# Patient Record
Sex: FEMALE | Race: WHITE | NOT HISPANIC OR LATINO | URBAN - METROPOLITAN AREA
[De-identification: names, ages, dates, MRNs, and addresses within clinical notes are randomized per-mention and may not be internally consistent; named-entity substitution may affect disease eponyms.]

---

## 2021-02-03 ENCOUNTER — IMMUNIZATIONS (OUTPATIENT)
Dept: FAMILY MEDICINE CLINIC | Facility: HOSPITAL | Age: 76
End: 2021-02-03

## 2021-02-03 DIAGNOSIS — Z23 ENCOUNTER FOR IMMUNIZATION: Primary | ICD-10-CM

## 2021-02-03 PROCEDURE — 91300 SARS-COV-2 / COVID-19 MRNA VACCINE (PFIZER-BIONTECH) 30 MCG: CPT

## 2021-02-03 PROCEDURE — 0001A SARS-COV-2 / COVID-19 MRNA VACCINE (PFIZER-BIONTECH) 30 MCG: CPT

## 2021-02-26 ENCOUNTER — IMMUNIZATIONS (OUTPATIENT)
Dept: FAMILY MEDICINE CLINIC | Facility: HOSPITAL | Age: 76
End: 2021-02-26

## 2021-02-26 DIAGNOSIS — Z23 ENCOUNTER FOR IMMUNIZATION: Primary | ICD-10-CM

## 2021-02-26 PROCEDURE — 0002A SARS-COV-2 / COVID-19 MRNA VACCINE (PFIZER-BIONTECH) 30 MCG: CPT

## 2021-02-26 PROCEDURE — 91300 SARS-COV-2 / COVID-19 MRNA VACCINE (PFIZER-BIONTECH) 30 MCG: CPT

## 2022-05-26 ENCOUNTER — APPOINTMENT (OUTPATIENT)
Dept: LAB | Age: 77
End: 2022-05-26
Attending: INTERNAL MEDICINE
Payer: MEDICARE

## 2022-05-26 ENCOUNTER — HOSPITAL ENCOUNTER (OUTPATIENT)
Dept: RADIOLOGY | Age: 77
Discharge: HOME | End: 2022-05-26
Attending: INTERNAL MEDICINE
Payer: MEDICARE

## 2022-05-26 ENCOUNTER — TRANSCRIBE ORDERS (OUTPATIENT)
Dept: REGISTRATION | Age: 77
End: 2022-05-26

## 2022-05-26 DIAGNOSIS — I47.20 VENTRICULAR TACHYCARDIA (CMS/HCC): ICD-10-CM

## 2022-05-26 DIAGNOSIS — I49.3 VENTRICULAR PREMATURE DEPOLARIZATION: ICD-10-CM

## 2022-05-26 DIAGNOSIS — I47.20 VENTRICULAR TACHYCARDIA (CMS/HCC): Primary | ICD-10-CM

## 2022-05-26 DIAGNOSIS — I47.10 SUPRAVENTRICULAR TACHYCARDIA (CMS/HCC): ICD-10-CM

## 2022-05-26 DIAGNOSIS — I34.0 NONRHEUMATIC MITRAL (VALVE) INSUFFICIENCY: ICD-10-CM

## 2022-05-26 DIAGNOSIS — I35.1 NONRHEUMATIC AORTIC (VALVE) INSUFFICIENCY: ICD-10-CM

## 2022-05-26 DIAGNOSIS — Z95.818 PRESENCE OF OTHER CARDIAC IMPLANTS AND GRAFTS: ICD-10-CM

## 2022-05-26 DIAGNOSIS — R94.31 ABNORMAL ELECTROCARDIOGRAM (ECG) (EKG): ICD-10-CM

## 2022-05-26 DIAGNOSIS — I65.23 OCCLUSION AND STENOSIS OF BILATERAL CAROTID ARTERIES: ICD-10-CM

## 2022-05-26 DIAGNOSIS — N28.9 DISORDER OF KIDNEY AND URETER, UNSPECIFIED: ICD-10-CM

## 2022-05-26 DIAGNOSIS — R06.09 OTHER FORMS OF DYSPNEA: ICD-10-CM

## 2022-05-26 DIAGNOSIS — I10 ESSENTIAL (PRIMARY) HYPERTENSION: ICD-10-CM

## 2022-05-26 DIAGNOSIS — I65.29 OCCLUSION AND STENOSIS OF UNSPECIFIED CAROTID ARTERY: ICD-10-CM

## 2022-05-26 LAB
ANION GAP SERPL CALC-SCNC: 12 MEQ/L (ref 3–15)
APTT PPP: 30 SEC (ref 23–35)
BUN SERPL-MCNC: 33 MG/DL (ref 8–20)
CALCIUM SERPL-MCNC: 10.3 MG/DL (ref 8.9–10.3)
CHLORIDE SERPL-SCNC: 102 MEQ/L (ref 98–109)
CO2 SERPL-SCNC: 27 MEQ/L (ref 22–32)
CREAT SERPL-MCNC: 1.4 MG/DL (ref 0.6–1.1)
ERYTHROCYTE [DISTWIDTH] IN BLOOD BY AUTOMATED COUNT: 15.3 % (ref 11.7–14.4)
GFR SERPL CREATININE-BSD FRML MDRD: 36.4 ML/MIN/1.73M*2
GLUCOSE SERPL-MCNC: 100 MG/DL (ref 70–99)
HCT VFR BLDCO AUTO: 44.8 % (ref 35–45)
HGB BLD-MCNC: 13.7 G/DL (ref 11.8–15.7)
INR PPP: 1.1
MCH RBC QN AUTO: 28 PG (ref 28–33.2)
MCHC RBC AUTO-ENTMCNC: 30.6 G/DL (ref 32.2–35.5)
MCV RBC AUTO: 91.6 FL (ref 83–98)
PDW BLD AUTO: 11.7 FL (ref 9.4–12.3)
PLATELET # BLD AUTO: 262 K/UL (ref 150–369)
POTASSIUM SERPL-SCNC: 5.1 MEQ/L (ref 3.6–5.1)
PROTHROMBIN TIME: 13.7 SEC (ref 12.2–14.5)
RBC # BLD AUTO: 4.89 M/UL (ref 3.93–5.22)
SODIUM SERPL-SCNC: 141 MEQ/L (ref 136–144)
WBC # BLD AUTO: 6.79 K/UL (ref 3.8–10.5)

## 2022-05-26 PROCEDURE — 85610 PROTHROMBIN TIME: CPT

## 2022-05-26 PROCEDURE — 80048 BASIC METABOLIC PNL TOTAL CA: CPT

## 2022-05-26 PROCEDURE — 85730 THROMBOPLASTIN TIME PARTIAL: CPT

## 2022-05-26 PROCEDURE — 85027 COMPLETE CBC AUTOMATED: CPT

## 2022-05-26 PROCEDURE — 71046 X-RAY EXAM CHEST 2 VIEWS: CPT

## 2022-05-26 PROCEDURE — 36415 COLL VENOUS BLD VENIPUNCTURE: CPT

## 2022-05-27 ENCOUNTER — TRANSCRIBE ORDERS (OUTPATIENT)
Dept: SCHEDULING | Age: 77
End: 2022-05-27

## 2022-05-27 DIAGNOSIS — Z11.59 ENCOUNTER FOR SCREENING FOR OTHER VIRAL DISEASES: Primary | ICD-10-CM

## 2022-06-06 ENCOUNTER — APPOINTMENT (OUTPATIENT)
Dept: LAB | Age: 77
DRG: 247 | End: 2022-06-06
Attending: PHYSICIAN ASSISTANT
Payer: MEDICARE

## 2022-06-06 DIAGNOSIS — Z11.59 ENCOUNTER FOR SCREENING FOR OTHER VIRAL DISEASES: ICD-10-CM

## 2022-06-06 LAB — SARS-COV-2 RNA RESP QL NAA+PROBE: NEGATIVE

## 2022-06-06 PROCEDURE — U0003 INFECTIOUS AGENT DETECTION BY NUCLEIC ACID (DNA OR RNA); SEVERE ACUTE RESPIRATORY SYNDROME CORONAVIRUS 2 (SARS-COV-2) (CORONAVIRUS DISEASE [COVID-19]), AMPLIFIED PROBE TECHNIQUE, MAKING USE OF HIGH THROUGHPUT TECHNOLOGIES AS DESCRIBED BY CMS-2020-01-R: HCPCS

## 2022-06-06 PROCEDURE — C9803 HOPD COVID-19 SPEC COLLECT: HCPCS

## 2022-06-08 NOTE — H&P
There have been no  interim changes in his medical history since the office visit.   Treatment still indicated    Admitted today for elective left and RIGHT heart catheterization related to continued PASCUAL(anginal equivalent)     Echocardiogram in January which showed a preserved EF with mild-to-moderate valvular regurgitation and mild pulmonary hypertension.  She had a PET stress test in November 2021. There were some EKG changes during the stress test but myocardial perfusion imaging appear normal.        There have been no medication changes.     Has been NPO since midnight      Labs reviewed 05/26/22  Creatinine 1.4  HG 13.7  Plats 262  COVID negative 06/06/22    Aspirin given  No contraindications to DAPT               Date of service: 05/26/2022 11:10 AM    Background     Reason for Visit: Follow-up    History from: Patient    Chief Complaint   #1: See HPI    History of Present Illness:  77-year-old woman with past medical history of mild bilateral carotid artery disease, hypertension, hyperlipidemia, mild mitral and mild aortic regurgitation, frequent PVCs, and dyspnea on exertion presents for follow-up.    Patient still with PASCUAL.  She has no chest pain.  She denies palpitations.  ILR transmissions have shown brief episodes of SVT as well as NSVT.  The patient reports that medications are being taken as prescribed.  No leg swelling, orthopnea or PND.  No presyncope or syncope.     She had an echocardiogram in January that showed EF 65-70%, mild LVH, mild LAE, mild MR, mild AR, mild/mod TR, mod W[place], mild pHTN with PASP 35-40 mmHg.     She had an implantable loop recorder placed earlier this year.  He has shown brief episodes of SVT as well as nonsustained ventricular tachycardia.    Past Medical and Surgical Histories   Past Medical History (reviewed - no changes required):   Hypertension  Arthritis  Left retinal vein occlusion 6/2016  PVC's, frequent   PAC's  SVT, NSVT   Hyperlipidemia   Cerebral Vascular  Disease   Mitral Regurgitation, trace to mild  Aortic Regurgitation, mild  CKD III    Rt hip replacement- 2011    Echo (2022): EF 65-70%, mild LVH, mild LAE, mild MR, mild AR, mild/mod TR, mod GA, mild pHTN with PASP 35-40 mmHg.   Past Surgical History (reviewed - no changes required):   Total right hip replacement    Social History     Smoked Tobacco Usage    Smoking Status:  Never    Detailed Status:  Never smoker    Smokeless Tobacco Usage     Smokeless Status:   Never    Alcohol Usage     Alcohol Status:  Current    She drinks alcohol infrequently.    Average drink(s) / day: Infrequent    Additional Social History (reviewed - no changes required):    & retired  Exercises regularly    Family History   Structured Family History  [01]  Father:   Heart disease;  (3/26/2015).  [01]  Mother:   Heart disease;  (3/26/2015).  Family History (reviewed - no changes required):   father  @ 80- [place], pacemaker  mother  @ 92- old age    Cardiac Catheterization History   Past Cath History:   Carotid Ultrasound Exam 16- mild plaque, PHAN < 50%, LICA no hemodynamically significant stenosis   Echocardiogram 16- EF 70%, no LVH, Ao nl, LV/RV nl,  mild MR, mild AR, TR mild-mod, PA 30-35 mmHg, mild-mod PI.  Since 9/24/15, the MR is mild  Carotid Ultrasound Exam 10/9/18- bilat 50-69% stenosis (lower end spectrum),  since 18 there is bilat. interval progression.  Repeat 1 year.    Review of Systems   General: Denies weakness and fatigue.   Eyes: Denies change in vision.   Cardiovascular: Acknowledges dyspnea on exertion. Denies chest pain at rest, chest pain with exercise, palpitations and peripheral edema.   Respiratory: Denies dyspnea at rest, wheezing and shortness of breath.   Gastrointestinal: Denies nausea, vomiting and change in bowel habits.   Genitourinary: Denies hematuria and nocturia.   Musculoskeletal: Denies myalgias.   Neurologic: Denies pre-syncope, syncope and dizziness.    Psychiatric: Denies depression and anxiety.   Endocrine: Denies unusual weight change.   Heme/Lymphatic: Denies abnormal bruising and bleeding.   Allergic/Immunologic: Denies hay fever.     Physical Exam  I have examined the patient and concur with documented physical findings.  Vital Signs:     Height: 63 inches  (01/31/2022).    Weight: 175 pounds    BP cuff size:  Large    BP: 120/64 mmHg HR: 56 bpm.    Body Mass Index: 31.11    Body Surface Area: 1.83 m2.  General: Well developed and well nourished.   Eyes: JOSEY, conjunctiva and sclera clear and no xanthomas.   Neck: Supple, no adenopathy and no thyromegaly.   Lungs: Clear to auscultation.   Chest: No obvious deformity.   Heart: Regular rate and rhythm, S1 and S2 physiologic and PMI not displaced. Examination of neck veins reveals no neck vein distention.  Carotid artery examination reveals no carotid bruits.    Extremities: There is no peripheral edema cyanosis or clubbing.   Pulses: Pedal pulses are normal bilaterally.   Abdomen: Normoactive bowel sounds and soft and non-tender.   Musculoskeletal: Normal gait and normal strength and tone.   Skin: Warm and dry and no rashes.   Neurologic: Alert, oriented x 3 and appropriate affect.       Resting Electrocardiogram  ECG personally reviewed   ECG Date:  05/26/2022   Sinus bradycardia with frequent atrial premature complexes,    Heart Rate: 55 bpm. NC:  154 msec. QRS: 90 msec. QT: 458 msec.   QRS Axis:   8 deg.   ECG Comments: # PACs = 2., -Poor R-wave progression,  Low voltage, possible pulmonary disease. , may be secondary to pulmonary disease consider old anterior infarct   Summary: Abnormal tracing.      Medications:   coenzyme Q10 100 mg capsule (coenzyme q10) Take 1 capsule by mouth once a day   fluticasone propionate 50 mcg/actuation spray,suspension (fluticasone propionate) Spray 2 spray into both nostrils once a day   triamterene-hydrochlorothiazid 37.5-25 mg tablet (triamterene-hydrochlorothiazid) Take  1 tablet by mouth once a day   Aspirin Low Dose 81 mg tablet,delayed release (DR/EC) (aspirin) Take 1 tablet by mouth once a day   omeprazole 40 mg capsule,delayed release(DR/EC) (omeprazole) Take 1 capsule by mouth once a day   simvastatin 20 mg tablet (simvastatin) Take 1 tablet every night   tramadol-acetaminophen 37.5-325 mg tablet (tramadol-acetaminophen) Take 1 tablet by mouth twice a day   famotidine 40 mg tablet (famotidine) Take 1 tablet by mouth at bedtime   metoprolol succinate 50 mg tablet extended release 24 hr (metoprolol succinate) Take 1 tablet by mouth once a day   Medications reviewed today.    Medications list verified with patient by Brian Saravia M.D..    Allergies, Intolerances and/or Therapeutic Variances:    CELEBREX (CELECOXIB)  [DRUG]  (Onset: 06/12/2013) Critical: rash & Bruisisng  CIPRO  [DRUG] Critical: torso rash  VOLTAREN  [DRUG] Critical  * CLINDAMYCIN  [DRUG] Severe: intolerant  CRESTOR (ROSUVASTATIN CALCIUM)  [DRUG]  (Onset: 02/17/2017) Severe: severe aches  ATORVASTATIN CALCIUM (ATORVASTATIN CALCIUM)  [DRUG]  (Onset: 06/16/2017) Severe: itching, no rash  Allergies and adverse reactions reviewed today.      Problems:   NONSUSTAINED VENTRICULAR TACHYCARDIA (ICD-427.1) (EBP18-P76.2)  PAROXYSMAL SVT (ICD-427.0) (YXL81-B81.1)  NEAR SYNCOPE (ICD-780.2) (EQQ42-U97)  MEDTRON[employer] SUBCUTANEOUS IMPLANTABLE LOOP RECORDER IN SITU (ICD-V45.09) (NFV95-D79.818)  DYSPNEA ON EXERTION (ICD-786.09) (KWX04-F46.09)  STENOSIS OR OCCLUSION OF BILATERAL CAROTID ARTERIES (ICD-433.10) (BXO78-M36.23)  BODY MASS INDEX (BMI) 31 - 32 (ICD-V85.30) (TNA94-H17.31)  SHORTNESS OF BREATH (ICD-786.05) (KLX75-X25.02)  PALPITATIONS (ICD-785.1) (JCF01-N28.2)  PREMATURE VENTRICULAR CONTRACTIONS (ICD-427.69) (NDM54-A07.3)  ESSENTIAL BENIGN HYPERTENSION (ICD-401.1) (SML43-W40)  MITRAL REGURGITATION (ICD-424.0) (RHM41-X79.0)  AORTIC REGURGITATION, MILD (ICD-424.1) (WNI28-E30.1)  ABNORMAL ELECTROCARDIOGRAM (ICD-794.31)  (GGF43-B92.31)  CAROTID ARTERY DISEASE (ICD-433.10) (BWW06-M20.29)  DEGENERATIVE JOINT DISEASE (ICD-715.90) (XGK99-I53.90)  GAIT DISTURBANCE (ICD-781.2) (CAM79-K46.9)  HYPERLIPIDEMIA (ICD-272.4) (XCS75-N76.5)  CENTRAL RETINAL VEIN OCCLUSION (ICD-362.35) (IOU42-C48.819)  RENAL INSUFFICIENCY (ICD-593.9) (DPW95-V26.9)  Problems reviewed today.      Impression:  1)  Nonsustained Ventricular Tachycardia :  We will continue to monitor her loop recorder transmissions.  There was 1 episode of nonsustained ventricular tachycardia detected.  She is on metoprolol succinate 50 mg once daily.  Continue.  She follows with electrophysiology.    2)  Paroxysmal SVT :  Brief episodes of SVT have also been noted on her loop recorder transmissions.  Continue Toprol.    3)  [employer] Subcutaneous Implantable Loop Recorder In Situ :  Noted.  Continue to monitor.    4)  Dyspnea On Exertion :  The patient complains of dyspnea on exertion.  This is chronic.  It is most notable when she is walking up hills.  She denies exertional chest pain.  Prior evaluation includes echocardiogram in January which showed a preserved EF with mild-to-moderate valvular regurgitation and mild pulmonary hypertension.  She had a PET stress test in November 2021. There were some EKG changes during the stress test but myocardial perfusion imaging appear normal.  Despite this, she is still complaining of dyspnea on exertion.  She has multiple risk factors for vascular disease including age, obesity, hypertension, and hyperlipidemia.  I am sending for right heart catheterization and I will also check a coronary angiogram to definitively evaluate coronary arteries and exclude symptomatic coronary artery disease as potential culprit of her dyspnea on exertion.    5)  Stenosis Or Occlusion Of Bilateral Carotid Arteries :  The patient has mild bilateral carotid artery disease.  Continue medical management with aspirin and simvastatin.  Recent blood work reviewed.  LDL  61, HDL 50, triglycerides 94.     6)  Premature Ventricular Contractions :  Noted.  Continue Toprol.     7)  Essential Benign Hypertension :  Blood pressure is controlled.  Continue current medications.    8)  Mitral Regurgitation :  Mild in severity.  Stable.  Monitor.    9)  Aortic Regurgitation: Mild :  Mild in severity.  Stable.  Monitor.    10)  Abnormal Electrocardiogram :  EKG today shows sinus bradycardia with PACs and poor R-wave progression, cannot exclude old anterior MI.  Continue to monitor.    11)  Carotid Artery Disease :  Plan as above.    12)  Renal Insufficiency :  Creatinine stable on recent blood work at 1.29.    RTO 6 months  R/[facility]  Pre-procedural bloodwork and CXR ordered             Return visit requested: 6 month(s) as a preferred in office encounter.    Medical Decision Making: Order Unique Test                    Electronically Signed by Brian Saravia M.D. on 05/26/2022 at 11:48 AM    ________________________________________________________________________

## 2022-06-09 ENCOUNTER — HOSPITAL ENCOUNTER (INPATIENT)
Facility: HOSPITAL | Age: 77
LOS: 1 days | Discharge: HOME | DRG: 247 | End: 2022-06-10
Attending: INTERNAL MEDICINE | Admitting: INTERNAL MEDICINE
Payer: MEDICARE

## 2022-06-09 ENCOUNTER — PROCEDURE PASS (OUTPATIENT)
Age: 77
End: 2022-06-09
Payer: MEDICARE

## 2022-06-09 DIAGNOSIS — I25.10 CAD (CORONARY ARTERY DISEASE): Primary | ICD-10-CM

## 2022-06-09 DIAGNOSIS — Z01.812 ENCOUNTER FOR PRE-OPERATIVE LABORATORY TESTING: ICD-10-CM

## 2022-06-09 DIAGNOSIS — R06.02 SHORTNESS OF BREATH: ICD-10-CM

## 2022-06-09 LAB
POCT ACT-LR: 283 SEC (ref 113–149)
POCT ACT-LR: 287 SEC (ref 113–149)
POCT ACT-LR: 312 SEC (ref 113–149)
POCT ACT-LR: 357 SEC (ref 113–149)
POCT ACT-LR: 363 SEC (ref 113–149)
POCT ACT-LR: 379 SEC (ref 113–149)
POCT OXYHGB: 78.2 % (ref 93–98)
POCT OXYHGB: 96.7 % (ref 93–98)
POCT TEST: ABNORMAL
POCT TEST: NORMAL

## 2022-06-09 PROCEDURE — 71000011 HC PACU PHASE 1 EA ADDL MIN: Performed by: INTERNAL MEDICINE

## 2022-06-09 PROCEDURE — 71000001 HC PACU PHASE 1 INITIAL 30MIN: Performed by: INTERNAL MEDICINE

## 2022-06-09 PROCEDURE — 4A023N8 MEASUREMENT OF CARDIAC SAMPLING AND PRESSURE, BILATERAL, PERCUTANEOUS APPROACH: ICD-10-PCS | Performed by: INTERNAL MEDICINE

## 2022-06-09 PROCEDURE — 93460 R&L HRT ART/VENTRICLE ANGIO: CPT | Mod: XU | Performed by: INTERNAL MEDICINE

## 2022-06-09 PROCEDURE — B2101ZZ FLUOROSCOPY OF SINGLE CORONARY ARTERY USING LOW OSMOLAR CONTRAST: ICD-10-PCS | Performed by: INTERNAL MEDICINE

## 2022-06-09 PROCEDURE — 93005 ELECTROCARDIOGRAM TRACING: CPT | Mod: 59 | Performed by: NURSE PRACTITIONER

## 2022-06-09 PROCEDURE — C1887 CATHETER, GUIDING: HCPCS | Performed by: INTERNAL MEDICINE

## 2022-06-09 PROCEDURE — 93454 CORONARY ARTERY ANGIO S&I: CPT | Mod: XU,78 | Performed by: INTERNAL MEDICINE

## 2022-06-09 PROCEDURE — 63600105 HC IODINE BASED CONTRAST: Mod: JW | Performed by: INTERNAL MEDICINE

## 2022-06-09 PROCEDURE — C1769 GUIDE WIRE: HCPCS | Performed by: INTERNAL MEDICINE

## 2022-06-09 PROCEDURE — 12000000 HC ROOM AND CARE MED/SURG

## 2022-06-09 PROCEDURE — C1726 CATH, BAL DIL, NON-VASCULAR: HCPCS | Performed by: INTERNAL MEDICINE

## 2022-06-09 PROCEDURE — 25800000 HC PHARMACY IV SOLUTIONS: Performed by: NURSE PRACTITIONER

## 2022-06-09 PROCEDURE — 85347 COAGULATION TIME ACTIVATED: CPT | Performed by: INTERNAL MEDICINE

## 2022-06-09 PROCEDURE — 63600000 HC DRUGS/DETAIL CODE: Performed by: INTERNAL MEDICINE

## 2022-06-09 PROCEDURE — C9601 PERC DRUG-EL COR STENT BRAN: HCPCS | Performed by: INTERNAL MEDICINE

## 2022-06-09 PROCEDURE — C1725 CATH, TRANSLUMIN NON-LASER: HCPCS | Performed by: INTERNAL MEDICINE

## 2022-06-09 PROCEDURE — C1751 CATH, INF, PER/CENT/MIDLINE: HCPCS | Performed by: INTERNAL MEDICINE

## 2022-06-09 PROCEDURE — 63700000 HC SELF-ADMINISTRABLE DRUG: Performed by: NURSE PRACTITIONER

## 2022-06-09 PROCEDURE — C9606 PERC D-E COR REVASC W AMI S: HCPCS | Performed by: INTERNAL MEDICINE

## 2022-06-09 PROCEDURE — B2111ZZ FLUOROSCOPY OF MULTIPLE CORONARY ARTERIES USING LOW OSMOLAR CONTRAST: ICD-10-PCS | Performed by: INTERNAL MEDICINE

## 2022-06-09 PROCEDURE — 63700000 HC SELF-ADMINISTRABLE DRUG: Performed by: INTERNAL MEDICINE

## 2022-06-09 PROCEDURE — 02703ZZ DILATION OF CORONARY ARTERY, ONE ARTERY, PERCUTANEOUS APPROACH: ICD-10-PCS | Performed by: INTERNAL MEDICINE

## 2022-06-09 PROCEDURE — C9600 PERC DRUG-EL COR STENT SING: HCPCS | Mod: RC | Performed by: INTERNAL MEDICINE

## 2022-06-09 PROCEDURE — C1874 STENT, COATED/COV W/DEL SYS: HCPCS | Performed by: INTERNAL MEDICINE

## 2022-06-09 PROCEDURE — C9600 PERC DRUG-EL COR STENT SING: HCPCS | Mod: LC

## 2022-06-09 PROCEDURE — 027135Z DILATION OF CORONARY ARTERY, TWO ARTERIES WITH TWO DRUG-ELUTING INTRALUMINAL DEVICES, PERCUTANEOUS APPROACH: ICD-10-PCS | Performed by: INTERNAL MEDICINE

## 2022-06-09 PROCEDURE — 93005 ELECTROCARDIOGRAM TRACING: CPT | Performed by: NURSE PRACTITIONER

## 2022-06-09 PROCEDURE — C1894 INTRO/SHEATH, NON-LASER: HCPCS | Performed by: INTERNAL MEDICINE

## 2022-06-09 PROCEDURE — 25000000 HC PHARMACY GENERAL: Performed by: INTERNAL MEDICINE

## 2022-06-09 PROCEDURE — 21400000 HC ROOM AND CARE CCU/INTERMEDIATE

## 2022-06-09 PROCEDURE — 25000000 HC PHARMACY GENERAL: Performed by: NURSE PRACTITIONER

## 2022-06-09 PROCEDURE — 27200000 HC STERILE SUPPLY: Performed by: INTERNAL MEDICINE

## 2022-06-09 PROCEDURE — C1760 CLOSURE DEV, VASC: HCPCS | Performed by: INTERNAL MEDICINE

## 2022-06-09 DEVICE — EVEROLIMUS-ELUTING PLATINUM CHROMIUM CORONARY STENT SYSTEM
Type: IMPLANTABLE DEVICE | Site: CORONARY | Status: FUNCTIONAL
Brand: SYNERGY™ XD

## 2022-06-09 DEVICE — PERCLOSE PROGLIDE™ SUTURE-MEDIATED CLOSURE SYSTEM
Type: IMPLANTABLE DEVICE | Site: GROIN | Status: FUNCTIONAL
Brand: PERCLOSE PROGLIDE™

## 2022-06-09 RX ORDER — MIDAZOLAM HYDROCHLORIDE 2 MG/2ML
INJECTION, SOLUTION INTRAMUSCULAR; INTRAVENOUS
Status: DISCONTINUED | OUTPATIENT
Start: 2022-06-09 | End: 2022-06-09 | Stop reason: HOSPADM

## 2022-06-09 RX ORDER — FENTANYL CITRATE 50 UG/ML
INJECTION, SOLUTION INTRAMUSCULAR; INTRAVENOUS
Status: DISCONTINUED | OUTPATIENT
Start: 2022-06-09 | End: 2022-06-09 | Stop reason: HOSPADM

## 2022-06-09 RX ORDER — NICARDIPINE HCL-0.9% SOD CHLOR 1 MG/10 ML
SYRINGE (ML) INTRAVENOUS
Status: DISCONTINUED | OUTPATIENT
Start: 2022-06-09 | End: 2022-06-09 | Stop reason: HOSPADM

## 2022-06-09 RX ORDER — IODIXANOL 320 MG/ML
INJECTION, SOLUTION INTRAVASCULAR
Status: DISCONTINUED | OUTPATIENT
Start: 2022-06-09 | End: 2022-06-09 | Stop reason: HOSPADM

## 2022-06-09 RX ORDER — FAMOTIDINE 20 MG/1
40 TABLET, FILM COATED ORAL NIGHTLY
Status: DISCONTINUED | OUTPATIENT
Start: 2022-06-09 | End: 2022-06-09

## 2022-06-09 RX ORDER — FAMOTIDINE 20 MG/1
20 TABLET, FILM COATED ORAL NIGHTLY
Status: DISCONTINUED | OUTPATIENT
Start: 2022-06-09 | End: 2022-06-10 | Stop reason: HOSPADM

## 2022-06-09 RX ORDER — CLOPIDOGREL BISULFATE 75 MG/1
TABLET ORAL
Status: DISCONTINUED | OUTPATIENT
Start: 2022-06-09 | End: 2022-06-09 | Stop reason: HOSPADM

## 2022-06-09 RX ORDER — SIMVASTATIN 20 MG/1
20 TABLET, FILM COATED ORAL NIGHTLY
COMMUNITY
End: 2022-09-25 | Stop reason: HOSPADM

## 2022-06-09 RX ORDER — ATROPINE SULFATE 0.1 MG/ML
0.5 INJECTION INTRAVENOUS EVERY 5 MIN PRN
Status: DISCONTINUED | OUTPATIENT
Start: 2022-06-09 | End: 2022-06-10 | Stop reason: HOSPADM

## 2022-06-09 RX ORDER — IBUPROFEN 200 MG
16-32 TABLET ORAL AS NEEDED
Status: DISCONTINUED | OUTPATIENT
Start: 2022-06-09 | End: 2022-06-10 | Stop reason: HOSPADM

## 2022-06-09 RX ORDER — SODIUM CHLORIDE 450 MG/100ML
INJECTION, SOLUTION INTRAVENOUS CONTINUOUS
Status: ACTIVE | OUTPATIENT
Start: 2022-06-09 | End: 2022-06-09

## 2022-06-09 RX ORDER — METOPROLOL SUCCINATE 50 MG/1
50 TABLET, EXTENDED RELEASE ORAL DAILY
Status: DISCONTINUED | OUTPATIENT
Start: 2022-06-09 | End: 2022-06-10 | Stop reason: HOSPADM

## 2022-06-09 RX ORDER — UBIDECARENONE 100 MG
100 CAPSULE ORAL EVERY EVENING
COMMUNITY
End: 2022-09-25 | Stop reason: HOSPADM

## 2022-06-09 RX ORDER — SODIUM CHLORIDE 450 MG/100ML
INJECTION, SOLUTION INTRAVENOUS CONTINUOUS
Status: DISCONTINUED | OUTPATIENT
Start: 2022-06-09 | End: 2022-06-09

## 2022-06-09 RX ORDER — HYDRALAZINE HYDROCHLORIDE 20 MG/ML
INJECTION INTRAMUSCULAR; INTRAVENOUS
Status: DISCONTINUED | OUTPATIENT
Start: 2022-06-09 | End: 2022-06-09 | Stop reason: HOSPADM

## 2022-06-09 RX ORDER — ACETAMINOPHEN 325 MG/1
650 TABLET ORAL EVERY 4 HOURS PRN
Status: DISCONTINUED | OUTPATIENT
Start: 2022-06-09 | End: 2022-06-10 | Stop reason: HOSPADM

## 2022-06-09 RX ORDER — PANTOPRAZOLE SODIUM 20 MG/1
20 TABLET, DELAYED RELEASE ORAL DAILY
Status: DISCONTINUED | OUTPATIENT
Start: 2022-06-10 | End: 2022-06-10 | Stop reason: HOSPADM

## 2022-06-09 RX ORDER — DIPHENHYDRAMINE HCL 25 MG
25 CAPSULE ORAL EVERY 6 HOURS PRN
Status: DISCONTINUED | OUTPATIENT
Start: 2022-06-09 | End: 2022-06-10 | Stop reason: HOSPADM

## 2022-06-09 RX ORDER — TRAMADOL HYDROCHLORIDE AND ACETAMINOPHEN 37.5; 325 MG/1; MG/1
1 TABLET, FILM COATED ORAL
COMMUNITY

## 2022-06-09 RX ORDER — TRIAMTERENE/HYDROCHLOROTHIAZID 37.5-25 MG
1 TABLET ORAL EVERY MORNING
COMMUNITY
End: 2022-07-19

## 2022-06-09 RX ORDER — OMEPRAZOLE 20 MG/1
20 CAPSULE, DELAYED RELEASE ORAL
COMMUNITY
End: 2022-07-19

## 2022-06-09 RX ORDER — NITROGLYCERIN 0.4 MG/1
0.4 TABLET SUBLINGUAL EVERY 5 MIN PRN
Status: DISCONTINUED | OUTPATIENT
Start: 2022-06-09 | End: 2022-06-10 | Stop reason: HOSPADM

## 2022-06-09 RX ORDER — ONDANSETRON HYDROCHLORIDE 2 MG/ML
4 INJECTION, SOLUTION INTRAVENOUS EVERY 8 HOURS PRN
Status: DISCONTINUED | OUTPATIENT
Start: 2022-06-09 | End: 2022-06-10 | Stop reason: HOSPADM

## 2022-06-09 RX ORDER — NITROGLYCERIN 0.4 MG/1
0.4 TABLET SUBLINGUAL EVERY 5 MIN PRN
Status: DISCONTINUED | OUTPATIENT
Start: 2022-06-09 | End: 2022-06-09

## 2022-06-09 RX ORDER — ASPIRIN 81 MG/1
81 TABLET ORAL DAILY
COMMUNITY
End: 2022-07-25 | Stop reason: HOSPADM

## 2022-06-09 RX ORDER — TRIAMTERENE/HYDROCHLOROTHIAZID 37.5-25 MG
1 TABLET ORAL DAILY
Status: DISCONTINUED | OUTPATIENT
Start: 2022-06-10 | End: 2022-06-10 | Stop reason: HOSPADM

## 2022-06-09 RX ORDER — MORPHINE SULFATE 2 MG/ML
2 INJECTION, SOLUTION INTRAMUSCULAR; INTRAVENOUS
Status: DISCONTINUED | OUTPATIENT
Start: 2022-06-09 | End: 2022-06-10

## 2022-06-09 RX ORDER — ALUMINUM HYDROXIDE, MAGNESIUM HYDROXIDE, AND SIMETHICONE 1200; 120; 1200 MG/30ML; MG/30ML; MG/30ML
30 SUSPENSION ORAL EVERY 4 HOURS PRN
Status: DISCONTINUED | OUTPATIENT
Start: 2022-06-09 | End: 2022-06-10 | Stop reason: HOSPADM

## 2022-06-09 RX ORDER — HEPARIN SODIUM 1000 [USP'U]/ML
INJECTION, SOLUTION INTRAVENOUS; SUBCUTANEOUS
Status: DISCONTINUED | OUTPATIENT
Start: 2022-06-09 | End: 2022-06-09 | Stop reason: HOSPADM

## 2022-06-09 RX ORDER — ASPIRIN 81 MG/1
81 TABLET ORAL DAILY
Status: DISCONTINUED | OUTPATIENT
Start: 2022-06-10 | End: 2022-06-10 | Stop reason: HOSPADM

## 2022-06-09 RX ORDER — DEXTROSE 40 %
15-30 GEL (GRAM) ORAL AS NEEDED
Status: DISCONTINUED | OUTPATIENT
Start: 2022-06-09 | End: 2022-06-10 | Stop reason: HOSPADM

## 2022-06-09 RX ORDER — CLOPIDOGREL BISULFATE 75 MG/1
75 TABLET ORAL DAILY
Status: DISCONTINUED | OUTPATIENT
Start: 2022-06-10 | End: 2022-06-10 | Stop reason: HOSPADM

## 2022-06-09 RX ORDER — DEXTROSE 50 % IN WATER (D50W) INTRAVENOUS SYRINGE
25 AS NEEDED
Status: DISCONTINUED | OUTPATIENT
Start: 2022-06-09 | End: 2022-06-10 | Stop reason: HOSPADM

## 2022-06-09 RX ORDER — LIDOCAINE HYDROCHLORIDE 10 MG/ML
INJECTION, SOLUTION INFILTRATION; PERINEURAL
Status: DISCONTINUED | OUTPATIENT
Start: 2022-06-09 | End: 2022-06-09 | Stop reason: HOSPADM

## 2022-06-09 RX ORDER — METOPROLOL SUCCINATE 50 MG/1
50 TABLET, EXTENDED RELEASE ORAL DAILY
COMMUNITY
End: 2022-07-25 | Stop reason: HOSPADM

## 2022-06-09 RX ORDER — NAPROXEN SODIUM 220 MG/1
81 TABLET, FILM COATED ORAL ONCE
Status: COMPLETED | OUTPATIENT
Start: 2022-06-09 | End: 2022-06-09

## 2022-06-09 RX ORDER — CLOPIDOGREL BISULFATE 75 MG/1
75 TABLET ORAL DAILY
Qty: 30 TABLET | Refills: 11 | Status: SHIPPED | OUTPATIENT
Start: 2022-06-09 | End: 2022-06-10 | Stop reason: HOSPADM

## 2022-06-09 RX ORDER — FAMOTIDINE 20 MG/1
40 TABLET, FILM COATED ORAL NIGHTLY PRN
COMMUNITY
End: 2022-07-19

## 2022-06-09 RX ORDER — METOPROLOL TARTRATE 1 MG/ML
5 INJECTION, SOLUTION INTRAVENOUS ONCE
Status: COMPLETED | OUTPATIENT
Start: 2022-06-09 | End: 2022-06-09

## 2022-06-09 RX ADMIN — ASPIRIN 81 MG CHEWABLE TABLET 81 MG: 81 TABLET CHEWABLE at 08:14

## 2022-06-09 RX ADMIN — METOPROLOL SUCCINATE 50 MG: 50 TABLET, EXTENDED RELEASE ORAL at 16:15

## 2022-06-09 RX ADMIN — FAMOTIDINE 20 MG: 20 TABLET ORAL at 21:13

## 2022-06-09 RX ADMIN — SODIUM CHLORIDE: 4.5 INJECTION, SOLUTION INTRAVENOUS at 08:14

## 2022-06-09 RX ADMIN — SODIUM CHLORIDE: 4.5 INJECTION, SOLUTION INTRAVENOUS at 17:00

## 2022-06-09 RX ADMIN — METOPROLOL TARTRATE 5 MG: 1 INJECTION, SOLUTION INTRAVENOUS at 11:23

## 2022-06-09 RX ADMIN — NITROGLYCERIN 0.4 MG: 0.4 TABLET SUBLINGUAL at 11:23

## 2022-06-09 ASSESSMENT — PATIENT HEALTH QUESTIONNAIRE - PHQ9: SUM OF ALL RESPONSES TO PHQ9 QUESTIONS 1 & 2: 0

## 2022-06-09 NOTE — Clinical Note
Patient placed on procedure table in supine position with arms at side. Positioning devices: all pressure points padded, arm board under arms, heel pads in place, safety strap applied, wrist straps in place, donut foam under head and pillow under knees.

## 2022-06-09 NOTE — DISCHARGE INSTRUCTIONS
Please follow up with Dr. Saravia in 1 week    Never miss a dose of Aspirin and Plavix        SPECIAL CAUTION FOR GROIN PUNCTURE:    1. No driving, bending over at the waist, or bearing down for 48 hours.    2. No lifting over 5 lbs for 1 week.    3. No bath or swimming pools for 1 week, you may shower. No lotions or powders    4. If Bleeding occurs, lie down immediately and apply pressure to the site with your fingers. Have Someone call you physician immediately. Call 911 if bleeding does not stop.    5. If you have any questions or problems of medical nature your should call your physician.    6. Call for fever >100.4, abnormal bruising or drainage from the site.    7. You may remove the dressing in the morning, no need to apply a bandage.

## 2022-06-09 NOTE — POST-PROCEDURE NOTE
Pt's  called and updated regarding plan to stay overnight. Pt's  has already spoken with Dr. Gallo who gave in depth review of procedure, findings, and interventions.

## 2022-06-09 NOTE — POST-PROCEDURE NOTE
"Pt returned to holding area. Denies pain. Reports feeling \"much better.\" Dr. Gallo at bedside to review procedure findings and interventions with patient. Questions encouraged and answered.  "

## 2022-06-09 NOTE — Clinical Note
The left groin and left radial was clipped, marked and prepped with ChloraPrep. The patient was draped in a sterile fashion after allowing for the recommended dry time.

## 2022-06-09 NOTE — POST-PROCEDURE NOTE
Pt c/o moderate 5/10 chest pressure and throat discomfort at 1118. EKG obtained. Dr Gallo aware and at bedside. Nitroglycerin 0.4mg x1 tab given, 5mg IV Metoprolol also administered. Pt reports persistent chest and throat discomfort. Pt taken back into cath lab by Dr. Gallo. See flowsheet and MAR for correlating information.

## 2022-06-09 NOTE — NURSING NOTE
R groin site with saturated dressing. Manual pressure help for 15 minutes. Cath lab nurse to bedside to apply brien dressing. L radial dressing with slight oozing. Pressure dressing applied by cath lab RN.

## 2022-06-09 NOTE — Clinical Note
Closure device placed for the right femoral artery. Closure device used: Perclose. Closure pressure manually applied. Hemostasis achieved.

## 2022-06-09 NOTE — POST-PROCEDURE NOTE
L venous sheath removed and Z stitch placed by Kristin MARTIN. Pt now to resume 4 hr bedrest. R groin site CDI and soft to touch. Pt denies pain.

## 2022-06-09 NOTE — Clinical Note
The bilateral groins was clipped, marked and prepped with ChloraPrep. The patient was draped in a sterile fashion after allowing for the recommended dry time.

## 2022-06-09 NOTE — Clinical Note
Patient placed on procedure table in supine position. Positioning devices: arm board under arms and safety strap applied.

## 2022-06-09 NOTE — Clinical Note
Stent deployed in the mid right coronary artery. Pressure = 11 jefry. Inflation time =  15 seconds.

## 2022-06-09 NOTE — PRE-PROCEDURE NOTE
Cardiac Cath Lab Pre-procedure Note    - Patient was seen and examined at bedside.  - The patient's chart and all data was reviewed.  - The procedure, treatment alternatives, risks and benefits were explained with specific risks discussed.  - Patient was consented for cardiac cath procedure and possible PCI.  - Patient's case was found appropriate for dual antiplatelet therapy.    Patient's clinical presentation to the cardiac cath lab: stable ischemic symptoms.       Patient appears to be managing well.     Notable Non-Invasive Cardiac Testing  - Stress Test: negative nuclear stress test, date: 12/1/2021.         Total anti-anginal medications: 1.         Pre-sedation assessment  ASA 3   Mallampati class: II - soft palate, uvula, fauces visible.   for right heart catheterization and a coronary angiogram to definitively evaluate coronary arteries and exclude symptomatic coronary artery disease as potential culprit of her dyspnea on exertion.

## 2022-06-10 VITALS
BODY MASS INDEX: 30.05 KG/M2 | TEMPERATURE: 98.5 F | DIASTOLIC BLOOD PRESSURE: 83 MMHG | HEART RATE: 67 BPM | OXYGEN SATURATION: 97 % | RESPIRATION RATE: 18 BRPM | SYSTOLIC BLOOD PRESSURE: 156 MMHG | HEIGHT: 64 IN | WEIGHT: 176 LBS

## 2022-06-10 PROBLEM — I25.10 CAD (CORONARY ARTERY DISEASE): Status: ACTIVE | Noted: 2022-06-10

## 2022-06-10 LAB
ANION GAP SERPL CALC-SCNC: 9 MEQ/L (ref 3–15)
ATRIAL RATE: 50
ATRIAL RATE: 61
ATRIAL RATE: 63
ATRIAL RATE: 67
ATRIAL RATE: 67
ATRIAL RATE: 72
BUN SERPL-MCNC: 27 MG/DL (ref 8–20)
CALCIUM SERPL-MCNC: 9 MG/DL (ref 8.9–10.3)
CHLORIDE SERPL-SCNC: 104 MEQ/L (ref 98–109)
CO2 SERPL-SCNC: 24 MEQ/L (ref 22–32)
CREAT SERPL-MCNC: 1 MG/DL (ref 0.6–1.1)
ERYTHROCYTE [DISTWIDTH] IN BLOOD BY AUTOMATED COUNT: 15.3 % (ref 11.7–14.4)
GFR SERPL CREATININE-BSD FRML MDRD: 53.6 ML/MIN/1.73M*2
GLUCOSE SERPL-MCNC: 105 MG/DL (ref 70–99)
HCT VFR BLDCO AUTO: 36.2 % (ref 35–45)
HGB BLD-MCNC: 12.1 G/DL (ref 11.8–15.7)
MCH RBC QN AUTO: 28.7 PG (ref 28–33.2)
MCHC RBC AUTO-ENTMCNC: 33.4 G/DL (ref 32.2–35.5)
MCV RBC AUTO: 86 FL (ref 83–98)
P AXIS: 28
P AXIS: 38
P AXIS: 39
P AXIS: 60
P AXIS: 64
P AXIS: 91
PDW BLD AUTO: 11 FL (ref 9.4–12.3)
PLATELET # BLD AUTO: 222 K/UL (ref 150–369)
POTASSIUM SERPL-SCNC: 3.6 MEQ/L (ref 3.6–5.1)
PR INTERVAL: 146
PR INTERVAL: 148
PR INTERVAL: 164
PR INTERVAL: 164
PR INTERVAL: 170
PR INTERVAL: 178
QRS DURATION: 70
QRS DURATION: 76
QRS DURATION: 76
QRS DURATION: 84
QRS DURATION: 88
QRS DURATION: 88
QT INTERVAL: 444
QT INTERVAL: 454
QT INTERVAL: 462
QT INTERVAL: 486
QTC CALCULATION(BAZETT): 443
QTC CALCULATION(BAZETT): 446
QTC CALCULATION(BAZETT): 469
QTC CALCULATION(BAZETT): 472
QTC CALCULATION(BAZETT): 479
QTC CALCULATION(BAZETT): 486
R AXIS: -21
R AXIS: -23
R AXIS: -4
R AXIS: 11
R AXIS: 32
R AXIS: 42
RBC # BLD AUTO: 4.21 M/UL (ref 3.93–5.22)
SODIUM SERPL-SCNC: 137 MEQ/L (ref 136–144)
T WAVE AXIS: -11
T WAVE AXIS: -18
T WAVE AXIS: -8
T WAVE AXIS: 0
T WAVE AXIS: 27
T WAVE AXIS: 77
VENTRICULAR RATE: 50
VENTRICULAR RATE: 61
VENTRICULAR RATE: 63
VENTRICULAR RATE: 67
VENTRICULAR RATE: 67
VENTRICULAR RATE: 72
WBC # BLD AUTO: 7.38 K/UL (ref 3.8–10.5)

## 2022-06-10 PROCEDURE — 80048 BASIC METABOLIC PNL TOTAL CA: CPT | Performed by: NURSE PRACTITIONER

## 2022-06-10 PROCEDURE — 36415 COLL VENOUS BLD VENIPUNCTURE: CPT | Performed by: NURSE PRACTITIONER

## 2022-06-10 PROCEDURE — 93005 ELECTROCARDIOGRAM TRACING: CPT | Performed by: NURSE PRACTITIONER

## 2022-06-10 PROCEDURE — 85027 COMPLETE CBC AUTOMATED: CPT | Performed by: NURSE PRACTITIONER

## 2022-06-10 PROCEDURE — 63700000 HC SELF-ADMINISTRABLE DRUG: Performed by: NURSE PRACTITIONER

## 2022-06-10 RX ORDER — CLOPIDOGREL BISULFATE 75 MG/1
75 TABLET ORAL DAILY
Qty: 30 TABLET | Refills: 6 | Status: SHIPPED | OUTPATIENT
Start: 2022-06-11 | End: 2022-07-19 | Stop reason: ENTERED-IN-ERROR

## 2022-06-10 RX ORDER — TRAMADOL HYDROCHLORIDE 50 MG/1
50 TABLET ORAL EVERY 6 HOURS PRN
Status: DISCONTINUED | OUTPATIENT
Start: 2022-06-10 | End: 2022-06-10 | Stop reason: HOSPADM

## 2022-06-10 RX ADMIN — CLOPIDOGREL BISULFATE 75 MG: 75 TABLET, FILM COATED ORAL at 08:49

## 2022-06-10 RX ADMIN — METOPROLOL SUCCINATE 50 MG: 50 TABLET, EXTENDED RELEASE ORAL at 08:49

## 2022-06-10 RX ADMIN — PANTOPRAZOLE SODIUM 20 MG: 20 TABLET, DELAYED RELEASE ORAL at 08:49

## 2022-06-10 RX ADMIN — ACETAMINOPHEN 650 MG: 325 TABLET ORAL at 04:23

## 2022-06-10 RX ADMIN — ASPIRIN 81 MG: 81 TABLET, COATED ORAL at 08:49

## 2022-06-10 RX ADMIN — TRIAMTERENE AND HYDROCHLOROTHIAZIDE 1 TABLET: 37.5; 25 TABLET ORAL at 08:49

## 2022-06-10 NOTE — PROGRESS NOTES
Right groin hemostatic suture removed and new pressure dressing placed.  Ecchymosis noted, slight tenderness to palpation.  No overt bleeding, no hematoma, no bruit auscultated.  Site stable.  Wound care and activity restrictions reviewed with patient with verbalization of understanding.

## 2022-06-10 NOTE — DISCHARGE SUMMARY
Cardiology Discharge Summary    BRIEF OVERVIEW    Admitting Provider: Javier Gallo MD  AttendingProvider: Javier Gallo MD  Primary Care Physician at Discharge: Davidhuber Jonathan SINGH,  [phone]   H&P Notes 5/11/2022 to 6/10/2022         Date of Service   Author Author Type Status Note Type File Time    06/09/22 0812  Natalia Smith CRNP Nurse Practitioner Signed H&P 06/09/22 0813        Admission Date: 6/9/2022     Discharge Date: 6/10/2022    Primary Discharge Diagnosis  Coronary artery disease    DETAILS OF HOSPITAL STAY    Operative Procedures Performed  Procedure(s):  RIGHT & LEFT HEART CATH WITH CORONARY ANGIOGRAPHY  Stent NIKITA - coronary - additional vessel  Stent NIKITA coronary - initial vessel      Consult Orders During Admission:  None     Procedures:  Pertinent Test Results:     Imaging  X-RAY CHEST 2 VIEWS    Result Date: 5/26/2022  Narrative: CLINICAL HISTORY: Ventricular tachycardia.     Impression: IMPRESSION: Cardiomegaly COMMENT: 2 PA, one lateral view of the chest are compared to previous study dated 4/28/2016. Thoracic scoliosis is seen with concavity to the left. Cardiomegaly is unchanged. Moderate to large hiatal hernia seen. Bilateral shoulder prostheses are noted. There is no large focal consolidation, effusion or pneumothorax.    Cardiac catheterization    Result Date: 6/9/2022  Narrative:   Prox RCA lesion is 30% stenosed. Acute Thrombotic stenosis RECOMMENDATIONS:  1. Successful PTCA acute thrombosis RCA stent     Cardiac catheterization    Result Date: 6/9/2022  Narrative:   Prox RCA lesion is 90% stenosed.   Prox Cx to Mid Cx lesion is 90% stenosed. RECOMMENDATIONS:  1. Successful PTCA/stent to CFX and RCA     Presenting Problem/History of Present Illness  Shortness of breath [R06.02]  Encounter for pre-operative laboratory testing [Z01.812]  CAD (coronary artery disease) [I25.10]     Hospital Course  A/P: 77-year-old woman with past medical history of mild bilateral carotid  artery disease, hypertension, hyperlipidemia, mild mitral and mild aortic regurgitation, frequent PVCs, and dyspnea on exertion presented for outpatient L/RHC, found to have severe 2-vessel disease (LCx, RCA) now s/p NIKITA to LCx, RCA.      #CAD, s/p NIKITA to LCx, RCA  -s/p NIKITA TO LCx, RCA  -Patient developed ST elevations after cath and needed balloon angioplasty of thrombosis in RCA stent  -Continue ASA, plavix, toprol   -Start rosuvastatin 20mg qhs  -Importance of taking DAPT reviewed with patient who expressed understanding.  -RHC benign/normal       #HTN  -BP mildly elevated  -Continue toprol  -Will follow-up in office in 1 week for reassessment - may need additional meds     #HLD  -Start rosuvastatin 20mg qhs    Discharge Orders  Aspirin 81 mg p.o. daily  Plavix 75 mg p.o. daily  Toprol 50 mg p.o. daily  Simvastatin 20 mg p.o. hour sleep  Maxide 37.5-25 mg p.o. daily    Outpatient Follow-Ups  F/U 1 week with cardiology     Referrals:      Active Issues Requiring Follow-up  none      Test Results Pending at Discharge  none    Discharge Disposition  Home   Code Status at Discharge: Full Code

## 2022-06-10 NOTE — PLAN OF CARE
Plan of Care Review  Plan of Care Reviewed With: patient  Progress: improving  Outcome Summary: Pt denies chest pain or sob this shift. No bleeding noted to L wrist site or R groin site. Donald dressing C/D/I to R groin. Pt up to BSC to void. VSS. Fall precautions in place.

## 2022-06-10 NOTE — PROGRESS NOTES
"Daily Progress Note (LOS: 0)    Interval History:   No chest pain or SOB.  Feeling well this AM.      Review of Systems:  All organ systems normal except as per HPI.    Current Medications:   aspirin  81 mg oral Daily    clopidogreL  75 mg oral Daily    famotidine  20 mg oral Nightly    metoprolol succinate XL  50 mg oral Daily    NON FORMULARY MEDICATION REQUEST  20 mg oral Daily (6p)    pantoprazole  20 mg oral Daily    triamterene-hydrochlorothiazide  1 tablet oral Daily       Physical Exam:  Visit Vitals  BP (!) 155/70 (BP Location: Right forearm, Patient Position: Lying)   Pulse 67   Temp 36.4 °C (97.5 °F) (Temporal)   Resp 16   Ht 1.626 m (5' 4\")   Wt 79.8 kg (176 lb)   SpO2 96%   BMI 30.21 kg/m²       Gen: NAD  HEENT: no JVD, no thyromegaly  Heart: RRR, nl S1, S2, no m/r/g  Lungs: Clear bilaterally  Abd: soft, nt, nd, +bs  Ext: no edema    I&Os:    Intake/Output Summary (Last 24 hours) at 6/10/2022 0749  Last data filed at 6/10/2022 0600  Gross per 24 hour   Intake 500 ml   Output 1530 ml   Net -1030 ml       Weights:   Wt Readings from Last 3 Encounters:   06/09/22 79.8 kg (176 lb)       Labs:  Results from last 7 days   Lab Units 06/10/22  0327   SODIUM mEQ/L 137   POTASSIUM mEQ/L 3.6   CHLORIDE mEQ/L 104   CO2 mEQ/L 24   BUN mg/dL 27*   CREATININE mg/dL 1.0   GLUCOSE mg/dL 105*   CALCIUM mg/dL 9.0             Results from last 7 days   Lab Units 06/10/22  0327   WBC K/uL 7.38   HEMOGLOBIN g/dL 12.1   HEMATOCRIT % 36.2   PLATELETS K/uL 222     No results found for: BNP  Lab Results   Component Value Date    INR 1.1 05/26/2022       A/P: 77-year-old woman with past medical history of mild bilateral carotid artery disease, hypertension, hyperlipidemia, mild mitral and mild aortic regurgitation, frequent PVCs, and dyspnea on exertion presented for outpatient L/RHC, found to have severe 2-vessel disease (LCx, RCA) now s/p NIKITA to LCx, RCA.     #CAD, s/p NIKITA to LCx, RCA  -s/p NIKITA TO LCx, RCA  -Patient " developed ST elevations after cath and needed balloon angioplasty of thrombosis in RCA stent  -Continue ASA, plavix, toprol   -Start rosuvastatin 20mg qhs  -Importance of taking DAPT reviewed with patient who expressed understanding.  -RHC benign/normal      #HTN  -BP mildly elevated  -Continue toprol  -Will follow-up in office in 1 week for reassessment - may need additional meds    #HLD  -Start rosuvastatin 20mg qhs    OK for d/c home today.   Our staff will come by to facilitate discharge.

## 2022-07-19 ENCOUNTER — APPOINTMENT (EMERGENCY)
Dept: RADIOLOGY | Facility: HOSPITAL | Age: 77
DRG: 243 | End: 2022-07-19
Attending: EMERGENCY MEDICINE
Payer: MEDICARE

## 2022-07-19 ENCOUNTER — HOSPITAL ENCOUNTER (INPATIENT)
Facility: HOSPITAL | Age: 77
LOS: 6 days | Discharge: HOME | DRG: 243 | End: 2022-07-25
Attending: EMERGENCY MEDICINE | Admitting: STUDENT IN AN ORGANIZED HEALTH CARE EDUCATION/TRAINING PROGRAM
Payer: MEDICARE

## 2022-07-19 DIAGNOSIS — N18.9 ACUTE KIDNEY INJURY SUPERIMPOSED ON CKD (CMS/HCC)  (CMS/HCC): ICD-10-CM

## 2022-07-19 DIAGNOSIS — E87.6 HYPOKALEMIA: ICD-10-CM

## 2022-07-19 DIAGNOSIS — I48.91 ATRIAL FIBRILLATION, UNSPECIFIED TYPE (CMS/HCC): Primary | ICD-10-CM

## 2022-07-19 DIAGNOSIS — I49.5 TACHY-BRADY SYNDROME (CMS/HCC): ICD-10-CM

## 2022-07-19 DIAGNOSIS — N17.9 ACUTE KIDNEY INJURY SUPERIMPOSED ON CKD (CMS/HCC)  (CMS/HCC): ICD-10-CM

## 2022-07-19 DIAGNOSIS — R79.89 ELEVATED TROPONIN: ICD-10-CM

## 2022-07-19 PROBLEM — E78.5 HYPERLIPIDEMIA: Status: ACTIVE | Noted: 2022-07-19

## 2022-07-19 PROBLEM — K21.9 GERD (GASTROESOPHAGEAL REFLUX DISEASE): Status: ACTIVE | Noted: 2022-07-19

## 2022-07-19 PROBLEM — N28.9 ACUTE KIDNEY INSUFFICIENCY: Status: ACTIVE | Noted: 2022-07-19

## 2022-07-19 LAB
ALBUMIN SERPL-MCNC: 4.1 G/DL (ref 3.4–5)
ALP SERPL-CCNC: 63 IU/L (ref 35–126)
ALT SERPL-CCNC: 18 IU/L (ref 11–54)
ANION GAP SERPL CALC-SCNC: 5 MEQ/L (ref 3–15)
AST SERPL-CCNC: 25 IU/L (ref 15–41)
BASOPHILS # BLD: 0.03 K/UL (ref 0.01–0.1)
BASOPHILS NFR BLD: 0.5 %
BILIRUB SERPL-MCNC: 0.8 MG/DL (ref 0.3–1.2)
BUN SERPL-MCNC: 38 MG/DL (ref 8–20)
CALCIUM SERPL-MCNC: 9.4 MG/DL (ref 8.9–10.3)
CHLORIDE SERPL-SCNC: 105 MEQ/L (ref 98–109)
CO2 SERPL-SCNC: 29 MEQ/L (ref 22–32)
CREAT SERPL-MCNC: 1.7 MG/DL (ref 0.6–1.1)
DIFFERENTIAL METHOD BLD: ABNORMAL
EOSINOPHIL # BLD: 0.18 K/UL (ref 0.04–0.36)
EOSINOPHIL NFR BLD: 2.7 %
ERYTHROCYTE [DISTWIDTH] IN BLOOD BY AUTOMATED COUNT: 14.8 % (ref 11.7–14.4)
GFR SERPL CREATININE-BSD FRML MDRD: 29.1 ML/MIN/1.73M*2
GLUCOSE SERPL-MCNC: 124 MG/DL (ref 70–99)
HCT VFR BLDCO AUTO: 39.2 % (ref 35–45)
HGB BLD-MCNC: 12.6 G/DL (ref 11.8–15.7)
IMM GRANULOCYTES # BLD AUTO: 0.01 K/UL (ref 0–0.08)
IMM GRANULOCYTES NFR BLD AUTO: 0.2 %
LYMPHOCYTES # BLD: 1.61 K/UL (ref 1.2–3.5)
LYMPHOCYTES NFR BLD: 24.2 %
MCH RBC QN AUTO: 28.8 PG (ref 28–33.2)
MCHC RBC AUTO-ENTMCNC: 32.1 G/DL (ref 32.2–35.5)
MCV RBC AUTO: 89.5 FL (ref 83–98)
MONOCYTES # BLD: 0.53 K/UL (ref 0.28–0.8)
MONOCYTES NFR BLD: 8 %
NEUTROPHILS # BLD: 4.29 K/UL (ref 1.7–7)
NEUTS SEG NFR BLD: 64.4 %
NRBC BLD-RTO: 0 %
PDW BLD AUTO: 10.7 FL (ref 9.4–12.3)
PLATELET # BLD AUTO: 275 K/UL (ref 150–369)
POTASSIUM SERPL-SCNC: 3.4 MEQ/L (ref 3.6–5.1)
PROT SERPL-MCNC: 7.1 G/DL (ref 6–8.2)
RBC # BLD AUTO: 4.38 M/UL (ref 3.93–5.22)
SARS-COV-2 RNA RESP QL NAA+PROBE: NEGATIVE
SODIUM SERPL-SCNC: 139 MEQ/L (ref 136–144)
TROPONIN I SERPL HS-MCNC: 365.5 PG/ML
TROPONIN I SERPL HS-MCNC: 377.5 PG/ML
WBC # BLD AUTO: 6.65 K/UL (ref 3.8–10.5)

## 2022-07-19 PROCEDURE — 84484 ASSAY OF TROPONIN QUANT: CPT

## 2022-07-19 PROCEDURE — U0003 INFECTIOUS AGENT DETECTION BY NUCLEIC ACID (DNA OR RNA); SEVERE ACUTE RESPIRATORY SYNDROME CORONAVIRUS 2 (SARS-COV-2) (CORONAVIRUS DISEASE [COVID-19]), AMPLIFIED PROBE TECHNIQUE, MAKING USE OF HIGH THROUGHPUT TECHNOLOGIES AS DESCRIBED BY CMS-2020-01-R: HCPCS | Performed by: EMERGENCY MEDICINE

## 2022-07-19 PROCEDURE — 85025 COMPLETE CBC W/AUTO DIFF WBC: CPT

## 2022-07-19 PROCEDURE — 36415 COLL VENOUS BLD VENIPUNCTURE: CPT

## 2022-07-19 PROCEDURE — 93005 ELECTROCARDIOGRAM TRACING: CPT | Performed by: EMERGENCY MEDICINE

## 2022-07-19 PROCEDURE — 84484 ASSAY OF TROPONIN QUANT: CPT | Performed by: EMERGENCY MEDICINE

## 2022-07-19 PROCEDURE — 80053 COMPREHEN METABOLIC PANEL: CPT | Performed by: EMERGENCY MEDICINE

## 2022-07-19 PROCEDURE — 63700000 HC SELF-ADMINISTRABLE DRUG

## 2022-07-19 PROCEDURE — 93005 ELECTROCARDIOGRAM TRACING: CPT

## 2022-07-19 PROCEDURE — 71045 X-RAY EXAM CHEST 1 VIEW: CPT

## 2022-07-19 PROCEDURE — 85025 COMPLETE CBC W/AUTO DIFF WBC: CPT | Performed by: EMERGENCY MEDICINE

## 2022-07-19 PROCEDURE — 12000000 HC ROOM AND CARE MED/SURG

## 2022-07-19 PROCEDURE — 99222 1ST HOSP IP/OBS MODERATE 55: CPT | Performed by: STUDENT IN AN ORGANIZED HEALTH CARE EDUCATION/TRAINING PROGRAM

## 2022-07-19 PROCEDURE — 80053 COMPREHEN METABOLIC PANEL: CPT

## 2022-07-19 PROCEDURE — 99283 EMERGENCY DEPT VISIT LOW MDM: CPT | Mod: 25

## 2022-07-19 PROCEDURE — 84443 ASSAY THYROID STIM HORMONE: CPT | Performed by: NURSE PRACTITIONER

## 2022-07-19 RX ORDER — ALUMINUM HYDROXIDE, MAGNESIUM HYDROXIDE, AND SIMETHICONE 1200; 120; 1200 MG/30ML; MG/30ML; MG/30ML
30 SUSPENSION ORAL EVERY 4 HOURS PRN
Status: DISCONTINUED | OUTPATIENT
Start: 2022-07-19 | End: 2022-07-25 | Stop reason: HOSPADM

## 2022-07-19 RX ORDER — CLOPIDOGREL BISULFATE 75 MG/1
75 TABLET ORAL DAILY
Status: DISCONTINUED | OUTPATIENT
Start: 2022-07-20 | End: 2022-07-25 | Stop reason: HOSPADM

## 2022-07-19 RX ORDER — CLOPIDOGREL BISULFATE 75 MG/1
75 TABLET ORAL EVERY MORNING
COMMUNITY
End: 2024-04-17

## 2022-07-19 RX ORDER — ACETAMINOPHEN 325 MG/1
650 TABLET ORAL EVERY 4 HOURS PRN
Status: DISCONTINUED | OUTPATIENT
Start: 2022-07-19 | End: 2022-07-23

## 2022-07-19 RX ORDER — POTASSIUM CHLORIDE 750 MG/1
20 TABLET, EXTENDED RELEASE ORAL AS NEEDED
Status: DISCONTINUED | OUTPATIENT
Start: 2022-07-19 | End: 2022-07-25 | Stop reason: HOSPADM

## 2022-07-19 RX ORDER — METOPROLOL SUCCINATE 50 MG/1
50 TABLET, EXTENDED RELEASE ORAL DAILY
Status: DISCONTINUED | OUTPATIENT
Start: 2022-07-20 | End: 2022-07-22

## 2022-07-19 RX ORDER — HYDROCHLOROTHIAZIDE 25 MG/1
25 TABLET ORAL DAILY
Status: DISCONTINUED | OUTPATIENT
Start: 2022-07-20 | End: 2022-07-24

## 2022-07-19 RX ORDER — FAMOTIDINE 20 MG/1
40 TABLET, FILM COATED ORAL NIGHTLY
Status: DISCONTINUED | OUTPATIENT
Start: 2022-07-19 | End: 2022-07-25 | Stop reason: HOSPADM

## 2022-07-19 RX ORDER — LISINOPRIL 10 MG/1
10 TABLET ORAL DAILY
Status: DISCONTINUED | OUTPATIENT
Start: 2022-07-20 | End: 2022-07-25 | Stop reason: HOSPADM

## 2022-07-19 RX ORDER — PANTOPRAZOLE SODIUM 40 MG/1
40 TABLET, DELAYED RELEASE ORAL DAILY
Status: DISCONTINUED | OUTPATIENT
Start: 2022-07-20 | End: 2022-07-19

## 2022-07-19 RX ORDER — IBUPROFEN 200 MG
16-32 TABLET ORAL AS NEEDED
Status: DISCONTINUED | OUTPATIENT
Start: 2022-07-19 | End: 2022-07-25 | Stop reason: HOSPADM

## 2022-07-19 RX ORDER — PANTOPRAZOLE SODIUM 40 MG/1
40 TABLET, DELAYED RELEASE ORAL DAILY
Status: DISCONTINUED | OUTPATIENT
Start: 2022-07-20 | End: 2022-07-25 | Stop reason: HOSPADM

## 2022-07-19 RX ORDER — ATROPINE SULFATE 0.1 MG/ML
0.5 INJECTION INTRAVENOUS EVERY 5 MIN PRN
Status: DISCONTINUED | OUTPATIENT
Start: 2022-07-19 | End: 2022-07-25 | Stop reason: HOSPADM

## 2022-07-19 RX ORDER — LISINOPRIL 10 MG/1
10 TABLET ORAL DAILY
COMMUNITY
End: 2022-07-25 | Stop reason: HOSPADM

## 2022-07-19 RX ORDER — DEXTROSE 50 % IN WATER (D50W) INTRAVENOUS SYRINGE
25 AS NEEDED
Status: DISCONTINUED | OUTPATIENT
Start: 2022-07-19 | End: 2022-07-25 | Stop reason: HOSPADM

## 2022-07-19 RX ORDER — HYDROCHLOROTHIAZIDE 25 MG/1
25 TABLET ORAL DAILY
COMMUNITY
End: 2022-07-25 | Stop reason: HOSPADM

## 2022-07-19 RX ORDER — ASPIRIN 81 MG/1
81 TABLET ORAL DAILY
Status: DISCONTINUED | OUTPATIENT
Start: 2022-07-20 | End: 2022-07-21

## 2022-07-19 RX ORDER — POTASSIUM CHLORIDE 750 MG/1
40 TABLET, EXTENDED RELEASE ORAL AS NEEDED
Status: DISCONTINUED | OUTPATIENT
Start: 2022-07-19 | End: 2022-07-25 | Stop reason: HOSPADM

## 2022-07-19 RX ORDER — FAMOTIDINE 40 MG/1
40 TABLET, FILM COATED ORAL NIGHTLY
COMMUNITY
End: 2022-09-25 | Stop reason: HOSPADM

## 2022-07-19 RX ORDER — DOCUSATE SODIUM 100 MG/1
100 CAPSULE, LIQUID FILLED ORAL 2 TIMES DAILY
Status: DISCONTINUED | OUTPATIENT
Start: 2022-07-19 | End: 2022-07-25 | Stop reason: HOSPADM

## 2022-07-19 RX ORDER — DEXTROSE 40 %
15-30 GEL (GRAM) ORAL AS NEEDED
Status: DISCONTINUED | OUTPATIENT
Start: 2022-07-19 | End: 2022-07-25 | Stop reason: HOSPADM

## 2022-07-19 RX ORDER — ATORVASTATIN CALCIUM 10 MG/1
10 TABLET, FILM COATED ORAL DAILY
Status: DISCONTINUED | OUTPATIENT
Start: 2022-07-20 | End: 2022-07-20

## 2022-07-19 RX ORDER — NITROGLYCERIN 0.4 MG/1
0.4 TABLET SUBLINGUAL EVERY 5 MIN PRN
Status: DISCONTINUED | OUTPATIENT
Start: 2022-07-19 | End: 2022-07-25 | Stop reason: HOSPADM

## 2022-07-19 RX ORDER — PANTOPRAZOLE SODIUM 40 MG/1
40 TABLET, DELAYED RELEASE ORAL
COMMUNITY

## 2022-07-19 RX ADMIN — APIXABAN 5 MG: 5 TABLET, FILM COATED ORAL at 22:16

## 2022-07-19 ASSESSMENT — ENCOUNTER SYMPTOMS
CHILLS: 0
RHINORRHEA: 0
ABDOMINAL PAIN: 0
VOMITING: 0
NAUSEA: 0
FEVER: 0
FACIAL ASYMMETRY: 0
COUGH: 0
WEAKNESS: 0
SORE THROAT: 0
SPEECH DIFFICULTY: 0
SHORTNESS OF BREATH: 0
CONSTIPATION: 0
HEADACHES: 0
DYSURIA: 0
LIGHT-HEADEDNESS: 0
NUMBNESS: 0
DIZZINESS: 1
DIARRHEA: 0
PALPITATIONS: 0

## 2022-07-19 ASSESSMENT — CHA2DS2 SCORE
VASCULAR DISEASE: YES
AGE IN YEARS: 75+
HYPERTENSION: YES
PRIOR STROKE OR TIA OR THROMBOEMBOLISM: NO
CHF OR LEFT VENTRICULAR DYSFUNCTION: YES
CHA2D2S VASC SCORE: 6
SEX: FEMALE
DIABETES: NO

## 2022-07-19 NOTE — Clinical Note
The bilateral chest was clipped, marked and prepped with ChloraPrep. The patient was draped in a sterile fashion after allowing for the recommended dry time.

## 2022-07-19 NOTE — Clinical Note
Future Attending Provider: LINA BERNAL [[id number]]   Send to the following Provider Care Team:: [facility] TEAM 4 [[id number]]

## 2022-07-20 ENCOUNTER — APPOINTMENT (INPATIENT)
Dept: CARDIOLOGY | Facility: HOSPITAL | Age: 77
DRG: 243 | End: 2022-07-20
Payer: MEDICARE

## 2022-07-20 PROBLEM — R79.89 ELEVATED TROPONIN: Status: ACTIVE | Noted: 2022-07-20

## 2022-07-20 PROBLEM — E66.811 CLASS 1 OBESITY IN ADULT: Status: ACTIVE | Noted: 2022-07-20

## 2022-07-20 PROBLEM — N17.9 ACUTE KIDNEY INJURY SUPERIMPOSED ON CKD (CMS/HCC)  (CMS/HCC): Status: ACTIVE | Noted: 2022-07-19

## 2022-07-20 PROBLEM — I48.0 PAROXYSMAL ATRIAL FIBRILLATION (CMS/HCC): Status: ACTIVE | Noted: 2022-07-19

## 2022-07-20 PROBLEM — I38 VALVULAR HEART DISEASE: Status: ACTIVE | Noted: 2022-07-20

## 2022-07-20 PROBLEM — N18.9 ACUTE KIDNEY INJURY SUPERIMPOSED ON CKD (CMS/HCC)  (CMS/HCC): Status: ACTIVE | Noted: 2022-07-19

## 2022-07-20 PROBLEM — I49.5 TACHY-BRADY SYNDROME (CMS/HCC): Status: ACTIVE | Noted: 2022-07-19

## 2022-07-20 PROBLEM — E83.42 HYPOMAGNESEMIA: Status: ACTIVE | Noted: 2022-07-20

## 2022-07-20 LAB
ANION GAP SERPL CALC-SCNC: 9 MEQ/L (ref 3–15)
AORTIC ROOT ANNULUS: 2.9 CM
ASCENDING AORTA: 3.1 CM
ATRIAL RATE: 64
BASOPHILS # BLD: 0.05 K/UL (ref 0.01–0.1)
BASOPHILS NFR BLD: 0.9 %
BSA FOR ECHO PROCEDURE: 1.94 M2
BUN SERPL-MCNC: 32 MG/DL (ref 8–20)
CALCIUM SERPL-MCNC: 8.9 MG/DL (ref 8.9–10.3)
CHLORIDE SERPL-SCNC: 104 MEQ/L (ref 98–109)
CO2 SERPL-SCNC: 27 MEQ/L (ref 22–32)
CREAT SERPL-MCNC: 1.3 MG/DL (ref 0.6–1.1)
DIFFERENTIAL METHOD BLD: ABNORMAL
E WAVE DECELERATION TIME: 285 MS
E/A RATIO: 1.4
E/E' RATIO: 25.3
E/LAT E' RATIO: 19.4
EOSINOPHIL # BLD: 0.28 K/UL (ref 0.04–0.36)
EOSINOPHIL NFR BLD: 5.1 %
ERYTHROCYTE [DISTWIDTH] IN BLOOD BY AUTOMATED COUNT: 14.6 % (ref 11.7–14.4)
EST RIGHT VENT SYSTOLIC PRESSURE BY TRICUSPID REGURGITATION JET: 35 MMHG
FRACTIONAL SHORTENING: 57.6 %
GFR SERPL CREATININE-BSD FRML MDRD: 39.6 ML/MIN/1.73M*2
GLUCOSE SERPL-MCNC: 87 MG/DL (ref 70–99)
HCT VFR BLDCO AUTO: 35.6 % (ref 35–45)
HGB BLD-MCNC: 11.6 G/DL (ref 11.8–15.7)
IMM GRANULOCYTES # BLD AUTO: 0.02 K/UL (ref 0–0.08)
IMM GRANULOCYTES NFR BLD AUTO: 0.4 %
INTERVENTRICULAR SEPTUM: 1.11 CM
LA ESV (BP): 69 CM3
LA ESV INDEX (A2C): 30.1 CM3/M2
LA ESV INDEX (BP): 35.57 CM3/M2
LA/AORTA RATIO: 1.1
LAAS-AP2: 21.8 CM2
LAAS-AP4: 25.9 CM2
LAD 2D: 3.2 CM
LALD A4C: 6.54 CM
LALD A4C: 6.61 CM
LAV-S: 58.4 CM3
LEFT ATRIUM VOLUME INDEX: 41.86 CM3/M2
LEFT ATRIUM VOLUME: 81.2 CM3
LEFT INTERNAL DIMENSION IN SYSTOLE: 1.81 CM (ref 2.63–3.98)
LEFT VENTRICULAR INTERNAL DIMENSION IN DIASTOLE: 4.27 CM (ref 4.46–6.19)
LEFT VENTRICULAR POSTERIOR WALL IN END DIASTOLE: 1.05 CM (ref 0.58–1.09)
LVOT 2D: 2.2 CM
LVOT A: 3.8 CM2
LYMPHOCYTES # BLD: 1.63 K/UL (ref 1.2–3.5)
LYMPHOCYTES NFR BLD: 29.8 %
MAGNESIUM SERPL-MCNC: 1.7 MG/DL (ref 1.8–2.5)
MCH RBC QN AUTO: 29 PG (ref 28–33.2)
MCHC RBC AUTO-ENTMCNC: 32.6 G/DL (ref 32.2–35.5)
MCV RBC AUTO: 89 FL (ref 83–98)
MONOCYTES # BLD: 0.65 K/UL (ref 0.28–0.8)
MONOCYTES NFR BLD: 11.9 %
MV E'TISSUE VEL-LAT: 0.08 M/S
MV E'TISSUE VEL-MED: 0.06 M/S
MV PEAK A VEL: 1.01 M/S
MV PEAK E VEL: 1.46 M/S
MV VALVE AREA P 1/2 METHOD: 2.65 CM2
NEUTROPHILS # BLD: 2.84 K/UL (ref 1.7–7)
NEUTS SEG NFR BLD: 51.9 %
NRBC BLD-RTO: 0 %
P AXIS: 69
PDW BLD AUTO: 10.8 FL (ref 9.4–12.3)
PLATELET # BLD AUTO: 233 K/UL (ref 150–369)
POSTERIOR WALL: 1.05 CM
POTASSIUM SERPL-SCNC: 3.5 MEQ/L (ref 3.6–5.1)
PR INTERVAL: 164
QRS DURATION: 82
QT INTERVAL: 468
QTC CALCULATION(BAZETT): 482
R AXIS: 24
RBC # BLD AUTO: 4 M/UL (ref 3.93–5.22)
SODIUM SERPL-SCNC: 140 MEQ/L (ref 136–144)
T WAVE AXIS: -18
TAPSE: 2.26 CM
TR MAX PG: 25 MMHG
TRICUSPID VALVE PEAK REGURGITATION VELOCITY: 2.51 M/S
TSH SERPL DL<=0.05 MIU/L-ACNC: 1.89 MIU/L (ref 0.34–5.6)
VENTRICULAR RATE: 64
WBC # BLD AUTO: 5.47 K/UL (ref 3.8–10.5)
Z-SCORE OF LEFT VENTRICULAR DIMENSION IN END DIASTOLE: -2.07
Z-SCORE OF LEFT VENTRICULAR DIMENSION IN END SYSTOLE: -4.6
Z-SCORE OF LEFT VENTRICULAR POSTERIOR WALL IN END DIASTOLE: 1.44

## 2022-07-20 PROCEDURE — 63700000 HC SELF-ADMINISTRABLE DRUG: Performed by: NURSE PRACTITIONER

## 2022-07-20 PROCEDURE — 99233 SBSQ HOSP IP/OBS HIGH 50: CPT | Performed by: STUDENT IN AN ORGANIZED HEALTH CARE EDUCATION/TRAINING PROGRAM

## 2022-07-20 PROCEDURE — 36415 COLL VENOUS BLD VENIPUNCTURE: CPT

## 2022-07-20 PROCEDURE — 21400000 HC ROOM AND CARE CCU/INTERMEDIATE

## 2022-07-20 PROCEDURE — 25500000 HC DRUGS/INCIDENT RAD: Performed by: STUDENT IN AN ORGANIZED HEALTH CARE EDUCATION/TRAINING PROGRAM

## 2022-07-20 PROCEDURE — 63600000 HC DRUGS/DETAIL CODE: Performed by: NURSE PRACTITIONER

## 2022-07-20 PROCEDURE — 63700000 HC SELF-ADMINISTRABLE DRUG

## 2022-07-20 PROCEDURE — C8929 TTE W OR WO FOL WCON,DOPPLER: HCPCS

## 2022-07-20 PROCEDURE — 80048 BASIC METABOLIC PNL TOTAL CA: CPT

## 2022-07-20 PROCEDURE — 25800000 HC PHARMACY IV SOLUTIONS: Performed by: STUDENT IN AN ORGANIZED HEALTH CARE EDUCATION/TRAINING PROGRAM

## 2022-07-20 PROCEDURE — 25000000 HC PHARMACY GENERAL: Performed by: STUDENT IN AN ORGANIZED HEALTH CARE EDUCATION/TRAINING PROGRAM

## 2022-07-20 PROCEDURE — 83735 ASSAY OF MAGNESIUM: CPT

## 2022-07-20 PROCEDURE — 85025 COMPLETE CBC W/AUTO DIFF WBC: CPT

## 2022-07-20 RX ORDER — TRAMADOL HYDROCHLORIDE 50 MG/1
25 TABLET ORAL ONCE
Status: COMPLETED | OUTPATIENT
Start: 2022-07-21 | End: 2022-07-21

## 2022-07-20 RX ORDER — CETIRIZINE HYDROCHLORIDE 5 MG/1
5 TABLET ORAL ONCE
Status: COMPLETED | OUTPATIENT
Start: 2022-07-21 | End: 2022-07-21

## 2022-07-20 RX ORDER — POTASSIUM CHLORIDE 750 MG/1
40 TABLET, EXTENDED RELEASE ORAL ONCE
Status: COMPLETED | OUTPATIENT
Start: 2022-07-20 | End: 2022-07-20

## 2022-07-20 RX ORDER — SIMVASTATIN 20 MG/1
20 TABLET, FILM COATED ORAL NIGHTLY
Status: DISCONTINUED | OUTPATIENT
Start: 2022-07-20 | End: 2022-07-25 | Stop reason: HOSPADM

## 2022-07-20 RX ADMIN — SODIUM CHLORIDE: 9 INJECTION INTRAMUSCULAR; INTRAVENOUS; SUBCUTANEOUS at 11:55

## 2022-07-20 RX ADMIN — PANTOPRAZOLE SODIUM 40 MG: 40 TABLET, DELAYED RELEASE ORAL at 09:04

## 2022-07-20 RX ADMIN — CLOPIDOGREL BISULFATE 75 MG: 75 TABLET, FILM COATED ORAL at 09:04

## 2022-07-20 RX ADMIN — MAGNESIUM SULFATE HEPTAHYDRATE 2 G: 40 INJECTION, SOLUTION INTRAVENOUS at 12:25

## 2022-07-20 RX ADMIN — SIMVASTATIN 20 MG: 20 TABLET, FILM COATED ORAL at 20:34

## 2022-07-20 RX ADMIN — HYDROCHLOROTHIAZIDE 25 MG: 25 TABLET ORAL at 09:05

## 2022-07-20 RX ADMIN — FAMOTIDINE 40 MG: 20 TABLET ORAL at 20:34

## 2022-07-20 RX ADMIN — FAMOTIDINE 40 MG: 20 TABLET ORAL at 01:27

## 2022-07-20 RX ADMIN — LISINOPRIL 10 MG: 10 TABLET ORAL at 09:05

## 2022-07-20 RX ADMIN — ASPIRIN 81 MG: 81 TABLET, COATED ORAL at 09:04

## 2022-07-20 RX ADMIN — ACETAMINOPHEN 650 MG: 325 TABLET, FILM COATED ORAL at 23:20

## 2022-07-20 RX ADMIN — SODIUM CHLORIDE 500 ML: 9 INJECTION, SOLUTION INTRAVENOUS at 01:27

## 2022-07-20 RX ADMIN — POTASSIUM CHLORIDE 40 MEQ: 750 TABLET, EXTENDED RELEASE ORAL at 12:25

## 2022-07-20 ASSESSMENT — PATIENT HEALTH QUESTIONNAIRE - PHQ9: SUM OF ALL RESPONSES TO PHQ9 QUESTIONS 1 & 2: 0

## 2022-07-20 NOTE — ASSESSMENT & PLAN NOTE
Known mild MR/ AR  TTE shows mild MR/ AR, mild-mod TR/ IN  Continue outpatient surveillance monitoring

## 2022-07-20 NOTE — PLAN OF CARE
Plan of Care Review  Plan of Care Reviewed With: patient  Progress: improving  Outcome Summary: patient arrived to floor around 0030. Oriented to room and care plan. Afib in ed, converted to nsr with pacs. ekg obtained, [facility] aware. Strict bedrest maintained, pw in place. Zoll at bedside per order. Safety precautions in place, wctm.

## 2022-07-20 NOTE — PLAN OF CARE
Problem: Adult Inpatient Plan of Care  Goal: Readiness for Transition of Care  Intervention: Mutually Develop Transition Plan  Flowsheets (Taken 7/20/2022 1913)  Anticipated Discharge Disposition: home without assistance or services  Equipment Needed After Discharge: none  Discharge Coordination/Progress: Pt plans to go home with   Assistive Device/Animal Currently Used at Home: (pt has 2 walkers and 2 canes, chair lift)   walker, front-wheeled   cane, straight   shower chair  Anticipated Changes Related to Illness: none  Readmission Within the Last 30 Days: no previous admission in last 30 days  Patient/Family Anticipated Services at Transition: none  Patient/Family Anticipates Transition to: home  Transportation Anticipated: car, drives self  Concerns to be Addressed:   discharge planning   care coordination/care conferences  Patient's Choice of Community Agency(s): pt denies needing home care  Offered/Gave Vendor List: no   At bedside of pt to discuss discharge planning/transition of care.  Pt awake and alert, lying in bed visiting with her brother at bedside.  Pt resides with her  Lucius in a 2 story home. Pt has chair lift to get to second floor.   Pt states she performs her ADL's independently, she uses a walker and cane at times. Pt has 2 of both., and has a shower chair.   Pt currently drives and is able to take herself to appointments, groceries, pharmacy as she needs.   Pt denies any needs at home, she declines having home care.   Pt will have transportation when discharged, her son or brother. Her  is having eye surgery 7/21.   Pt verified PCP and pharmacy, will continue to follow and offer support as needed.

## 2022-07-20 NOTE — ED ATTESTATION NOTE
I have personally seen and examined the patient.  I reviewed and agree with pa/np/resident's assessment and plan of care, with the following exceptions: None  My examination, assessment, and plan of care of the pt is as follows:    C/o dizziness starting around 5pm after shucking a bunch of corn. Assoc with head pressure, fatigue, freeman. She checked her apple watch and it said she was in afib. Symptoms have waxed and waned since then. Currently no symptoms. No assoc cp, fever, cough, n/v/d, abd pain, leg pain or swelling. No h/o afib. She had stents placed in June.       PE - NAD  NCAT  Cards - rrr no m/r/g  Lungs - cta bilat, symmetric, nonlabored  abd - soft nontender.   Ext - no deformity or leg swelling or ttp.   Neuro - nonfocal          ED Triage Vitals   Temp Heart Rate Resp BP SpO2   07/19/22 1933 07/19/22 1933 07/19/22 1933 07/19/22 1933 07/19/22 1933   36.6 °C (97.8 °F) 100 18 135/63 97 %      Temp Source Heart Rate Source Patient Position BP Location FiO2 (%) (Set)   07/19/22 1933 07/19/22 2301 07/19/22 2144 07/19/22 2144 --   Temporal Monitor Sitting Right upper arm          Vitals reviewed and pulse ox -  97 %- not hypoxic.      A/P - afib on ekg. Symptomatic. Will check labs, cxr, monitor, start anticoagulation, currently rate controlled. admit.     Labs Reviewed   CBC AND DIFF - Abnormal       Result Value    WBC 6.65      RBC 4.38      Hemoglobin 12.6      Hematocrit 39.2      MCV 89.5      MCH 28.8      MCHC 32.1 (*)     RDW 14.8 (*)     Platelets 275      MPV 10.7      Differential Type Auto      nRBC 0.0      Immature Granulocytes 0.2      Neutrophils 64.4      Lymphocytes 24.2      Monocytes 8.0      Eosinophils 2.7      Basophils 0.5      Immature Granulocytes, Absolute 0.01      Neutrophils, Absolute 4.29      Lymphocytes, Absolute 1.61      Monocytes, Absolute 0.53      Eosinophils, Absolute 0.18      Basophils, Absolute 0.03     COMPREHENSIVE METABOLIC PANEL - Abnormal    Sodium 139       Potassium 3.4 (*)     Chloride 105      CO2 29      BUN 38 (*)     Creatinine 1.7 (*)     Glucose 124 (*)     Calcium 9.4      AST (SGOT) 25      ALT (SGPT) 18      Alkaline Phosphatase 63      Total Protein 7.1      Albumin 4.1      Bilirubin, Total 0.8      eGFR 29.1 (*)     Anion Gap 5     HIGH SENSITIVE TROPONIN I (BASELINE - REFLEX 2HR) - Abnormal    High Sens Troponin I 365.5 (*)    HIGH SENSITIVE TROPONIN I (NO REFLEX) - Abnormal    High Sens Troponin I 377.5 (*)    BASIC METABOLIC PANEL - Abnormal    Sodium 140      Potassium 3.5 (*)     Chloride 104      CO2 27      BUN 32 (*)     Creatinine 1.3 (*)     Glucose 87      Calcium 8.9      eGFR 39.6 (*)     Anion Gap 9     MAGNESIUM - Abnormal    Magnesium 1.7 (*)    CBC AND DIFF - Abnormal    WBC 5.47      RBC 4.00      Hemoglobin 11.6 (*)     Hematocrit 35.6      MCV 89.0      MCH 29.0      MCHC 32.6      RDW 14.6 (*)     Platelets 233      MPV 10.8      Differential Type Auto      nRBC 0.0      Immature Granulocytes 0.4      Neutrophils 51.9      Lymphocytes 29.8      Monocytes 11.9      Eosinophils 5.1      Basophils 0.9      Immature Granulocytes, Absolute 0.02      Neutrophils, Absolute 2.84      Lymphocytes, Absolute 1.63      Monocytes, Absolute 0.65      Eosinophils, Absolute 0.28      Basophils, Absolute 0.05     BASIC METABOLIC PANEL - Abnormal    Sodium 139      Potassium 3.7      Chloride 105      CO2 25      BUN 27 (*)     Creatinine 1.2 (*)     Glucose 99      Calcium 9.3      eGFR 43.4 (*)     Anion Gap 9     CBC - Abnormal    WBC 7.82      RBC 4.23      Hemoglobin 12.6      Hematocrit 37.9      MCV 89.6      MCH 29.8      MCHC 33.2      RDW 14.8 (*)     Platelets 232      MPV 10.6     BASIC METABOLIC PANEL - Abnormal    Sodium 139      Potassium 3.6      Chloride 105      CO2 25      BUN 22 (*)     Creatinine 1.0      Glucose 98      Calcium 9.2      eGFR 53.6 (*)     Anion Gap 9     BASIC METABOLIC PANEL - Abnormal    Sodium 138      Potassium  4.2      Chloride 103      CO2 25      BUN 36 (*)     Creatinine 1.4 (*)     Glucose 118 (*)     Calcium 9.6      eGFR 36.4 (*)     Anion Gap 10     HIGH SENSITIVE TROPONIN I (NO REFLEX) - Abnormal    High Sens Troponin I 76.5 (*)    HIGH SENSITIVE TROPONIN I (BASELINE - REFLEX 2HR) - Abnormal    High Sens Troponin I 88.7 (*)    HIGH SENSITIVE TROPONIN I (NO REFLEX) - Abnormal    High Sens Troponin I 66.0 (*)    BASIC METABOLIC PANEL - Abnormal    Sodium 139      Potassium 3.8      Chloride 105      CO2 24      BUN 44 (*)     Creatinine 1.8 (*)     Glucose 105 (*)     Calcium 8.9      eGFR 27.2 (*)     Anion Gap 10     BASIC METABOLIC PANEL - Abnormal    Sodium 135 (*)     Potassium 4.6      Chloride 107      CO2 19 (*)     BUN 41 (*)     Creatinine 1.4 (*)     Glucose 119 (*)     Calcium 8.8 (*)     eGFR 36.4 (*)     Anion Gap 9     POCT GLUCOSE (BEAKER) - Abnormal    POCT Bedside Glucose 114 (*)     POC Test POC     SARS-COV-2 (COVID 19), PCR (PERF) - Normal    SARS-CoV-2 (COVID-19) Negative      Narrative:     Testing performed using real-time PCR for detection of COVID-19. EUA approved validation studies performed on site.    SARS-COV-2 (COVID 19), PCR (PERF) - Normal    SARS-CoV-2 (COVID-19) Negative      Narrative:     Testing performed using real-time PCR for detection of COVID-19. EUA approved validation studies performed on site.    TSH 3RD GENERATION W/REFLEX FT4 - Normal    TSH 1.89     MAGNESIUM - Normal    Magnesium 2.1     CBC - Normal    WBC 6.53      RBC 4.33      Hemoglobin 12.7      Hematocrit 38.5      MCV 88.9      MCH 29.3      MCHC 33.0      RDW 14.3      Platelets 242      MPV 10.5     MAGNESIUM - Normal    Magnesium 1.8     MAGNESIUM - Normal    Magnesium 2.1     MAGNESIUM - Normal    Magnesium 1.9     MAGNESIUM - Normal    Magnesium 1.8     SARS-COV-2 (COVID 19), PCR    Narrative:     The following orders were created for panel order SARS-CoV-2 (COVID-19), PCR Nasopharynx.  Procedure                                Abnormality         Status                     ---------                               -----------         ------                     SARS-CoV-2 (COVID-19), P...[[id number]]  Normal              Final result                 Please view results for these tests on the individual orders.   SARS-COV-2 (COVID 19), PCR    Narrative:     The following orders were created for panel order SARS-CoV-2 (COVID-19), PCR Nasopharynx.  Procedure                               Abnormality         Status                     ---------                               -----------         ------                     SARS-CoV-2 (COVID-19), P...[[id number]]  Normal              Final result                 Please view results for these tests on the individual orders.   RAINBOW DRAW PANEL    Narrative:     The following orders were created for panel order [place] Draw Panel.  Procedure                               Abnormality         Status                     ---------                               -----------         ------                     RAINBOW RED[[id number]]                                      Final result               RAINBOW LT BLUE[[id number]]                                  Final result               RAINBOW GOLD[[id number]]                                     Final result                 Please view results for these tests on the individual orders.   RAINBOW RED   RAINBOW LT BLUE   RAINBOW GOLD       ECG Baseline for QTc   Final Result      ECG 12 lead   Final Result      ECG 12 lead   Final Result      ECG Followup for QTc post medication   Final Result      ECG Followup for QTc post medication   Final Result      ECG Followup for QTc post medication   Final Result      ECG 12 lead   Final Result      ECG Baseline for QTc   Final Result      X-RAY CHEST 1 VIEW   Final Result   IMPRESSION: No pneumothorax.            ECG Followup for QTc post medication   Final Result      Transthoracic Echo (TTE) Complete    Final Result      ECG 12 lead   Final Result      X-RAY CHEST 1 VIEW   Final Result   IMPRESSION: No acute disease in the chest.               ECG 12 lead   Final Result          ED Course as of 07/29/22 2110 Tue Jul 19, 2022 2144 Elevated trop  currently no cp or sob. Ekg with st elevations.  [KK]      ED Course User Index  [KK] Oleksandr Fitzgerald MD Kleckner, Kenneth, MD  07/19/22 2142       Oleksandr Fitzgerald MD  07/19/22 2143       Oleksandr Fitzgerald MD  07/20/22 2109       Oleksandr Fitzgerald MD  07/29/22 2110

## 2022-07-20 NOTE — ED PROVIDER NOTES
"Emergency Medicine Note  HPI   HISTORY OF PRESENT ILLNESS       Atrial Fibrillation  Associated symptoms: no abdominal pain, no chest pain, no congestion, no cough, no diarrhea, no fever, no headaches, no nausea, no rash, no rhinorrhea, no shortness of breath, no sore throat and no vomiting      This pt is a 77yoF w/ pmh of CAD and CHF s/p NIKITA x2 on 6/9, HTN, mitral and aortic regurg, HLD, CKD who presents with fatigue.    Pt reports initially feeling significantly improved since her procredure 1 month ago, however in the last several days has developed gradually worsening fatigue and dyspnea on exertion. Today, she felt a \"head pressure\" and dizziness at about 5pm, that has been intermittent since. At the same time, she received a notification from her apple watch that it was detecting atrial fibrillation, and prompted her to present to the ED. She denies headache, vision changes, numbness, weakness, chest pain, dyspnea at rest, abdominal pain, n/v, leg swelling.    She denies tobacco and recreational drug use. infrequent small volume alcohol use.        Patient History   PAST HISTORY     Reviewed from Nursing Triage:  Allergies  Meds  Problems         Past Medical History:   Diagnosis Date   • Aortic regurgitation    • Chronic kidney disease    • Coronary artery disease    • History of loop recorder    • Hypertension    • Lipid disorder    • Mitral regurgitation    • SVT (supraventricular tachycardia) (CMS/HCC)        History reviewed. No pertinent surgical history.    History reviewed. No pertinent family history.    Social History     Tobacco Use   • Smoking status: Never Smoker   • Smokeless tobacco: Never Used   Substance Use Topics   • Alcohol use: Yes     Alcohol/week: 1.0 standard drink     Types: 1 Glasses of wine per week     Comment: 1x/month   • Drug use: Not Currently         Review of Systems   REVIEW OF SYSTEMS     Review of Systems   Constitutional: Negative for chills and fever.   HENT: Negative " for congestion, rhinorrhea and sore throat.    Eyes: Negative for visual disturbance.   Respiratory: Negative for cough and shortness of breath.    Cardiovascular: Negative for chest pain and palpitations.   Gastrointestinal: Negative for abdominal pain, constipation, diarrhea, nausea and vomiting.   Genitourinary: Negative for dysuria.   Musculoskeletal: Negative for gait problem.   Skin: Negative for rash.   Neurological: Positive for dizziness. Negative for syncope, facial asymmetry, speech difficulty, weakness, light-headedness, numbness and headaches.         VITALS     ED Vitals    Date/Time Temp Pulse Resp BP SpO2 [facility]   07/19/22 2301 -- 70 24 179/90 97 %    07/19/22 2144 -- 76 24 157/66 95 %    07/19/22 1933 36.6 °C (97.8 °F) 100 18 135/63 97 % [facility]                       Physical Exam   PHYSICAL EXAM     Physical Exam  Vitals and nursing note reviewed.   Constitutional:       General: She is not in acute distress.     Appearance: She is well-developed.   HENT:      Head: Normocephalic and atraumatic.   Eyes:      Extraocular Movements: Extraocular movements intact.      Conjunctiva/sclera: Conjunctivae normal.      Pupils: Pupils are equal, round, and reactive to light.   Cardiovascular:      Rate and Rhythm: Normal rate. Rhythm irregular.      Pulses: Normal pulses.   Pulmonary:      Effort: Pulmonary effort is normal. No respiratory distress.      Breath sounds: Normal breath sounds.   Abdominal:      Palpations: Abdomen is soft.      Tenderness: There is no abdominal tenderness.   Musculoskeletal:         General: Normal range of motion.      Cervical back: Normal range of motion and neck supple.   Skin:     General: Skin is warm and dry.   Neurological:      Mental Status: She is alert and oriented to person, place, and time.      Cranial Nerves: No cranial nerve deficit.      Sensory: No sensory deficit.      Motor: No weakness.      Coordination: Coordination normal.           PROCEDURES      Procedures     DATA     Results     Procedure Component Value Units Date/Time    [place] Draw Panel [[id number]] Collected: 07/19/22 1940    Specimen: Blood, Venous Updated: 07/20/22 0401    Narrative:      The following orders were created for panel order [place] Draw Panel.  Procedure                               Abnormality         Status                     ---------                               -----------         ------                     RAINBOW RED[[id number]]                                      Final result               RAINBOW LT BLUE[[id number]]                                  Final result               RAINBOW GOLD[[id number]]                                     Final result                 Please view results for these tests on the individual orders.    RAINBOW RED [[id number]] Collected: 07/19/22 1940    Specimen: Blood, Venous Updated: 07/20/22 0401    RAINBOW LT BLUE [[id number]] Collected: 07/19/22 1940    Specimen: Blood, Venous Updated: 07/20/22 0401    RAINBOW GOLD [[id number]] Collected: 07/19/22 1940    Specimen: Blood, Venous Updated: 07/20/22 0401    SARS-CoV-2 (COVID-19), PCR Nasopharynx [[id number]]  (Normal) Collected: 07/19/22 2158    Specimen: Nasopharyngeal Swab from Nasopharynx Updated: 07/19/22 2243    Narrative:      The following orders were created for panel order SARS-CoV-2 (COVID-19), PCR Nasopharynx.  Procedure                               Abnormality         Status                     ---------                               -----------         ------                     SARS-CoV-2 (COVID-19), P...[[id number]]  Normal              Final result                 Please view results for these tests on the individual orders.    SARS-CoV-2 (COVID-19), PCR Nasopharynx [[id number]]  (Normal) Collected: 07/19/22 2158    Specimen: Nasopharyngeal Swab from Nasopharynx Updated: 07/19/22 2243     SARS-CoV-2 (COVID-19) Negative    Narrative:      Testing performed using real-time PCR for  detection of COVID-19. EUA approved validation studies performed on site.     HS Troponin I (with 2 hour reflex) [[id number]]  (Abnormal) Collected: 07/19/22 1940    Specimen: Blood, Venous Updated: 07/19/22 2029     High Sens Troponin I 365.5 pg/mL     Comprehensive metabolic panel [[id number]]  (Abnormal) Collected: 07/19/22 1940    Specimen: Blood, Venous Updated: 07/19/22 2024     Sodium 139 mEQ/L      Potassium 3.4 mEQ/L      Comment: Results obtained on plasma. Plasma Potassium values may be up to 0.4 mEQ/L less than serum values. The differences may be greater for patients with high platelet or white cell counts.        Chloride 105 mEQ/L      CO2 29 mEQ/L      BUN 38 mg/dL      Creatinine 1.7 mg/dL      Glucose 124 mg/dL      Calcium 9.4 mg/dL      AST (SGOT) 25 IU/L      ALT (SGPT) 18 IU/L      Alkaline Phosphatase 63 IU/L      Total Protein 7.1 g/dL      Comment: Test performed on plasma which typically contains approximately 0.4 g/dL more protein than serum.        Albumin 4.1 g/dL      Bilirubin, Total 0.8 mg/dL      eGFR 29.1 mL/min/1.73m*2      Anion Gap 5 mEQ/L     CBC and differential [[id number]]  (Abnormal) Collected: 07/19/22 1940    Specimen: Blood, Venous Updated: 07/19/22 2000     WBC 6.65 K/uL      RBC 4.38 M/uL      Hemoglobin 12.6 g/dL      Hematocrit 39.2 %      MCV 89.5 fL      MCH 28.8 pg      MCHC 32.1 g/dL      RDW 14.8 %      Platelets 275 K/uL      MPV 10.7 fL      Differential Type Auto     nRBC 0.0 %      Immature Granulocytes 0.2 %      Neutrophils 64.4 %      Lymphocytes 24.2 %      Monocytes 8.0 %      Eosinophils 2.7 %      Basophils 0.5 %      Immature Granulocytes, Absolute 0.01 K/uL      Neutrophils, Absolute 4.29 K/uL      Lymphocytes, Absolute 1.61 K/uL      Monocytes, Absolute 0.53 K/uL      Eosinophils, Absolute 0.18 K/uL      Basophils, Absolute 0.03 K/uL           Imaging Results          X-RAY CHEST 1 VIEW (Final result)  Result time 07/20/22 08:32:17    Final result                  Impression:    IMPRESSION: No acute disease in the chest.                   Narrative:    CLINICAL HISTORY: Shortness of breath and atrial fibrillation.    COMMENT: A single frontal view of the chest was obtained using portable  technique.    COMPARISON: Multiple priors including the most recent prior chest radiograph  dated  May 26, 2022.    FINDINGS:    Heart is normal in size. Mediastinal and hilar contours are normal.  No  consolidation. No pleural effusion. No pneumothorax. Regional skeletal  structures are intact. Multiple cardiac leads and wires overlie the patient.  Cardiac loop recorder is noted. Bilateral shoulder prostheses are also noted.                                ECG Baseline for QTc   Final Result      ECG 12 lead   Final Result      ECG 12 lead   Final Result      ECG Followup for QTc post medication   Final Result      ECG Followup for QTc post medication   Final Result      ECG Followup for QTc post medication   Final Result      ECG 12 lead   Final Result      ECG Baseline for QTc   Final Result      ECG Followup for QTc post medication   Final Result      ECG 12 lead   Final Result      ECG 12 lead   Final Result          Scoring tools                                   ED Course & MDM   MDM / ED COURSE / CLINICAL IMPRESSIONS / DISPO     MDM    This pt is a 77yoF w/ pmh of CAD and CHF s/p NIKITA x2 on 6/9, HTN, mitral and aortic regurg, HLD, CKD who presents with fatigue, found to have new onset a-fib.    Rate controlled. No chest pain or sob. No ischemic changes on EKG.    Plan:  - labs  - start AC  - cardiology consult  - admit    ED course as below. EKG repeated. Pt admitted for further evaluation and management of new onset a-fib.    ED Course as of 07/26/22 0046   e Jul 19, 2022   2144 Elevated trop  currently no cp or sob. Ekg with st elevations.  [KK]      ED Course User Index  [KK] Oleksandr Fitzgerald MD         Clinical Impressions as of 07/26/22 0046   Atrial fibrillation,  unspecified type (CMS/HCC)   Elevated troponin              Lucius Chan MD  Resident  07/19/22 7767       Lucius Chan MD  Resident  07/20/22 1406       Lucius Chan MD  Resident  07/26/22 0044

## 2022-07-20 NOTE — PROGRESS NOTES
Patient: Emily Ramirez  Location: [address]  MRN:  [id number]  Today's date:  7/20/2022    Attempted to see patient for therapy. Unable due to medical hold.  Pt with Strict bedrest  Orders. Confirmed with RN. OT will continue to follow

## 2022-07-20 NOTE — H&P
Hospital Medicine Service -  History & Physical        CHIEF COMPLAINT     Chief Complaint   Patient presents with   • Atrial Fibrillation        HISTORY OF PRESENT ILLNESS      77 y.o. female with a past medical history of aortic courage CKD, CAD, hypertension, hyperlipidemia, SVT, with recorder, status post stent to RCA and circumflex 6/9, who was at home shucking corn today, and began to feel fatigued around 2:00 this afternoon, she noticed head pressure at that time and continued on her normal activities however over the course of a few hours the fatigue did not resolve.  Patient noted that her apple watch was telling her that she was in atrial fib which was a new arrhythmia for this patient.  She came to [facility] for further evaluation.  Vital signs temp 97.8 °F, heart rate 100, respiratory rate 18, SPO2 97% on room air, blood pressure 135/63.  Labs significant for high-sensitivity troponin of 365.5, 377.5, potassium 3.4, BUN of 38, creatinine 1.7, EGFR 29.1, glucose 124, white blood count 6.65.  Lead EKG was performed showed patient was in a controlled rate controlled A. fib with a heart rate of 98.  Patient received Eliquis in the emergency department.    On initial evaluation patient spouse and daughter at bedside and assisted with HPI.  Patient denies chest pain, she does not note any arrhythmia.  She does have a loop recorder for history of SVT currently in place.  Of note in the emergency department she had a period upon returning from ambulation to the bathroom where she felt fatigued and had pain and was noted to have a sinus pause 6 seconds in duration, followed by a brief 5 to 6-second period of sinus rhythm, before her return to rate controlled atrial flutter.  Patient uses a rolling walker for ambulation at home.  Denies any caffeine use other than drinking a half a glass of decaffinated iced tea.  She does not regularly drink alcohol, or any other caffeine containing products.  She does  not have obstructive sleep apnea, or difficulty with sleeping, she does not use supplemental oxygen at home.  Patient denies any nausea vomiting constipation or diarrhea.  No issues with her urination.    Patient to be admitted for new onset A. fib.  Plan is to admit to telemetry, Eliquis twice daily, n.p.o., echocardiogram 7/20/2022, cardiology consult, ZOLL at bedside, fall precautions, PT, OT, evaluation, social work evaluation, trend labs.  Patient lives at with spouse.  CODE STATUS discussed with patient spouse and daughter at bedside and patient is a full code.    PAST MEDICAL AND SURGICAL HISTORY      Past Medical History:   Diagnosis Date   • Aortic regurgitation    • Chronic kidney disease    • Coronary artery disease    • History of loop recorder    • Hypertension    • Lipid disorder    • Mitral regurgitation    • SVT (supraventricular tachycardia) (CMS/[facility])        History reviewed. No pertinent surgical history.    MEDICATIONS      Prior to Admission medications    Medication Sig Start Date End Date Taking? Authorizing Provider   aspirin 81 mg enteric coated tablet Take 81 mg by mouth daily.   Yes Amna Lockwood MD   coenzyme Q10 (COQ10) 100 mg capsule Take 100 mg by mouth daily. Takes in the PM   Yes Amna Lockwood MD   famotidine (PEPCID) 20 mg tablet Take 40 mg by mouth nightly.   Yes Amna Lockwood MD   hydrochlorothiazide (HYDRODIURIL) 25 mg tablet Take 25 mg by mouth daily.   Yes Amna Lockwood MD   lisinopriL (PRINIVIL) 10 mg tablet Take 10 mg by mouth daily.   Yes Amna Lockwood MD   metoprolol succinate XL (TOPROL-XL) 50 mg 24 hr tablet Take 50 mg by mouth daily.   Yes Amna Lockwood MD   multivit,iron,minerals/lutein (CENTRUM SILVER ULTRA WOMEN'S ORAL) Take by mouth nightly.   Yes Amna Lockwood MD   pantoprazole (PROTONIX) 40 mg EC tablet Take 40 mg by mouth daily. 30 min prior to breakfast   Yes Amna Lockwood MD   simvastatin (ZOCOR) 20  mg tablet Take 20 mg by mouth nightly.   Yes Amna Lockwood MD   clopidogreL (PLAVIX) 75 mg tablet Take 1 tablet (75 mg total) by mouth daily. 6/11/22 7/11/22  Dyana Mack CRNP   traMADoL-acetaminophen (ULTRACET) 37.5-325 mg per tablet Take 1 tablet by mouth 2 (two) times a day. Daily or q 12 hours as needed    Amna Lockwood MD   famotidine (PEPCID) 20 mg tablet Take 40 mg by mouth nightly as needed for heartburn.  7/19/22  Amna Lockwood MD   omeprazole (PriLOSEC) 20 mg capsule Take 20 mg by mouth daily before breakfast.  7/19/22  Amna Lockwood MD   triamterene-hydrochlorothiazide (MAXZIDE-25) 37.5-25 mg per tablet Take 1 tablet by mouth every morning.  7/19/22  Amna Lockwood MD       ALLERGIES      Celebrex [celecoxib], Cipro [ciprofloxacin hcl], Clindamycin, Crestor [rosuvastatin], Lipitor [atorvastatin], and Voltaren [diclofenac sodium]    FAMILY HISTORY      History reviewed. No pertinent family history.    SOCIAL HISTORY      Social History     Socioeconomic History   • Marital status:      Spouse name: None   • Number of children: None   • Years of education: None   • Highest education level: None   Tobacco Use   • Smoking status: Never Smoker   • Smokeless tobacco: Never Used   Substance and Sexual Activity   • Alcohol use: Yes     Alcohol/week: 1.0 standard drink     Types: 1 Glasses of wine per week     Comment: 1x/month   • Drug use: Not Currently       REVIEW OF SYSTEMS        Review of Systems  Constitutional: Fatigue today, no unexpected weight change.   Eyes: Negative for visual disturbance. Mouth no sore throat  Respiratory: Negative for cough and shortness of breath.    Cardiovascular: Negative for chest pain and palpitations.   Gastrointestinal: Negative for abdominal pain, constipation, diarrhea, nausea and vomiting.   Genitourinary: Negative for difficulty urinating. no hematuria no frequency no urgency no discharge  Musculoskeletal: Negative  for arthralgias.   Skin: Negative for rash.   Neurological: Had dizziness and head pressure today.   Hematological: Does not bruise/bleed easily.   Psychiatric/Behavioral: Negative for confusion and dysphoric mood no suicidal ideations          PHYSICAL EXAMINATION      Temp:  [36.6 °C (97.8 °F)] 36.6 °C (97.8 °F)  Heart Rate:  [] 76  Resp:  [18-24] 24  BP: (135-157)/(63-66) 157/66  Body mass index is 31.71 kg/m².    Physical Exam  Constitutional:       General: She is not in acute distress.     Appearance: She is not toxic-appearing.   HENT:      Mouth/Throat:      Mouth: Mucous membranes are moist.   Eyes:      Pupils: Pupils are equal, round, and reactive to light.   Cardiovascular:      Rate and Rhythm: Normal rate. Rhythm irregular.      Pulses: Normal pulses.   Pulmonary:      Breath sounds: Normal breath sounds.   Abdominal:      General: Bowel sounds are normal. There is no distension.      Palpations: Abdomen is soft.   Musculoskeletal:      Right lower leg: No edema.      Left lower leg: No edema.   Skin:     General: Skin is warm and dry.      Capillary Refill: Capillary refill takes less than 2 seconds.      Coloration: Skin is pale.   Neurological:      General: No focal deficit present.      Mental Status: She is alert and oriented to person, place, and time.   Psychiatric:         Mood and Affect: Mood normal.         LABS / IMAGING / EKG        Labs  CBC Results       07/19/22 06/10/22 05/26/22     1940 0327 1250    WBC 6.65 7.38 6.79    RBC 4.38 4.21 4.89    HGB 12.6 12.1 13.7    HCT 39.2 36.2 44.8    MCV 89.5 86.0 91.6    MCH 28.8 28.7 28.0    MCHC 32.1 33.4 30.6     222 262        CMP Results       07/19/22 06/10/22 05/26/22     1940 0327 1250     137 141    K 3.4 3.6 5.1    Cl 105 104 102    CO2 29 24 27    Glucose 124 105 100    BUN 38 27 33    Creatinine 1.7 1.0 1.4    Calcium 9.4 9.0 10.3    Anion Gap 5 9 12    AST 25 -- --    ALT 18 -- --    Albumin 4.1 -- --    EGFR 29.1  "53.6 36.4         Comment for K at 1940 on 07/19/22: Results obtained on plasma. Plasma Potassium values may be up to 0.4 mEQ/L less than serum values. The differences may be greater for patients with high platelet or white cell counts.          Troponin I Results       07/19/22 07/19/22 2142 1940    HS Troponin I 377.5 365.5        Microbiology Results     ** No results found for the last 720 hours. **        UA Results    No lab values to display.         Imaging  ECG 12 lead    (Results Pending)   ECG 12 lead    (Results Pending)   X-RAY CHEST 1 VIEW    (Results Pending)         ECG/Telemetry  reviewed by me    ASSESSMENT AND PLAN           * Atrial fibrillation (CMS/[facility])  Assessment & Plan  She was at home with family today it was shucking 80 pieces of corn, and 2:00 in the afternoon shortly after lunch the patient felt extremely fatigued had pressure and \"just not right\".  Patient continued to feel this way for a few more hours and noted that her apple watch was indicating that she was in atrial fib which is a new rhythm for this patient.  Patient has a loop recorder intact.  Patient has been rate controlled since she has been here in the 80s.  High-sensitivity troponins were 365.5 and 377.5 likely indicative of her for new onset A. fib.  Telemetry admission, monitor heart rate  Eliquis twice daily  Continue metoprolol  Cardiology consult      Acute kidney insufficiency  Assessment & Plan  BUN is 38, creatinine 1.7, EGFR 29.1 patient has been to a nephrologist outpatient and has been following.  Avoid Nephrotoxic drugs when possible    GERD (gastroesophageal reflux disease)  Assessment & Plan  Continue Pepcid nightly Pepcid and Pr Protonix in AM.    Hypokalemia  Assessment & Plan  Serum potassium 3.4  Trend potassium levels and replace as needed    Hyperlipidemia  Assessment & Plan  Continue Simvastatin    CAD (coronary artery disease)  Assessment & Plan  Patient with recent stent placement on 9/20/2022 at " that time she received stent to her RCA and circumflex.  Was discharged home on aspirin and Plavix and simvastatin.   Continue aspirin, Plavix  Echocardiogram 7/20/2022      VTE Assessment: Padua VTE Score: 5  VTE Prophylaxis Plan: scd's, eliquis  Code Status: Full Code       VENTURA Rowe  7/19/2022

## 2022-07-20 NOTE — PROGRESS NOTES
Hospital Medicine Service -  Daily Progress Note       SUBJECTIVE   Interval History: No acute overnight events. Pt reports she felt flushed during her pause yesterday. Feels well now back in NSR. No CP, SOB, abd pain, dizziness, weakness.      OBJECTIVE      Vital signs in last 24 hours:  Temp:  [36.2 °C (97.2 °F)-37.1 °C (98.7 °F)] 36.2 °C (97.2 °F)  Heart Rate:  [] 83  Resp:  [18-24] 18  BP: (131-179)/(63-90) 146/74  No intake or output data in the 24 hours ending 07/20/22 1146    PHYSICAL EXAMINATION      Physical Exam  Vitals and nursing note reviewed.   Constitutional:       General: She is not in acute distress.     Appearance: She is obese.   HENT:      Head: Normocephalic.   Eyes:      Pupils: Pupils are equal, round, and reactive to light.   Cardiovascular:      Rate and Rhythm: Normal rate and regular rhythm.   Pulmonary:      Effort: Pulmonary effort is normal.      Breath sounds: Normal breath sounds.   Abdominal:      General: Abdomen is flat. Bowel sounds are normal.      Palpations: Abdomen is soft.   Musculoskeletal:         General: No swelling. Normal range of motion.      Cervical back: Normal range of motion.   Skin:     General: Skin is warm and dry.   Neurological:      General: No focal deficit present.      Mental Status: She is alert and oriented to person, place, and time.   Psychiatric:         Mood and Affect: Mood normal.        LINES, CATHETERS, DRAINS, AIRWAYS, AND WOUNDS   Lines, Drains, Airways, Wounds:  Peripheral IV (Adult) 07/19/22 Right Antecubital (Active)   Number of days: 1       Comments: no other LDAs     LABS / IMAGING / TELE      Labs  Labs reviewed  Results from last 7 days   Lab Units 07/20/22 0757 07/19/22  1940   WBC K/uL 5.47 6.65   HEMOGLOBIN g/dL 11.6* 12.6   HEMATOCRIT % 35.6 39.2   PLATELETS K/uL 233 275     Results from last 7 days   Lab Units 07/20/22 0757 07/19/22 1940   SODIUM mEQ/L 140 139   POTASSIUM mEQ/L 3.5* 3.4*   CHLORIDE mEQ/L 104 105    CO2 mEQ/L 27 29   BUN mg/dL 32* 38*   CREATININE mg/dL 1.3* 1.7*   CALCIUM mg/dL 8.9 9.4   ALBUMIN g/dL  --  4.1   BILIRUBIN TOTAL mg/dL  --  0.8   ALK PHOS IU/L  --  63   ALT IU/L  --  18   AST IU/L  --  25   GLUCOSE mg/dL 87 124*     Results from last 7 days   Lab Units 07/19/22 2142 07/19/22 1940   HIGH SENSITIVE TROPONIN I pg/mL 377.5* 365.5*     Results from last 7 days   Lab Units 07/19/22 1940   TSH mIU/L 1.89     Microbiology Results     Procedure Component Value Units Date/Time    SARS-CoV-2 (COVID-19), PCR Nasopharynx [[id number]]  (Normal) Collected: 07/19/22 2158    Specimen: Nasopharyngeal Swab from Nasopharynx Updated: 07/19/22 2243    Narrative:      The following orders were created for panel order SARS-CoV-2 (COVID-19), PCR Nasopharynx.  Procedure                               Abnormality         Status                     ---------                               -----------         ------                     SARS-CoV-2 (COVID-19), P...[[id number]]  Normal              Final result                 Please view results for these tests on the individual orders.    SARS-CoV-2 (COVID-19), PCR Nasopharynx [[id number]]  (Normal) Collected: 07/19/22 2158    Specimen: Nasopharyngeal Swab from Nasopharynx Updated: 07/19/22 2243     SARS-CoV-2 (COVID-19) Negative    Narrative:      Testing performed using real-time PCR for detection of COVID-19. EUA approved validation studies performed on site.             Imaging  Imaging reviewed  X-RAY CHEST 1 VIEW    Result Date: 7/20/2022  Narrative: CLINICAL HISTORY: Shortness of breath and atrial fibrillation. COMMENT: A single frontal view of the chest was obtained using portable technique. COMPARISON: Multiple priors including the most recent prior chest radiograph dated  May 26, 2022. FINDINGS: Heart is normal in size. Mediastinal and hilar contours are normal.  No consolidation. No pleural effusion. No pneumothorax. Regional skeletal structures are intact.  Multiple cardiac leads and wires overlie the patient. Cardiac loop recorder is noted. Bilateral shoulder prostheses are also noted.     Impression: IMPRESSION: No acute disease in the chest.       ECG/Telemetry  Telemetry reviewed SR (on floor), ED strips unavailable at time of review    ASSESSMENT AND PLAN      * Paroxysmal atrial fibrillation (CMS/[facility])  Assessment & Plan  Presents with fatigue, dizziness, flushness, PASCUAL  Found to be in AF with multiple conversion pauses up to 6 seconds  Currently back in NSR  NPO for possible PPM placement tomorrow pending EP evaluation   Hold eliquis  Hold metoprolol  Check TTE  Keep zoll pads at bedside  Cardiology consult appreciated, known to Dr Saravia    Tachy-marco a syndrome (CMS/[facility])  Assessment & Plan  See PAF problem    Acute kidney injury superimposed on CKD (CMS/[facility])  Assessment & Plan  H/o CKD III with baseline Cr 1.1-1.4  Cr 1.7 on admission  DENNY suspect prerenal  Cr improved to 1.3 today after hydration   Encourage PO intake  Avoid nephrotoxins and hypotension  Trend BMP    CAD (coronary artery disease)  Assessment & Plan  S/p NIKITA to RCA and LCx (6/6/22)  Denies chest pain  Continue ASA, plavix (will need to d/c 1 antiplatelet when restarted on eliquis), statin, lisinopril  Hold metoprolol in setting of long conversion pauses  Check TTE    Elevated troponin  Assessment & Plan  Troponin 365.5-377.5  EKG nonischemic  Denies chest pain  Suspect demand ischemia due to AF  Not c/w ACS    Valvular heart disease  Assessment & Plan  Known mild MR/ AR  Check TTE    Hypomagnesemia  Assessment & Plan  Trend and replace as needed    GERD (gastroesophageal reflux disease)  Assessment & Plan  Stable, denies GI sx  Continue PPI and H2 blocker    Hypokalemia  Assessment & Plan  Trend and replace as needed    Hyperlipidemia  Assessment & Plan  Stable   Continue Simvastatin    Class 1 obesity in adult  Assessment & Plan  Body mass index is 32.95 kg/m².  Encourage weight loss       VTE  Assessment: Padua VTE Score: 5  VTE Prophylaxis Plan: eliquis held for possible ppm cullen  Code Status: Full Code  Estimated Discharge Date: 7/22/2022  Disposition Planning: d/c home when medically stable, TTE today, likely PPM cullen pending EP VENTURA Calabrese  7/20/2022

## 2022-07-20 NOTE — ASSESSMENT & PLAN NOTE
Presents with fatigue, dizziness, flushness, PASCUAL  Found to be in AF with multiple conversion pauses up to 6 seconds  Consistent with underlying symptomatic SSS  TTE with EF 65%, G2DD, mild-mod RT/DE, mild MR/ AR, wall motion grossly normal  S/p PPM placement (7/21/22)  Currently in rate controlled A flutter  Attempted sotalol with QT prolonged and renal function worsened so it was discontinued  Start amiodarone 400mg BID x7 days, then 200mg daily   Continue eliquis  Cardiology and EP consult appreciated, known to Dr Saravia

## 2022-07-20 NOTE — PATIENT CARE CONFERENCE
Care Progression Rounds Note  Date: 7/20/2022  Time: 1:57 PM     Patient Name: Emily Ramirez     Medical Record Number: [id number]   YOB: 1945  Sex: Female      Room/Bed: 0323W    Admitting Diagnosis: Atrial fibrillation (CMS/HCC) [I48.91]  Atrial fibrillation, unspecified type (CMS/HCC) [I48.91]   Admit Date/Time: 7/19/2022  7:38 PM    Primary Diagnosis: Paroxysmal atrial fibrillation (CMS/HCC)  Principal Problem: Paroxysmal atrial fibrillation (CMS/HCC)    GMLOS: pending  Anticipated Discharge Date: 7/22/2022    AM-PAC:  Mobility Score:      Discharge Planning:   d.c planning for pt is home at this time will continue to follow    Barriers to Discharge:       Comments:       Participants:

## 2022-07-20 NOTE — CONSULTS
REASON FOR CONSULT: Hx CAD s/p NIKITA x2 on 6/9 who p/w fatigue, found to have new onset afib    CONSULT FROM: Prieto Yo MD    PRIMARY CARDIOLOGIST: Dr. Saravia     ------------------------------------------------------------------------------------------------------------------------------------------  HISTORY OF PRESENTING ILLNESS  ------------------------------------------------------------------------------------------------------------------------------------------  Emily Ramirez is a 77 y.o. female who is admitted with new onset atrial fibrillation.    # cardiac risk factors: HTN, HL, obesity, age  # CAD- PCI with NIKITA to prox RCA and prox LCx [p/op developed ST elevations inferiorly- thrombotic occlusion RCA s/p repeat angioplasty]  # bilateral carotid artery disease- mild  # mild MR/AR  # frequent PVCs, PACs- [employer] loop recorder  # PMH: dyspnea on exertion, CKD III, Rt hip replacement- 4/2011    Echo (1/2022): EF 65-70%, mild LVH, mild LAE, mild MR, mild AR, mild/mod TR, mod MN, mild pHTN with PASP 35-40 mmHg.    LHC (6/9/2022): 90% prox RCA, 90% prox LCx --> PCI to RCA, PCI to LCX    At baseline, the patient denies exertional chest pain, shortness of breath, orthopnea, PND, ankle edema, palpitations, or syncope.  She uses a cane and walker with ambulation.    The patient presents to [facility] for evaluation of atrial fibrillation.  She has a history of coronary artery disease and recent stenting to the proximal RCA and proximal left circumflex on 6/9/2022.  She did noted great improvement of her dyspnea on exertion after her cath.  Over the last week however she has noted gradual worsening of her dyspnea on exertion.  Yesterday when she was shucking corn she suddenly felt fatigued and had pressure behind her eyes.  She was slightly dizzy which resolved quickly.  Her fatigue persisted throughout the day.  Later her apple watch alerted her that she was possibly in atrial fibrillation.  Her  " convinced her to come to the ER for evaluation.    EKG on admission showed new onset atrial fibrillation with a heart rate of 98 bpm.  In the ED she had two episodes of feeling of a \"heat wave\" traveling from her thighs up to her chest. Implantable loop recorder was interrogated this morning and shows several pauses that correlate with timing of her symptoms. She has had several pauses yesterday, two of which were 6 seconds long.     ------------------------------------------------------------------------------------------------------------------------------------------  PAST MEDICAL HISTORY  ------------------------------------------------------------------------------------------------------------------------------------------  Past Medical History:   Diagnosis Date   • Aortic regurgitation    • Chronic kidney disease    • Coronary artery disease    • History of loop recorder    • Hypertension    • Lipid disorder    • Mitral regurgitation    • SVT (supraventricular tachycardia) (CMS/[facility])      History reviewed. No pertinent surgical history.    ------------------------------------------------------------------------------------------------------------------------------------------  MEDICATIONS  ------------------------------------------------------------------------------------------------------------------------------------------  Home medications    •  aspirin, Take 81 mg by mouth daily.  •  clopidogreL, Take 75 mg by mouth daily.  •  coenzyme Q10, Take 100 mg by mouth daily. Takes in the PM  •  famotidine, Take 10 mg by mouth nightly.  •  hydrochlorothiazide, Take 25 mg by mouth daily.  •  lisinopriL, Take 10 mg by mouth daily.  •  metoprolol succinate XL, Take 50 mg by mouth daily.  •  multivit,iron,minerals/lutein (CENTRUM SILVER ULTRA WOMEN'S ORAL), Take 1 tablet by mouth every other day.  •  pantoprazole, Take 40 mg by mouth daily. 30 min prior to breakfast  •  simvastatin, Take 20 mg by mouth " nightly.  •  traMADoL-acetaminophen, Take 1 tablet by mouth 2 (two) times a day. Daily or q 12 hours as needed    Inpatient Medications    •  acetaminophen, 650 mg, oral, q4h PRN  •  alum-mag hydroxide-simeth, 30 mL, oral, q4h PRN  •  apixaban, 5 mg, oral, BID  •  aspirin, 81 mg, oral, Daily  •  atorvastatin, 10 mg, oral, Daily  •  atropine, 0.5 mg, intravenous, q5 min PRN  •  clopidogreL, 75 mg, oral, Daily  •  glucose, 16-32 g of dextrose, oral, PRN **OR** dextrose, 15-30 g of dextrose, oral, PRN **OR** glucagon, 1 mg, intramuscular, PRN **OR** dextrose 50 % in water (D50), 25 mL, intravenous, PRN  •  docusate sodium, 100 mg, oral, BID  •  famotidine, 40 mg, oral, Nightly  •  hydrochlorothiazide, 25 mg, oral, Daily  •  lisinopriL, 10 mg, oral, Daily  •  magnesium sulfate, 1 g, intravenous, PRN **OR** magnesium sulfate, 2 g, intravenous, PRN  •  [Provider Managed Hold] metoprolol succinate XL, 50 mg, oral, Daily  •  nitroglycerin, 0.4 mg, sublingual, q5 min PRN  •  pantoprazole, 40 mg, oral, Daily  •  potassium chloride, 20 mEq, oral, PRN  •  potassium chloride, 40 mEq, oral, PRN    ------------------------------------------------------------------------------------------------------------------------------------------  ALLERGIES  ------------------------------------------------------------------------------------------------------------------------------------------  Celebrex [celecoxib], Cipro [ciprofloxacin hcl], Clindamycin, Crestor [rosuvastatin], Lipitor [atorvastatin], and Voltaren [diclofenac sodium]    ------------------------------------------------------------------------------------------------------------------------------------------  SOCIAL HISTORY  ------------------------------------------------------------------------------------------------------------------------------------------  Never smoker, infrequent alcohol    & retired  Exercises  regularly    ------------------------------------------------------------------------------------------------------------------------------------------  FAMILY HISTORY  ------------------------------------------------------------------------------------------------------------------------------------------  No premature CAD  father  @ 80- [place], pacemaker  mother  @ 92- old age  ------------------------------------------------------------------------------------------------------------------------------------------  REVIEW OF SYSTEMS  ------------------------------------------------------------------------------------------------------------------------------------------  Constitutional: - fever, - chills, - weakness, - weight loss, + fatigue, + dizziness  HEENT: - blurred vision, - sore throat, - hoarseness  Respiratory: + dyspnea, - cough, - hemoptysis  Cardiovascular: - chest pain, + dyspnea on exertion, - orthopnea, - PND, - edema, - palpitations, - syncope  Gastrointestinal: - nausea, - vomiting, - diarrhea, - hematemesis, - melena  Genitourinary: - dysuria, - frequency  Integument: - rash, - itching  Hematologic/lymphatic:  - bruising, - petechiae  Musculoskeletal: - arthalgias, - myalgias  Neurological: - vertigo, - tremors, - headache, - speech deficit, - focal weakness  Behavioral/Psych: - anxiety, - depression  Endocrine: - cold intolerance, - heat intolerance, - weight change    ------------------------------------------------------------------------------------------------------------------------------------------  PHYSICAL EXAM  ------------------------------------------------------------------------------------------------------------------------------------------  VITAL SIGNS:  Temp:  [36.4 °C (97.6 °F)-37.1 °C (98.7 °F)] 36.4 °C (97.6 °F)  Heart Rate:  [] 78  Resp:  [18-24] 20  BP: (135-179)/(63-90) 171/75  SaO2: 93%  No intake or output data in the 24 hours ending 22  3051    PHYSICAL EXAM:  General appearance: alert and cooperative, NAD  Head: without obvious abnormality  Eyes: PERRLA, extraocular movements intact  Neck: No JVD, carotid bruits, thyromegaly  Lungs: clear to auscultation bilaterally, no crackles or wheezing, room air  Heart: regular rate and rhythm, S1-S2 normal, no murmurs, clicks, rubs or gallops  Abdomen: soft, non-tender, bowel sounds normal  Extremities: no edema, peripheral pulses present  Skin: Skin color, texture, turgor normal. No rashes or lesions  Neurologic: Alert and oriented X 3, no focal deficits    ------------------------------------------------------------------------------------------------------------------------------------------  LABS / IMAGING / EKG / TELEMETRY  ------------------------------------------------------------------------------------------------------------------------------------------  LABS:  Results from last 7 days   Lab Units 22  1940   SODIUM mEQ/L  --  139   POTASSIUM mEQ/L  --  3.4*   CHLORIDE mEQ/L  --  105   CO2 mEQ/L  --  29   BUN mg/dL  --  38*   CREATININE mg/dL  --  1.7*   AST IU/L  --  25   ALT IU/L  --  18   HIGH SENSITIVE TROPONIN I pg/mL 377.5* 365.5*     Results from last 7 days   Lab Units 22  1940   WBC K/uL 6.65   HEMOGLOBIN g/dL 12.6   HEMATOCRIT % 39.2   PLATELETS K/uL 275     No results found for: HGBA1C, TSH  No results found for: CHOL, LDLCALC, HDL, TRIG  No results found for: BNP    IMAGIN22 CXR: completed results pending    EC22 Afib 98, normal axis, low voltage QRS, age indeterminate anterior and inferior infarct    22 SR with PACs, normal axis, old septal infarct     TELEMETRY:  Sinus rhythm    ------------------------------------------------------------------------------------------------------------------------------------------  ASSESSMENT AND  PLAN  ------------------------------------------------------------------------------------------------------------------------------------------  Tachy-marco a syndrome  -Patient had several pauses yesterday up to 6 seconds long. Will consult EP for probable permanent pacemaker implantation tomorrow. Hold Eliquis and metoprolol for now. NPO at midnight.     Atrial fibrillation  -New onset, rate controlled. Plan as above to hold anticoagulation and beta blocker until after PPM. Repeat TTE today.     CAD  -s/p NIKITA to the prox RCA and prox left circumflex on 6/6/22. She remains on aspirin and Plavix.   -Trop elevated- possibly tachy mediated? Denies any CP. No acute EKG changes.    Hypertension  -Continue aspirin, lisinopril. Hold BB for now.     Valvular heart disease  -mild MR/AR by echo 1/22.    Hyperlipidemia  -On simvastatin. Intolerant to both rosuvastatin and atorvastatin. FLP from 5/12/22 shows a TC of 129 and LDL of 61.             VENTURA Tapia  7/20/2022    Primary Care Doctor: Jonathan Meza, DO

## 2022-07-20 NOTE — PROGRESS NOTES
Patient: Emily Ramirez  Location: [address]  MRN:  [id number]  Today's date:  7/20/2022    Attempted to see patient for therapy. Unable due to medical hold. Pt on strict bed rest orders. Discussed with RN. PT will continue to follow.

## 2022-07-20 NOTE — ASSESSMENT & PLAN NOTE
H/o CKD III with baseline Cr 1.1-1.4  Cr peak 1.8 suspect prerenal  Cr improved to 1.4 today with hydration   Encourage PO intake  Hold HCTZ and lisinopril  Avoid nephrotoxins and hypotension  Trend BMP

## 2022-07-20 NOTE — STUDENT
"Medical Student Note: For educational purposes only.  Do not use for coding or billing.     Medical Student Daily Progress Note    Subjective     Interval History: Emily was admitted yesterday after her apple watch alerted her to atrial fibrillation rhythm detected and she had also been c/o pressure in her head, dizziness, and some SOB. Had been on her feet working for more than usual. When we had her ekg in ED, showed atrial fibrillation. Now in NSR but with premature ventricular complexes on tele. Says that she has a history of \"an extra beat\" Patient currently denies any chest pain, dizziness, SOB, n/v, congestion, cough, fever, recent illness. Patient history significant for HTN, CAD s/p NIKITA x2 in RCA and LCX 6/9/2022, mitral and aortic regurg, CKD, CHF, HLD, GERD. Had loop recorder on admission. Was awaiting echo today on my examination. 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       Objective     Vital signs in last 24 hours:  Temp:  [36.2 °C (97.2 °F)-37.1 °C (98.7 °F)] 36.5 °C (97.7 °F)  Heart Rate:  [] 69  Resp:  [18-24] 18  BP: (131-179)/(63-90) 151/71    No intake or output data in the 24 hours ending 07/20/22 1542  Intake/Output this shift:  No intake/output data recorded.    Physical Exam:  Visit Vitals  BP (!) 151/71 (BP Location: Left forearm, Patient Position: Lying)   Pulse 69   Temp 36.5 °C (97.7 °F) (Temporal)   Resp 18   Ht 1.6 m (5' 3\")   Wt 84.4 kg (186 lb)   SpO2 96%   BMI 32.95 kg/m²     General appearance: alert, appears stated age, cooperative and no distress  Head: normocephalic, without obvious abnormality, atraumatic  Back: symmetric, no curvature. ROM normal. No CVA tenderness.  Lungs: clear to auscultation bilaterally  Chest wall: no tenderness  Heart: regular rate and rhythm, S1, S2 normal, no murmur, click, rub or gallop  Abdomen: soft, non-tender; bowel sounds normal; no masses, no organomegaly  Extremities: " extremities normal, warm and well-perfused; no cyanosis, clubbing, or edema  Pulses: 2+ and symmetric    Labs  I have reviewed the patient's labs.  Significant abnormals are troponins, BUN, Cr, eGFR, magnesium - see below.     High sensitivity Troponin   7/19/22 at 19:40 - 365.5  7/19/22 at 21:42 - 377.5     BUN   7/19/22 - 38   7/20/22 - 32     Cr   7/19/22 - 1.7   7/20/22 - 1.3    eGFR   7/19/22 - 29.1   7/20/22 - 39.6     Magnesium   7/20/22 - 1.7     Imaging  I have reviewed the Imaging from the last 24 hrs. and Significant findings include:     XRAY chest 7/19/22   No acute disease in the chest     EKG 7/19/22   Atrial fibrillation with low voltage QRS, possible inferior infarct (cited on or before 6/10/22), cannot rule out anterior infarct    EKG 7/20/22   Sinus rhythm with PVCs, old septal infarct (cited on or before 6/10/22), vent rate decreased by 34 bpm from previous study     TTE pending 7/20/22    VTE Assessment: Padua 5, Eliquis held due to presumed pacemaker placement tomorrow     Assessment/Plan      Paroxysmal Atrial Fibrillation   -At home apple watch alerted to afib + symptoms of SOB, fatigue, head pressure   -Symptoms continued to wax/wane  -Came to ED with daughter + son   -Elevated troponins 365.5-377.5   -Recently had NIKITA placed (6/9/22) in RCA, LCX  -EKG 7/19/22 Atrial fibrillation with low voltage QRS, with controlled vent rate, possible inferior infarct (cited on or before 6/10/22), cannot rule out anterior infarct,   -Patient denies history of afib   -EKG 7/20/22 Sinus rhythm with PVCs, old septal infarct (cited on or before 6/10/22), vent rate decreased by 34 bpm from previous study   -XRAY chest 7/19/22 No acute disease in the chest   -After looking at loop recorder, multiple pauses up to 6 seconds recorded yesterday which coincides with patients description of episodes  -HR  since admission, continue to monitor   -Since HR under 110, no need for rate control at this time, hold  metoprolol   --Patient denies chest pain, fever, recent infection, n/v, leg pain, edema  -Will need pacemaker, planning for tomorrow  -NPO   -Holding Eliquis due to anticipation of pacemaker placement tomorrow   -Aspirin 81 mg PO q Day   -Plavix 75 mg PO q Day   -TTE pending   -cardiology following     CAD   -NIKITA placed in RCA and LCX 6/9/22   -Patient had been feeling much better post stent until current presentation   --EKG 7/19/22 Atrial fibrillation with low voltage QRS, with controlled vent rate, possible inferior infarct (cited on or before 6/10/22), cannot rule out anterior infarct,   -EKG 7/20/22 Sinus rhythm with PVCs, old septal infarct (cited on or before 6/10/22), vent rate decreased by 34 bpm from previous study   -Aspirin 81 mg PO q Day   -Plavix 75 mg PO q Day   -Lisinopril 10 mg PO q Day   -Simvastatin due to Lipitor and Crestor allergy - good control  -Eliquis on hold for now due to presumed pacemaker placement for tomorrow   -Elevated troponins 365.5-377.5, cardiology suggests tachy mediated since no acute EKG changes and no chest pain  -TTe pending     CKD   -hx of CKD   -Cr 1.7 on admission   -Suspect DENNY (prerenal? Dehydration?)  -IV fluids   -Cr improved to 1.3 - supports prerenal DENNY  -Avoid nephrotoxic agents     HLD   -Documented allergy to Crestor and Lipitor   -Per cardiology, good control with simvastatin  -Continue simvastatin q Day     GERD   -Patient w/o GI complaints   -Continue protonix 40 mg PO   -Continue pepcid 40 mg PO q Day      Hypomagnesia   -1.7   -Trend magnesium       I have discussed assessment and plan with Dr. Yo     Expected Discharge Date:   7/22/2022

## 2022-07-20 NOTE — ASSESSMENT & PLAN NOTE
Troponin 365.5-377.5  EKG nonischemic  Denies chest pain  Suspect demand ischemia due to AF  Not c/w ACS

## 2022-07-20 NOTE — ASSESSMENT & PLAN NOTE
S/p NIKITA to RCA and LCx (6/6/22)  Denies chest pain  Continue ASA, eliquis, statin, lisinopril  D/c'd plavix, metoprolol

## 2022-07-21 ENCOUNTER — ANESTHESIA (INPATIENT)
Dept: CARDIOLOGY | Facility: HOSPITAL | Age: 77
DRG: 243 | End: 2022-07-21
Payer: MEDICARE

## 2022-07-21 ENCOUNTER — ANESTHESIA EVENT (INPATIENT)
Dept: CARDIOLOGY | Facility: HOSPITAL | Age: 77
DRG: 243 | End: 2022-07-21
Payer: MEDICARE

## 2022-07-21 ENCOUNTER — APPOINTMENT (INPATIENT)
Dept: RADIOLOGY | Facility: HOSPITAL | Age: 77
DRG: 243 | End: 2022-07-21
Attending: PHYSICIAN ASSISTANT
Payer: MEDICARE

## 2022-07-21 LAB
ANION GAP SERPL CALC-SCNC: 9 MEQ/L (ref 3–15)
ATRIAL RATE: 94
BUN SERPL-MCNC: 27 MG/DL (ref 8–20)
CALCIUM SERPL-MCNC: 9.3 MG/DL (ref 8.9–10.3)
CHLORIDE SERPL-SCNC: 105 MEQ/L (ref 98–109)
CO2 SERPL-SCNC: 25 MEQ/L (ref 22–32)
CREAT SERPL-MCNC: 1.2 MG/DL (ref 0.6–1.1)
ERYTHROCYTE [DISTWIDTH] IN BLOOD BY AUTOMATED COUNT: 14.8 % (ref 11.7–14.4)
GFR SERPL CREATININE-BSD FRML MDRD: 43.4 ML/MIN/1.73M*2
GLUCOSE SERPL-MCNC: 99 MG/DL (ref 70–99)
HCT VFR BLDCO AUTO: 37.9 % (ref 35–45)
HGB BLD-MCNC: 12.6 G/DL (ref 11.8–15.7)
MAGNESIUM SERPL-MCNC: 2.1 MG/DL (ref 1.8–2.5)
MCH RBC QN AUTO: 29.8 PG (ref 28–33.2)
MCHC RBC AUTO-ENTMCNC: 33.2 G/DL (ref 32.2–35.5)
MCV RBC AUTO: 89.6 FL (ref 83–98)
PDW BLD AUTO: 10.6 FL (ref 9.4–12.3)
PLATELET # BLD AUTO: 232 K/UL (ref 150–369)
POTASSIUM SERPL-SCNC: 3.7 MEQ/L (ref 3.6–5.1)
QRS DURATION: 80
QT INTERVAL: 380
QTC CALCULATION(BAZETT): 485
R AXIS: 1
RBC # BLD AUTO: 4.23 M/UL (ref 3.93–5.22)
SODIUM SERPL-SCNC: 139 MEQ/L (ref 136–144)
T WAVE AXIS: -16
VENTRICULAR RATE: 98
WBC # BLD AUTO: 7.82 K/UL (ref 3.8–10.5)

## 2022-07-21 PROCEDURE — 71045 X-RAY EXAM CHEST 1 VIEW: CPT

## 2022-07-21 PROCEDURE — C1769 GUIDE WIRE: HCPCS | Performed by: INTERNAL MEDICINE

## 2022-07-21 PROCEDURE — 25000000 HC PHARMACY GENERAL: Performed by: INTERNAL MEDICINE

## 2022-07-21 PROCEDURE — 99233 SBSQ HOSP IP/OBS HIGH 50: CPT | Performed by: STUDENT IN AN ORGANIZED HEALTH CARE EDUCATION/TRAINING PROGRAM

## 2022-07-21 PROCEDURE — 25800000 HC PHARMACY IV SOLUTIONS: Performed by: NURSE ANESTHETIST, CERTIFIED REGISTERED

## 2022-07-21 PROCEDURE — 71000001 HC PACU PHASE 1 INITIAL 30MIN: Performed by: INTERNAL MEDICINE

## 2022-07-21 PROCEDURE — 63700000 HC SELF-ADMINISTRABLE DRUG: Performed by: PHYSICIAN ASSISTANT

## 2022-07-21 PROCEDURE — 25000000 HC PHARMACY GENERAL: Performed by: NURSE ANESTHETIST, CERTIFIED REGISTERED

## 2022-07-21 PROCEDURE — 63600000 HC DRUGS/DETAIL CODE: Performed by: PHYSICIAN ASSISTANT

## 2022-07-21 PROCEDURE — 36415 COLL VENOUS BLD VENIPUNCTURE: CPT | Performed by: NURSE PRACTITIONER

## 2022-07-21 PROCEDURE — 33208 INSRT HEART PM ATRIAL & VENT: CPT | Performed by: INTERNAL MEDICINE

## 2022-07-21 PROCEDURE — 25000000 HC PHARMACY GENERAL: Performed by: PHYSICIAN ASSISTANT

## 2022-07-21 PROCEDURE — 33286 RMVL SUBQ CAR RHYTHM MNTR: CPT | Performed by: INTERNAL MEDICINE

## 2022-07-21 PROCEDURE — 25800000 HC PHARMACY IV SOLUTIONS: Performed by: PHYSICIAN ASSISTANT

## 2022-07-21 PROCEDURE — 63700000 HC SELF-ADMINISTRABLE DRUG: Performed by: INTERNAL MEDICINE

## 2022-07-21 PROCEDURE — C1894 INTRO/SHEATH, NON-LASER: HCPCS | Performed by: INTERNAL MEDICINE

## 2022-07-21 PROCEDURE — 80048 BASIC METABOLIC PNL TOTAL CA: CPT | Performed by: NURSE PRACTITIONER

## 2022-07-21 PROCEDURE — C1785 PMKR, DUAL, RATE-RESP: HCPCS | Performed by: INTERNAL MEDICINE

## 2022-07-21 PROCEDURE — 63700000 HC SELF-ADMINISTRABLE DRUG

## 2022-07-21 PROCEDURE — 85027 COMPLETE CBC AUTOMATED: CPT | Performed by: NURSE PRACTITIONER

## 2022-07-21 PROCEDURE — 21400000 HC ROOM AND CARE CCU/INTERMEDIATE

## 2022-07-21 PROCEDURE — 83735 ASSAY OF MAGNESIUM: CPT | Performed by: NURSE PRACTITIONER

## 2022-07-21 PROCEDURE — 93005 ELECTROCARDIOGRAM TRACING: CPT | Performed by: PHYSICIAN ASSISTANT

## 2022-07-21 PROCEDURE — 37000002 HC ANESTHESIA MAC: Performed by: INTERNAL MEDICINE

## 2022-07-21 PROCEDURE — 63600000 HC DRUGS/DETAIL CODE: Performed by: NURSE ANESTHETIST, CERTIFIED REGISTERED

## 2022-07-21 PROCEDURE — 02HK0JZ INSERTION OF PACEMAKER LEAD INTO RIGHT VENTRICLE, OPEN APPROACH: ICD-10-PCS | Performed by: INTERNAL MEDICINE

## 2022-07-21 PROCEDURE — C1898 LEAD, PMKR, OTHER THAN TRANS: HCPCS | Performed by: INTERNAL MEDICINE

## 2022-07-21 PROCEDURE — 0JH606Z INSERTION OF PACEMAKER, DUAL CHAMBER INTO CHEST SUBCUTANEOUS TISSUE AND FASCIA, OPEN APPROACH: ICD-10-PCS | Performed by: INTERNAL MEDICINE

## 2022-07-21 PROCEDURE — 02H60JZ INSERTION OF PACEMAKER LEAD INTO RIGHT ATRIUM, OPEN APPROACH: ICD-10-PCS | Performed by: INTERNAL MEDICINE

## 2022-07-21 DEVICE — LEAD 383069 MRI US
Type: IMPLANTABLE DEVICE | Status: FUNCTIONAL
Brand: SELECTSECURE™ MRI SURESCAN™

## 2022-07-21 DEVICE — IPG W1DR01 AZURE XT DR MRI USA
Type: IMPLANTABLE DEVICE | Status: FUNCTIONAL
Brand: AZURE™ XT DR MRI SURESCAN™

## 2022-07-21 DEVICE — LEAD 5076-52 MRI US RCMCRD
Type: IMPLANTABLE DEVICE | Status: FUNCTIONAL
Brand: CAPSUREFIX NOVUS MRI™ SURESCAN®

## 2022-07-21 RX ORDER — SOTALOL HYDROCHLORIDE 120 MG/1
120 TABLET ORAL EVERY 12 HOURS
Status: DISCONTINUED | OUTPATIENT
Start: 2022-07-21 | End: 2022-07-21

## 2022-07-21 RX ORDER — SOTALOL HYDROCHLORIDE 80 MG/1
80 TABLET ORAL ONCE
Status: COMPLETED | OUTPATIENT
Start: 2022-07-21 | End: 2022-07-21

## 2022-07-21 RX ORDER — SODIUM CHLORIDE 9 MG/ML
INJECTION, SOLUTION INTRAVENOUS CONTINUOUS PRN
Status: DISCONTINUED | OUTPATIENT
Start: 2022-07-21 | End: 2022-07-21 | Stop reason: SURG

## 2022-07-21 RX ORDER — ACETAMINOPHEN 325 MG/1
650 TABLET ORAL EVERY 4 HOURS PRN
Status: DISCONTINUED | OUTPATIENT
Start: 2022-07-21 | End: 2022-07-23

## 2022-07-21 RX ORDER — MIDAZOLAM HYDROCHLORIDE 2 MG/2ML
INJECTION, SOLUTION INTRAMUSCULAR; INTRAVENOUS AS NEEDED
Status: DISCONTINUED | OUTPATIENT
Start: 2022-07-21 | End: 2022-07-21 | Stop reason: SURG

## 2022-07-21 RX ORDER — BUPIVACAINE HYDROCHLORIDE 5 MG/ML
INJECTION, SOLUTION EPIDURAL; INTRACAUDAL
Status: DISCONTINUED | OUTPATIENT
Start: 2022-07-21 | End: 2022-07-21 | Stop reason: HOSPADM

## 2022-07-21 RX ORDER — SOTALOL HYDROCHLORIDE 80 MG/1
80 TABLET ORAL DAILY
Status: DISCONTINUED | OUTPATIENT
Start: 2022-07-22 | End: 2022-07-22

## 2022-07-21 RX ORDER — PROPOFOL 10 MG/ML
INJECTION, EMULSION INTRAVENOUS CONTINUOUS PRN
Status: DISCONTINUED | OUTPATIENT
Start: 2022-07-21 | End: 2022-07-21 | Stop reason: SURG

## 2022-07-21 RX ORDER — FENTANYL CITRATE 50 UG/ML
INJECTION, SOLUTION INTRAMUSCULAR; INTRAVENOUS AS NEEDED
Status: DISCONTINUED | OUTPATIENT
Start: 2022-07-21 | End: 2022-07-21 | Stop reason: SURG

## 2022-07-21 RX ORDER — VANCOMYCIN HYDROCHLORIDE
1.25 ONCE
Status: COMPLETED | OUTPATIENT
Start: 2022-07-21 | End: 2022-07-21

## 2022-07-21 RX ORDER — METOPROLOL TARTRATE 1 MG/ML
INJECTION, SOLUTION INTRAVENOUS AS NEEDED
Status: DISCONTINUED | OUTPATIENT
Start: 2022-07-21 | End: 2022-07-21 | Stop reason: SURG

## 2022-07-21 RX ORDER — CEFAZOLIN SODIUM 2 G/100ML
INJECTION, SOLUTION INTRAVENOUS AS NEEDED
Status: DISCONTINUED | OUTPATIENT
Start: 2022-07-21 | End: 2022-07-21 | Stop reason: SURG

## 2022-07-21 RX ORDER — SOTALOL HYDROCHLORIDE 80 MG/1
80 TABLET ORAL EVERY 12 HOURS
Status: DISCONTINUED | OUTPATIENT
Start: 2022-07-21 | End: 2022-07-21

## 2022-07-21 RX ORDER — LIDOCAINE HYDROCHLORIDE 10 MG/ML
INJECTION, SOLUTION INFILTRATION; PERINEURAL
Status: DISCONTINUED | OUTPATIENT
Start: 2022-07-21 | End: 2022-07-21 | Stop reason: HOSPADM

## 2022-07-21 RX ADMIN — SODIUM CHLORIDE: 9 INJECTION, SOLUTION INTRAVENOUS at 17:53

## 2022-07-21 RX ADMIN — PROPOFOL 25 MCG/KG/MIN: 10 INJECTION, EMULSION INTRAVENOUS at 18:00

## 2022-07-21 RX ADMIN — CETIRIZINE HYDROCHLORIDE 5 MG: 5 TABLET ORAL at 00:10

## 2022-07-21 RX ADMIN — FENTANYL CITRATE 25 MCG: 50 INJECTION, SOLUTION INTRAMUSCULAR; INTRAVENOUS at 19:07

## 2022-07-21 RX ADMIN — FENTANYL CITRATE 25 MCG: 50 INJECTION, SOLUTION INTRAMUSCULAR; INTRAVENOUS at 18:05

## 2022-07-21 RX ADMIN — LISINOPRIL 10 MG: 10 TABLET ORAL at 09:05

## 2022-07-21 RX ADMIN — CLOPIDOGREL BISULFATE 75 MG: 75 TABLET, FILM COATED ORAL at 09:03

## 2022-07-21 RX ADMIN — MIDAZOLAM HYDROCHLORIDE 1 MG: 1 INJECTION, SOLUTION INTRAMUSCULAR; INTRAVENOUS at 17:55

## 2022-07-21 RX ADMIN — PANTOPRAZOLE SODIUM 40 MG: 40 TABLET, DELAYED RELEASE ORAL at 09:04

## 2022-07-21 RX ADMIN — HYDROCHLOROTHIAZIDE 25 MG: 25 TABLET ORAL at 09:04

## 2022-07-21 RX ADMIN — VANCOMYCIN HYDROCHLORIDE 1.25 G: 500 INJECTION, POWDER, LYOPHILIZED, FOR SOLUTION INTRAVENOUS at 09:10

## 2022-07-21 RX ADMIN — SIMVASTATIN 20 MG: 20 TABLET, FILM COATED ORAL at 21:42

## 2022-07-21 RX ADMIN — FENTANYL CITRATE 25 MCG: 50 INJECTION, SOLUTION INTRAMUSCULAR; INTRAVENOUS at 18:12

## 2022-07-21 RX ADMIN — CEFAZOLIN SODIUM 2 G: 2 INJECTION, SOLUTION INTRAVENOUS at 18:00

## 2022-07-21 RX ADMIN — FENTANYL CITRATE 25 MCG: 50 INJECTION, SOLUTION INTRAMUSCULAR; INTRAVENOUS at 18:51

## 2022-07-21 RX ADMIN — TRAMADOL HYDROCHLORIDE 25 MG: 50 TABLET, COATED ORAL at 00:09

## 2022-07-21 RX ADMIN — SOTALOL HYDROCHLORIDE 80 MG: 80 TABLET ORAL at 20:48

## 2022-07-21 RX ADMIN — MIDAZOLAM HYDROCHLORIDE 1 MG: 1 INJECTION, SOLUTION INTRAMUSCULAR; INTRAVENOUS at 18:00

## 2022-07-21 RX ADMIN — METOPROLOL TARTRATE 5 MG: 5 INJECTION, SOLUTION INTRAVENOUS at 19:01

## 2022-07-21 RX ADMIN — ACETAMINOPHEN 650 MG: 325 TABLET, FILM COATED ORAL at 20:46

## 2022-07-21 RX ADMIN — METOPROLOL TARTRATE 5 MG: 5 INJECTION, SOLUTION INTRAVENOUS at 18:57

## 2022-07-21 RX ADMIN — ASPIRIN 81 MG: 81 TABLET, COATED ORAL at 09:04

## 2022-07-21 RX ADMIN — FAMOTIDINE 40 MG: 20 TABLET ORAL at 20:47

## 2022-07-21 NOTE — CONSULTS
Cardiac Electrophysiology Consult Note      Emily Ramirez is a 77 y.o. female who was admitted for Atrial fibrillation (CMS/[facility]) [I48.91]  Atrial fibrillation, unspecified type (CMS/[facility]) [I48.91]. Cardiac Electrophysiology was consulted by Dr Saravia for symptomatic sick sinus syndromne.      History of Present Illness: 77-year-old white female has been complaining of shortness of breath.  The patient has a loop recorder.  The patient underwent cardiac catheterization with drug-eluting stents to the proximal right coronary and proximal circumflex artery on 6/9/2022.  She felt better after revascularization.  Unfortunately she then once again noted shortness of breath.  She checked her apple watch which revealed that she was in atrial fibrillation and she came to the emergency room.  Her past medical history significant for hypertension, hyperlipidemia, obesity, frequent premature ventricular ectopic beats, chronic renal insufficiency.  She has preserved ejection fraction.    The patient has also had episodes of lightheadedness but has not had any tigist syncopal episodes.  On admission she was found to be in atrial fibrillation with a heart rate of 98 bpm.  In the ER she had episodes of feeling a heat wave traveling from her thighs up to her chest.  Implantable loop recorder was interrogated and shows several pauses that correlate with timing of the symptoms.  2 over the pauses with 6 seconds as well.  Today, she is in sinus rhythm with PACs.  She is feeling well at rest.    Outside records reviewed.    PAST MEDICAL HISTORY:   Past Medical History:   Diagnosis Date   • Aortic regurgitation    • Chronic kidney disease    • Coronary artery disease    • History of loop recorder    • Hypertension    • Lipid disorder    • Mitral regurgitation    • SVT (supraventricular tachycardia) (CMS/[facility])        PAST SURGICAL HISTORY: She has no past surgical history on file.    SOCIAL HISTORY: She  Social History     Tobacco Use    • Smoking status: Never Smoker   • Smokeless tobacco: Never Used   Substance Use Topics   • Alcohol use: Yes     Alcohol/week: 1.0 standard drink     Types: 1 Glasses of wine per week     Comment: 1x/month   • Drug use: Not Currently       FAMILY HISTORY: No family history of premature coronary artery disease.  Her father did have a pacemaker.  Her mother is  at 92 years of age.    ALLERGIES:  Allergies   Allergen Reactions   • Celebrex [Celecoxib]    • Cipro [Ciprofloxacin Hcl]    • Clindamycin    • Crestor [Rosuvastatin]    • Lipitor [Atorvastatin]    • Voltaren [Diclofenac Sodium]        Home Meds  •  aspirin, Take 81 mg by mouth daily.  •  clopidogreL, Take 75 mg by mouth daily.  •  coenzyme Q10, Take 100 mg by mouth daily. Takes in the PM  •  famotidine, Take 10 mg by mouth nightly.  •  hydrochlorothiazide, Take 25 mg by mouth daily.  •  lisinopriL, Take 10 mg by mouth daily.  •  metoprolol succinate XL, Take 50 mg by mouth daily.  •  multivit,iron,minerals/lutein (CENTRUM SILVER ULTRA WOMEN'S ORAL), Take 1 tablet by mouth every other day.  •  pantoprazole, Take 40 mg by mouth daily. 30 min prior to breakfast  •  simvastatin, Take 20 mg by mouth nightly.  •  traMADoL-acetaminophen, Take 1 tablet by mouth 2 (two) times a day. Daily or q 12 hours as needed    CURRENT MEDICATIONS:   •  acetaminophen, 650 mg, oral, q4h PRN  •  alum-mag hydroxide-simeth, 30 mL, oral, q4h PRN  •  [Provider Managed Hold] apixaban, 5 mg, oral, BID  •  aspirin, 81 mg, oral, Daily  •  atropine, 0.5 mg, intravenous, q5 min PRN  •  clopidogreL, 75 mg, oral, Daily  •  glucose, 16-32 g of dextrose, oral, PRN **OR** dextrose, 15-30 g of dextrose, oral, PRN **OR** glucagon, 1 mg, intramuscular, PRN **OR** dextrose 50 % in water (D50), 25 mL, intravenous, PRN  •  docusate sodium, 100 mg, oral, BID  •  famotidine, 40 mg, oral, Nightly  •  hydrochlorothiazide, 25 mg, oral, Daily  •  lisinopriL, 10 mg, oral, Daily  •  magnesium sulfate,  "1 g, intravenous, PRN **OR** magnesium sulfate, 2 g, intravenous, PRN  •  [Provider Managed Hold] metoprolol succinate XL, 50 mg, oral, Daily  •  nitroglycerin, 0.4 mg, sublingual, q5 min PRN  •  pantoprazole, 40 mg, oral, Daily  •  potassium chloride, 20 mEq, oral, PRN  •  potassium chloride, 40 mEq, oral, PRN  •  simvastatin, 20 mg, oral, Nightly  •  vancomycin, 1.25 g, intravenous, Once    Review of Systems:  Pertinent items are noted in HPI.  GENERAL: Well, overweight  ENT:, No epistaxis, throat pain, hearing loss  CVS: No chest pain, complains of shortness of breath.  The patient did not have any palpitations.  She has had episodes of lightheadedness  RES: Shortness of breath, no coughing or wheezing  ABD: No change in bowel habits or abdominal pain  CNS: Recurrent episodes of lightheadedness, no weakness  END: No complaints of feeling hot or cold  MSK: Arthritic pain   HEME: No significant bleeding  PSYCHE: No depression or anxiety  Skin: No rashes or itching    Physical Exam:    Visit Vitals  /74 (BP Location: Left forearm, Patient Position: Lying)   Pulse 67   Temp 36.4 °C (97.6 °F) (Temporal)   Resp 18   Ht 1.6 m (5' 3\")   Wt 84.4 kg (186 lb)   SpO2 97%   BMI 32.95 kg/m²       General appearance: alert, appears stated age and cooperative  Head and Neck: without obvious abnormality  Eyes: Pupils equal and clear conjunctiva  Lungs: clear to auscultation bilaterally, no rales or wheezing  Heart: S1, S2 normal, no murmur, click, rub or gallop, regular rate and rhythm, no JVD  Abdomen: soft, non-tender; bowel sounds normal; no masses  Extremities/Musculoskeletal: peripheral pulses intact, no edema, no joint deformities  Skin: Skin color, texture, turgor normal. No rashes or lesions  Neurologic: Alert and oriented X 3, no focal deficits  Psyche: Appropriate affect, no evidence for depression or anxiety  Endocrine: no thyromegaly    Labs:   Results from last 7 days   Lab Units 07/21/22  0727 07/20/22  0757 " 07/19/22 1940   SODIUM mEQ/L 139 140 139   CO2 mEQ/L 25 27 29   BUN mg/dL 27* 32* 38*   CREATININE mg/dL 1.2* 1.3* 1.7*   CALCIUM mg/dL 9.3 8.9 9.4   ALT IU/L  --   --  18   AST IU/L  --   --  25     Results from last 7 days   Lab Units 07/21/22  0727 07/20/22  0757 07/19/22  1940   WBC K/uL 7.82 5.47 6.65   PLATELETS K/uL 232 233 275           No lab exists for component: CPKNo results found for: PROBNP    Telemetry (07/21/22): Sinus bradycardia  Cardiac Testing:    Electrogram (07/21/22): Atrial fibrillation with a controlled ventricular response rate    Echocardiogram:    Cardiac Imaging    TRANSTHORACIC ECHO (TTE) COMPLETE 07/20/2022    Interpretation Summary  · Preserved LV systolic function. Estimated EF 65%. Wall motion appears grossly normal. Grade II LV diastolic dysfunction.  · Normal-sized RV. Normal RV systolic function.  · The left atrium is dilated.  · Aortic root sclerotic.  · Tricuspid aortic valve. Sclerotic aortic valve leaflets. Mild aortic valve regurgitation.  · Sclerotic mitral valve. Mitral annular calcification. Mild mitral valve regurgitation.  · Tricuspid valve structure is grossly normal. Mild to moderate tricuspid valve regurgitation.  · Estimated RVSP = 35 mmHg.  · Grossly normal pulmonic valve structure. Mild to moderate pulmonic valve regurgitation.  · IVC not well visualized. IVC is <2.1cm.  · There is a trivial loculated pericardial effusion adjacent to the right ventricle.        Impression and Plan:    77 y.o. female with the following problems,      1.  Symptomatic sick sinus syndrome-the patient has now been documented to have atrial fibrillation with pauses.  She will need to undergo implantation of a dual-chamber permanent pacemaker to prevent further episodes of lightheadedness and near syncope.  After undergoing placement of a pacemaker, we will need to consider placing her on antiarrhythmic drug therapy.  She has a history of coronary artery disease and therefore will not  be able to be placed on a class Ic antiarrhythmic drug.  I think that it would be valuable to place her on sotalol in an attempt to suppress further episodes of atrial fibrillation.  I have spoken at length with the patient with regard to undergoing implantation of a dual-chamber permanent pacemaker.  She understands the potential risks and benefits of device implantation and agrees to proceed.    2.  Anticoagulation therapy-the patient's [id number] score is 4.  She will need to be placed on Eliquis.    3.  Hypertension- the patient's blood pressure is adequately controlled.    4.  Coronary artery disease-patient has been revascularized.  She will need to be on a beta-blocker and continue on antiplatelet therapy.  She will be on Plavix.  Aspirin will need to be held after she is placed on Eliquis.    5.  Dyslipidemia-continue on statin therapy    Electronic signature:    Henry Angel MD 07/21/22

## 2022-07-21 NOTE — PROGRESS NOTES
"Daily Progress Note (LOS: 2)    Interval History:   No cardiac complaints.  Sinus rhythm with PACs on telemetry.     Review of Systems:  All organ systems normal except as per HPI.    Current Medications:  • [Provider Managed Hold] apixaban  5 mg oral BID   • aspirin  81 mg oral Daily   • clopidogreL  75 mg oral Daily   • docusate sodium  100 mg oral BID   • famotidine  40 mg oral Nightly   • hydrochlorothiazide  25 mg oral Daily   • lisinopriL  10 mg oral Daily   • [Provider Managed Hold] metoprolol succinate XL  50 mg oral Daily   • pantoprazole  40 mg oral Daily   • simvastatin  20 mg oral Nightly   • vancomycin  1.25 g intravenous Once       Physical Exam:  Visit Vitals  /74 (BP Location: Left forearm, Patient Position: Lying)   Pulse 75   Temp 36.4 °C (97.6 °F) (Temporal)   Resp 18   Ht 1.6 m (5' 3\")   Wt 84.4 kg (186 lb)   SpO2 97%   BMI 32.95 kg/m²       Gen: NAD  HEENT: no JVD, no thyromegaly  Heart: RRR, nl S1, S2, no m/r/g  Lungs: Clear bilaterally  Abd: soft, nt, nd, +bs  Ext: no edema    I&Os:    Intake/Output Summary (Last 24 hours) at 7/21/2022 0740  Last data filed at 7/21/2022 0655  Gross per 24 hour   Intake 240 ml   Output 1425 ml   Net -1185 ml       Weights:   Wt Readings from Last 3 Encounters:   07/20/22 84.4 kg (186 lb)   06/09/22 79.8 kg (176 lb)       Labs:  Results from last 7 days   Lab Units 07/20/22 0757 07/19/22 1940   SODIUM mEQ/L 140 139   POTASSIUM mEQ/L 3.5* 3.4*   CHLORIDE mEQ/L 104 105   CO2 mEQ/L 27 29   BUN mg/dL 32* 38*   CREATININE mg/dL 1.3* 1.7*   GLUCOSE mg/dL 87 124*   CALCIUM mg/dL 8.9 9.4   MAGNESIUM mg/dL 1.7*  --              Results from last 7 days   Lab Units 07/21/22  0727 07/20/22 0757 07/19/22 1940   WBC K/uL 7.82 5.47 6.65   HEMOGLOBIN g/dL 12.6 11.6* 12.6   HEMATOCRIT % 37.9 35.6 39.2   PLATELETS K/uL 232 233 275     No results found for: BNP  Lab Results   Component Value Date    INR 1.1 05/26/2022       A/P: 77 year-old woman with PMHx CAD s/p " recent PCI to RCA (6/2022), LCx, ILR, HTN, DLD who presented with weakness and dizziness, found to have PAF with conversion pauses.     #PAF, SSS   -Spontaneously converted to NSR associated with conversion pauses  -Dr. Angel of EP evaluated today and plans are for DCPPM later today.  ILR will be removed at that point. Keep NPO.   -When appropriate post-PPM, patient will be placed on eliquis 5mg BID and her ASA will be held.   -Resume toprol after PPM.  Defer to EP regarding any additional meds for afib  -Continue telemetry     #CAD, recent PCI  -Stable, no angina  -For now, continue DAPT with plans to transition to eliquis/plavix  -Resume toprol after PPM  -Continue simvastatin (previously did not tolerate rosuvastatin or atorvastatin)    #HTN  -BP has been elevated but controlled this AM  -Continue current meds and monitor    #HLD  -Continue statin

## 2022-07-21 NOTE — PROGRESS NOTES
Patient: Emily Ramirez  Location: [address]  MRN:  [id number]  Today's date:  7/21/2022    Attempted to see patient for therapy. Unable due to medical hold.  Pt with strict bed rest orders. Plan for PPM placement today. OT will require new orders post op

## 2022-07-21 NOTE — POST-PROCEDURE NOTE
"EP Service Post Op Check-Device Implantation    77 y.o. female s/p Medtronic dual-chamber left bundle pacemaker implantation/ [employer]onic loop recorder explantation by Dr. Henry Angel today. Patient is feeling well. She denies chest pain or shortness of breath. No pain at incision site.   Visit Vitals  BP (!) 140/106   Pulse 81   Temp 36.6 °C (97.9 °F) (Temporal)   Resp 14   Ht 1.6 m (5' 3\")   Wt 84.4 kg (186 lb)   SpO2 99%   BMI 32.95 kg/m²     Left chest incision C/D/I, no bleeding or hematoma. Left arm sling and pressure dressing in place    Assessment/Plan:  1) Tachycardia-bradycardia syndrome/ newly diagnosed atrial fibrillation with RVR/ Sick sinus syndrome with pauses while in atrial fibrillation status post Medtronic dual-chamber left bundle pacemaker implantation/ Medtronic loop recorder explantation    -bedrest x 1 hours  -left arm sling and dressing overnight  -post-op chest x-ray and ECG reviewed by physician  -repeat device interrogation will be completed prior to discharge  -keep incision dry for 3 days  -device education, wound care, and arm restrictions reviewed with patient in detail  -remove dressing tomorrow  -pain control with prn Tylenol  -patient received IV vancomycin this morning and a dose of IV Ancef at the beginning of the procedure, no additional doses of antibiotics necessary to complete 24 hours of surgical prophylaxis due to reduced creatinine clearance  -Aspirin discontinued. Continue Plavix 75 mg by mouth daily. Resume Eliquis 5 mg by mouth twice daily tomorrow evening 7/22/22 if no bleeding or hematoma  -Start Sotalol 80 mg by mouth this evening 7/21/22. Continue to monitor electrolytes and renal function. Mg and K should be repleted as necessary to maintain Mg>2 and K>4. Baseline QTc is 482. She should have an ECG completed 2 hours after each doses of Sotalol. ECG should be sent to Dr. Henry Angel for review before receiving the next dose.     -remote monitor paired with " device  -device is programmed AAIR<=>DDDR 60 - 120 bpm  -F/U with Dr. Henry Angel's Nurse Practitioner on 08/02/22 at 10:15 AM for incision/device check    AMBER Kathleen  7/21/2022 7:34 PM

## 2022-07-21 NOTE — PROGRESS NOTES
Hospital Medicine Service -  Daily Progress Note       SUBJECTIVE   Interval History: No acute overnight events. Pt denies any CP, SOB, abd pain, nausea, weakness, dizziness. Plan for PPM placement today.     OBJECTIVE      Vital signs in last 24 hours:  Temp:  [36.2 °C (97.2 °F)-37.1 °C (98.8 °F)] 36.4 °C (97.6 °F)  Heart Rate:  [61-95] 67  Resp:  [18] 18  BP: (132-170)/(67-78) 132/74    Intake/Output Summary (Last 24 hours) at 7/21/2022 1057  Last data filed at 7/21/2022 0655  Gross per 24 hour   Intake 240 ml   Output 1425 ml   Net -1185 ml       PHYSICAL EXAMINATION      Physical Exam  Vitals and nursing note reviewed.   Constitutional:       General: She is not in acute distress.     Appearance: She is obese.   HENT:      Head: Normocephalic.   Eyes:      Pupils: Pupils are equal, round, and reactive to light.   Cardiovascular:      Rate and Rhythm: Normal rate and regular rhythm.   Pulmonary:      Effort: Pulmonary effort is normal.      Breath sounds: Normal breath sounds.   Abdominal:      General: Abdomen is flat. Bowel sounds are normal.      Palpations: Abdomen is soft.   Musculoskeletal:         General: No swelling. Normal range of motion.      Cervical back: Normal range of motion.   Skin:     General: Skin is warm and dry.   Neurological:      General: No focal deficit present.      Mental Status: She is alert and oriented to person, place, and time.   Psychiatric:         Mood and Affect: Mood normal.      LINES, CATHETERS, DRAINS, AIRWAYS, AND WOUNDS   Lines, Drains, Airways, Wounds:  Peripheral IV (Adult) 07/19/22 Right Antecubital (Active)   Number of days: 2       Comments: no other LDAs     LABS / IMAGING / TELE      Labs  Labs reviewed  Results from last 7 days   Lab Units 07/21/22  0727 07/20/22  0757 07/19/22  1940   WBC K/uL 7.82 5.47 6.65   HEMOGLOBIN g/dL 12.6 11.6* 12.6   HEMATOCRIT % 37.9 35.6 39.2   PLATELETS K/uL 232 233 275     Results from last 7 days   Lab Units 07/21/22  0727  07/20/22  0757 07/19/22 1940   SODIUM mEQ/L 139 140 139   POTASSIUM mEQ/L 3.7 3.5* 3.4*   CHLORIDE mEQ/L 105 104 105   CO2 mEQ/L 25 27 29   BUN mg/dL 27* 32* 38*   CREATININE mg/dL 1.2* 1.3* 1.7*   CALCIUM mg/dL 9.3 8.9 9.4   ALBUMIN g/dL  --   --  4.1   BILIRUBIN TOTAL mg/dL  --   --  0.8   ALK PHOS IU/L  --   --  63   ALT IU/L  --   --  18   AST IU/L  --   --  25   GLUCOSE mg/dL 99 87 124*       Imaging  Imaging reviewed  Transthoracic Echo (TTE) Complete    Result Date: 7/20/2022  · Preserved LV systolic function. Estimated EF 65%. Wall motion appears grossly normal. Grade II LV diastolic dysfunction. · Normal-sized RV. Normal RV systolic function. · The left atrium is dilated. · Aortic root sclerotic. · Tricuspid aortic valve. Sclerotic aortic valve leaflets. Mild aortic valve regurgitation. · Sclerotic mitral valve. Mitral annular calcification. Mild mitral valve regurgitation. · Tricuspid valve structure is grossly normal. Mild to moderate tricuspid valve regurgitation. · Estimated RVSP = 35 mmHg. · Grossly normal pulmonic valve structure. Mild to moderate pulmonic valve regurgitation. · IVC not well visualized. IVC is <2.1cm. · There is a trivial loculated pericardial effusion adjacent to the right ventricle.          ECG/Telemetry  Telemetry reviewed; SB-SR with PAC/ PVCs    ASSESSMENT AND PLAN      * Paroxysmal atrial fibrillation (CMS/HCC)  Assessment & Plan  Presents with fatigue, dizziness, flushness, PASCUAL  Found to be in AF with multiple conversion pauses up to 6 seconds  TTE with EF 65%, G2DD, mild-mod RT/MO, mild MR/ AR, wall motion grossly normal  Suspect underlying SSS  Currently back in NSR  NPO for possible PPM placement today  May need to place on antiarrythmic after PPM placement  Hold eliquis, resume when cleared by EP  Hold metoprolol, resume after PPM placement  Keep zoll pads at bedside  Cardiology and EP consult appreciated, known to Dr Saravia    Tachy-marco a syndrome (CMS/HCC)  Assessment &  Plan  See PAF problem    Acute kidney injury superimposed on CKD (CMS/[facility])  Assessment & Plan  H/o CKD III with baseline Cr 1.1-1.4  Cr 1.7 on admission  DENNY suspect prerenal  Cr improved to 1.2 today  Encourage PO intake  Avoid nephrotoxins and hypotension  Trend BMP    CAD (coronary artery disease)  Assessment & Plan  S/p NIKITA to RCA and LCx (6/6/22)  Denies chest pain  Continue ASA, plavix (d/c ASA when restarted on eliquis), statin, lisinopril  Hold metoprolol, resume after PPM placement    Elevated troponin  Assessment & Plan  Troponin 365.5-377.5  EKG nonischemic  Denies chest pain  Suspect demand ischemia due to AF  Not c/w ACS     Valvular heart disease  Assessment & Plan  Known mild MR/ AR  TTE shows mild MR/ AR, mild-mod TR/ HI  Continue outpatient surveillance monitoring    Hypomagnesemia  Assessment & Plan  Trend and replace as needed     GERD (gastroesophageal reflux disease)  Assessment & Plan  Stable, denies GI sx  Continue PPI and H2 blocker     Hypokalemia  Assessment & Plan  Trend and replace as needed     Hyperlipidemia  Assessment & Plan  Stable   Continue Simvastatin     Class 1 obesity in adult  Assessment & Plan  Body mass index is 32.95 kg/m².  Encourage weight loss      VTE Assessment: [place] VTE Score: 5  VTE Prophylaxis Plan: eliquis on hold for PPM placement  Code Status: Full Code  Estimated Discharge Date: 7/22/2022  Disposition Planning: d/c home likely tomorrow, PPM placement today     VENTURA Walton  7/21/2022

## 2022-07-21 NOTE — PROGRESS NOTES
Patient: Emily Ramirez  Location: [address]  MRN:  [id number]  Today's date:  7/21/2022    Attempted to see patient for therapy. Unable due to medical hold (Pt with active bedrest orders in place; pacemaker to be placed today. PT will follow and assess post-procedure with new orders.).

## 2022-07-21 NOTE — PLAN OF CARE
Plan of Care Review  Plan of Care Reviewed With: patient  Progress: no change  Outcome Summary: SR on monitor. Pt. aaox4. BP slightly elevated, [facility] made aware. Purewick in place. Complained of headache, medications administered as ordered. Pending pacemaker placement today. Bed alarm set and call bell within reach. Will continue to monitor.

## 2022-07-21 NOTE — DISCHARGE INSTRUCTIONS
-You were found to have atrial fibrillation with sinus pauses. You underwent a pacemaker placement. You will be discharged on a blood thinner and antiarrhythmia medication.   -You were found to have an acute kidney injury. Your hydrochlorothiazide and lisinopril were discontinued. You were treated with intravenous fluids with improvement.    Follow up:  -You will have a follow-up appointment with Dr. Henry Angel's Nurse Practitioner on 08/02/22 at 10:15 AM. [address] [facility], [address], [place] [address]. Call [phone] if you need to reschedule.  -Follow up with cardiologist Dr Saravia in 1-2 weeks for continued heart management.   -Follow up with your primary care provider Dr Meza in 1-2 weeks to discuss your hospitalization and continue management of your other chronic conditions, including your kidney disease.     Labs:  -Repeat labs (basic metabolic panel) in 1 week to reassess your kidney function and electrolytes    Medications:  -You are prescribed the blood thinner apixaban (eliquis) to prevent clots and stroke due to your atrial fibrillation  -You are prescribed the anti-arrhythmic amiodarone to treat your atrial fibrillation.  -Stop taking your aspirin since you are now on apixaban.  -Stop taking your metoprolol, hydrochlorothiazide, losartan. You will need to follow up with cardiology to determine if any of these medications should be resumed.   -Resume the rest of your medications as directed.         PACEMAKER IMPLANT/ LOOP RECORDER EXPLANT DISCHARGE INSTRUCTIONS    * Returning to work, driving, and other daily activities should be discussed with your doctor before discharge from the hospital. Generally, driving is safe after 24 hours unless you are taking narcotics for pain relief.    * Notify your doctor immediately if you notice any of the following: fever, chills, warmth, redness, swelling, or drainage at your incisions.    * There are some restrictions for your arm on the same side as  your device:      -Do not reach behind your back or lift your arm above shoulder level for four weeks. After this period, it is usually safe to resume normal physical activities.     -Avoid activities that involve exertional or vigorous movements of the affected arm, such as golf or tennis, until you are cleared at your 4-6 week follow up visit.     -Avoid lifting any objects greater than ten pounds for four weeks.    * Many pacemakers are now MRI compatible. Please discuss this further with your electrophysiologist before scheduling an MRI.    * Household electronics (microwaves, cellular phones) are safe for use.    * Place the temporary identification card in your wallet at time of discharge and replace it with your permanent card once it is mailed to your home in four to eight weeks. Make sure you inform healthcare providers that you have an implanted device, particularly if surgical procedures are scheduled.    * Please read your device 's information booklet for future reference. Write down any questions you may have and bring a list with you to your follow-up appointment.    * You were provided instructions for use of your home monitoring system. This was also demonstrated for you. Please review the information and connect the monitor at your bedside, if applicable. Perform a manual transmission tomorrow morning, as described in the instructions.     WOUND CARE:    * Keep your incisions dry for 24 hours. When showering after that, keep the incisions out of a direct stream of water for one week, and use soap and water only. Do not scrub the incisions. Instead, pat it dry. Do not apply any creams, lotions, or powders to your incision.    * If your incisions have steri-strips, do not remove them. They will fall off on their own. You may clip the ends with scissors if they curl up.    * You should avoid submerging the incisions under water such as in a hot tub, bath tub, swimming pool, or ocean for at  least one week.    THINGS YOU SHOULD AVOID:    * Any large magnets including industrial equipment, induction furnaces, arc welding or large electrical motors.    * Security systems at airports do not interfere with your device. However, your device is made of metal and will therefore set off metal detectors. Have your device identification card ready for presentation to the .    * Some medical procedures use cautery, diathermy, ultrasound therapy, and radiation therapy which may interfere with your device. Notify the performing physician that you have a pacemaker.    * Do not push on the area around your device or twist the device under the skin.

## 2022-07-22 LAB
ANION GAP SERPL CALC-SCNC: 9 MEQ/L (ref 3–15)
BUN SERPL-MCNC: 22 MG/DL (ref 8–20)
CALCIUM SERPL-MCNC: 9.2 MG/DL (ref 8.9–10.3)
CHLORIDE SERPL-SCNC: 105 MEQ/L (ref 98–109)
CO2 SERPL-SCNC: 25 MEQ/L (ref 22–32)
CREAT SERPL-MCNC: 1 MG/DL (ref 0.6–1.1)
ERYTHROCYTE [DISTWIDTH] IN BLOOD BY AUTOMATED COUNT: 14.3 % (ref 11.7–14.4)
GFR SERPL CREATININE-BSD FRML MDRD: 53.6 ML/MIN/1.73M*2
GLUCOSE BLD-MCNC: 114 MG/DL (ref 70–99)
GLUCOSE SERPL-MCNC: 98 MG/DL (ref 70–99)
HCT VFR BLDCO AUTO: 38.5 % (ref 35–45)
HGB BLD-MCNC: 12.7 G/DL (ref 11.8–15.7)
MAGNESIUM SERPL-MCNC: 1.8 MG/DL (ref 1.8–2.5)
MCH RBC QN AUTO: 29.3 PG (ref 28–33.2)
MCHC RBC AUTO-ENTMCNC: 33 G/DL (ref 32.2–35.5)
MCV RBC AUTO: 88.9 FL (ref 83–98)
PDW BLD AUTO: 10.5 FL (ref 9.4–12.3)
PLATELET # BLD AUTO: 242 K/UL (ref 150–369)
POCT TEST: ABNORMAL
POTASSIUM SERPL-SCNC: 3.6 MEQ/L (ref 3.6–5.1)
RBC # BLD AUTO: 4.33 M/UL (ref 3.93–5.22)
SODIUM SERPL-SCNC: 139 MEQ/L (ref 136–144)
WBC # BLD AUTO: 6.53 K/UL (ref 3.8–10.5)

## 2022-07-22 PROCEDURE — 80048 BASIC METABOLIC PNL TOTAL CA: CPT | Performed by: PHYSICIAN ASSISTANT

## 2022-07-22 PROCEDURE — 97162 PT EVAL MOD COMPLEX 30 MIN: CPT | Mod: GP

## 2022-07-22 PROCEDURE — 99233 SBSQ HOSP IP/OBS HIGH 50: CPT | Performed by: STUDENT IN AN ORGANIZED HEALTH CARE EDUCATION/TRAINING PROGRAM

## 2022-07-22 PROCEDURE — 85027 COMPLETE CBC AUTOMATED: CPT | Performed by: PHYSICIAN ASSISTANT

## 2022-07-22 PROCEDURE — 36415 COLL VENOUS BLD VENIPUNCTURE: CPT | Performed by: PHYSICIAN ASSISTANT

## 2022-07-22 PROCEDURE — 63700000 HC SELF-ADMINISTRABLE DRUG

## 2022-07-22 PROCEDURE — 63700000 HC SELF-ADMINISTRABLE DRUG: Performed by: INTERNAL MEDICINE

## 2022-07-22 PROCEDURE — 93005 ELECTROCARDIOGRAM TRACING: CPT | Performed by: PHYSICIAN ASSISTANT

## 2022-07-22 PROCEDURE — 63700000 HC SELF-ADMINISTRABLE DRUG: Performed by: PHYSICIAN ASSISTANT

## 2022-07-22 PROCEDURE — 83735 ASSAY OF MAGNESIUM: CPT | Performed by: PHYSICIAN ASSISTANT

## 2022-07-22 PROCEDURE — 97535 SELF CARE MNGMENT TRAINING: CPT | Mod: GO

## 2022-07-22 PROCEDURE — 97166 OT EVAL MOD COMPLEX 45 MIN: CPT | Mod: GO

## 2022-07-22 PROCEDURE — 97530 THERAPEUTIC ACTIVITIES: CPT | Mod: GP

## 2022-07-22 PROCEDURE — 21400000 HC ROOM AND CARE CCU/INTERMEDIATE

## 2022-07-22 PROCEDURE — 63600000 HC DRUGS/DETAIL CODE: Performed by: NURSE PRACTITIONER

## 2022-07-22 PROCEDURE — 63700000 HC SELF-ADMINISTRABLE DRUG: Performed by: NURSE PRACTITIONER

## 2022-07-22 RX ORDER — SOTALOL HYDROCHLORIDE 80 MG/1
80 TABLET ORAL EVERY 12 HOURS
Status: DISCONTINUED | OUTPATIENT
Start: 2022-07-22 | End: 2022-07-24

## 2022-07-22 RX ORDER — SOTALOL HYDROCHLORIDE 80 MG/1
80 TABLET ORAL EVERY 12 HOURS
Qty: 60 TABLET | Refills: 3 | Status: SHIPPED | OUTPATIENT
Start: 2022-07-22 | End: 2024-04-17 | Stop reason: HOSPADM

## 2022-07-22 RX ORDER — DIAZEPAM 10 MG/2ML
2 INJECTION INTRAMUSCULAR ONCE
Status: COMPLETED | OUTPATIENT
Start: 2022-07-23 | End: 2022-07-23

## 2022-07-22 RX ORDER — POTASSIUM CHLORIDE 750 MG/1
20 TABLET, EXTENDED RELEASE ORAL ONCE
Status: COMPLETED | OUTPATIENT
Start: 2022-07-22 | End: 2022-07-22

## 2022-07-22 RX ADMIN — CLOPIDOGREL BISULFATE 75 MG: 75 TABLET, FILM COATED ORAL at 09:08

## 2022-07-22 RX ADMIN — NITROGLYCERIN 0.4 MG: 0.4 TABLET SUBLINGUAL at 21:10

## 2022-07-22 RX ADMIN — SOTALOL HYDROCHLORIDE 80 MG: 80 TABLET ORAL at 21:05

## 2022-07-22 RX ADMIN — HYDROCHLOROTHIAZIDE 25 MG: 25 TABLET ORAL at 09:07

## 2022-07-22 RX ADMIN — MAGNESIUM SULFATE HEPTAHYDRATE 2 G: 40 INJECTION, SOLUTION INTRAVENOUS at 09:10

## 2022-07-22 RX ADMIN — SIMVASTATIN 20 MG: 20 TABLET, FILM COATED ORAL at 21:10

## 2022-07-22 RX ADMIN — APIXABAN 5 MG: 5 TABLET, FILM COATED ORAL at 21:04

## 2022-07-22 RX ADMIN — SOTALOL HYDROCHLORIDE 80 MG: 80 TABLET ORAL at 09:08

## 2022-07-22 RX ADMIN — ACETAMINOPHEN 650 MG: 325 TABLET, FILM COATED ORAL at 21:11

## 2022-07-22 RX ADMIN — PANTOPRAZOLE SODIUM 40 MG: 40 TABLET, DELAYED RELEASE ORAL at 09:08

## 2022-07-22 RX ADMIN — ACETAMINOPHEN 650 MG: 325 TABLET, FILM COATED ORAL at 05:03

## 2022-07-22 RX ADMIN — LISINOPRIL 10 MG: 10 TABLET ORAL at 09:07

## 2022-07-22 RX ADMIN — FAMOTIDINE 40 MG: 20 TABLET ORAL at 21:05

## 2022-07-22 RX ADMIN — NITROGLYCERIN 0.4 MG: 0.4 TABLET SUBLINGUAL at 21:02

## 2022-07-22 RX ADMIN — POTASSIUM CHLORIDE 20 MEQ: 750 TABLET, EXTENDED RELEASE ORAL at 09:07

## 2022-07-22 RX ADMIN — NITROGLYCERIN 0.4 MG: 0.4 TABLET SUBLINGUAL at 21:20

## 2022-07-22 RX ADMIN — ACETAMINOPHEN 650 MG: 325 TABLET, FILM COATED ORAL at 14:41

## 2022-07-22 RX ADMIN — DOCUSATE SODIUM 100 MG: 100 CAPSULE, LIQUID FILLED ORAL at 09:08

## 2022-07-22 ASSESSMENT — PAIN SCALES - GENERAL: PAIN_LEVEL: 0

## 2022-07-22 ASSESSMENT — COGNITIVE AND FUNCTIONAL STATUS - GENERAL
EATING MEALS: 3 - A LITTLE
DRESSING REGULAR UPPER BODY CLOTHING: 2 - A LOT
STANDING UP FROM CHAIR USING ARMS: 3 - A LITTLE
TOILETING: 3 - A LITTLE
CLIMB 3 TO 5 STEPS WITH RAILING: 3 - A LITTLE
MOVING TO AND FROM BED TO CHAIR: 3 - A LITTLE
DRESSING REGULAR LOWER BODY CLOTHING: 2 - A LOT
HELP NEEDED FOR PERSONAL GROOMING: 3 - A LITTLE
WALKING IN HOSPITAL ROOM: 3 - A LITTLE
HELP NEEDED FOR BATHING: 2 - A LOT

## 2022-07-22 NOTE — ANESTHESIOLOGIST PRE-PROCEDURE ATTESTATION
Pre-Procedure Patient Identification:  I am the Primary Anesthesiologist and have identified the patient on 07/22/22 at 7:41 AM.   I have confirmed the procedure(s) will be performed by the following surgeon/proceduralist Henry Angel MD.

## 2022-07-22 NOTE — PLAN OF CARE
Plan of Care Review  Plan of Care Reviewed With: patient  Progress: improving    Pt admitted for new onset PAF, s/p PPM insertion in cath lab yesterday. Pt returned to floor approx 2030 last evening. L arm in sling x 24 hrs, limited L arm ROM reviewed. Denies any SOB, CP, N/T. Ice pack & Tylenol for incisional pain at PM site. Sotalol started last evening, EKG 2 hrs after each dose with review by EP prior to next dose. Pt c/o feeling palpitations ever since around 0300.  Pt was largely in controlled Afib on tele monitor ever since arrival to the floor last night. Occasional PM spikes started to be seen in early AM. Had 15 bt run/VT at 0404. VSS, new EKG showing A paced with PACs. Call bell within reach, bed alarm set. WCTM.

## 2022-07-22 NOTE — PATIENT CARE CONFERENCE
Care Progression Rounds Note  Date: 7/22/2022  Time: 1:46 PM     Patient Name: Emily Ramirez     Medical Record Number: [id number]   YOB: 1945  Sex: Female      Room/Bed: 0323W    Admitting Diagnosis: Atrial fibrillation (CMS/HCC) [I48.91]  Atrial fibrillation, unspecified type (CMS/HCC) [I48.91]   Admit Date/Time: 7/19/2022  7:38 PM    Primary Diagnosis: Paroxysmal atrial fibrillation (CMS/HCC)  Principal Problem: Paroxysmal atrial fibrillation (CMS/HCC)    GMLOS: 2.4  Anticipated Discharge Date: 7/23/2022    AM-PAC:  Mobility Score:      Discharge Planning:  Concerns to be Addressed: discharge planning, care coordination/care conferences  Anticipated Discharge Disposition: home without assistance or services  Can patient participate in a follow-up phone call/video visit?: Yes, independently  Home with   Barriers to Discharge:       Comments:       Participants:

## 2022-07-22 NOTE — PROGRESS NOTES
PHYSICAL THERAPY ACUTE CARE - INITIAL EVALUATION     Patient:  Emily Ramirez  Location:  [facility] 0323  MRN:  [id number]  Today's date:  7/22/2022    Emily is a 77 y.o. female admitted on 7/19/2022 with Atrial fibrillation (CMS/HCC) [I48.91]  Atrial fibrillation, unspecified type (CMS/HCC) [I48.91]. Principal problem is Paroxysmal atrial fibrillation (CMS/HCC).    Past Medical History  Emily has a past medical history of Aortic regurgitation, Chronic kidney disease, Coronary artery disease, History of loop recorder, Hypertension, Lipid disorder, Mitral regurgitation, and SVT (supraventricular tachycardia) (CMS/HCC).    History of Present Illness   77 y.o. female with a past medical history of aortic courage CKD, CAD, hypertension, hyperlipidemia, SVT, with recorder, status post stent to RCA and circumflex 6/9, who was at home shucking corn today, and began to feel fatigued around 2:00 this afternoon, she noticed head pressure at that time and continued on her normal activities however over the course of a few hours the fatigue did not resolve.  Patient noted that her apple watch was telling her that she was in atrial fib which was a new arrhythmia for this patient.  She came to [facility] for further evaluation.  Vital signs temp 97.8 °F, heart rate 100, respiratory rate 18, SPO2 97% on room air, blood pressure 135/63.  Labs significant for high-sensitivity troponin of 365.5, 377.5, potassium 3.4, BUN of 38, creatinine 1.7, EGFR 29.1, glucose 124, white blood count 6.65.  Lead EKG was performed showed patient was in a controlled rate controlled A. fib with a heart rate of 98.  Patient received Eliquis in the emergency department.     On initial evaluation patient spouse and daughter at bedside and assisted with HPI.  Patient denies chest pain, she does not note any arrhythmia.  She does have a loop recorder for history of SVT currently in place.  Of note in the emergency department she  had a period upon returning from ambulation to the bathroom where she felt fatigued and had pain and was noted to have a sinus pause 6 seconds in duration, followed by a brief 5 to 6-second period of sinus rhythm, before her return to rate controlled atrial flutter.  Patient uses a rolling walker for ambulation at home.  Denies any caffeine use other than drinking a half a glass of decaffinated iced tea.  She does not regularly drink alcohol, or any other caffeine containing products.  She does not have obstructive sleep apnea, or difficulty with sleeping, she does not use supplemental oxygen at home.  Patient denies any nausea vomiting constipation or diarrhea.  No issues with her urination.     Patient to be admitted for new onset A. fib.  Plan is to admit to telemetry, Eliquis twice daily, n.p.o., echocardiogram 2022, cardiology consult, ZOLL at bedside, fall precautions, PT, OT, evaluation, social work evaluation, trend labs.  Patient lives at with spouse.  CODE STATUS discussed with patient spouse and daughter at bedside and patient is a full code.    22: s/p Medtronic dual-chamber left bundle pacemaker implantation/ Medtronic loop recorder explantation by Dr. Henry Angel       Session details: initial evaluation   physical therapy    Start time:   1152   End time:  1215  Time ca min    General Observations  Start: Pt received supine in bed, LUE in sling; she was agreeable to therapy and no issues or concerns identified by nurse prior to session   End: Pt modified chair position of bed, LUE in sling at end of session; call bell in reach, bed alarm activated, all needs met, personal items in reach and nurse notified of patient performance, patient's position and patient's response to activity    Precautions:   fall and pacemaker, sling LUE      VITALS     PT Vitals    Date/Time Pulse SpO2 Pt Activity O2 Therapy BP BP Location BP Method Pt Position [facility]   22 1152 60 -- -- -- 129/58 Right  upper arm Automatic Lying LG   07/22/22 1210 71 98 % -- post walking None (Room air) -- -- -- -- LG   07/22/22 1214 63 -- -- -- 139/61 Right upper arm Automatic Lying LG      PT Pain    Date/Time Pain Type Side/Orientation Location Rating: Rest Rating: Activity Description [facility]   07/22/22 1152 Pain Assessment left chest 2 2 incisional LG   07/22/22 1214 Pain Assessment;Post Activity left chest 2 2 incisional LG          PRIOR LEVEL OF FUNCTION AND LIVING ENVIRONMENT     Prior Level of Function    Flowsheet Row Most Recent Value   Dominant Hand right   Ambulation assistive equipment   Transferring assistive equipment   Toileting assistive equipment   Bathing independent   Dressing independent   Eating independent   Prior Level of Function Comment indep PLOF with self care tasks using rollator. +driving   Assistive Device Currently Used at Home other (see comments)  [rollater, stair lift]          Prior Living Environment    Flowsheet Row Most Recent Value   Living Environment Comment lives with , 2 story home, 1 step to enter, RI on 1st floor, walkin shower and bed room upstairs. stair lift. pt reports she walks steps once daily and uses stair lift the rest of the time          OBJECTIVE     Cognition   Affect/MentalStatus: WFL  Orientation: oriented x 4  Follow Commands: WFL  Cognitive Function: safety deficit: minimal deficit (awareness of need for assistance, insight into deficits/self-awareness, safety precautions awareness and safety precautions follow-through)    Sensory  Vision (impairment/limitations): WFL and corrective lenses full time  Sensation: sensation intact and LE sensation intact  Hearing: WFL            Lower Extremity   Range of Motion (AROM/PROM) Strength (MMT)   RLE WFL WFL (grossly 4-/5)   LLE WFL WFL (grossly 4-/5)       Balance   Static Dynamic   Sitting Fair (maintains balance unsupported without LOB and without UE support) Fair + (supervision; unsupported, crossing midline and weight  shifting minimally)   Standing Poor + (min A to maintain balance) Poor + (min A to maintain balance or right self; unable to weight shift)   General Comments:HHA on R.                        Bed Mobility   [place] Level DME / AD Cues / Comments   Supine to Sit minimum assist and 1 person assist draw sheet and head of bed elevated with verbal cues (hand placement, maintaining precautions and technique), assist at trunk, exit to L   Sit to Supine minimum assist head of bed elevated with  verbal cues (hand placement, maintaining precautions, safety and technique), assist for BLE, entrance from L     Functional Transfers - mobility belt placed upon sitting EOB, used t/o session, removed upon return to bed. Pt verbalized understanding of use of belt   [place] Level DME / AD Cues / Comments   Sit to Stand minimum assist and 1 person assist none with verbal cues (hand placement, maintaining precautions, safety and technique), increased time needed to complete and safety considerations, from EOB, cues for NWB LUE. Assist to corrrect multidirectional trunk sway upon standing. Pt verbalizing fear of falling. Reassurance provided.  HHA required   Stand to Sit minimum assist and 1 person assist none To EOB, cues for hand placement/precautions/technique. Upon return to bed, pt placed in chair position. Instructed in AP/LAQ, demonstrated correctly.HHA required   Gait minimum assist and 1 person assist none Gait distance of 24 feet, with gait deviations (dec steplength, inc hip/knee flex) and increased time needed to complete. Pt c/o fatigue/generalized weakness w/activity. RPE 6/10 w/above distance.  HHA for amb        AM-PAC ™ - Mobility (Current Function)     Turning from your back to your side  3 - A Little   Moving from lying on your back to sitting 3 - A Little   Moving to and from a bed to a chair 3 - A Little   Standing up from a chair using arms 3 - A Little   Walk in a hospital room 3 - A Little   Climbing 3-5  steps with a railing  3 - A Little   AM-PAC ™ Mobility Score 18     EDUCATION     Education Documentation  Signs/Symptoms, taught by Minnie Abdi, PT at 7/22/2022  3:34 PM.  Learner: Patient  Readiness: Eager  Method: Explanation  Response: Verbalizes Understanding  Comment: role of PT, use of mobiliyt belt w/amb, pacemaker precautions, need for assistance w/mobility, AP/LAQ/seated marching, benefits of mobility w/staff, use of call bell, chair position of bed features    Risk Factors, taught by Minnie Abdi, PT at 7/22/2022  3:34 PM.  Learner: Patient  Readiness: Eager  Method: Explanation  Response: Verbalizes Understanding  Comment: role of PT, use of mobiliyt belt w/amb, pacemaker precautions, need for assistance w/mobility, AP/LAQ/seated marching, benefits of mobility w/staff, use of call bell, chair position of bed features          ASSESSMENT AND RECOMMENDATIONS     Progress Summary  PT eval completed. Min A supine to/from sit, Min A sit to/from stand, Min A amb 24' w/HHA. RPE 6/10 w/activity today. current mobility is a decline from PLOF. Pt will benefit from cont skilled therapy to maximize safety/indepdnence w/functional mobility prior to pt anticiipated return home. Pt refusing SNF placement at discharge and reported spouse/family able to assist as needed. [facility] 18    Therapy Plan  Rehab potential:  good, to achieve stated therapy goals  Therapy frequency:  3-5 times/wk  Therapy interventions:  balance training, bed mobility training, gait training, transfer training, stair training, patient/family education    Discharge Plan  Recommended discharge:  home with home health, home with assistance  Anticipated equipment needs:  none         PT Goals    Flowsheet Row Most Recent Value   Bed Mobility Goal 1    Activity/Assistive Device rolling to left, rolling to right, sit to supine, supine to sit at 07/22/2022 1152   [place] modified independence at 07/22/2022 1152   Time Frame by discharge at  07/22/2022 1152   Strategies/Barriers has recliners available as needed on 1st floor for sleep at 07/22/2022 1152   Progress/Outcome goal ongoing at 07/22/2022 1152   Transfer Goal 1    Activity/Assistive Device sit-to-stand/stand-to-sit, bed-to-chair/chair-to-bed, stand pivot, walker, front-wheeled at 07/22/2022 1152   [place] modified independence at 07/22/2022 1152   Time Frame by discharge at 07/22/2022 1152   Progress/Outcome goal ongoing at 07/22/2022 1152   Gait Training Goal 1    Activity/Assistive Device gait (walking locomotion), walker, front-wheeled at 07/22/2022 1152   [place] modified independence at 07/22/2022 1152   Distance 75 at 07/22/2022 1152   Time Frame by discharge at 07/22/2022 1152   Progress/Outcome goal ongoing at 07/22/2022 1152   Stairs Goal 1    Activity/Assistive Device ascending stairs, descending stairs, step-to-step, walker, front-wheeled at 07/22/2022 1152   [place] supervision required at 07/22/2022 1152   Number of Stairs 1 at 07/22/2022 1152   Time Frame by discharge at 07/22/2022 1152   Progress/Outcome goal ongoing at 07/22/2022 1152

## 2022-07-22 NOTE — ANESTHESIA POSTPROCEDURE EVALUATION
Patient: Emily Ramirez    Procedure Summary     Date: 07/21/22 Room / Location: [facility] EP LAB 5 / [facility] CARDIAC CATH/EP/NEURO    Anesthesia Start: 1753 Anesthesia Stop: 1928    Procedures:       PPM - DUAL CHAMBER NEW (N/A )      Loop recorder explant (N/A ) Diagnosis:       Tachy-marco a syndrome (CMS/HCC)      (tachy marco a syndrome)    Providers: Henry Angel MD Responsible Provider: Eladio Rodriguez MD    Anesthesia Type: MAC ASA Status: 3          Anesthesia Type: MAC  PACU Vitals  7/21/2022 1927 - 7/21/2022 2027 7/21/2022  1932 7/21/2022 1945 7/21/2022 2000 7/21/2022 2022    BP: 140/106 161/84 180/79 189/97    Temp: -- -- -- 36.9 °C (98.4 °F)    Pulse: 81 79 73 87    Resp: 14 21 15 18    SpO2: 99 % 99 % 99 % 97 %            Anesthesia Post Evaluation    Pain score: 0  Pain management: adequate  Patient location during evaluation: PACU  Patient participation: complete - patient participated  Level of consciousness: awake and alert  Cardiovascular status: acceptable  Airway Patency: adequate  Respiratory status: acceptable  Hydration status: acceptable  Anesthetic complications: no

## 2022-07-22 NOTE — PLAN OF CARE
Problem: Adult Inpatient Plan of Care  Goal: Plan of Care Review  Outcome: Progressing  Flowsheets (Taken 7/22/2022 0922)  Progress: improving  Plan of Care Reviewed With: patient  Outcome Summary: OT evaluation complete. ADL [facility] 16 . Patient presents with functional limitations affecting areas of ADL’s and functional transfers. Currently, patient performs bed mobility CS, functional STS and small lateral steps MIN A for stability with hand held assist on right side. Pt required MAX-total assist for LB dressing, MOD A For UB dressing, and Min A for grooming., pt limited by activity tolerance. Increase dizziness and overall balance.  Pt would benefit from skilled OT services to maximize safety and independence with daily tasks. Recommend  SNF when medically stable.  Goal: Patient-Specific Goal (Individualized)  Outcome: Progressing  Flowsheets (Taken 7/22/2022 0922)  Patient-Specific Goals (Include Timeframe): get better

## 2022-07-22 NOTE — PATIENT CARE CONFERENCE
Care Progression Rounds Note  Date: 7/22/2022  Time: 5:04 PM     Patient Name: Emily Ramirez     Medical Record Number: [id number]   YOB: 1945  Sex: Female      Room/Bed: 0323W    Admitting Diagnosis: Atrial fibrillation (CMS/HCC) [I48.91]  Atrial fibrillation, unspecified type (CMS/HCC) [I48.91]   Admit Date/Time: 7/19/2022  7:38 PM    Primary Diagnosis: Paroxysmal atrial fibrillation (CMS/HCC)  Principal Problem: Paroxysmal atrial fibrillation (CMS/HCC)    GMLOS: 2.4  Anticipated Discharge Date: 7/23/2022    AM-PAC:  Mobility Score: 18    Discharge Planning:  Concerns to be Addressed: discharge planning, care coordination/care conferences  Anticipated Discharge Disposition: home without assistance or services  Can patient participate in a follow-up phone call/video visit?: Yes, independently  Home   Barriers to Discharge:       Comments:       Participants:

## 2022-07-22 NOTE — PROGRESS NOTES
Daily Progress Note (LOS: 3)    ?    Interval History: Patient feels well this morning.  She has noted some palpitations.  She has no chest pain, shortness of breath, lightheadedness.  She is not complaining of significant pain.  She has no gastrointestinal issues.        Current Medications:  • apixaban  5 mg oral BID   • clopidogreL  75 mg oral Daily   • docusate sodium  100 mg oral BID   • famotidine  40 mg oral Nightly   • hydrochlorothiazide  25 mg oral Daily   • lisinopriL  10 mg oral Daily   • magnesium sulfate  2 g intravenous Once   • pantoprazole  40 mg oral Daily   • simvastatin  20 mg oral Nightly   • sotaloL  80 mg oral q12h KRISTEN       Physical Exam:  Vitals:    07/22/22 0901   BP: (!) 120/58   Pulse: 79   Resp:    Temp:    SpO2: 99%       General appearance: alert, appears stated age and cooperative  Head: without obvious abnormality  Eyes: PERRLA, EOM's intact  Lungs: clear to auscultation bilaterally, no rales or wheezing, small pocket hematoma  Heart: S1, S2 normal, no murmur, click, rub or gallop, regular rate and rhythm, no JVD  Abdomen: soft, non-tender; bowel sounds normal; no masses  Extremities: peripheral pulses intact, no edema  Skin: Skin color, texture, turgor normal. No rashes or lesions  Neurologic: Alert and oriented X 3, no focal deficits  Psychiatric: Appropriate affect  Endocrine: No thyromegaly    I&Os:    Intake/Output Summary (Last 24 hours) at 7/22/2022 0936  Last data filed at 7/22/2022 0910  Gross per 24 hour   Intake 450 ml   Output 2010 ml   Net -1560 ml       Weights:   Wt Readings from Last 3 Encounters:   07/20/22 84.4 kg (186 lb)   06/09/22 79.8 kg (176 lb)       Labs:  Results from last 7 days   Lab Units 07/22/22  0238 07/21/22  0727 07/20/22  0757 07/19/22  1940   SODIUM mEQ/L 139 139 140 139   CO2 mEQ/L 25 25 27 29   BUN mg/dL 22* 27* 32* 38*   CREATININE mg/dL 1.0 1.2* 1.3* 1.7*   CALCIUM mg/dL 9.2 9.3 8.9 9.4   ALT IU/L  --   --   --  18   AST IU/L  --   --   --  25      Results from last 7 days   Lab Units 07/22/22  0238 07/21/22  0727 07/20/22  0757   WBC K/uL 6.53 7.82 5.47   PLATELETS K/uL 242 232 233           No lab exists for component: CPKNo results found for: PROBNP    Data Review:  EKG Impression: Sinus rhythm, atrial pacing, acceptable QT interval   telemetry Review: Reverted back to sinus rhythm  Plan:    77 y.o. female with the following problems,      1.  Symptomatic sick sinus syndrome-the patient has now been documented to have atrial fibrillation with pauses.    She is undergone implantation of a dual-chamber permanent pacemaker with left bundle area pacing.  Sotalol has been initiated.  Creatinine is 1.0 mg/dL.  She will be loaded with sotalol 80 mg twice a day.  She should be able to be discharged home tomorrow evening.  Her QTc interval will need to be monitored.      2.  Anticoagulation therapy-the patient's [id number] score is 4.    She can resume Eliquis this evening.  Aspirin needs to be discontinued.    3.  Hypertension- the patient's blood pressure is adequately controlled.     4.  Coronary artery disease-patient has been revascularized.  She will continue on Plavix and Eliquis.    5.  Dyslipidemia-continue on statin therapy

## 2022-07-22 NOTE — PLAN OF CARE
Problem: Adult Inpatient Plan of Care  Goal: Plan of Care Review  Outcome: Progressing  Flowsheets (Taken 7/22/2022 1401)  Progress: improving  Plan of Care Reviewed With: patient  Outcome Summary: Left arm remains in sling. Left chest wall incision clean with steri strips in place. Pt states the site is sore. Pt was seen by PT/OT. Pt was seen by Dr. Henry Angel. Pt is resting in bed at present.   Plan of Care Review  Plan of Care Reviewed With: patient  Progress: improving  Outcome Summary: Left arm remains in sling. Left chest wall incision clean with steri strips in place. Pt states the site is sore. Pt was seen by PT/OT. Pt was seen by Dr. Henry Angel. Pt is resting in bed at present.

## 2022-07-22 NOTE — PROGRESS NOTES
Hospital Medicine Service -  Daily Progress Note       SUBJECTIVE   Interval History: No acute overnight events. Pt reports feeling slightly weak/ dizzy when working with therapy. No CP, SOB, abd pain. Plan to continue sotalol loading and discharge over the weekend if stable per cardiology.     OBJECTIVE      Vital signs in last 24 hours:  Temp:  [36.2 °C (97.1 °F)-36.9 °C (98.4 °F)] 36.2 °C (97.1 °F)  Heart Rate:  [] 79  Resp:  [14-21] 18  BP: (113-204)/() 120/58    Intake/Output Summary (Last 24 hours) at 7/22/2022 1023  Last data filed at 7/22/2022 0910  Gross per 24 hour   Intake 450 ml   Output 2010 ml   Net -1560 ml       PHYSICAL EXAMINATION      Physical Exam  Vitals and nursing note reviewed.   Constitutional:       General: She is not in acute distress.  HENT:      Head: Normocephalic.   Eyes:      Pupils: Pupils are equal, round, and reactive to light.   Cardiovascular:      Rate and Rhythm: Normal rate and regular rhythm.   Pulmonary:      Effort: Pulmonary effort is normal.      Breath sounds: Normal breath sounds.   Abdominal:      General: Abdomen is flat. Bowel sounds are normal.      Palpations: Abdomen is soft.   Musculoskeletal:         General: No swelling. Normal range of motion.      Cervical back: Normal range of motion.      Comments: LUE in sling   Skin:     General: Skin is warm and dry.      Comments: L chest PPM site CDI. Old ILD site CDI. Mildly tender   Neurological:      General: No focal deficit present.      Mental Status: She is alert and oriented to person, place, and time.   Psychiatric:         Mood and Affect: Mood normal.        LINES, CATHETERS, DRAINS, AIRWAYS, AND WOUNDS   Lines, Drains, Airways, Wounds:  Peripheral IV (Adult) 07/19/22 Right Antecubital (Active)   Number of days: 3       Peripheral IV (Adult) 07/21/22 Left Antecubital (Active)   Number of days: 1       External Urinary Catheter Female (one size) (Active)   Number of days: 1       Wound  Surgical Midline Sternum (Active)   Number of days: 1       Surgical Incision Chest Left;Upper;Anterior (Active)   Number of days: 1       Comments:  No other LDAs     LABS / IMAGING / TELE      Labs  Labs reviewed  Results from last 7 days   Lab Units 07/22/22  0238 07/21/22 0727 07/20/22  0757   WBC K/uL 6.53 7.82 5.47   HEMOGLOBIN g/dL 12.7 12.6 11.6*   HEMATOCRIT % 38.5 37.9 35.6   PLATELETS K/uL 242 232 233     Results from last 7 days   Lab Units 07/22/22  0238 07/21/22 0727 07/20/22 0757 07/19/22  1940   SODIUM mEQ/L 139 139 140 139   POTASSIUM mEQ/L 3.6 3.7 3.5* 3.4*   CHLORIDE mEQ/L 105 105 104 105   CO2 mEQ/L 25 25 27 29   BUN mg/dL 22* 27* 32* 38*   CREATININE mg/dL 1.0 1.2* 1.3* 1.7*   CALCIUM mg/dL 9.2 9.3 8.9 9.4   ALBUMIN g/dL  --   --   --  4.1   BILIRUBIN TOTAL mg/dL  --   --   --  0.8   ALK PHOS IU/L  --   --   --  63   ALT IU/L  --   --   --  18   AST IU/L  --   --   --  25   GLUCOSE mg/dL 98 99 87 124*       Imaging  Imaging reviewed  X-RAY CHEST 1 VIEW    Result Date: 7/21/2022  CLINICAL HISTORY: Pacemaker placement. COMMENT: Upright portable view of the chest was obtained at 1940 hours and is compared with a similar study from 7/19/2022. Since the previous study, the loop recorder has been removed and a left chest pacemaker has been placed. 2 leads appear intact and in satisfactory position. The heart is enlarged. There is no pleural effusion or pneumothorax. Pulmonary vascularity is stable. Bilateral total shoulder arthroplasties are stable. There are degenerative changes at multiple levels in the thoracic spine.     IMPRESSION: No pneumothorax.     Electrophysiology - Implantable Device    Result Date: 7/21/2022  RECOMMENDATIONS:  1.  Acutely successful implantation of dual-chamber permanent pacemaker with left bundle area pacing 2.  Acutely removal of loop recorder Plan The patient is in atrial fibrillation. Initiate sotalol 120 mg twice daily Obtain twelve-lead electrocardiograms  Consider initiating Eliquis tomorrow. Discontinue aspirin tomorrow, continue on Plavix   '    ECG/Telemetry  Telemetry reviewed: SR PACs some A pacing    ASSESSMENT AND PLAN      * Paroxysmal atrial fibrillation (CMS/[facility])  Assessment & Plan  Presents with fatigue, dizziness, flushness, PASCUAL  Found to be in AF with multiple conversion pauses up to 6 seconds  Consistent with underlying symptomatic SSS  TTE with EF 65%, G2DD, mild-mod RT/NV, mild MR/ AR, wall motion grossly normal  S/p PPM placement (7/21/22)  Currently back in NSR with A pacing  Continue sotalol loading and monitoring QTC  Resume eliquis tonight  Cardiology and EP consult appreciated, known to Dr Saravia    Tachy-marco a syndrome (CMS/HCC)  Assessment & Plan  See PAF problem     Acute kidney injury superimposed on CKD (CMS/HCC)  Assessment & Plan  H/o CKD III with baseline Cr 1.1-1.4  Cr 1.7 on admission  DENNY suspect prerenal  Cr improved to 1.0 today  Encourage PO intake  Avoid nephrotoxins and hypotension  Trend BMP    CAD (coronary artery disease)  Assessment & Plan  S/p NIKITA to RCA and LCx (6/6/22)  Denies chest pain  Continue ASA, eliquis, statin, lisinopril  D/c'd plavix, metoprolol    Elevated troponin  Assessment & Plan  Troponin 365.5-377.5  EKG nonischemic  Denies chest pain  Suspect demand ischemia due to AF  Not c/w ACS      Valvular heart disease  Assessment & Plan  Known mild MR/ AR  TTE shows mild MR/ AR, mild-mod TR/ NV  Continue outpatient surveillance monitoring     Hypomagnesemia  Assessment & Plan  Trend and replace as needed      GERD (gastroesophageal reflux disease)  Assessment & Plan  Stable, denies GI sx  Continue PPI and H2 blocker      Hypokalemia  Assessment & Plan  Trend and replace as needed      Hyperlipidemia  Assessment & Plan  Stable   Continue Simvastatin      Class 1 obesity in adult  Assessment & Plan  Body mass index is 32.95 kg/m².  Encourage weight loss         VTE Assessment: [place] VTE Score: 5  VTE Prophylaxis Plan:  eliquis to start tonight  Code Status: Full Code  Estimated Discharge Date: 7/23/2022  Disposition Planning: d/c home over the weekend, monitor response to sotalol load, pt/ot     VENTURA Walton  7/22/2022

## 2022-07-22 NOTE — PLAN OF CARE
Problem: Adult Inpatient Plan of Care  Goal: Plan of Care Review  Outcome: Progressing  Flowsheets (Taken 7/22/2022 6355)  Progress: no change  Plan of Care Reviewed With: patient  Outcome Summary: PT eval completed. Min A mobility due to limited use of LUE post pacemaker, typically uses RW/rollator. pt refusing SNF at discharge, prefers home w/assist/homecare

## 2022-07-22 NOTE — PROGRESS NOTES
OCCUPATIONAL THERAPY ACUTE CARE - INITIAL EVALUATION     Patient:  Emily Ramirez  Location:  [facility] [address]  MRN:  [id number]  Today's date:  7/22/2022    Emily is a 77 y.o. female admitted on 7/19/2022 with Atrial fibrillation (CMS/HCC) [I48.91]  Atrial fibrillation, unspecified type (CMS/HCC) [I48.91]. Principal problem is Paroxysmal atrial fibrillation (CMS/HCC).    Past Medical History  Emily has a past medical history of Aortic regurgitation, Chronic kidney disease, Coronary artery disease, History of loop recorder, Hypertension, Lipid disorder, Mitral regurgitation, and SVT (supraventricular tachycardia) (CMS/HCC).    History of Present Illness   77 y.o. female with a past medical history of aortic courage CKD, CAD, hypertension, hyperlipidemia, SVT, with recorder, status post stent to RCA and circumflex 6/9, who was at home shucking corn today, and began to feel fatigued around 2:00 this afternoon, she noticed head pressure at that time and continued on her normal activities however over the course of a few hours the fatigue did not resolve.  Patient noted that her apple watch was telling her that she was in atrial fib which was a new arrhythmia for this patient.  She came to [facility] for further evaluation.  Vital signs temp 97.8 °F, heart rate 100, respiratory rate 18, SPO2 97% on room air, blood pressure 135/63.  Labs significant for high-sensitivity troponin of 365.5, 377.5, potassium 3.4, BUN of 38, creatinine 1.7, EGFR 29.1, glucose 124, white blood count 6.65.  Lead EKG was performed showed patient was in a controlled rate controlled A. fib with a heart rate of 98.  Patient received Eliquis in the emergency department.     On initial evaluation patient spouse and daughter at bedside and assisted with HPI.  Patient denies chest pain, she does not note any arrhythmia.  She does have a loop recorder for history of SVT currently in place.  Of note in the emergency department  she had a period upon returning from ambulation to the bathroom where she felt fatigued and had pain and was noted to have a sinus pause 6 seconds in duration, followed by a brief 5 to 6-second period of sinus rhythm, before her return to rate controlled atrial flutter.  Patient uses a rolling walker for ambulation at home.  Denies any caffeine use other than drinking a half a glass of decaffinated iced tea.  She does not regularly drink alcohol, or any other caffeine containing products.  She does not have obstructive sleep apnea, or difficulty with sleeping, she does not use supplemental oxygen at home.  Patient denies any nausea vomiting constipation or diarrhea.  No issues with her urination.     Patient to be admitted for new onset A. fib.  Plan is to admit to telemetry, Eliquis twice daily, n.p.o., echocardiogram 2022, cardiology consult, ZOLL at bedside, fall precautions, PT, OT, evaluation, social work evaluation, trend labs.  Patient lives at with spouse.  CODE STATUS discussed with patient spouse and daughter at bedside and patient is a full code.    22: s/p Medtronic dual-chamber left bundle pacemaker implantation/ [employer] loop recorder explantation by Dr. Henry Angel       Session details: initial evaluation   occupational therapy    Start time:   838  End time:  904  Time ca min    General Observations  Start: Pt received supine in bed; she was agreeable to therapy and no issues or concerns identified by nurse prior to session   End: Pt supine in bed at end of session; call bell in reach, bed alarm activated, all needs met, personal items in reach and nurse notified of patient performance, patient's position and patient's response to activity    Precautions:   cardiac, fall and Pacemaker      VITALS     OT Vitals    Date/Time Pulse HR Source SpO2 O2 Therapy BP BP Location BP Method Pt Position Who   22 0840 75 Monitor 98 % None (Room air) 113/57 Right upper arm Automatic  Lying CLG   07/22/22 0901 79 Monitor 99 % None (Room air) 120/58 Right upper arm Automatic Standing CLG      OT Pain    Date/Time Pain Type Acceptable Pain Level Rating: Rest Rating: Activity [facility]   07/22/22 0840 Pain Assessment 0 0 0 CLG   07/22/22 0901 Post Activity -- 0 0 CLG          PRIOR LEVEL OF FUNCTION AND LIVING ENVIRONMENT     Prior Level of Function    Flowsheet Row Most Recent Value   Dominant Hand right   Ambulation assistive equipment   Transferring assistive equipment   Toileting assistive equipment   Bathing independent   Dressing independent   Eating independent   Prior Level of Function Comment indep PLOF with self care tasks using rollator. +driving   Assistive Device Currently Used at Home other (see comments)  [rollater, stair lift]          Prior Living Environment    Flowsheet Row Most Recent Value   Living Environment Comment lives with , 2 story home, 1 step to enter, OK on 1st floor, walkin shower and bed room upstairs. stair lift. pt reports she walks steps once daily and uses stair lift the rest of the time          Occupational Profile    Flowsheet Row Most Recent Value   Reason for Services/Referral impaired ADLs/ functional mobility   Successful Occupations retired   Patient Goals go home          OBJECTIVE     Cognition   Affect/MentalStatus: WFL  Orientation: oriented x 4  Follow Commands: WFL  Cognitive Function: executive function deficit: minimal deficit (judgment and planning/decision making)    Sensory  Vision (impairment/limitations): WFL and corrective lenses full time  Sensation: sensation intact and UE sensation intact  Hearing: WFL    Motor Skills: functional activity tolerance/endurance (Fair)    Upper Extremity   Range of Motion (AROM/PROM) Strength (MMT)   RUE WFL WFL   LUE Elbow, wrist, and hand WFL, shoulder NT due to new pacemaker  WFL       Balance   Static Dynamic   Sitting Fair (maintains balance unsupported without LOB and without UE support) Fair +  (supervision; unsupported, crossing midline and weight shifting minimally)   Standing Fair - (maintains balance with UE support or CGA) Poor + (min A to maintain balance or right self; unable to weight shift)   General Comments:      Functional Transfers   [place] Level DME / AD Cues / Comments   Supine to Sit close supervision head of bed elevated with moderate verbal cues (hand placement, maintaining center of gravity over base of support, maintaining precautions, preparatory posture, safety and technique), increased time needed to complete and safety considerations   Sit to Supine close supervision head of bed elevated with moderate verbal cues (maintaining precautions), increased time needed to complete and safety considerations   Sit to Stand minimum assist and 1 person assist none Hand held assist on right side. Noted pt reports fatigue upon standing due to dizziness. VS monitored throughout session.    Stand to Sit minimum assist and 1 person assist none To bed   Toilet    Deferred this date due to dizziness.    Pt able to lateral steps right to left with hand held assist on first side with MIN A for stability. Pt trailed forward stepping and LOB each time. Pt does use RW at baseline. RW deferred this date due to pacemaker precautions/ sling wear.     Activities of Daily Living   [place] Level Cues / DME / Comments   Dressing: UB                      LB moderate assist Pt education provided on  dressing left arm first and undressing Left arm last. And maintaining pacemaker precautions during dressing.       Dependent Donning bilateral socks, seated   Grooming/hygiene minimum assist MIN A for stability while standing to wash hands    Toileting unable to assess Declined. Noted pt currently using purwick. Pt education on use of BSC for functional mobility through the day. RN also notified.        AM-PAC ™ - ADL (Current Function)     Putting on/taking off regular LB clothing 2 - A Lot   Bathing 2 - A  Lot   Toileting 3 - A Little   Putting on/taking off regular UB clothing 2 - A Lot   Help for taking care of personal grooming 3 - A Little   Eating meals 3 - A Little   AM-PAC ™ ADL Score 15     EDUCATION     Education Documentation  Self-Care, taught by Megan Joseph OT at 7/22/2022  9:10 AM.  Learner: Patient  Readiness: Acceptance  Method: Explanation  Response: Verbalizes Understanding  Comment: role of OT/ goals, energy conservation, purpose of sling, pacemaker precuations    Self-Care, taught by Megan Joseph OT at 7/22/2022  9:10 AM.  Learner: Patient  Readiness: Acceptance  Method: Explanation  Response: Verbalizes Understanding, Demonstrated Understanding          ASSESSMENT AND RECOMMENDATIONS     Progress Summary     OT evaluation complete. ADL AMPAC 16 . Patient presents with functional limitations affecting areas of ADL’s and functional transfers. Currently, patient performs bed mobility CS, functional STS and small lateral steps MIN A for stability with hand held assist on right side. Pt required MAX-total assist for LB dressing, MOD A For UB dressing, and Min A for grooming., pt limited by activity tolerance. Increase dizziness and overall balance.  Pt would benefit from skilled OT services to maximize safety and independence with daily tasks. Recommend  SNF when medically stable.     Therapy Plan  Rehab potential:  good, to achieve stated therapy goals  Therapy frequency:  3-5 times/wk  Therapy interventions:  activity tolerance training, adaptive equipment training, BADL retraining, functional balance retraining, occupation/activity based interventions, passive ROM/stretching, patient/caregiver education/training, ROM/therapeutic exercise, strengthening exercise, transfer/mobility retraining    Discharge Plan  Recommended discharge:  skilled nursing facility (pending progress)  Anticipated equipment needs:  none    Patient/Family Therapy Goal Statement: get better    OT Goals     Flowsheet Row Most Recent Value   Transfer Goal 1    Activity/Assistive Device toilet at 07/22/2022 0838   [place] modified independence at 07/22/2022 0838   Time Frame by discharge at 07/22/2022 0838   Progress/Outcome goal ongoing at 07/22/2022 0838   Dressing Goal 1    Activity/Adaptive Equipment dressing skills, all at 07/22/2022 0838   [place] modified independence at 07/22/2022 0838   Time Frame by discharge at 07/22/2022 0838   Progress/Outcome goal ongoing at 07/22/2022 0838   Toileting Goal 1    Activity/Assistive Device toileting skills, all at 07/22/2022 0838   [place] modified independence at 07/22/2022 0838   Time Frame by discharge at 07/22/2022 0838   Progress/Outcome goal ongoing at 07/22/2022 0838   Grooming Goal 1    Activity/Assistive Device grooming skills, all at 07/22/2022 0838   [place] modified independence at 07/22/2022 0838   Time Frame by discharge at 07/22/2022 0838   Progress/Outcome goal ongoing at 07/22/2022 0838   Precaution Goal 1    Activity cardiac precautions  [pacemaker] at 07/22/2022 0838   [place]/Cues independently at 07/22/2022 0838   Time Frame by discharge at 07/22/2022 0838   Progress/Outcome goal ongoing at 07/22/2022 0838

## 2022-07-22 NOTE — ANESTHESIA PREPROCEDURE EVALUATION
Relevant Problems   CARDIOVASCULAR   (+) CAD (coronary artery disease)   (+) Paroxysmal atrial fibrillation (CMS/HCC)   (+) Tachy-marco a syndrome (CMS/HCC)   (+) Valvular heart disease      GASTROINTESTINAL   (+) GERD (gastroesophageal reflux disease)      URINARY SYSTEM   (+) Acute kidney injury superimposed on CKD (CMS/HCC)   (+) Hypokalemia   (+) Hypomagnesemia       Anesthesia ROS/MED HX    Anesthesia History - neg  Pulmonary - neg  Neuro/Psych - neg  Cardiovascular   CAD   hypertension  Hematological - neg  GI/Hepatic   GERD  Musculoskeletal- neg  Renal Disease- neg  Endo/Other- neg           Allergies:   Allergies   Allergen Reactions   • Celebrex [Celecoxib]    • Cipro [Ciprofloxacin Hcl]    • Clindamycin    • Crestor [Rosuvastatin]    • Lipitor [Atorvastatin]    • Voltaren [Diclofenac Sodium]        ROS: Denies any chest pain or shortness of breath    PMH:   Past Medical History:   Diagnosis Date   • Aortic regurgitation    • Chronic kidney disease    • Coronary artery disease    • History of loop recorder    • Hypertension    • Lipid disorder    • Mitral regurgitation    • SVT (supraventricular tachycardia) (CMS/HCC)        PSH: History reviewed. No pertinent surgical history.    No current facility-administered medications on file prior to encounter.     Current Outpatient Medications on File Prior to Encounter   Medication Sig   • aspirin 81 mg enteric coated tablet Take 81 mg by mouth daily.   • clopidogreL (PLAVIX) 75 mg tablet Take 75 mg by mouth daily.   • coenzyme Q10 (COQ10) 100 mg capsule Take 100 mg by mouth daily. Takes in the PM   • famotidine (PEPCID) 10 mg tablet Take 10 mg by mouth nightly.   • hydrochlorothiazide (HYDRODIURIL) 25 mg tablet Take 25 mg by mouth daily.   • lisinopriL (PRINIVIL) 10 mg tablet Take 10 mg by mouth daily.   • metoprolol succinate XL (TOPROL-XL) 50 mg 24 hr tablet Take 50 mg by mouth daily.   • multivit,iron,minerals/lutein (CENTRUM SILVER ULTRA WOMEN'S ORAL) Take 1  tablet by mouth every other day.   • pantoprazole (PROTONIX) 40 mg EC tablet Take 40 mg by mouth daily. 30 min prior to breakfast   • simvastatin (ZOCOR) 20 mg tablet Take 20 mg by mouth nightly.   • traMADoL-acetaminophen (ULTRACET) 37.5-325 mg per tablet Take 1 tablet by mouth 2 (two) times a day. Daily or q 12 hours as needed       CBC Results       07/22/22 07/21/22 07/20/22     0238 0727 0757    WBC 6.53 7.82 5.47    RBC 4.33 4.23 4.00    HGB 12.7 12.6 11.6    HCT 38.5 37.9 35.6    MCV 88.9 89.6 89.0    MCH 29.3 29.8 29.0    MCHC 33.0 33.2 32.6     232 233        BMP Results       07/22/22 07/21/22 07/20/22     0238 0727 0757     139 140    K 3.6 3.7 3.5    Cl 105 105 104    CO2 25 25 27    Glucose 98 99 87    BUN 22 27 32    Creatinine 1.0 1.2 1.3    Calcium 9.2 9.3 8.9    Anion Gap 9 9 9    EGFR 53.6 43.4 39.6                  Physical Exam    Airway   Mallampati: II   TM distance: >3 FB   Neck ROM: full  Cardiovascular - normal   Rhythm: regular   Rate: normalPulmonary - normal   clear to auscultation  Dental - normal        Anesthesia Plan    Plan: MAC    Technique: MAC     Airway: natural airway / supplemental oxygen   ASA 3  Anesthetic plan and risks discussed with: patient  Postop Plan:   Patient Disposition: phase II then home  Comments:    Plan: MAC

## 2022-07-23 LAB
ANION GAP SERPL CALC-SCNC: 10 MEQ/L (ref 3–15)
BUN SERPL-MCNC: 36 MG/DL (ref 8–20)
CALCIUM SERPL-MCNC: 9.6 MG/DL (ref 8.9–10.3)
CHLORIDE SERPL-SCNC: 103 MEQ/L (ref 98–109)
CO2 SERPL-SCNC: 25 MEQ/L (ref 22–32)
CREAT SERPL-MCNC: 1.4 MG/DL (ref 0.6–1.1)
GFR SERPL CREATININE-BSD FRML MDRD: 36.4 ML/MIN/1.73M*2
GLUCOSE SERPL-MCNC: 118 MG/DL (ref 70–99)
MAGNESIUM SERPL-MCNC: 2.1 MG/DL (ref 1.8–2.5)
POTASSIUM SERPL-SCNC: 4.2 MEQ/L (ref 3.6–5.1)
SODIUM SERPL-SCNC: 138 MEQ/L (ref 136–144)
TROPONIN I SERPL HS-MCNC: 76.5 PG/ML
TROPONIN I SERPL HS-MCNC: 88.7 PG/ML

## 2022-07-23 PROCEDURE — 36415 COLL VENOUS BLD VENIPUNCTURE: CPT | Performed by: PHYSICIAN ASSISTANT

## 2022-07-23 PROCEDURE — 93005 ELECTROCARDIOGRAM TRACING: CPT | Performed by: PHYSICIAN ASSISTANT

## 2022-07-23 PROCEDURE — 63700000 HC SELF-ADMINISTRABLE DRUG: Performed by: PHYSICIAN ASSISTANT

## 2022-07-23 PROCEDURE — 63700000 HC SELF-ADMINISTRABLE DRUG: Performed by: INTERNAL MEDICINE

## 2022-07-23 PROCEDURE — 83735 ASSAY OF MAGNESIUM: CPT | Performed by: PHYSICIAN ASSISTANT

## 2022-07-23 PROCEDURE — 63700000 HC SELF-ADMINISTRABLE DRUG

## 2022-07-23 PROCEDURE — 63600000 HC DRUGS/DETAIL CODE: Performed by: NURSE PRACTITIONER

## 2022-07-23 PROCEDURE — 63700000 HC SELF-ADMINISTRABLE DRUG: Performed by: STUDENT IN AN ORGANIZED HEALTH CARE EDUCATION/TRAINING PROGRAM

## 2022-07-23 PROCEDURE — 84484 ASSAY OF TROPONIN QUANT: CPT | Performed by: STUDENT IN AN ORGANIZED HEALTH CARE EDUCATION/TRAINING PROGRAM

## 2022-07-23 PROCEDURE — 99232 SBSQ HOSP IP/OBS MODERATE 35: CPT | Performed by: STUDENT IN AN ORGANIZED HEALTH CARE EDUCATION/TRAINING PROGRAM

## 2022-07-23 PROCEDURE — 21400000 HC ROOM AND CARE CCU/INTERMEDIATE

## 2022-07-23 PROCEDURE — 80048 BASIC METABOLIC PNL TOTAL CA: CPT | Performed by: PHYSICIAN ASSISTANT

## 2022-07-23 RX ORDER — ACETAMINOPHEN 325 MG/1
650 TABLET ORAL EVERY 6 HOURS PRN
Status: DISCONTINUED | OUTPATIENT
Start: 2022-07-23 | End: 2022-07-25 | Stop reason: HOSPADM

## 2022-07-23 RX ORDER — TRAMADOL HYDROCHLORIDE 50 MG/1
50 TABLET ORAL EVERY 12 HOURS PRN
Status: DISCONTINUED | OUTPATIENT
Start: 2022-07-23 | End: 2022-07-25 | Stop reason: HOSPADM

## 2022-07-23 RX ADMIN — APIXABAN 5 MG: 5 TABLET, FILM COATED ORAL at 21:12

## 2022-07-23 RX ADMIN — FAMOTIDINE 40 MG: 20 TABLET ORAL at 21:12

## 2022-07-23 RX ADMIN — HYDROCHLOROTHIAZIDE 25 MG: 25 TABLET ORAL at 09:31

## 2022-07-23 RX ADMIN — LISINOPRIL 10 MG: 10 TABLET ORAL at 09:30

## 2022-07-23 RX ADMIN — TRAMADOL HYDROCHLORIDE 50 MG: 50 TABLET, COATED ORAL at 11:06

## 2022-07-23 RX ADMIN — APIXABAN 5 MG: 5 TABLET, FILM COATED ORAL at 09:31

## 2022-07-23 RX ADMIN — PANTOPRAZOLE SODIUM 40 MG: 40 TABLET, DELAYED RELEASE ORAL at 09:30

## 2022-07-23 RX ADMIN — TRAMADOL HYDROCHLORIDE 50 MG: 50 TABLET, COATED ORAL at 21:14

## 2022-07-23 RX ADMIN — ACETAMINOPHEN 650 MG: 325 TABLET, FILM COATED ORAL at 21:14

## 2022-07-23 RX ADMIN — SIMVASTATIN 20 MG: 20 TABLET, FILM COATED ORAL at 21:13

## 2022-07-23 RX ADMIN — ACETAMINOPHEN 650 MG: 325 TABLET, FILM COATED ORAL at 11:07

## 2022-07-23 RX ADMIN — SOTALOL HYDROCHLORIDE 80 MG: 80 TABLET ORAL at 09:31

## 2022-07-23 RX ADMIN — CLOPIDOGREL BISULFATE 75 MG: 75 TABLET, FILM COATED ORAL at 09:31

## 2022-07-23 RX ADMIN — DIAZEPAM 2 MG: 5 INJECTION, SOLUTION INTRAMUSCULAR; INTRAVENOUS at 00:13

## 2022-07-23 NOTE — PROGRESS NOTES
Cardiology  Daily Progress Note       SUBJECTIVE   Subjective   Patient had chest pain last night up substernal left subclavicular.  Still has some discomfort in the area of left subclavian pacemaker pocket.    ROS  Shortness of breath, palpitations, dizziness.     OBJECTIVE      Objective     Vital signs in last 24 hours  Temp:  [36.4 °C (97.5 °F)-36.8 °C (98.3 °F)] 36.8 °C (98.3 °F)  Heart Rate:  [60-79] 66  Resp:  [16-21] 21  BP: (102-178)/(53-72) 157/70      Intake/Output Summary (Last 24 hours) at 7/23/2022 0752  Last data filed at 7/22/2022 0910  Gross per 24 hour   Intake 50 ml   Output --   Net 50 ml     Intake/Output this shift:  No intake/output data recorded.    PHYSICAL EXAMINATION      Physical Exam     Constitutional: Patient appears well-developed and well-nourished. NAD  Neck: No JVD present. Carotid bruit is not present.   Cardiovascular: Regular rhythm.  Exam reveals no gallop.    No murmur heard.  Left subclavian pacer pocket with moderate ecchymosis and small hematoma, no active bleeding noted.  Pulmonary/Chest: Clear to auscultation bilaterally. No wheeze. No rales.    Abdomen: soft, non-tender  Musculoskeletal: Patient exhibits no edema.   Neurological: Patient is alert and oriented to person, place, and time. Non-focal.  Skin: no rash or jaundice     MEDICATIONS   Current Meds  • apixaban  5 mg oral BID   • clopidogreL  75 mg oral Daily   • docusate sodium  100 mg oral BID   • famotidine  40 mg oral Nightly   • hydrochlorothiazide  25 mg oral Daily   • lisinopriL  10 mg oral Daily   • pantoprazole  40 mg oral Daily   • simvastatin  20 mg oral Nightly   • sotaloL  80 mg oral q12h KRISTEN           LABS / IMAGING / TELE      Labs  Results from last 7 days   Lab Units 07/23/22  0430 07/22/22  0238 07/21/22  0727 07/20/22  0757 07/19/22  1940   SODIUM mEQ/L 138 139 139   < > 139   POTASSIUM mEQ/L 4.2 3.6 3.7   < > 3.4*   MAGNESIUM mg/dL 2.1 1.8 2.1   < >  --    CHLORIDE mEQ/L 103 105 105   < >  105   CO2 mEQ/L 25 25 25   < > 29   BUN mg/dL 36* 22* 27*   < > 38*   CREATININE mg/dL 1.4* 1.0 1.2*   < > 1.7*   AST IU/L  --   --   --   --  25   ALT IU/L  --   --   --   --  18    < > = values in this interval not displayed.     Results from last 7 days   Lab Units 07/19/22  2142 07/19/22  1940   HIGH SENSITIVE TROPONIN I pg/mL 377.5* 365.5*     Results from last 7 days   Lab Units 07/22/22  0238 07/21/22  0727 07/20/22  0757   WBC K/uL 6.53 7.82 5.47   HEMOGLOBIN g/dL 12.7 12.6 11.6*   HEMATOCRIT % 38.5 37.9 35.6   PLATELETS K/uL 242 232 233     Lab Results   Component Value Date    TSH 1.89 07/19/2022     No results found for: CHOL, LDLCALC, LDLDIRECT, HDL, TRIG        RECENT CARDIAC STUDIES     Cardiac Imaging    TRANSTHORACIC ECHO (TTE) COMPLETE 07/20/2022    Interpretation Summary  · Preserved LV systolic function. Estimated EF 65%. Wall motion appears grossly normal. Grade II LV diastolic dysfunction.  · Normal-sized RV. Normal RV systolic function.  · The left atrium is dilated.  · Aortic root sclerotic.  · Tricuspid aortic valve. Sclerotic aortic valve leaflets. Mild aortic valve regurgitation.  · Sclerotic mitral valve. Mitral annular calcification. Mild mitral valve regurgitation.  · Tricuspid valve structure is grossly normal. Mild to moderate tricuspid valve regurgitation.  · Estimated RVSP = 35 mmHg.  · Grossly normal pulmonic valve structure. Mild to moderate pulmonic valve regurgitation.  · IVC not well visualized. IVC is <2.1cm.  · There is a trivial loculated pericardial effusion adjacent to the right ventricle.      Cardiac Imaging    CARDIAC CATHETERIZATION 06/09/2022    Conclusion  •  Prox RCA lesion is 30% stenosed. Acute Thrombotic stenosis    RECOMMENDATIONS:  1. Successful PTCA acute thrombosis RCA stent      Telemetry  Atrial paced ventricular sensed rhythm.    ASSESSMENT AND PLAN        Principal Problem:    Paroxysmal atrial fibrillation (CMS/HCC)  Active Problems:    Hyperlipidemia     Hypokalemia    GERD (gastroesophageal reflux disease)    Acute kidney injury superimposed on CKD (CMS/HCC)    CAD (coronary artery disease)    Tachy-marco a syndrome (CMS/HCC)    Hypomagnesemia    Valvular heart disease    Elevated troponin    Class 1 obesity in adult         1.  Symptomatic sick sinus syndrome- continue sotalol loading.  Pacemaker seem to be functioning appropriately.  Episode of chest pain-no obvious ischemia.  We will continue monitoring additional 24 hours.  Please repeat troponins every 4 hours x2.    2.  Anticoagulation therapy-the patient's [id number] score is 4.    She back on Liquids.     3.  Hypertension- the patient's blood pressure is adequately controlled.     4.  Coronary artery disease-patient has been revascularized.  She will continue on Plavix and Eliquis.     5.  Dyslipidemia-continue on statin therapy      Wily Howard MD  7/23/2022  Pager #[phone]

## 2022-07-23 NOTE — PLAN OF CARE
Problem: Adult Inpatient Plan of Care  Goal: Plan of Care Review  Outcome: Not progressing  Flowsheets (Taken 7/23/2022 1726)  Progress: no change  Plan of Care Reviewed With:   patient   daughter  Outcome Summary: vss paced rthym on monitor a pacing, had tramadol for chest pain this am with relief, ekg was done 2 hours after sotol dose, dr childs wants to hold this evening dose due to cr being 1.4, do a ekg in the am prior to am dose. dressing are dry and intact over pacer and loop recorder will continue to monitor   Plan of Care Review  Plan of Care Reviewed With: patient, daughter  Progress: no change  Outcome Summary: vss paced rthym on monitor a pacing, had tramadol for chest pain this am with relief, ekg was done 2 hours after sotol dose, dr childs wants to hold this evening dose due to cr being 1.4, do a ekg in the am prior to am dose. dressing are dry and intact over pacer and loop recorder will continue to monitor

## 2022-07-23 NOTE — PLAN OF CARE
Plan of Care Review  Plan of Care Reviewed With: patient  Progress: no change    Pt admitted for PAF, s/p PPM insertion 7/21. Last evening, pt c/o pain to right side above PM insertion site that radiated to throat and ears and worse with deep breaths. Provider notified, SL nitro given x 3 with little relief. VSS, multiple EKGs performed, all of which state “suspect unspecified PM failure.” Remains in SR with VSS in NAD. Cardiologist on call (Dr. Gallo) notified; no new orders, continue to monitor. IV Valium given for anxiety, repositioned. Pt reports still some pain this morning but not as bad. SR / occasionally A paced on tele monitor, VSS.  Call bell within reach, bed alarm set. WCTM.

## 2022-07-23 NOTE — PLAN OF CARE
Due to bump in creatinine, sotalol will be held tonight.   Repeat BMP in AM  EKG in am prior to further doses

## 2022-07-23 NOTE — PROGRESS NOTES
Hospital Medicine Service -  Daily Progress Note       SUBJECTIVE   Interval History: Patient reports pain around the pacemaker site. Denies any dizziness, SOB, abdominal pain. Ambulating in the room with assistance      OBJECTIVE      Vital signs in last 24 hours:  Temp:  [36.5 °C (97.7 °F)-36.8 °C (98.3 °F)] 36.8 °C (98.2 °F)  Heart Rate:  [60-66] 60  Resp:  [16-21] 20  BP: (102-178)/(55-72) 128/62    Intake/Output Summary (Last 24 hours) at 7/23/2022 1250  Last data filed at 7/23/2022 1000  Gross per 24 hour   Intake 320 ml   Output --   Net 320 ml       PHYSICAL EXAMINATION      Physical Exam   Gen- in no acute distress,     CV: RRR, No M/G, no JVD     Chest- dressing in place- c/d/I  Pulm: CTA B/l, no wheezes or rhonchi, no rales     Abd: Soft, NT, ND, normal bowel sounds     Extremities: normal range of motion            LINES, CATHETERS, DRAINS, AIRWAYS, AND WOUNDS   Lines, Drains, Airways, Wounds:  Peripheral IV (Adult) 07/19/22 Right Antecubital (Active)   Number of days: 3       Peripheral IV (Adult) 07/21/22 Left Antecubital (Active)   Number of days: 1       External Urinary Catheter Female (one size) (Active)   Number of days: 1       Wound Surgical Midline Sternum (Active)   Number of days: 1       Surgical Incision Chest Left;Upper;Anterior (Active)   Number of days: 1       Comments:  No other LDAs     LABS / IMAGING / TELE      Labs  Labs reviewed  Results from last 7 days   Lab Units 07/22/22  0238 07/21/22  0727 07/20/22  0757   WBC K/uL 6.53 7.82 5.47   HEMOGLOBIN g/dL 12.7 12.6 11.6*   HEMATOCRIT % 38.5 37.9 35.6   PLATELETS K/uL 242 232 233     Results from last 7 days   Lab Units 07/23/22  0430 07/22/22  0238 07/21/22  0727 07/20/22  0757 07/19/22  1940   SODIUM mEQ/L 138 139 139   < > 139   POTASSIUM mEQ/L 4.2 3.6 3.7   < > 3.4*   CHLORIDE mEQ/L 103 105 105   < > 105   CO2 mEQ/L 25 25 25   < > 29   BUN mg/dL 36* 22* 27*   < > 38*   CREATININE mg/dL 1.4* 1.0 1.2*   < > 1.7*   CALCIUM mg/dL  9.6 9.2 9.3   < > 9.4   ALBUMIN g/dL  --   --   --   --  4.1   BILIRUBIN TOTAL mg/dL  --   --   --   --  0.8   ALK PHOS IU/L  --   --   --   --  63   ALT IU/L  --   --   --   --  18   AST IU/L  --   --   --   --  25   GLUCOSE mg/dL 118* 98 99   < > 124*    < > = values in this interval not displayed.       Imaging  Imaging reviewed  No results found.'    ECG/Telemetry  Telemetry reviewed: SR PACs some A pacing    ASSESSMENT AND PLAN      Acute kidney injury superimposed on CKD (CMS/[facility])  Assessment & Plan  Stable    GERD (gastroesophageal reflux disease)  Assessment & Plan  Stable, denies GI sx  Continue PPI and H2 blocker      Hypokalemia  Assessment & Plan  Trend and replace as needed      Hyperlipidemia  Assessment & Plan  Stable   Continue Simvastatin      Class 1 obesity in adult  Assessment & Plan  Body mass index is 32.95 kg/m².  Encourage weight loss      Elevated troponin  Assessment & Plan  Troponin 365.5-377.5  EKG nonischemic  Denies chest pain  Suspect demand ischemia due to AF  Not c/w ACS      Valvular heart disease  Assessment & Plan  Known mild MR/ AR  TTE shows mild MR/ AR, mild-mod TR/ SC  Continue outpatient surveillance monitoring     Hypomagnesemia  Assessment & Plan  Trend and replace as needed      Tachy-marco a syndrome (CMS/[facility])  Assessment & Plan  See PAF problem     CAD (coronary artery disease)  Assessment & Plan  S/p NIKITA to RCA and LCx (6/6/22)  Denies chest pain  Continue ASA, eliquis, statin, lisinopril  D/c'd plavix, metoprolol    * Paroxysmal atrial fibrillation (CMS/[facility])  SSS  Assessment & Plan  Presents with fatigue, dizziness, flushness, PASCUAL  Found to be in AF with multiple conversion pauses up to 6 seconds  Consistent with underlying symptomatic SSS  TTE with EF 65%, G2DD, mild-mod RT/SC, mild MR/ AR, wall motion grossly normal  S/p PPM placement (7/21/22)  Currently back in NSR with A pacing  Continue sotalol loading and monitoring QTC  Eliquis resumed  Troponins ordered by  cardiology for chest pain- will f/u   Cardiology and EP consult appreciated, known to Dr Saravia       VTE Assessment: Pad[place] VTE Score: 5  VTE Prophylaxis Plan: eliquis   Code Status: Full Code  Estimated Discharge Date: 7/24/2022  Disposition Planning: pending improvement      Prieto Yo MD  7/23/2022

## 2022-07-24 LAB
ANION GAP SERPL CALC-SCNC: 10 MEQ/L (ref 3–15)
ATRIAL RATE: 288
ATRIAL RATE: 62
ATRIAL RATE: 63
ATRIAL RATE: 64
ATRIAL RATE: 64
ATRIAL RATE: 66
ATRIAL RATE: 97
BUN SERPL-MCNC: 44 MG/DL (ref 8–20)
CALCIUM SERPL-MCNC: 8.9 MG/DL (ref 8.9–10.3)
CHLORIDE SERPL-SCNC: 105 MEQ/L (ref 98–109)
CO2 SERPL-SCNC: 24 MEQ/L (ref 22–32)
CREAT SERPL-MCNC: 1.8 MG/DL (ref 0.6–1.1)
GFR SERPL CREATININE-BSD FRML MDRD: 27.2 ML/MIN/1.73M*2
GLUCOSE SERPL-MCNC: 105 MG/DL (ref 70–99)
MAGNESIUM SERPL-MCNC: 1.9 MG/DL (ref 1.8–2.5)
P AXIS: 35
P AXIS: 44
P AXIS: 46
P AXIS: 51
POTASSIUM SERPL-SCNC: 3.8 MEQ/L (ref 3.6–5.1)
PR INTERVAL: 158
PR INTERVAL: 158
PR INTERVAL: 164
PR INTERVAL: 166
PR INTERVAL: 204
QRS DURATION: 128
QRS DURATION: 76
QRS DURATION: 78
QRS DURATION: 80
QT INTERVAL: 392
QT INTERVAL: 442
QT INTERVAL: 458
QT INTERVAL: 468
QT INTERVAL: 470
QT INTERVAL: 486
QT INTERVAL: 486
QTC CALCULATION(BAZETT): 464
QTC CALCULATION(BAZETT): 479
QTC CALCULATION(BAZETT): 484
QTC CALCULATION(BAZETT): 490
QTC CALCULATION(BAZETT): 497
QTC CALCULATION(BAZETT): 501
QTC CALCULATION(BAZETT): 570
R AXIS: -3
R AXIS: -7
R AXIS: -8
R AXIS: -8
R AXIS: 126
R AXIS: 2
R AXIS: 8
SARS-COV-2 RNA RESP QL NAA+PROBE: NEGATIVE
SODIUM SERPL-SCNC: 139 MEQ/L (ref 136–144)
T WAVE AXIS: -24
T WAVE AXIS: 1
T WAVE AXIS: 2
T WAVE AXIS: 2
T WAVE AXIS: 3
T WAVE AXIS: 4
T WAVE AXIS: 93
TROPONIN I SERPL HS-MCNC: 66 PG/ML
VENTRICULAR RATE: 100
VENTRICULAR RATE: 62
VENTRICULAR RATE: 63
VENTRICULAR RATE: 64
VENTRICULAR RATE: 64
VENTRICULAR RATE: 66
VENTRICULAR RATE: 90

## 2022-07-24 PROCEDURE — 93005 ELECTROCARDIOGRAM TRACING: CPT | Performed by: STUDENT IN AN ORGANIZED HEALTH CARE EDUCATION/TRAINING PROGRAM

## 2022-07-24 PROCEDURE — 82310 ASSAY OF CALCIUM: CPT | Performed by: PHYSICIAN ASSISTANT

## 2022-07-24 PROCEDURE — 21400000 HC ROOM AND CARE CCU/INTERMEDIATE

## 2022-07-24 PROCEDURE — 80048 BASIC METABOLIC PNL TOTAL CA: CPT | Performed by: PHYSICIAN ASSISTANT

## 2022-07-24 PROCEDURE — 63700000 HC SELF-ADMINISTRABLE DRUG: Performed by: STUDENT IN AN ORGANIZED HEALTH CARE EDUCATION/TRAINING PROGRAM

## 2022-07-24 PROCEDURE — 36415 COLL VENOUS BLD VENIPUNCTURE: CPT | Performed by: PHYSICIAN ASSISTANT

## 2022-07-24 PROCEDURE — 84484 ASSAY OF TROPONIN QUANT: CPT | Performed by: STUDENT IN AN ORGANIZED HEALTH CARE EDUCATION/TRAINING PROGRAM

## 2022-07-24 PROCEDURE — 97530 THERAPEUTIC ACTIVITIES: CPT | Mod: GP,CQ

## 2022-07-24 PROCEDURE — U0002 COVID-19 LAB TEST NON-CDC: HCPCS | Performed by: STUDENT IN AN ORGANIZED HEALTH CARE EDUCATION/TRAINING PROGRAM

## 2022-07-24 PROCEDURE — 63700000 HC SELF-ADMINISTRABLE DRUG

## 2022-07-24 PROCEDURE — 25800000 HC PHARMACY IV SOLUTIONS: Performed by: STUDENT IN AN ORGANIZED HEALTH CARE EDUCATION/TRAINING PROGRAM

## 2022-07-24 PROCEDURE — 63700000 HC SELF-ADMINISTRABLE DRUG: Performed by: PHYSICIAN ASSISTANT

## 2022-07-24 PROCEDURE — 99233 SBSQ HOSP IP/OBS HIGH 50: CPT | Performed by: STUDENT IN AN ORGANIZED HEALTH CARE EDUCATION/TRAINING PROGRAM

## 2022-07-24 PROCEDURE — 83735 ASSAY OF MAGNESIUM: CPT | Performed by: PHYSICIAN ASSISTANT

## 2022-07-24 RX ORDER — SOTALOL HYDROCHLORIDE 80 MG/1
80 TABLET ORAL EVERY 12 HOURS
Status: DISCONTINUED | OUTPATIENT
Start: 2022-07-24 | End: 2022-07-24

## 2022-07-24 RX ORDER — SOTALOL HYDROCHLORIDE 80 MG/1
80 TABLET ORAL DAILY
Status: DISCONTINUED | OUTPATIENT
Start: 2022-07-25 | End: 2022-07-24

## 2022-07-24 RX ORDER — SODIUM CHLORIDE 9 MG/ML
INJECTION, SOLUTION INTRAVENOUS CONTINUOUS
Status: DISCONTINUED | OUTPATIENT
Start: 2022-07-24 | End: 2022-07-25

## 2022-07-24 RX ADMIN — APIXABAN 5 MG: 5 TABLET, FILM COATED ORAL at 10:14

## 2022-07-24 RX ADMIN — ACETAMINOPHEN 650 MG: 325 TABLET, FILM COATED ORAL at 10:24

## 2022-07-24 RX ADMIN — TRAMADOL HYDROCHLORIDE 50 MG: 50 TABLET, COATED ORAL at 20:15

## 2022-07-24 RX ADMIN — SIMVASTATIN 20 MG: 20 TABLET, FILM COATED ORAL at 20:16

## 2022-07-24 RX ADMIN — ACETAMINOPHEN 650 MG: 325 TABLET, FILM COATED ORAL at 20:15

## 2022-07-24 RX ADMIN — TRAMADOL HYDROCHLORIDE 50 MG: 50 TABLET, COATED ORAL at 10:22

## 2022-07-24 RX ADMIN — SOTALOL HYDROCHLORIDE 80 MG: 80 TABLET ORAL at 10:13

## 2022-07-24 RX ADMIN — APIXABAN 5 MG: 5 TABLET, FILM COATED ORAL at 20:15

## 2022-07-24 RX ADMIN — PANTOPRAZOLE SODIUM 40 MG: 40 TABLET, DELAYED RELEASE ORAL at 10:14

## 2022-07-24 RX ADMIN — SODIUM CHLORIDE: 9 INJECTION, SOLUTION INTRAVENOUS at 10:16

## 2022-07-24 RX ADMIN — FAMOTIDINE 40 MG: 20 TABLET ORAL at 20:15

## 2022-07-24 RX ADMIN — CLOPIDOGREL BISULFATE 75 MG: 75 TABLET, FILM COATED ORAL at 10:14

## 2022-07-24 RX ADMIN — SODIUM CHLORIDE: 9 INJECTION, SOLUTION INTRAVENOUS at 20:17

## 2022-07-24 ASSESSMENT — COGNITIVE AND FUNCTIONAL STATUS - GENERAL
STANDING UP FROM CHAIR USING ARMS: 3 - A LITTLE
MOVING TO AND FROM BED TO CHAIR: 3 - A LITTLE
AFFECT: WFL
WALKING IN HOSPITAL ROOM: 3 - A LITTLE
CLIMB 3 TO 5 STEPS WITH RAILING: 3 - A LITTLE

## 2022-07-24 NOTE — PROGRESS NOTES
Cardiology Progress Note       Subjective   Patient had no further chest discomfort or shortness of breath.  She does have some minimal pleurisy on inspirium In the upper mid substernal area.    Objective     Vital signs in last 24 hours    Temp:  [36.2 °C (97.2 °F)-37.2 °C (99 °F)] 36.3 °C (97.3 °F)  Heart Rate:  [60-71] 71  Resp:  [16-20] 18  BP: ()/(49-66) 110/61  No intake/output data recorded.      Intake/Output Summary (Last 24 hours) at 7/24/2022 0825  Last data filed at 7/23/2022 2000  Gross per 24 hour   Intake 860 ml   Output 600 ml   Net 260 ml         Physical Exam     Constitutional:  Well-developed and well-nourished. NAD  Head: atraumatic, normocephalic   Neck: No JVD present. Carotid bruit is not present.   Cardiovascular: Regular rate and rhythm.  no murmur, rubs, gallops.  Left subclavian pacer pocket with ecchymosis, no active bleeding.  Pulmonary/Chest: Clear to auscultation bilaterally.  Abdomen: soft, NT/ND.  Extremities: No clubbing/cyanosis/edema.   Neurological: Alert and oriented to person, place, and time. Appropriate affect  Skin: warm, dry    Labs  Lab Results   Component Value Date    GLUCOSE 105 (H) 07/24/2022    CALCIUM 8.9 07/24/2022     07/24/2022    K 3.8 07/24/2022    CO2 24 07/24/2022     07/24/2022    BUN 44 (H) 07/24/2022    CREATININE 1.8 (H) 07/24/2022    MG 1.9 07/24/2022     No results found for: CKTOTAL, CKMB, CKMBINDEX, TROPONINI  Results from last 7 days   Lab Units 07/24/22  0409 07/23/22  2123 07/23/22  1657   HIGH SENSITIVE TROPONIN I pg/mL 66.0* 76.5* 88.7*     No results found for: BNP  Lab Results   Component Value Date    WBC 6.53 07/22/2022    HGB 12.7 07/22/2022    HCT 38.5 07/22/2022    MCV 88.9 07/22/2022     07/22/2022    INR 1.1 05/26/2022           Current Meds  • apixaban  5 mg oral BID   • clopidogreL  75 mg oral Daily   • docusate sodium  100 mg oral BID   • famotidine  40 mg oral Nightly   • hydrochlorothiazide  25 mg oral  Daily   • lisinopriL  10 mg oral Daily   • pantoprazole  40 mg oral Daily   • simvastatin  20 mg oral Nightly   • [Provider Managed Hold] sotaloL  80 mg oral q12h KRISTEN       Recent Cardiac Studies    Cardiac Imaging    TRANSTHORACIC ECHO (TTE) COMPLETE 07/20/2022    Interpretation Summary  · Preserved LV systolic function. Estimated EF 65%. Wall motion appears grossly normal. Grade II LV diastolic dysfunction.  · Normal-sized RV. Normal RV systolic function.  · The left atrium is dilated.  · Aortic root sclerotic.  · Tricuspid aortic valve. Sclerotic aortic valve leaflets. Mild aortic valve regurgitation.  · Sclerotic mitral valve. Mitral annular calcification. Mild mitral valve regurgitation.  · Tricuspid valve structure is grossly normal. Mild to moderate tricuspid valve regurgitation.  · Estimated RVSP = 35 mmHg.  · Grossly normal pulmonic valve structure. Mild to moderate pulmonic valve regurgitation.  · IVC not well visualized. IVC is <2.1cm.  · There is a trivial loculated pericardial effusion adjacent to the right ventricle.    Cardiac Imaging    CARDIAC CATHETERIZATION 06/09/2022    Conclusion  •  Prox RCA lesion is 30% stenosed. Acute Thrombotic stenosis    RECOMMENDATIONS:  1. Successful PTCA acute thrombosis RCA stent        Telemetry    Slow atrial flutter with appropriate ventricular pacing.    Assessment & Plan     1.  A. fib/flutter- patient went to control atrial flutter.  She is on sotalol 80 mg daily, dose adjusted for renal insufficiency.  QT is acceptable for sotalol 80 mg daily.  Will await further recommendations from Dr. Henry Angel regarding antiarrhythmic therapy.    2.   Renal insufficiency-reaction and increased from 1.4-1.8 this morning.  Etiology is unclear.  Will repeat renal function tomorrow.  May need nephrology consultation.  In the meantime we will hold HCTZ and lisinopril.     3.  Anticoagulation therapy-the patient's [id number] score is 4.    She back on Liquids.     4.   Hypertension- the patient's blood pressure is adequately controlled.     5.  Coronary artery disease-patient has been revascularized.  She will continue on Plavix and Eliquis.  Her episode of chest pain yesterday revealed no evidence of acute coronary syndrome, flat troponins.     6.  Dyslipidemia-continue on statin therapy    Care reviewed with Dr. Henry Angel.  Follow-up tomorrow with Dr. Saravia and Nitesh.    Wily Howard MD  7/24/2022  Pager #[phone]

## 2022-07-24 NOTE — PLAN OF CARE
Problem: Adult Inpatient Plan of Care  Goal: Plan of Care Review  Outcome: Not progressing  Flowsheets (Taken 7/24/2022 6877)  Progress: no change  Plan of Care Reviewed With:   patient   daughter  Outcome Summary: pt bp lower today , cr level up to 1.8 ,lisinopril and hydrochlorathizide on hold, nss started at 100 cc/hr, pt converted to aflutter sotalol given this am and ekg done 2 hrs after and sotalol discontinue, worked with physical therapy today walked in crespo, will continue to monitor   Plan of Care Review  Plan of Care Reviewed With: patient, daughter  Progress: no change  Outcome Summary: vss paced rthym on monitor a pacing, had tramadol for chest pain this am with relief, ekg was done 2 hours after sotol dose, dr childs wants to hold this evening dose due to cr being 1.4, do a ekg in the am prior to am dose. dressing are dry and intact over pacer and loop recorder will continue to monitor

## 2022-07-24 NOTE — PROGRESS NOTES
Patient: Emily Ramirez  Location:  [facility] 3 Main 0323  MRN:  [id number]  Today's date:  7/24/2022     Pt returned to chair in room; alarm set; call cord and personal items in reach; nsg notified; fam present and sppt'v.    Emily is a 77 y.o. female admitted on 7/19/2022 with Atrial fibrillation (CMS/HCC) [I48.91]  Atrial fibrillation, unspecified type (CMS/HCC) [I48.91]. Principal problem is Paroxysmal atrial fibrillation (CMS/HCC).    Past Medical History  Emily has a past medical history of Aortic regurgitation, Chronic kidney disease, Coronary artery disease, History of loop recorder, Hypertension, Lipid disorder, Mitral regurgitation, and SVT (supraventricular tachycardia) (CMS/HCC).    History of Present Illness   77 y.o. female with a past medical history of aortic courage CKD, CAD, hypertension, hyperlipidemia, SVT, with recorder, status post stent to RCA and circumflex 6/9, who was at home shucking corn today, and began to feel fatigued around 2:00 this afternoon, she noticed head pressure at that time and continued on her normal activities however over the course of a few hours the fatigue did not resolve.  Patient noted that her apple watch was telling her that she was in atrial fib which was a new arrhythmia for this patient.  She came to [facility] for further evaluation.  Vital signs temp 97.8 °F, heart rate 100, respiratory rate 18, SPO2 97% on room air, blood pressure 135/63.  Labs significant for high-sensitivity troponin of 365.5, 377.5, potassium 3.4, BUN of 38, creatinine 1.7, EGFR 29.1, glucose 124, white blood count 6.65.  Lead EKG was performed showed patient was in a controlled rate controlled A. fib with a heart rate of 98.  Patient received Eliquis in the emergency department.     On initial evaluation patient spouse and daughter at bedside and assisted with HPI.  Patient denies chest pain, she does not note any arrhythmia.  She does have a loop recorder for history  of SVT currently in place.  Of note in the emergency department she had a period upon returning from ambulation to the bathroom where she felt fatigued and had pain and was noted to have a sinus pause 6 seconds in duration, followed by a brief 5 to 6-second period of sinus rhythm, before her return to rate controlled atrial flutter.  Patient uses a rolling walker for ambulation at home.  Denies any caffeine use other than drinking a half a glass of decaffinated iced tea.  She does not regularly drink alcohol, or any other caffeine containing products.  She does not have obstructive sleep apnea, or difficulty with sleeping, she does not use supplemental oxygen at home.  Patient denies any nausea vomiting constipation or diarrhea.  No issues with her urination.     Patient to be admitted for new onset A. fib.  Plan is to admit to telemetry, Eliquis twice daily, n.p.o., echocardiogram 7/20/2022, cardiology consult, ZOLL at bedside, fall precautions, PT, OT, evaluation, social work evaluation, trend labs.  Patient lives at with spouse.  CODE STATUS discussed with patient spouse and daughter at bedside and patient is a full code.    7/21/22: s/p Medtronic dual-chamber left bundle pacemaker implantation/ [employer] loop recorder explantation by Dr. Henry Angel     Vital recorded at 1552 not recorded during time w/ PT    PT Vitals    Date/Time Pulse HR Source Resp SpO2 Pt Activity O2 Therapy BP BP Location BP Method Pt Position Observations [facility]   07/24/22 1542 83 Monitor -- 96 % At rest None (Room air) 92/54 Right forearm Automatic Lying PT MM   07/24/22 1544 90 Monitor -- -- -- -- 97/55 Right forearm Automatic Sitting -- MM   07/24/22 1546 98 Monitor -- -- -- -- 129/71 Right forearm Automatic Standing -- MM   07/24/22 1552 67 Monitor 18 98 % At rest None (Room air) 92/50 Right upper arm Automatic Sitting -- MJT   07/24/22 1604 73 Monitor -- 98 % Walking None (Room air) -- -- -- -- PT MM      PT Pain    Date/Time Pain  Type Side/Orientation Location Rating: Rest Rating: Activity Description Interventions [facility]   07/24/22 1542 Pain Assessment left chest 2 - mild pain -- -- pacer site; gown material rubbing care clustered MM   07/24/22 1604 Post Activity left chest 2 - mild pain 2 - mild pain -- from gown rubbing pacer site care clustered MM          Prior Living Environment    Flowsheet Row Most Recent Value   Living Environment Comment lives with , 2 story home, 1 step to enter, [place] on 1st floor, walkin shower and bed room upstairs. stair lift. pt reports she walks steps once daily and uses stair lift the rest of the time        Prior Level of Function    Flowsheet Row Most Recent Value   Dominant Hand right   Ambulation assistive equipment   Transferring assistive equipment   Toileting assistive equipment   Bathing independent   Dressing independent   Eating independent   Prior Level of Function Comment indep PLOF with self care tasks using rollator. +driving   Assistive Device Currently Used at Home other (see comments)  [rollater, stair lift]           PT Evaluation and Treatment - 07/24/22 1542        PT Time Calculation    Start Time 1542     Stop Time 1604     Time Calculation (min) 22 min        Session Details    Document Type daily treatment/progress note     Mode of Treatment physical therapy        General Information    Patient Profile Reviewed yes     Referring Physician [place]     Patient/Family/Caregiver Comments/Observations therapy cleared, requested by RN     General Observations of Patient in chair, legs elevated and reclined; pt's husb and dtr present and sppt'v     Existing Precautions/Restrictions fall;pacemaker;cardiac        Cognition/Psychosocial    Affect/Mental Status (Cognition) WFL     Orientation Status (Cognition) oriented x 3     Follows Commands (Cognition) WFL     Cognitive Function other (see comments)     Comment, Cognition pleasant, receptive, engaged        Bed Mobility    Comment (Bed  Mobility) OOB at time of visit        Transfers    Transfers stand pivot transfer     Comment PPM LUE        Sit to Stand Transfer    [place], Sit to Stand Transfer minimum assist (75% or more patient effort);verbal cues     Verbal Cues technique;maintaining precautions     Assistive Device gait belt;walker, front-wheeled     Comment from chair, arm rest on (R) only; walker use for balance only; PPM prec maintained        Stand to Sit Transfer    [place], Stand to Sit Transfer minimum assist (75% or more patient effort);verbal cues     Verbal Cues technique;safety     Assistive Device none;gait belt     Comment to chair; arm rest on (R) only        Stand Pivot Transfer    [place], Stand Pivot/Stand Step Transfer minimum assist (75% or more patient effort)     Assistive Device walker, front-wheeled;gait belt     Comment PPM prec maintained        Gait Training    [place], Gait minimum assist (75% or more patient effort)     Assistive Device walker, front-wheeled;gait belt   PPM prec maintained    Distance in Feet 80 feet     Pattern (Gait) step-through     Deviations/Abnormal Patterns (Gait) gait speed decreased;vandana decreased;base of support, narrow     Comment (Gait/Stairs) minimally unsteady w/o LOB on smooth level surface; may require grtr asst uneven surfaces and elevations        Stairs Training    Comment TBA; 1STE        Safety Issues, Functional Mobility    Impairments Affecting Function (Mobility) balance;endurance/activity tolerance;strength        Balance    Balance Assessment sit to stand dynamic balance     Static Sitting Balance WFL     Dynamic Sitting Balance WFL;sitting in chair     Sit to Stand Dynamic Balance mild impairment;supported   HHA, self - RUE    Static Standing Balance WFL;supported;mild impairment;unsupported   RW, gait belt    Dynamic Standing Balance mild impairment;supported   RW, gait belt; PPM prec maintained; dyn bal over OSMEL    Comment, Balance smooth level  surface amb and trxfs only; elevations TBA        Therapeutic Exercise    Therapeutic Exercise lower extremity        Lower Extremity (Therapeutic Exercise)    Exercise Position/Type seated;AROM (active range of motion)     General Exercise bilateral;ankle pumps;LAQ (long arc quad)     Reps and Sets 15/10     Comment activity prep        AM-PAC (TM) - Mobility (Current Function)    Turning from your back to your side while in a flat bed without using bedrails? 3 - A Little     Moving from lying on your back to sitting on the side of a flat bed without using bedrails? 3 - A Little     Moving to and from a bed to a chair? 3 - A Little     Standing up from a chair using your arms? 3 - A Little     To walk in a hospital room? 3 - A Little     Climbing 3-5 steps with a railing? 3 - A Little     AM-PAC (TM) Mobility Score 18        Assessment/Plan (PT)    Daily Outcome Statement PT session completed; stairsTBA; AMPAC mob 18, Min A balance and sppt; amb 80ft RW maintaining PPM prec; deficits in balance, strength, endurance; anticipate continued progress w/ PT.     Rehab Potential good, to achieve stated therapy goals     Therapy Frequency 3-5 times/wk     Planned Therapy Interventions balance training;transfer training;gait training;strengthening;stair training               PT Assessment/Plan    Flowsheet Row Most Recent Value   PT Recommended Discharge Disposition home with home health, home with assistance at 07/24/2022 1542   Anticipated Equipment Needs at Discharge (PT) none at 07/24/2022 1542   Patient/Family Therapy Goals Statement get better,  go home at 07/24/2022 1542                         PT Goals    Flowsheet Row Most Recent Value   Bed Mobility Goal 1    Activity/Assistive Device rolling to left, rolling to right, sit to supine, supine to sit at 07/22/2022 1152   [employer] modified independence at 07/22/2022 1152   Time Frame by discharge at 07/22/2022 1152   Strategies/Barriers has recliners available as  needed on 1st floor for sleep at 07/22/2022 1152   Progress/Outcome goal ongoing at 07/22/2022 1152   Transfer Goal 1    Activity/Assistive Device sit-to-stand/stand-to-sit, bed-to-chair/chair-to-bed, stand pivot, walker, front-wheeled at 07/22/2022 1152   [place] modified independence at 07/22/2022 1152   Time Frame by discharge at 07/22/2022 1152   Progress/Outcome goal ongoing at 07/22/2022 1152   Gait Training Goal 1    Activity/Assistive Device gait (walking locomotion), walker, front-wheeled at 07/22/2022 1152   [place] modified independence at 07/22/2022 1152   Distance 75 at 07/22/2022 1152   Time Frame by discharge at 07/22/2022 1152   Progress/Outcome goal ongoing at 07/22/2022 1152   Stairs Goal 1    Activity/Assistive Device ascending stairs, descending stairs, step-to-step, walker, front-wheeled at 07/22/2022 1152   [place] supervision required at 07/22/2022 1152   Number of Stairs 1 at 07/22/2022 1152   Time Frame by discharge at 07/22/2022 1152   Progress/Outcome goal ongoing at 07/22/2022 1152

## 2022-07-24 NOTE — PLAN OF CARE
Plan of Care Review  Plan of Care Reviewed With: patient  Progress: no change  Outcome Summary: Pt. is alert and oriented x4, she is A paced/AV paced at times on the monitor, Sotalol on hold at HS. fall precautions in place.

## 2022-07-24 NOTE — PROGRESS NOTES
Hospital Medicine Service -  Daily Progress Note       SUBJECTIVE   Interval History: Patient has some pleuritic pain upon deep breathing. Denies any other symptoms. Encourage her OOB.    OBJECTIVE      Vital signs in last 24 hours:  Temp:  [36.2 °C (97.2 °F)-37.2 °C (99 °F)] 36.3 °C (97.3 °F)  Heart Rate:  [60-97] 97  Resp:  [16-20] 18  BP: ()/(49-66) 106/52    Intake/Output Summary (Last 24 hours) at 7/24/2022 1059  Last data filed at 7/23/2022 2000  Gross per 24 hour   Intake 540 ml   Output 600 ml   Net -60 ml       PHYSICAL EXAMINATION      Physical Exam   Gen- in no acute distress,     CV: RRR, No M/G, no JVD     Chest- dressing in place- c/d/I  Pulm: CTA B/l, no wheezes or rhonchi, no rales     Abd: Soft, NT, ND, normal bowel sounds     Extremities: normal range of motion            LINES, CATHETERS, DRAINS, AIRWAYS, AND WOUNDS   Lines, Drains, Airways, Wounds:  Peripheral IV (Adult) 07/19/22 Right Antecubital (Active)   Number of days: 3       Peripheral IV (Adult) 07/21/22 Left Antecubital (Active)   Number of days: 1       External Urinary Catheter Female (one size) (Active)   Number of days: 1       Wound Surgical Midline Sternum (Active)   Number of days: 1       Surgical Incision Chest Left;Upper;Anterior (Active)   Number of days: 1       Comments:  No other LDAs     LABS / IMAGING / TELE      Labs  Labs reviewed  Results from last 7 days   Lab Units 07/22/22  0238 07/21/22  0727 07/20/22  0757   WBC K/uL 6.53 7.82 5.47   HEMOGLOBIN g/dL 12.7 12.6 11.6*   HEMATOCRIT % 38.5 37.9 35.6   PLATELETS K/uL 242 232 233     Results from last 7 days   Lab Units 07/24/22  0409 07/23/22  0430 07/22/22  0238 07/20/22  0757 07/19/22  1940   SODIUM mEQ/L 139 138 139   < > 139   POTASSIUM mEQ/L 3.8 4.2 3.6   < > 3.4*   CHLORIDE mEQ/L 105 103 105   < > 105   CO2 mEQ/L 24 25 25   < > 29   BUN mg/dL 44* 36* 22*   < > 38*   CREATININE mg/dL 1.8* 1.4* 1.0   < > 1.7*   CALCIUM mg/dL 8.9 9.6 9.2   < > 9.4   ALBUMIN  g/dL  --   --   --   --  4.1   BILIRUBIN TOTAL mg/dL  --   --   --   --  0.8   ALK PHOS IU/L  --   --   --   --  63   ALT IU/L  --   --   --   --  18   AST IU/L  --   --   --   --  25   GLUCOSE mg/dL 105* 118* 98   < > 124*    < > = values in this interval not displayed.       Imaging  Imaging reviewed  No results found.'    ECG/Telemetry  Telemetry reviewed: SR PACs some A pacing    ASSESSMENT AND PLAN      Acute kidney injury superimposed on CKD (CMS/[facility])  Assessment & Plan  Patient has underlying CKD- baseline creatinine 1.0-1.4  Creatinine with slight bump - 1.8  Will start on IV fluids  Hold HCTZ along with ACE   Will re-check BMP tomorrow- if worsening will consult Nephrology    GERD (gastroesophageal reflux disease)  Assessment & Plan  Stable, denies GI sx  Continue PPI and H2 blocker      Hypokalemia  Assessment & Plan  Trend and replace as needed      Hyperlipidemia  Assessment & Plan  Stable   Continue Simvastatin      Class 1 obesity in adult  Assessment & Plan  Body mass index is 32.95 kg/m².  Encourage weight loss      Elevated troponin  Assessment & Plan  Troponin 365.5-377.5  EKG nonischemic  Denies chest pain  Suspect demand ischemia due to AF  Not c/w ACS      Valvular heart disease  Assessment & Plan  Known mild MR/ AR  TTE shows mild MR/ AR, mild-mod TR/ HI  Continue outpatient surveillance monitoring     Hypomagnesemia  Assessment & Plan  Trend and replace as needed      Tachy-marco a syndrome (CMS/[facility])  Assessment & Plan  See PAF problem     CAD (coronary artery disease)  Assessment & Plan  S/p NIKITA to RCA and LCx (6/6/22)  Denies chest pain  Continue ASA, eliquis, statin, lisinopril  D/c'd plavix, metoprolol    * Paroxysmal atrial fibrillation (CMS/[facility])  SSS  Assessment & Plan  Presents with fatigue, dizziness, flushness, PASCUAL  Found to be in AF with multiple conversion pauses up to 6 seconds  Consistent with underlying symptomatic SSS  TTE with EF 65%, G2DD, mild-mod RT/HI, mild MR/ AR, wall  motion grossly normal  S/p PPM placement (7/21/22)  Currently back in NSR with A pacing  Creatinine with slight bump- sotalol decreased to once a day, patient started on IV fluids.   Eliquis resumed  Cardiology and EP consult appreciated, known to Dr Saravia       VTE Assessment: [place] VTE Score: 5  VTE Prophylaxis Plan: eliquis   Code Status: Full Code  Estimated Discharge Date: 7/24/2022  Disposition Planning: pending improvement      Prieto Yo MD  7/24/2022

## 2022-07-25 VITALS
TEMPERATURE: 98.4 F | HEIGHT: 63 IN | RESPIRATION RATE: 18 BRPM | BODY MASS INDEX: 32.96 KG/M2 | WEIGHT: 186 LBS | HEART RATE: 64 BPM | SYSTOLIC BLOOD PRESSURE: 114 MMHG | DIASTOLIC BLOOD PRESSURE: 60 MMHG | OXYGEN SATURATION: 96 %

## 2022-07-25 LAB
ANION GAP SERPL CALC-SCNC: 9 MEQ/L (ref 3–15)
ATRIAL RATE: 182
ATRIAL RATE: 208
BUN SERPL-MCNC: 41 MG/DL (ref 8–20)
CALCIUM SERPL-MCNC: 8.8 MG/DL (ref 8.9–10.3)
CHLORIDE SERPL-SCNC: 107 MEQ/L (ref 98–109)
CO2 SERPL-SCNC: 19 MEQ/L (ref 22–32)
CREAT SERPL-MCNC: 1.4 MG/DL (ref 0.6–1.1)
GFR SERPL CREATININE-BSD FRML MDRD: 36.4 ML/MIN/1.73M*2
GLUCOSE SERPL-MCNC: 119 MG/DL (ref 70–99)
MAGNESIUM SERPL-MCNC: 1.8 MG/DL (ref 1.8–2.5)
POTASSIUM SERPL-SCNC: 4.6 MEQ/L (ref 3.6–5.1)
QRS DURATION: 76
QRS DURATION: 84
QT INTERVAL: 408
QT INTERVAL: 408
QTC CALCULATION(BAZETT): 473
QTC CALCULATION(BAZETT): 482
R AXIS: 26
R AXIS: 7
SODIUM SERPL-SCNC: 135 MEQ/L (ref 136–144)
T WAVE AXIS: -11
T WAVE AXIS: -14
VENTRICULAR RATE: 81
VENTRICULAR RATE: 84

## 2022-07-25 PROCEDURE — 97116 GAIT TRAINING THERAPY: CPT | Mod: GP

## 2022-07-25 PROCEDURE — 63700000 HC SELF-ADMINISTRABLE DRUG: Performed by: INTERNAL MEDICINE

## 2022-07-25 PROCEDURE — 36415 COLL VENOUS BLD VENIPUNCTURE: CPT | Performed by: PHYSICIAN ASSISTANT

## 2022-07-25 PROCEDURE — 25800000 HC PHARMACY IV SOLUTIONS: Performed by: STUDENT IN AN ORGANIZED HEALTH CARE EDUCATION/TRAINING PROGRAM

## 2022-07-25 PROCEDURE — 63700000 HC SELF-ADMINISTRABLE DRUG

## 2022-07-25 PROCEDURE — 63700000 HC SELF-ADMINISTRABLE DRUG: Performed by: STUDENT IN AN ORGANIZED HEALTH CARE EDUCATION/TRAINING PROGRAM

## 2022-07-25 PROCEDURE — 80048 BASIC METABOLIC PNL TOTAL CA: CPT | Performed by: PHYSICIAN ASSISTANT

## 2022-07-25 PROCEDURE — 63700000 HC SELF-ADMINISTRABLE DRUG: Performed by: PHYSICIAN ASSISTANT

## 2022-07-25 PROCEDURE — 83735 ASSAY OF MAGNESIUM: CPT | Performed by: PHYSICIAN ASSISTANT

## 2022-07-25 PROCEDURE — 63700000 HC SELF-ADMINISTRABLE DRUG: Performed by: NURSE PRACTITIONER

## 2022-07-25 PROCEDURE — 97535 SELF CARE MNGMENT TRAINING: CPT | Mod: GO

## 2022-07-25 PROCEDURE — 93005 ELECTROCARDIOGRAM TRACING: CPT | Performed by: INTERNAL MEDICINE

## 2022-07-25 PROCEDURE — 99238 HOSP IP/OBS DSCHRG MGMT 30/<: CPT | Mod: FS | Performed by: HOSPITALIST

## 2022-07-25 RX ORDER — AMIODARONE HYDROCHLORIDE 200 MG/1
TABLET ORAL
Qty: 60 TABLET | Refills: 0 | Status: SHIPPED | OUTPATIENT
Start: 2022-07-25 | End: 2022-08-05

## 2022-07-25 RX ORDER — LANOLIN ALCOHOL/MO/W.PET/CERES
400 CREAM (GRAM) TOPICAL ONCE
Status: COMPLETED | OUTPATIENT
Start: 2022-07-25 | End: 2022-07-25

## 2022-07-25 RX ORDER — AMIODARONE HYDROCHLORIDE 200 MG/1
400 TABLET ORAL 2 TIMES DAILY
Status: DISCONTINUED | OUTPATIENT
Start: 2022-07-25 | End: 2022-07-25 | Stop reason: HOSPADM

## 2022-07-25 RX ADMIN — SODIUM CHLORIDE: 9 INJECTION, SOLUTION INTRAVENOUS at 05:26

## 2022-07-25 RX ADMIN — CLOPIDOGREL BISULFATE 75 MG: 75 TABLET, FILM COATED ORAL at 10:03

## 2022-07-25 RX ADMIN — AMIODARONE HYDROCHLORIDE 400 MG: 200 TABLET ORAL at 10:04

## 2022-07-25 RX ADMIN — ACETAMINOPHEN 650 MG: 325 TABLET, FILM COATED ORAL at 10:11

## 2022-07-25 RX ADMIN — PANTOPRAZOLE SODIUM 40 MG: 40 TABLET, DELAYED RELEASE ORAL at 10:03

## 2022-07-25 RX ADMIN — Medication 400 MG: at 12:48

## 2022-07-25 RX ADMIN — APIXABAN 5 MG: 5 TABLET, FILM COATED ORAL at 10:03

## 2022-07-25 ASSESSMENT — COGNITIVE AND FUNCTIONAL STATUS - GENERAL
HELP NEEDED FOR PERSONAL GROOMING: 3 - A LITTLE
EATING MEALS: 4 - NONE
HELP NEEDED FOR BATHING: 3 - A LITTLE
WALKING IN HOSPITAL ROOM: 3 - A LITTLE
CLIMB 3 TO 5 STEPS WITH RAILING: 3 - A LITTLE
DRESSING REGULAR LOWER BODY CLOTHING: 3 - A LITTLE
TOILETING: 3 - A LITTLE
STANDING UP FROM CHAIR USING ARMS: 3 - A LITTLE
DRESSING REGULAR UPPER BODY CLOTHING: 3 - A LITTLE
AFFECT: WFL
MOVING TO AND FROM BED TO CHAIR: 3 - A LITTLE

## 2022-07-25 NOTE — PROGRESS NOTES
"Daily Progress Note (LOS: 6)    Interval History:   No anginal CP or SOB.  Has a slight \"catch\" in her throat when she breathes but this is improving.  She has some tenderness around L PPM site. Telemetry shows atrial flutter with controlled ventricular response.     Review of Systems:  All organ systems normal except as per HPI.    Current Medications:  • amiodarone  400 mg oral BID   • apixaban  5 mg oral BID   • clopidogreL  75 mg oral Daily   • docusate sodium  100 mg oral BID   • famotidine  40 mg oral Nightly   • [Provider Managed Hold] lisinopriL  10 mg oral Daily   • pantoprazole  40 mg oral Daily   • simvastatin  20 mg oral Nightly       Physical Exam:  Visit Vitals  BP (!) 131/59 (BP Location: Right upper arm, Patient Position: Lying)   Pulse 71   Temp 36.4 °C (97.5 °F) (Oral)   Resp 18   Ht 1.6 m (5' 3\")   Wt 84.4 kg (186 lb)   SpO2 98%   BMI 32.95 kg/m²     Gen: NAD  HEENT: no JVD, no thyromegaly  Heart: irregularly irregular, normal S1, S2, no m/r/g  Lungs: Clear bilaterally  Abd: soft, nt, nd, +bs  Ext: no edema  Neuro: AAOx3  Skin: no significant skin lesions  Psych: appropriate mood and affect    I&Os:    Intake/Output Summary (Last 24 hours) at 7/25/2022 0938  Last data filed at 7/24/2022 1800  Gross per 24 hour   Intake 2146.67 ml   Output 1 ml   Net 2145.67 ml       Weights:   Wt Readings from Last 3 Encounters:   07/20/22 84.4 kg (186 lb)   06/09/22 79.8 kg (176 lb)       Labs:  Results from last 7 days   Lab Units 07/24/22  0409 07/23/22  0430 07/22/22  0238   SODIUM mEQ/L 139 138 139   POTASSIUM mEQ/L 3.8 4.2 3.6   CHLORIDE mEQ/L 105 103 105   CO2 mEQ/L 24 25 25   BUN mg/dL 44* 36* 22*   CREATININE mg/dL 1.8* 1.4* 1.0   GLUCOSE mg/dL 105* 118* 98   CALCIUM mg/dL 8.9 9.6 9.2   MAGNESIUM mg/dL 1.9 2.1 1.8             Results from last 7 days   Lab Units 07/22/22  0238 07/21/22  0727 07/20/22  0757   WBC K/uL 6.53 7.82 5.47   HEMOGLOBIN g/dL 12.7 12.6 11.6*   HEMATOCRIT % 38.5 37.9 35.6 "   PLATELETS K/uL 242 232 233     No results found for: BNP  Lab Results   Component Value Date    INR 1.1 05/26/2022       A/P: 77 year-old woman with PMHx CAD s/p recent PCI to RCA (6/2022), LCx, ILR, HTN, DLD who presented with weakness and dizziness, found to have PAF with conversion pauses.      #PAF, SSS   -Spontaneously converted to NSR associated with conversion pauses, but now in atrial flutter  -s/p DCPPM for the conversion pauses  -Attempted sotalol in hospital but QT prolonged and renal function worsened so sotalol held.  Amiodarone now started after discussion with EP.  Continue amio 400mg BID x 7 days, then 200mg daily thereafter  -She will follow-up with EP in near future for device check, further discussion regarding rhythms     #CAD, recent PCI  -Stable, no angina  -Continue eliquis and plavix   -Continue simvastatin (previously did not tolerate rosuvastatin or atorvastatin)     #HTN  -BP has been low, likely due to dehydration.  Cr elevated yesterday.  She has received IV fluids and BP now normal.    -Lisinopril held due to DENNY.  Repeat labs pending today.  If Cr normalized, resume lisinopril.      #HLD  -Continue statin     PT/OT.  No objection to discharge today from cardiac perspective if Cr improved.  She already has follow-up arranged.

## 2022-07-25 NOTE — DISCHARGE SUMMARY
Hospital Medicine Service -  Inpatient Discharge Summary        BRIEF OVERVIEW   Admitting Provider: Jamie Bishop DO  Attending Provider: Morena Loyola,* Attending phys phone: [phone]    PCP: Jonathan Meza [phone]    Admission Date: 7/19/2022  Discharge Date: 7/25/2022     DISCHARGE DIAGNOSES      Primary Discharge Diagnosis  Paroxysmal atrial fibrillation (CMS/[facility])    Secondary Discharge Diagnoses  Active Hospital Problems    Diagnosis Date Noted   • Paroxysmal atrial fibrillation (CMS/[facility]) 07/19/2022     Priority: High   • Acute kidney injury superimposed on CKD (CMS/[facility]) 07/19/2022     Priority: Medium   • Tachy-marco a syndrome (CMS/[facility]) 07/19/2022     Priority: Medium   • CAD (coronary artery disease) 06/10/2022     Priority: Medium   • Hypomagnesemia 07/20/2022     Priority: Low   • Valvular heart disease 07/20/2022     Priority: Low   • Elevated troponin 07/20/2022     Priority: Low   • Hyperlipidemia 07/19/2022     Priority: Low   • Hypokalemia 07/19/2022     Priority: Low   • GERD (gastroesophageal reflux disease) 07/19/2022     Priority: Low   • Class 1 obesity in adult 07/20/2022      Resolved Hospital Problems   No resolved problems to display.       Problem List on Day of Discharge  * Paroxysmal atrial fibrillation (CMS/[facility])  Assessment & Plan  Presents with fatigue, dizziness, flushness, PASCUAL  Found to be in AF with multiple conversion pauses up to 6 seconds  Consistent with underlying symptomatic SSS  TTE with EF 65%, G2DD, mild-mod RT/ID, mild MR/ AR, wall motion grossly normal  S/p PPM placement (7/21/22)  Currently in rate controlled A flutter  Attempted sotalol with QT prolonged and renal function worsened so it was discontinued  Start amiodarone 400mg BID x7 days, then 200mg daily   Continue eliquis  Cardiology and EP consult appreciated, known to Dr Saravia    Tachy-marco a syndrome (CMS/HCC)  Assessment & Plan  See PAF problem      Acute kidney injury superimposed on CKD  (CMS/HCC)  Assessment & Plan  H/o CKD III with baseline Cr 1.1-1.4  Cr peak 1.8 suspect prerenal  Cr improved to 1.4 today with hydration   Encourage PO intake  Hold HCTZ and lisinopril  Avoid nephrotoxins and hypotension  Trend BMP    CAD (coronary artery disease)  Assessment & Plan  S/p NIKITA to RCA and LCx (6/6/22)  Denies chest pain  Continue ASA, eliquis, statin, lisinopril  D/c'd plavix, metoprolol     Elevated troponin  Assessment & Plan  Troponin 365.5-377.5  EKG nonischemic  Denies chest pain  Suspect demand ischemia due to AF  Not c/w ACS       Valvular heart disease  Assessment & Plan  Known mild MR/ AR  TTE shows mild MR/ AR, mild-mod TR/ NE  Continue outpatient surveillance monitoring      Hypomagnesemia  Assessment & Plan  Trend and replace as needed       GERD (gastroesophageal reflux disease)  Assessment & Plan  Stable, denies GI sx  Continue PPI and H2 blocker       Hypokalemia  Assessment & Plan  Trend and replace as needed       Hyperlipidemia  Assessment & Plan  Stable   Continue Simvastatin       Class 1 obesity in adult  Assessment & Plan  Body mass index is 32.95 kg/m².  Encourage weight loss       SUMMARY OF HOSPITALIZATION      Presenting Problem/History of Present Illness  Atrial fibrillation (CMS/HCC) [I48.91]  Atrial fibrillation, unspecified type (CMS/HCC) [I48.91]    This is a 77 y.o. year-old female admitted on 7/19/2022 with Atrial fibrillation (CMS/HCC) [I48.91]  Atrial fibrillation, unspecified type (CMS/HCC) [I48.91].    See H&P from 7/19/22 for details    Hospital Course    In short, Emily Ramirez is a 78 y/o F PMH valvular heart disease, CKD III, CAD, HTN, HLD, SVT, ILD, GERD, who presented to [facility]22 with complaints of fatigue, dizziness, flushness, PASCUAL. Pt was found to be in AF with multiple conversion pauses up to 6 seconds. Pt was seen by cardiology and EP. Pt underwent PPM placement. Pt was started on sotalol but had prolonged QT and worsening renal fx  so this was d/c'd and she was started on amiodarone instead (400mg bid x 7 days, then 200mg daily). Pt was also started on eliquis for vte ppx and is tolerating this well. Pt was noted to have DENNY on CKD with Cr peak 1.8 likely prerenal. Pt's HCTZ and lisinopril were d/c'd and was given IVF with improvement of Cr to 1.4. Pt to repeat BMP in 1 week. Today, pt is medically stable for discharge home with close outpatient f/u with cards, EP,PCP as directed. See problem list for additional details.         Exam on Day of Discharge  Physical Exam  Vitals and nursing note reviewed.   Constitutional:       General: She is not in acute distress.     Appearance: She is obese.   HENT:      Head: Normocephalic.   Eyes:      Pupils: Pupils are equal, round, and reactive to light.   Cardiovascular:      Rate and Rhythm: Normal rate. Rhythm irregular.   Pulmonary:      Effort: Pulmonary effort is normal.      Breath sounds: Normal breath sounds.   Abdominal:      General: Abdomen is flat. Bowel sounds are normal.      Palpations: Abdomen is soft.   Musculoskeletal:         General: Normal range of motion.      Cervical back: Normal range of motion.   Skin:     General: Skin is warm and dry.      Comments: Ppm and old ILR sites CDI   Neurological:      General: No focal deficit present.      Mental Status: She is alert and oriented to person, place, and time.   Psychiatric:         Mood and Affect: Mood normal.         Consults During Admission  IP CONSULT TO CARDIOLOGY  IP CONSULT TO SOCIAL WORK  IP CONSULT TO ELECTROPHYSIOLOGY    DISCHARGE MEDICATIONS        Medication List      START taking these medications    amiodarone 200 mg tablet  Commonly known as: PACERONE  By mouth. Take 400mg (2 tab) twice daily for 7 days, then 200mg (1 tab) daily     apixaban 5 mg tablet  Commonly known as: ELIQUIS  Take 1 tablet (5 mg total) by mouth 2 (two) times a day Indications: blood clot in a deep vein of the extremities, a clot in the  lung.  Dose: 5 mg        CONTINUE taking these medications    CENTRUM SILVER ULTRA WOMEN'S ORAL  Take 1 tablet by mouth every other day.  Dose: 1 tablet     clopidogreL 75 mg tablet  Commonly known as: PLAVIX  Take 75 mg by mouth daily.  Dose: 75 mg     coenzyme Q10 100 mg capsule  Commonly known as: COQ10  Take 100 mg by mouth daily. Takes in the PM  Dose: 100 mg     famotidine 10 mg tablet  Commonly known as: PEPCID  Take 10 mg by mouth nightly.  Dose: 10 mg     pantoprazole 40 mg EC tablet  Commonly known as: PROTONIX  Take 40 mg by mouth daily. 30 min prior to breakfast  Dose: 40 mg     simvastatin 20 mg tablet  Commonly known as: ZOCOR  Take 20 mg by mouth nightly.  Dose: 20 mg     traMADoL-acetaminophen 37.5-325 mg per tablet  Commonly known as: ULTRACET  Take 1 tablet by mouth 2 (two) times a day. Daily or q 12 hours as needed  Dose: 1 tablet        STOP taking these medications    aspirin 81 mg enteric coated tablet     hydrochlorothiazide 25 mg tablet  Commonly known as: HYDRODIURIL     lisinopriL 10 mg tablet  Commonly known as: PRINIVIL     metoprolol succinate XL 50 mg 24 hr tablet  Commonly known as: TOPROL-XL            Instructions for after discharge     Call provider for:  difficulty breathing, headache or visual disturbances      Call provider for:  extreme fatigue      Call provider for:  persistent dizziness or light-headedness      Call provider for:  persistent nausea or vomiting      Call provider for:  rash      Call provider for:  redness, tenderness, or signs of infection (pain, swelling, redness, odor or green/yellow discharge around incision site)      Call provider for:  severe uncontrolled pain      Call provider for:  temperature >100.4      Call provider for: blood sugar change      Call provider for blood sugar less than 70 or greater than 350    Discharge diet      Diet Type / Texture: Cardiac (Low Sodium, Low Fat)    Follow-up with Provider:      -Follow up with cardiologist   Rani in 1-2 weeks for continued heart management.    Brian Saravia MD   [phone]    [address]  [address]  [place] [address]       Follow-up with primary physician (PCP)      -Follow up with your primary care provider Dr Lara in 1-2 weeks to discuss your hospitalization and continue management of your other chronic conditions, including your kidney disease.    Follow-up with provider      -You will have a follow-up appointment with Dr. Henry Angel's Nurse Practitioner on 08/02/22 at 10:15 AM. [address], [address], [place] [address]. Call [phone] if you need to reschedule.    Henry Angel MD   [phone]    [address]  [address]  [place] [address]       Post-Discharge Activity: Normal activity as tolerated.      Normal activity as tolerated. See additional instructions for activity limitations after pacemaker placement    Basic metabolic panel      Which Provider would you like to Cc?:  TRISTAN LARA ROBERT J       Release to patient: Immediate             PROCEDURES / LABS / IMAGING          Pertinent Labs  Results from last 7 days   Lab Units 07/22/22  0238 07/21/22  0727 07/20/22  0757   WBC K/uL 6.53 7.82 5.47   HEMOGLOBIN g/dL 12.7 12.6 11.6*   HEMATOCRIT % 38.5 37.9 35.6   PLATELETS K/uL 242 232 233     Results from last 7 days   Lab Units 07/25/22  1109 07/24/22  0409 07/23/22  0430 07/20/22  0757 07/19/22  1940   SODIUM mEQ/L 135* 139 138   < > 139   POTASSIUM mEQ/L 4.6 3.8 4.2   < > 3.4*   CHLORIDE mEQ/L 107 105 103   < > 105   CO2 mEQ/L 19* 24 25   < > 29   BUN mg/dL 41* 44* 36*   < > 38*   CREATININE mg/dL 1.4* 1.8* 1.4*   < > 1.7*   CALCIUM mg/dL 8.8* 8.9 9.6   < > 9.4   ALBUMIN g/dL  --   --   --   --  4.1   BILIRUBIN TOTAL mg/dL  --   --   --   --  0.8   ALK PHOS IU/L  --   --   --   --  63   ALT IU/L  --   --   --   --  18   AST IU/L  --   --   --   --  25   GLUCOSE mg/dL 119* 105* 118*   < > 124*    < > = values in this interval not displayed.      Results from last 7 days   Lab Units 07/24/22  0409 07/23/22 2123 07/23/22 1657   HIGH SENSITIVE TROPONIN I pg/mL 66.0* 76.5* 88.7*     Microbiology Results     Procedure Component Value Units Date/Time    SARS-CoV-2 (COVID-19), PCR Nasopharynx [[id number]]  (Normal) Collected: 07/24/22 1655    Specimen: Nasopharyngeal Swab from Nasopharynx Updated: 07/24/22 1723    Narrative:      The following orders were created for panel order SARS-CoV-2 (COVID-19), PCR Nasopharynx.  Procedure                               Abnormality         Status                     ---------                               -----------         ------                     SARS-CoV-2 (COVID-19), P...[[id number]]  Normal              Final result                 Please view results for these tests on the individual orders.    SARS-CoV-2 (COVID-19), PCR Nasopharynx [[id number]]  (Normal) Collected: 07/24/22 1655    Specimen: Nasopharyngeal Swab from Nasopharynx Updated: 07/24/22 1723     SARS-CoV-2 (COVID-19) Negative    Narrative:      Testing performed using real-time PCR for detection of COVID-19. EUA approved validation studies performed on site.     SARS-CoV-2 (COVID-19), PCR Nasopharynx [[id number]]  (Normal) Collected: 07/19/22 2158    Specimen: Nasopharyngeal Swab from Nasopharynx Updated: 07/19/22 2243    Narrative:      The following orders were created for panel order SARS-CoV-2 (COVID-19), PCR Nasopharynx.  Procedure                               Abnormality         Status                     ---------                               -----------         ------                     SARS-CoV-2 (COVID-19), P...[[id number]]  Normal              Final result                 Please view results for these tests on the individual orders.    SARS-CoV-2 (COVID-19), PCR Nasopharynx [[id number]]  (Normal) Collected: 07/19/22 2158    Specimen: Nasopharyngeal Swab from Nasopharynx Updated: 07/19/22 2243     SARS-CoV-2 (COVID-19) Negative    Narrative:       Testing performed using real-time PCR for detection of COVID-19. EUA approved validation studies performed on site.             Pertinent Imaging  X-RAY CHEST 1 VIEW    Result Date: 7/21/2022  Narrative: CLINICAL HISTORY: Pacemaker placement. COMMENT: Upright portable view of the chest was obtained at 1940 hours and is compared with a similar study from 7/19/2022. Since the previous study, the loop recorder has been removed and a left chest pacemaker has been placed. 2 leads appear intact and in satisfactory position. The heart is enlarged. There is no pleural effusion or pneumothorax. Pulmonary vascularity is stable. Bilateral total shoulder arthroplasties are stable. There are degenerative changes at multiple levels in the thoracic spine.     Impression: IMPRESSION: No pneumothorax.     X-RAY CHEST 1 VIEW    Result Date: 7/20/2022  Narrative: CLINICAL HISTORY: Shortness of breath and atrial fibrillation. COMMENT: A single frontal view of the chest was obtained using portable technique. COMPARISON: Multiple priors including the most recent prior chest radiograph dated  May 26, 2022. FINDINGS: Heart is normal in size. Mediastinal and hilar contours are normal.  No consolidation. No pleural effusion. No pneumothorax. Regional skeletal structures are intact. Multiple cardiac leads and wires overlie the patient. Cardiac loop recorder is noted. Bilateral shoulder prostheses are also noted.     Impression: IMPRESSION: No acute disease in the chest.     Electrophysiology - Implantable Device    Result Date: 7/21/2022  Narrative: RECOMMENDATIONS:  1.  Acutely successful implantation of dual-chamber permanent pacemaker with left bundle area pacing 2.  Acutely removal of loop recorder Plan The patient is in atrial fibrillation. Initiate sotalol 120 mg twice daily Obtain twelve-lead electrocardiograms Consider initiating Eliquis tomorrow. Discontinue aspirin tomorrow, continue on Plavix     Transthoracic Echo (TTE)  Complete    Result Date: 7/20/2022  Narrative: · Preserved LV systolic function. Estimated EF 65%. Wall motion appears grossly normal. Grade II LV diastolic dysfunction. · Normal-sized RV. Normal RV systolic function. · The left atrium is dilated. · Aortic root sclerotic. · Tricuspid aortic valve. Sclerotic aortic valve leaflets. Mild aortic valve regurgitation. · Sclerotic mitral valve. Mitral annular calcification. Mild mitral valve regurgitation. · Tricuspid valve structure is grossly normal. Mild to moderate tricuspid valve regurgitation. · Estimated RVSP = 35 mmHg. · Grossly normal pulmonic valve structure. Mild to moderate pulmonic valve regurgitation. · IVC not well visualized. IVC is <2.1cm. · There is a trivial loculated pericardial effusion adjacent to the right ventricle.        OUTPATIENT  FOLLOW-UP / REFERRALS / PENDING TESTS        Outpatient Follow-Up Appointments  Encounter Information    This patient does not currently have any appointments scheduled.     -You will have a follow-up appointment with Dr. Henry Angel's Nurse Practitioner on 08/02/22 at 10:15 AM. [address] [facility], [address], [place] [address]. Call [phone] if you need to reschedule.  -Follow up with cardiologist Dr Clark in 1-2 weeks for continued heart management.   -Follow up with your primary care provider Dr Lara in 1-2 weeks to discuss your hospitalization and continue management of your other chronic conditions, including your kidney disease.     Referrals  No orders of the defined types were placed in this encounter.      Test Results Pending at Discharge  Unresulted Labs (From admission, onward)             Start     Ordered    07/25/22 0000  Basic metabolic panel        Question Answer Comment   Which Provider would you like to Cc? TRISTAN LARA    Which Provider would you like to Cc? FRANCHESKA CLARK    Release to patient Immediate        07/25/22 1239    07/22/22 0600  Basic metabolic panel  Daily       Question:  Release to patient  Answer:  Immediate   Start Status   07/26/22 0600 Scheduled   07/27/22 0600 Scheduled   07/28/22 0600 Scheduled       07/21/22 1929 07/22/22 0600  Magnesium  Daily      Question:  Release to patient  Answer:  Immediate   Start Status   07/26/22 0600 Scheduled   07/27/22 0600 Scheduled   07/28/22 0600 Scheduled       07/21/22 1929                Important Issues to Address in Follow-Up    AF/ SSS/ s/p PPM  -amio 400mg bid x 7 days, then 200mg daily  -d/c BB  -start eliquis  -f/u card, ep    DENNY on CKD  -d/c hctz, lisinopril  -repeat bmp in 1 week  -f/u pcp    DISCHARGE DISPOSITION      Disposition: Home     Code Status At Discharge: Full Code    Physician Order for Life-Sustaining Treatment Document Status      No documents found

## 2022-07-25 NOTE — PROGRESS NOTES
OCCUPATIONAL THERAPY ACUTE CARE - TREATMENT NOTE     Patient:  Emily Ramirez  Location:  [facility] 0323  MRN:  [id number]  Today's date:  7/25/2022    Emily is a 77 y.o. female admitted on 7/19/2022 with Atrial fibrillation (CMS/HCC) [I48.91]  Atrial fibrillation, unspecified type (CMS/HCC) [I48.91]. Principal problem is Paroxysmal atrial fibrillation (CMS/HCC).    Past Medical History  Emily has a past medical history of Aortic regurgitation, Chronic kidney disease, Coronary artery disease, History of loop recorder, Hypertension, Lipid disorder, Mitral regurgitation, and SVT (supraventricular tachycardia) (CMS/HCC).    History of Present Illness   77 y.o. female with a past medical history of aortic courage CKD, CAD, hypertension, hyperlipidemia, SVT, with recorder, status post stent to RCA and circumflex 6/9, who was at home shucking corn today, and began to feel fatigued around 2:00 this afternoon, she noticed head pressure at that time and continued on her normal activities however over the course of a few hours the fatigue did not resolve.  Patient noted that her apple watch was telling her that she was in atrial fib which was a new arrhythmia for this patient.  She came to [facility] for further evaluation.  Vital signs temp 97.8 °F, heart rate 100, respiratory rate 18, SPO2 97% on room air, blood pressure 135/63.  Labs significant for high-sensitivity troponin of 365.5, 377.5, potassium 3.4, BUN of 38, creatinine 1.7, EGFR 29.1, glucose 124, white blood count 6.65.  Lead EKG was performed showed patient was in a controlled rate controlled A. fib with a heart rate of 98.  Patient received Eliquis in the emergency department.     On initial evaluation patient spouse and daughter at bedside and assisted with HPI.  Patient denies chest pain, she does not note any arrhythmia.  She does have a loop recorder for history of SVT currently in place.  Of note in the emergency department she  had a period upon returning from ambulation to the bathroom where she felt fatigued and had pain and was noted to have a sinus pause 6 seconds in duration, followed by a brief 5 to 6-second period of sinus rhythm, before her return to rate controlled atrial flutter.  Patient uses a rolling walker for ambulation at home.  Denies any caffeine use other than drinking a half a glass of decaffinated iced tea.  She does not regularly drink alcohol, or any other caffeine containing products.  She does not have obstructive sleep apnea, or difficulty with sleeping, she does not use supplemental oxygen at home.  Patient denies any nausea vomiting constipation or diarrhea.  No issues with her urination.     Patient to be admitted for new onset A. fib.  Plan is to admit to telemetry, Eliquis twice daily, n.p.o., echocardiogram 2022, cardiology consult, ZOLL at bedside, fall precautions, PT, OT, evaluation, social work evaluation, trend labs.  Patient lives at with spouse.  CODE STATUS discussed with patient spouse and daughter at bedside and patient is a full code.    22: s/p Medtronic dual-chamber left bundle pacemaker implantation/ Medtronic loop recorder explantation by Dr. Henry Angel       Session details: daily treatment/progress note   occupational therapy    Start time:   922  End time:  948  Time ca min    General Observations  Start: Pt received supine in bed; she was agreeable to therapy and no issues or concerns identified by nurse prior to session   End: Pt supine in bed at end of session; call bell in reach, bed alarm activated, all needs met, personal items in reach and nurse notified of patient performance, patient's position and patient's response to activity    Precautions:   cardiac, fall and pacemaker      VITALS     OT Vitals    Date/Time Pulse HR Source SpO2 Pt Activity O2 Therapy BP BP Location BP Method Pt Position Who   22 0926 73 Monitor 96 % At rest None (Room air) 139/96  Right upper arm Automatic Lying LF   07/25/22 0927 82 Monitor -- -- -- 152/86 Right upper arm Automatic Sitting LF   07/25/22 0928 91 Monitor -- -- -- 125/84 Right upper arm Automatic Standing LF   07/25/22 0935 91 Monitor 95 % At rest None (Room air) 133/75 Right upper arm Automatic Sitting LF   07/25/22 0944 93 Monitor 95 % At rest None (Room air) 157/79 Right upper arm Automatic Lying LF      OT Pain    Date/Time Pain Type Rating: Rest Rating: Activity [facility]   07/25/22 0926 Pain Assessment 0 0 LF          PRIOR LEVEL OF FUNCTION AND LIVING ENVIRONMENT     Prior Level of Function    Flowsheet Row Most Recent Value   Dominant Hand right   Ambulation assistive equipment   Transferring assistive equipment   Toileting assistive equipment   Bathing independent   Dressing independent   Eating independent   Prior Level of Function Comment indep PLOF with self care tasks using rollator. +driving   Assistive Device Currently Used at Home other (see comments)  [rollater, stair lift]          Prior Living Environment    Flowsheet Row Most Recent Value   Living Environment Comment lives with , 2 story home, 1 step to enter, [place] on 1st floor, walkin shower and bed room upstairs. stair lift. pt reports she walks steps once daily and uses stair lift the rest of the time          Occupational Profile    Flowsheet Row Most Recent Value   Reason for Services/Referral impaired ADLs/ functional mobility   Successful Occupations retired   Patient Goals go home          OBJECTIVE     Cognition   Affect/MentalStatus: WFL  Orientation: oriented x 4  Follow Commands: WFL  Cognitive Function: WFL    Motor Skills: WFL    Balance   Static Dynamic   Sitting Fair (maintains balance unsupported without LOB and without UE support) Fair + (supervision; unsupported, crossing midline and weight shifting minimally)   Standing Fair (maintains balance unsupported without LOB and without UE support) Fair + (supervision; unsupported, crossing  midline and weight shifting minimally)   General Comments:      Functional Transfers   [place] Level DME / AD Cues / Comments   Supine to Sit close supervision head of bed elevated Exit to left    Sit to Supine minimum assist head of bed elevated Required assist with LE. Pt stated spouse will assist her with this task at home    Bed to/from Chair close supervision walker (front-wheeled) To left    Sit to Stand close supervision walker (front-wheeled) Patient education on proper hand placement when performing functional sit to stand transfers in preporation for toilet transfers. Patient demonstrated correctly.    Education to only allow LUE to rest on RW and to not apply pressure   Stand to Sit close supervision walker (front-wheeled) To bed/ chair    Toilet  close supervision walker, front-wheeled In bathroom, cuing for proper placement of RW    Functional mobility in room/ bathroom with close supervision using RW. Pt will have supervision at home. further mobility  will be addressed by physical therapy. Pt stated  is able to physically assist at home     Activities of Daily Living   [place] Level Cues / DME / Comments   Dressing: Upper Body                      Lower Body close supervision Education on maintain pacemaker precautions. Pt able to state correctly.       close supervision Donning bilateral socks, pt stated  can assist at home   Grooming/hygiene close supervision Sink side to wash hands, seated   Toileting close supervision Pt demonstrated ability to complete toileting task of clothing management and hygiene           AM-PAC ™ - ADL (Current Function)     Putting on/taking off regular LB clothing 3 - A Little   Bathing 3 - A Little   Toileting 3 - A Little   Putting on/taking off regular UB clothing 3 - A Little   Help for taking care of personal grooming 3 - A Little   Eating meals 4 - None   AM-PAC ™ ADL Score 19       ASSESSMENT AND RECOMMENDATIONS     Progress Summary     OT  treatment session complete. ADL [facility] 19 . Patient continues to present with functional limitations affecting areas of ADL’s and functional transfers. Pt making good progress toward all goals. Currently, patient performs bed mobilty CS/ Min A for LE's, and all BADL's with CS for safety/. Balance using RW. Pt limited by overall tolerance.  Pt would benefit from continued skilled OT services to maximize safety and independence with daily tasks. Recommend discharge to  Home with assist as needed and OT HH When medically stable.     Therapy Plan  Rehab potential:  good, to achieve stated therapy goals  Therapy frequency:  3-5 times/wk  Therapy interventions:  activity tolerance training, adaptive equipment training, BADL retraining, functional balance retraining, occupation/activity based interventions, passive ROM/stretching, patient/caregiver education/training, ROM/therapeutic exercise, strengthening exercise, transfer/mobility retraining    Discharge Plan  Recommended discharge:  home with assistance, home with home health  Anticipated equipment needs:  none    Patient/Family Therapy Goal Statement: get better    OT Goals    Flowsheet Row Most Recent Value   Transfer Goal 1    Activity/Assistive Device toilet at 07/22/2022 0838   [place] modified independence at 07/22/2022 0838   Time Frame by discharge at 07/22/2022 0838   Progress/Outcome goal ongoing at 07/22/2022 0838   Dressing Goal 1    Activity/Adaptive Equipment dressing skills, all at 07/22/2022 0838   [place] modified independence at 07/22/2022 0838   Time Frame by discharge at 07/22/2022 0838   Progress/Outcome goal ongoing at 07/22/2022 0838   Toileting Goal 1    Activity/Assistive Device toileting skills, all at 07/22/2022 0838   [place] modified independence at 07/22/2022 0838   Time Frame by discharge at 07/22/2022 0838   Progress/Outcome goal ongoing at 07/22/2022 0838   Grooming Goal 1    Activity/Assistive Device grooming skills, all  at 07/22/2022 0838   [place] modified independence at 07/22/2022 0838   Time Frame by discharge at 07/22/2022 0838   Progress/Outcome goal ongoing at 07/22/2022 0838   Precaution Goal 1    Activity cardiac precautions  [pacemaker] at 07/22/2022 0838   [place]/Cues independently at 07/22/2022 0838   Time Frame by discharge at 07/22/2022 0838   Progress/Outcome goal ongoing at 07/22/2022 0838

## 2022-07-25 NOTE — PROGRESS NOTES
[facility]....Referral rec'd from CM.  Per CM, pt is agreeable to home RN, PT visits.  Spoke with pt's , because unable to speak w/ pt at this time.    of pt is agreeable to home RN, PT visits.  Demographics verified.  DME in home: rollator, stair glide  HC referral to be completed by alexandra.

## 2022-07-25 NOTE — PATIENT CARE CONFERENCE
Care Progression Rounds Note  Date: 7/25/2022  Time: 1:17 PM     Patient Name: Emily Ramirez     Medical Record Number: [id number]   YOB: 1945  Sex: Female      Room/Bed: 0323W    Admitting Diagnosis: Atrial fibrillation (CMS/HCC) [I48.91]  Atrial fibrillation, unspecified type (CMS/HCC) [I48.91]   Admit Date/Time: 7/19/2022  7:38 PM    Primary Diagnosis: Paroxysmal atrial fibrillation (CMS/HCC)  Principal Problem: Paroxysmal atrial fibrillation (CMS/HCC)    GMLOS: 2.4  Anticipated Discharge Date: 7/25/2022    AM-PAC:  Mobility Score: 18    Discharge Planning:  Concerns to be Addressed: discharge planning, care coordination/care conferences  Anticipated Discharge Disposition: home with assistance, home without assistance or services  Can patient participate in a follow-up phone call/video visit?: Yes, independently  Discharge plan home with home health and   Barriers to Discharge:   none    Comments:       Participants:

## 2022-07-25 NOTE — PROGRESS NOTES
Patient: Emily Ramirez  Location:  [facility] 0323  MRN:  [id number]  Today's date:  7/25/2022     Pt cleared for PT by RN. Pt left in bed, PCT present for EKG. Bed alarm on, needs in reach, concerns addressed. RN notified    Emily is a 77 y.o. female admitted on 7/19/2022 with Atrial fibrillation (CMS/HCC) [I48.91]  Atrial fibrillation, unspecified type (CMS/HCC) [I48.91]. Principal problem is Paroxysmal atrial fibrillation (CMS/HCC).    Past Medical History  Emily has a past medical history of Aortic regurgitation, Chronic kidney disease, Coronary artery disease, History of loop recorder, Hypertension, Lipid disorder, Mitral regurgitation, and SVT (supraventricular tachycardia) (CMS/HCC).    History of Present Illness   77 y.o. female with a past medical history of aortic courage CKD, CAD, hypertension, hyperlipidemia, SVT, with recorder, status post stent to RCA and circumflex 6/9, who was at home shucking corn today, and began to feel fatigued around 2:00 this afternoon, she noticed head pressure at that time and continued on her normal activities however over the course of a few hours the fatigue did not resolve.  Patient noted that her apple watch was telling her that she was in atrial fib which was a new arrhythmia for this patient.  She came to [facility] for further evaluation.  Vital signs temp 97.8 °F, heart rate 100, respiratory rate 18, SPO2 97% on room air, blood pressure 135/63.  Labs significant for high-sensitivity troponin of 365.5, 377.5, potassium 3.4, BUN of 38, creatinine 1.7, EGFR 29.1, glucose 124, white blood count 6.65.  Lead EKG was performed showed patient was in a controlled rate controlled A. fib with a heart rate of 98.  Patient received Eliquis in the emergency department.     On initial evaluation patient spouse and daughter at bedside and assisted with HPI.  Patient denies chest pain, she does not note any arrhythmia.  She does have a loop recorder for  history of SVT currently in place.  Of note in the emergency department she had a period upon returning from ambulation to the bathroom where she felt fatigued and had pain and was noted to have a sinus pause 6 seconds in duration, followed by a brief 5 to 6-second period of sinus rhythm, before her return to rate controlled atrial flutter.  Patient uses a rolling walker for ambulation at home.  Denies any caffeine use other than drinking a half a glass of decaffinated iced tea.  She does not regularly drink alcohol, or any other caffeine containing products.  She does not have obstructive sleep apnea, or difficulty with sleeping, she does not use supplemental oxygen at home.  Patient denies any nausea vomiting constipation or diarrhea.  No issues with her urination.     Patient to be admitted for new onset A. fib.  Plan is to admit to telemetry, Eliquis twice daily, n.p.o., echocardiogram 7/20/2022, cardiology consult, ZOLL at bedside, fall precautions, PT, OT, evaluation, social work evaluation, trend labs.  Patient lives at with spouse.  CODE STATUS discussed with patient spouse and daughter at bedside and patient is a full code.    7/21/22: s/p Medtronic dual-chamber left bundle pacemaker implantation/ Medtronic loop recorder explantation by Dr. Henry Angel       PT Vitals    Date/Time Pulse HR Source SpO2 Pt Activity O2 Therapy BP BP Location BP Method Pt Position [facility]   07/25/22 0926 73 Monitor 96 % At rest None (Room air) 139/96 Right upper arm Automatic Lying LF   07/25/22 0927 82 Monitor -- -- -- 152/86 Right upper arm Automatic Sitting LF   07/25/22 0928 91 Monitor -- -- -- 125/84 Right upper arm Automatic Standing LF   07/25/22 0935 91 Monitor 95 % At rest None (Room air) 133/75 Right upper arm Automatic Sitting LF   07/25/22 0944 93 Monitor 95 % At rest None (Room air) 157/79 Right upper arm Automatic Lying LF      PT Pain    Date/Time Pain Type Rating: Rest Rating: Activity [facility]   07/25/22 0926 Pain  Assessment 0 0 LF          Prior Living Environment    Flowsheet Row Most Recent Value   Living Environment Comment lives with , 2 story home, 1 step to enter, [place] on 1st floor, walkin shower and bed room upstairs. stair lift. pt reports she walks steps once daily and uses stair lift the rest of the time        Prior Level of Function    Flowsheet Row Most Recent Value   Dominant Hand right   Ambulation assistive equipment   Transferring assistive equipment   Toileting assistive equipment   Bathing independent   Dressing independent   Eating independent   Prior Level of Function Comment indep PLOF with self care tasks using rollator. +driving   Assistive Device Currently Used at Home other (see comments)  [rollater, stair lift]           PT Evaluation and Treatment - 07/25/22 0924        PT Time Calculation    Start Time 0924     Stop Time 0945     Time Calculation (min) 21 min        Session Details    Document Type daily treatment/progress note     Mode of Treatment physical therapy        General Information    Patient Profile Reviewed yes     Onset of Illness/Injury or Date of Surgery 07/19/22     Referring Physician Dr. Loyola     Patient/Family/Caregiver Comments/Observations RN cleared for PT session     General Observations of Patient Pt received awake in bed, agreeable to therapy     Existing Precautions/Restrictions fall;pacemaker;cardiac        Cognition/Psychosocial    Affect/Mental Status (Cognition) WFL     Orientation Status (Cognition) oriented x 3     Follows Commands (Cognition) follows one-step commands;over 90% accuracy     Cognitive Function executive function deficit     Executive Function Deficit (Cognition) minimal deficit;insight/awareness of deficits;planning/decision-making     Comment, Cognition receptive to care, pleasant        Bed Mobility    [place], Supine to Sit close supervision;verbal cues     Verbal Cues (Supine to Sit) hand placement;safety;technique      [place], Sit to Supine minimum assist (75% or more patient effort);1 person assist;verbal cues     Verbal Cues (Sit to Supine) hand placement;technique;safety     Assistive Device head of bed elevated     Comment (Bed Mobility) OOB to the L. Vitals obtained sitting EOB. Returning to supine requiring min A x 1 for BLE assist        Transfers    Transfers toilet transfer        Sit to Stand Transfer    [place], Sit to Stand Transfer close supervision     Verbal Cues hand placement;safety;technique     Assistive Device walker, front-wheeled     Comment from EOB, bedside chair        Stand to Sit Transfer    [place], Stand to Sit Transfer minimum assist (75% or more patient effort);1 person assist;verbal cues     Verbal Cues hand placement;safety;technique     Assistive Device walker, front-wheeled     Comment to bedside chair, bedside        Toilet Transfer    Transfer Technique sit-stand;stand-sit     [place], Toilet Transfer close supervision;verbal cues     Verbal Cues hand placement;safety;technique     Assistive Device grab bars/safety frame;walker, front-wheeled        Gait Training    [place], Gait close supervision;verbal cues     Assistive Device walker, front-wheeled     Distance in Feet 90 feet   19 ft x 2, 90 ft x 1    Pattern (Gait) step-through     Deviations/Abnormal Patterns (Gait) gait speed decreased;step length decreased;base of support, narrow     Comment (Gait/Stairs) chief complaint SOB at approx. 45 ft, SpO2 95% post ambulation trial. RPE noted 6-7/10 RPE post ambulation. Step-through pattern however decreased heel strike        Stairs Training    Comment pt declines concerned, fatigued at end of session, PCT present for EKG        Safety Issues, Functional Mobility    Safety Issues Affecting Function (Mobility) problem-solving     Impairments Affecting Function (Mobility) balance;endurance/activity tolerance;strength        Balance    Balance Assessment sitting static  balance;sitting dynamic balance;sit to stand dynamic balance;standing static balance;standing dynamic balance     Static Sitting Balance WFL;unsupported;sitting, edge of bed     Dynamic Sitting Balance WFL;unsupported;sitting, edge of bed     Sit to Stand Dynamic Balance mild impairment;supported;standing     Static Standing Balance supported;WFL;standing     Dynamic Standing Balance mild impairment;supported;standing     Comment, Balance BUE support on RW, pacemaker precautions maintained. CS functional transfers. No overt LOB however mildly unsteady        AM-PAC (TM) - Mobility (Current Function)    Turning from your back to your side while in a flat bed without using bedrails? 3 - A Little     Moving from lying on your back to sitting on the side of a flat bed without using bedrails? 3 - A Little     Moving to and from a bed to a chair? 3 - A Little     Standing up from a chair using your arms? 3 - A Little     To walk in a hospital room? 3 - A Little     Climbing 3-5 steps with a railing? 3 - A Little     AM-PAC (TM) Mobility Score 18        Assessment/Plan (PT)    Daily Outcome Statement Pt seen for PT follow-up session, [facility] 18. Pt requires CS-min A x 1 bed mobility, CS transfers and CS ambulation with use of RW. Pt increasing distance ambulated however noted RPE of 6-7/10. Pt reports PASCUAL however 95% on RA. Anticipate d/c home with assist/home health.     Rehab Potential good, to achieve stated therapy goals     Therapy Frequency 3-5 times/wk     Planned Therapy Interventions bed mobility training;balance training;gait training;patient/family education;stair training;strengthening;transfer training               PT Assessment/Plan    Flowsheet Row Most Recent Value   PT Recommended Discharge Disposition home with assistance, home with home health at 07/25/2022 0924   Anticipated Equipment Needs at Discharge (PT) none  [declining RW, owns Rollators] at 07/25/2022 0924   Patient/Family Therapy Goals Statement  return home at 07/25/2022 0924                    Education Documentation  Signs/Symptoms, taught by Glo Busby PT at 7/25/2022 10:02 AM.  Learner: Patient  Readiness: Acceptance  Method: Explanation  Response: Verbalizes Understanding  Comment: PT role, POC, goals, pacemaker precautions, energy conservation    Risk Factors, taught by Glo Busby PT at 7/25/2022 10:02 AM.  Learner: Patient  Readiness: Acceptance  Method: Explanation  Response: Verbalizes Understanding  Comment: PT role, POC, goals, pacemaker precautions, energy conservation          PT Goals    Flowsheet Row Most Recent Value   Bed Mobility Goal 1    Activity/Assistive Device rolling to left, rolling to right, sit to supine, supine to sit at 07/22/2022 1152   [place] modified independence at 07/22/2022 1152   Time Frame by discharge at 07/22/2022 1152   Strategies/Barriers has recliners available as needed on 1st floor for sleep at 07/22/2022 1152   Progress/Outcome goal ongoing at 07/25/2022 0924   Transfer Goal 1    Activity/Assistive Device sit-to-stand/stand-to-sit, bed-to-chair/chair-to-bed, stand pivot, walker, front-wheeled at 07/22/2022 1152   [place] modified independence at 07/22/2022 1152   Time Frame by discharge at 07/22/2022 1152   Progress/Outcome goal ongoing at 07/25/2022 0924   Gait Training Goal 1    Activity/Assistive Device gait (walking locomotion), walker, front-wheeled at 07/22/2022 1152   [place] modified independence at 07/22/2022 1152   Distance 75 at 07/22/2022 1152   Time Frame by discharge at 07/22/2022 1152   Progress/Outcome goal ongoing at 07/25/2022 0924   Stairs Goal 1    Activity/Assistive Device ascending stairs, descending stairs, step-to-step, walker, front-wheeled at 07/22/2022 1152   [place] supervision required at 07/22/2022 1152   Number of Stairs 1 at 07/22/2022 1152   Time Frame by discharge at 07/22/2022 1152   Progress/Outcome goal ongoing at 07/25/2022 0924

## 2022-07-25 NOTE — PROGRESS NOTES
At bedside of pt to go over discharge plans again, as new for Eliquis and family nervous of her being discharged.   Pharmacy was contacted by NP, Eliquis conde given was $460-but this may be her deductible per pharmacy. They were unable to determine.   Pt was given Eliquis card for free month to figure it out before hand.   Daughter was on phone concerned about pt going home-   Did go over home care services , what it entail , and continuity of care. Family would like home car set up, really had no preference  Patient and/or patient guardian/advocate was made aware of their right to choose a provider. A list of eligible providers was presented and reviewed with the patient and/or patient guardian/advocate in written and/or verbal form. The list delineates providers in the patient’s desired geographic area who are participating in the Medicare program and/or providers contracted with the patient’s primary insurance. The Medicare list and quality ratings were obtained from the Medicare.gov [medicare.gov] website.    Suggested hospital has home care team, pt/family would like to go with [facility]. Referral sent to [facility] for home care.  Brother of pt will be transporting pt home today, she has no further needs at this time.  Discharge IMM discussed with pt , understands, signed and copy provided to pt.

## 2022-07-25 NOTE — PLAN OF CARE
Plan of Care Review  Plan of Care Reviewed With: patient  Progress: no change  Outcome Summary: pt. continous on NSS at 100ml/hr. BP still running soft. She is Afib on the monitor with some Vpaced spikes. Tylenol and Tramadol given for pain. Fall precautions maintained

## 2022-07-25 NOTE — PROGRESS NOTES
07/25/22 1416   Hospital to Home   Patient Enrolled in [facility]? Yes   [facility] Coordinator Comment CC spoke with pt to discuss the Hospital to Home program. Pt is agreeable. She reports she uses [employer] on an iPad or iPhone so can participate in a video visit. Virtual appt scheduled with [facility] provider for wednesday 7/27 at 1400. CC offered assistance making PCP follow up appt and she declined, stating she needs to look at her calendar when she gets home to see when she can make it. [facility] appt added to AVS.

## 2022-07-27 ENCOUNTER — HOSPITAL ENCOUNTER (INPATIENT)
Facility: HOSPITAL | Age: 77
LOS: 5 days | Discharge: HOME HEALTH | DRG: 315 | End: 2022-08-02
Attending: EMERGENCY MEDICINE | Admitting: HOSPITALIST
Payer: MEDICARE

## 2022-07-27 ENCOUNTER — APPOINTMENT (EMERGENCY)
Dept: RADIOLOGY | Facility: HOSPITAL | Age: 77
DRG: 315 | End: 2022-07-27
Attending: EMERGENCY MEDICINE
Payer: MEDICARE

## 2022-07-27 ENCOUNTER — APPOINTMENT (OUTPATIENT)
Dept: CARDIOLOGY | Facility: HOSPITAL | Age: 77
Setting detail: OBSERVATION
DRG: 315 | End: 2022-07-27
Attending: INTERNAL MEDICINE
Payer: MEDICARE

## 2022-07-27 DIAGNOSIS — E87.6 HYPOKALEMIA: ICD-10-CM

## 2022-07-27 DIAGNOSIS — R79.89 ELEVATED TROPONIN: ICD-10-CM

## 2022-07-27 DIAGNOSIS — J90 PLEURAL EFFUSION: ICD-10-CM

## 2022-07-27 DIAGNOSIS — I48.91 ATRIAL FIBRILLATION, UNSPECIFIED TYPE (CMS/HCC): ICD-10-CM

## 2022-07-27 DIAGNOSIS — I48.0 PAROXYSMAL ATRIAL FIBRILLATION (CMS/HCC): ICD-10-CM

## 2022-07-27 DIAGNOSIS — R53.1 GENERALIZED WEAKNESS: ICD-10-CM

## 2022-07-27 DIAGNOSIS — Z95.0 PACEMAKER: ICD-10-CM

## 2022-07-27 DIAGNOSIS — I49.5 TACHY-BRADY SYNDROME (CMS/HCC): ICD-10-CM

## 2022-07-27 DIAGNOSIS — I25.10 CORONARY ARTERY DISEASE INVOLVING NATIVE CORONARY ARTERY OF NATIVE HEART WITHOUT ANGINA PECTORIS: ICD-10-CM

## 2022-07-27 DIAGNOSIS — I31.39 PERICARDIAL EFFUSION: Primary | ICD-10-CM

## 2022-07-27 PROBLEM — D64.9 ANEMIA: Status: ACTIVE | Noted: 2022-07-27

## 2022-07-27 LAB
ALBUMIN SERPL-MCNC: 3.4 G/DL (ref 3.4–5)
ALP SERPL-CCNC: 71 IU/L (ref 35–126)
ALT SERPL-CCNC: 19 IU/L (ref 11–54)
ANION GAP SERPL CALC-SCNC: 7 MEQ/L (ref 3–15)
AST SERPL-CCNC: 27 IU/L (ref 15–41)
BASOPHILS # BLD: 0.04 K/UL (ref 0.01–0.1)
BASOPHILS NFR BLD: 0.5 %
BILIRUB SERPL-MCNC: 0.8 MG/DL (ref 0.3–1.2)
BNP SERPL-MCNC: 470 PG/ML
BSA FOR ECHO PROCEDURE: 1.89 M2
BUN SERPL-MCNC: 32 MG/DL (ref 8–20)
CALCIUM SERPL-MCNC: 9.2 MG/DL (ref 8.9–10.3)
CHLORIDE SERPL-SCNC: 106 MEQ/L (ref 98–109)
CO2 SERPL-SCNC: 26 MEQ/L (ref 22–32)
CREAT SERPL-MCNC: 1.1 MG/DL (ref 0.6–1.1)
CRP SERPL-MCNC: 54.6 MG/L
DIFFERENTIAL METHOD BLD: ABNORMAL
EOSINOPHIL # BLD: 0.14 K/UL (ref 0.04–0.36)
EOSINOPHIL NFR BLD: 1.8 %
ERYTHROCYTE [DISTWIDTH] IN BLOOD BY AUTOMATED COUNT: 13.9 % (ref 11.7–14.4)
ERYTHROCYTE [SEDIMENTATION RATE] IN BLOOD BY WESTERGREN METHOD: 67 MM/HR
GFR SERPL CREATININE-BSD FRML MDRD: 48 ML/MIN/1.73M*2
GLUCOSE SERPL-MCNC: 138 MG/DL (ref 70–99)
HCT VFR BLDCO AUTO: 31.1 % (ref 35–45)
HCT VFR BLDCO AUTO: 33.7 % (ref 35–45)
HGB BLD-MCNC: 10.3 G/DL (ref 11.8–15.7)
HGB BLD-MCNC: 10.8 G/DL (ref 11.8–15.7)
IMM GRANULOCYTES # BLD AUTO: 0.02 K/UL (ref 0–0.08)
IMM GRANULOCYTES NFR BLD AUTO: 0.3 %
LYMPHOCYTES # BLD: 0.84 K/UL (ref 1.2–3.5)
LYMPHOCYTES NFR BLD: 10.6 %
MAGNESIUM SERPL-MCNC: 1.7 MG/DL (ref 1.8–2.5)
MCH RBC QN AUTO: 29 PG (ref 28–33.2)
MCHC RBC AUTO-ENTMCNC: 32 G/DL (ref 32.2–35.5)
MCV RBC AUTO: 90.6 FL (ref 83–98)
MONOCYTES # BLD: 0.79 K/UL (ref 0.28–0.8)
MONOCYTES NFR BLD: 10 %
MV MEAN GRADIENT: 2 MMHG
MV PEAK GRADIENT: 7 MMHG
MV VTI: 25.4 CM
NEUTROPHILS # BLD: 6.08 K/UL (ref 1.7–7)
NEUTS SEG NFR BLD: 76.8 %
NRBC BLD-RTO: 0 %
PDW BLD AUTO: 10.6 FL (ref 9.4–12.3)
PLATELET # BLD AUTO: 257 K/UL (ref 150–369)
POTASSIUM SERPL-SCNC: 3.4 MEQ/L (ref 3.6–5.1)
PROT SERPL-MCNC: 6.9 G/DL (ref 6–8.2)
RBC # BLD AUTO: 3.72 M/UL (ref 3.93–5.22)
SARS-COV-2 RNA RESP QL NAA+PROBE: NEGATIVE
SODIUM SERPL-SCNC: 139 MEQ/L (ref 136–144)
TR MAX PG: 33 MMHG
TRICUSPID VALVE PEAK REGURGITATION VELOCITY: 2.88 M/S
TROPONIN I SERPL HS-MCNC: 184.9 PG/ML
TROPONIN I SERPL HS-MCNC: 191.4 PG/ML
TROPONIN I SERPL HS-MCNC: 203 PG/ML
TROPONIN I SERPL HS-MCNC: 239 PG/ML
TROPONIN I SERPL HS-MCNC: 34.2 PG/ML
TROPONIN I SERPL HS-MCNC: 95.5 PG/ML
WBC # BLD AUTO: 7.91 K/UL (ref 3.8–10.5)

## 2022-07-27 PROCEDURE — 86140 C-REACTIVE PROTEIN: CPT | Performed by: PHYSICIAN ASSISTANT

## 2022-07-27 PROCEDURE — 99285 EMERGENCY DEPT VISIT HI MDM: CPT | Mod: 25

## 2022-07-27 PROCEDURE — 84484 ASSAY OF TROPONIN QUANT: CPT | Performed by: EMERGENCY MEDICINE

## 2022-07-27 PROCEDURE — 63700000 HC SELF-ADMINISTRABLE DRUG: Performed by: INTERNAL MEDICINE

## 2022-07-27 PROCEDURE — 84484 ASSAY OF TROPONIN QUANT: CPT

## 2022-07-27 PROCEDURE — 80053 COMPREHEN METABOLIC PANEL: CPT | Performed by: EMERGENCY MEDICINE

## 2022-07-27 PROCEDURE — 99220 PR INITIAL OBSERVATION CARE/DAY 70 MINUTES: CPT | Performed by: INTERNAL MEDICINE

## 2022-07-27 PROCEDURE — 84484 ASSAY OF TROPONIN QUANT: CPT | Mod: 91 | Performed by: EMERGENCY MEDICINE

## 2022-07-27 PROCEDURE — 85018 HEMOGLOBIN: CPT | Performed by: PHYSICIAN ASSISTANT

## 2022-07-27 PROCEDURE — 83880 ASSAY OF NATRIURETIC PEPTIDE: CPT | Performed by: PHYSICIAN ASSISTANT

## 2022-07-27 PROCEDURE — 85025 COMPLETE CBC W/AUTO DIFF WBC: CPT | Performed by: EMERGENCY MEDICINE

## 2022-07-27 PROCEDURE — 36415 COLL VENOUS BLD VENIPUNCTURE: CPT

## 2022-07-27 PROCEDURE — U0003 INFECTIOUS AGENT DETECTION BY NUCLEIC ACID (DNA OR RNA); SEVERE ACUTE RESPIRATORY SYNDROME CORONAVIRUS 2 (SARS-COV-2) (CORONAVIRUS DISEASE [COVID-19]), AMPLIFIED PROBE TECHNIQUE, MAKING USE OF HIGH THROUGHPUT TECHNOLOGIES AS DESCRIBED BY CMS-2020-01-R: HCPCS | Performed by: STUDENT IN AN ORGANIZED HEALTH CARE EDUCATION/TRAINING PROGRAM

## 2022-07-27 PROCEDURE — 85025 COMPLETE CBC W/AUTO DIFF WBC: CPT

## 2022-07-27 PROCEDURE — 85652 RBC SED RATE AUTOMATED: CPT | Performed by: PHYSICIAN ASSISTANT

## 2022-07-27 PROCEDURE — 96375 TX/PRO/DX INJ NEW DRUG ADDON: CPT

## 2022-07-27 PROCEDURE — 63700000 HC SELF-ADMINISTRABLE DRUG: Performed by: PHYSICIAN ASSISTANT

## 2022-07-27 PROCEDURE — 1123F ACP DISCUSS/DSCN MKR DOCD: CPT | Performed by: INTERNAL MEDICINE

## 2022-07-27 PROCEDURE — 25800000 HC PHARMACY IV SOLUTIONS: Performed by: EMERGENCY MEDICINE

## 2022-07-27 PROCEDURE — 71045 X-RAY EXAM CHEST 1 VIEW: CPT

## 2022-07-27 PROCEDURE — G0378 HOSPITAL OBSERVATION PER HR: HCPCS

## 2022-07-27 PROCEDURE — 63700000 HC SELF-ADMINISTRABLE DRUG: Performed by: EMERGENCY MEDICINE

## 2022-07-27 PROCEDURE — 63600000 HC DRUGS/DETAIL CODE: Performed by: INTERNAL MEDICINE

## 2022-07-27 PROCEDURE — 80053 COMPREHEN METABOLIC PANEL: CPT

## 2022-07-27 PROCEDURE — 93308 TTE F-UP OR LMTD: CPT

## 2022-07-27 PROCEDURE — 93005 ELECTROCARDIOGRAM TRACING: CPT

## 2022-07-27 PROCEDURE — 93005 ELECTROCARDIOGRAM TRACING: CPT | Performed by: PHYSICIAN ASSISTANT

## 2022-07-27 PROCEDURE — 84484 ASSAY OF TROPONIN QUANT: CPT | Mod: 91 | Performed by: PHYSICIAN ASSISTANT

## 2022-07-27 PROCEDURE — 25000000 HC PHARMACY GENERAL: Performed by: EMERGENCY MEDICINE

## 2022-07-27 PROCEDURE — 83735 ASSAY OF MAGNESIUM: CPT | Performed by: PHYSICIAN ASSISTANT

## 2022-07-27 PROCEDURE — 96375 TX/PRO/DX INJ NEW DRUG ADDON: CPT | Performed by: INTERNAL MEDICINE

## 2022-07-27 PROCEDURE — 93005 ELECTROCARDIOGRAM TRACING: CPT | Performed by: EMERGENCY MEDICINE

## 2022-07-27 RX ORDER — ATROPINE SULFATE 0.1 MG/ML
0.5 INJECTION INTRAVENOUS EVERY 5 MIN PRN
Status: DISCONTINUED | OUTPATIENT
Start: 2022-07-27 | End: 2022-08-02 | Stop reason: HOSPADM

## 2022-07-27 RX ORDER — CLOPIDOGREL BISULFATE 75 MG/1
75 TABLET ORAL DAILY
Status: DISCONTINUED | OUTPATIENT
Start: 2022-07-27 | End: 2022-08-02 | Stop reason: HOSPADM

## 2022-07-27 RX ORDER — FAMOTIDINE 20 MG/1
40 TABLET, FILM COATED ORAL NIGHTLY
Status: DISCONTINUED | OUTPATIENT
Start: 2022-07-27 | End: 2022-08-01

## 2022-07-27 RX ORDER — ACETAMINOPHEN 650 MG/1
650 SUPPOSITORY RECTAL ONCE
Status: DISCONTINUED | OUTPATIENT
Start: 2022-07-27 | End: 2022-07-27

## 2022-07-27 RX ORDER — FUROSEMIDE 10 MG/ML
20 INJECTION INTRAMUSCULAR; INTRAVENOUS ONCE
Status: COMPLETED | OUTPATIENT
Start: 2022-07-27 | End: 2022-07-27

## 2022-07-27 RX ORDER — AMIODARONE HYDROCHLORIDE 200 MG/1
400 TABLET ORAL 2 TIMES DAILY
Status: COMPLETED | OUTPATIENT
Start: 2022-07-27 | End: 2022-08-01

## 2022-07-27 RX ORDER — PANTOPRAZOLE SODIUM 40 MG/1
40 TABLET, DELAYED RELEASE ORAL DAILY
Status: DISCONTINUED | OUTPATIENT
Start: 2022-07-27 | End: 2022-08-02 | Stop reason: HOSPADM

## 2022-07-27 RX ORDER — DEXTROSE 40 %
15-30 GEL (GRAM) ORAL AS NEEDED
Status: DISCONTINUED | OUTPATIENT
Start: 2022-07-27 | End: 2022-08-02 | Stop reason: HOSPADM

## 2022-07-27 RX ORDER — POTASSIUM CHLORIDE 750 MG/1
40 TABLET, EXTENDED RELEASE ORAL ONCE
Status: COMPLETED | OUTPATIENT
Start: 2022-07-27 | End: 2022-07-27

## 2022-07-27 RX ORDER — TRAMADOL HYDROCHLORIDE 50 MG/1
50 TABLET ORAL ONCE
Status: COMPLETED | OUTPATIENT
Start: 2022-07-28 | End: 2022-07-27

## 2022-07-27 RX ORDER — METOPROLOL TARTRATE 1 MG/ML
5 INJECTION, SOLUTION INTRAVENOUS ONCE
Status: COMPLETED | OUTPATIENT
Start: 2022-07-27 | End: 2022-07-27

## 2022-07-27 RX ORDER — COLCHICINE 0.6 MG/1
0.6 TABLET ORAL
Status: DISCONTINUED | OUTPATIENT
Start: 2022-07-27 | End: 2022-08-01

## 2022-07-27 RX ORDER — NITROGLYCERIN 0.4 MG/1
0.4 TABLET SUBLINGUAL EVERY 5 MIN PRN
Status: DISCONTINUED | OUTPATIENT
Start: 2022-07-27 | End: 2022-08-02 | Stop reason: HOSPADM

## 2022-07-27 RX ORDER — IBUPROFEN 200 MG
16-32 TABLET ORAL AS NEEDED
Status: DISCONTINUED | OUTPATIENT
Start: 2022-07-27 | End: 2022-08-02 | Stop reason: HOSPADM

## 2022-07-27 RX ORDER — ACETAMINOPHEN 325 MG/1
650 TABLET ORAL ONCE
Status: COMPLETED | OUTPATIENT
Start: 2022-07-27 | End: 2022-07-27

## 2022-07-27 RX ORDER — AMIODARONE HYDROCHLORIDE 200 MG/1
200 TABLET ORAL DAILY
Status: DISCONTINUED | OUTPATIENT
Start: 2022-08-02 | End: 2022-08-02 | Stop reason: HOSPADM

## 2022-07-27 RX ORDER — ACETAMINOPHEN 325 MG/1
TABLET ORAL
Status: COMPLETED
Start: 2022-07-27 | End: 2022-07-27

## 2022-07-27 RX ORDER — DEXTROSE 50 % IN WATER (D50W) INTRAVENOUS SYRINGE
25 AS NEEDED
Status: DISCONTINUED | OUTPATIENT
Start: 2022-07-27 | End: 2022-08-02 | Stop reason: HOSPADM

## 2022-07-27 RX ORDER — ASPIRIN 81 MG/1
81 TABLET ORAL DAILY
Status: DISCONTINUED | OUTPATIENT
Start: 2022-07-27 | End: 2022-07-29

## 2022-07-27 RX ORDER — ACETAMINOPHEN 325 MG/1
650 TABLET ORAL EVERY 4 HOURS PRN
Status: DISCONTINUED | OUTPATIENT
Start: 2022-07-27 | End: 2022-08-02 | Stop reason: HOSPADM

## 2022-07-27 RX ADMIN — POTASSIUM CHLORIDE 40 MEQ: 750 TABLET, EXTENDED RELEASE ORAL at 12:30

## 2022-07-27 RX ADMIN — ASPIRIN 81 MG: 81 TABLET, COATED ORAL at 19:43

## 2022-07-27 RX ADMIN — TRAMADOL HYDROCHLORIDE 50 MG: 50 TABLET, COATED ORAL at 23:44

## 2022-07-27 RX ADMIN — SODIUM CHLORIDE 500 ML: 9 INJECTION, SOLUTION INTRAVENOUS at 11:22

## 2022-07-27 RX ADMIN — METOPROLOL TARTRATE 5 MG: 1 INJECTION, SOLUTION INTRAVENOUS at 11:21

## 2022-07-27 RX ADMIN — FAMOTIDINE 40 MG: 20 TABLET, FILM COATED ORAL at 21:10

## 2022-07-27 RX ADMIN — AMIODARONE HYDROCHLORIDE 400 MG: 200 TABLET ORAL at 19:43

## 2022-07-27 RX ADMIN — COLCHICINE 0.6 MG: 0.6 TABLET, FILM COATED ORAL at 18:28

## 2022-07-27 RX ADMIN — METOPROLOL TARTRATE 12.5 MG: 25 TABLET, FILM COATED ORAL at 18:28

## 2022-07-27 RX ADMIN — ACETAMINOPHEN 650 MG: 325 TABLET, FILM COATED ORAL at 11:18

## 2022-07-27 RX ADMIN — FUROSEMIDE 20 MG: 10 INJECTION, SOLUTION INTRAMUSCULAR; INTRAVENOUS at 18:28

## 2022-07-27 RX ADMIN — ACETAMINOPHEN 650 MG: 325 TABLET, FILM COATED ORAL at 21:11

## 2022-07-27 RX ADMIN — PANTOPRAZOLE SODIUM 40 MG: 40 TABLET, DELAYED RELEASE ORAL at 19:43

## 2022-07-27 RX ADMIN — CLOPIDOGREL BISULFATE 75 MG: 75 TABLET, FILM COATED ORAL at 19:42

## 2022-07-27 NOTE — ASSESSMENT & PLAN NOTE
Hgb 10.8, previously in the 12s on last admission  Holding Elqiuis   Cont DAPT with recent stent with acute stent thrombosis  Trend

## 2022-07-27 NOTE — CONSULTS
Cardiology ([facility] - Kiran Marshall, Rani, Sebastián Mondragon, Stanley, Gagan, Lee, Bridget, Mandy, Spencer)  Consult performed by: Josi Wilkinson PA C  Consult ordered by: Yokasta Ramos MD            REASON FOR CONSULT: chest pain, weakness     CONSULT FROM: Yokasta Ramos MD    PRIMARY CARDIOLOGIST: Dr. Saravia    ------------------------------------------------------------------------------------------------------------------------------------------  HISTORY OF PRESENTING ILLNESS  ------------------------------------------------------------------------------------------------------------------------------------------  Emily Ramirez is a 77 y.o. female who is admitted with weakness, chest pain and dyspnea     # cardiac risk factors: HTN, HL, obesity, age  # CAD- PCI with NIKITA to prox RCA and prox LCx [p/op developed ST elevations inferiorly- thrombotic occlusion RCA s/p repeat angioplasty]  # bilateral carotid artery disease- mild  # mild MR/AR  # new onset atrial fibrillation with SSS/pauses 7/2022, PPM 7/21/22  # PMH: dyspnea on exertion, CKD III, Rt hip replacement- 4/2011     Echo (1/2022): EF 65-70%, mild LVH, mild LAE, mild MR, mild AR, mild/mod TR, mod TN, mild pHTN with PASP 35-40 mmHg.  LHC (6/9/2022): 90% prox RCA, 90% prox LCx --> PCI to RCA, PCI to LCX    Ms. Ramirez had elective PCI 6/9/2022 with PCI to proximal RCA and proximal Cx.  This was complicated by thrombotic occlusion and required repeat angioplasty to the RCA. She was discharged home and was doing well until last week when she developed weakness.  Her Apple Watch alerted her to atrial fibrillation. She was admitted 7/19/2022 with new onset atrial fibrillation and found to have SSS with several pauses up to 6 seconds duration. She had a dual chamber PPM placed 7/21/22. Postoperatively she was started on Sotalol but she developed DENNY and prolonged QTc so this was discontinued and she was started on amiodarone loading.      She was  discharged home 7/25/22.  She was feeling better until last night when she had lack of appetite and increasing weakness. Overnight, she didn't sleep well due to weakness and pleuritic chest pain.  This morning, she felt short of breath and returned to ED.      On arrival, EKG shows atrial fibrillation rate 109.  At rest, her HR are 90-115bpm.  She still has a pleuritic chest pain.  Labwork notable for normal renal function, BUN/Cr 32/1.1.  Trop 34.2 [trending down from prior trops]. Hgb 10.8.  CXR shows moderate left sided pleural effusion.     ------------------------------------------------------------------------------------------------------------------------------------------  PAST MEDICAL HISTORY  ------------------------------------------------------------------------------------------------------------------------------------------  Past Medical History:   Diagnosis Date    Aortic regurgitation     Chronic kidney disease     Coronary artery disease     History of loop recorder     Hypertension     Lipid disorder     Mitral regurgitation     SVT (supraventricular tachycardia) (CMS/HCC)      History reviewed. No pertinent surgical history.    ------------------------------------------------------------------------------------------------------------------------------------------  MEDICATIONS  ------------------------------------------------------------------------------------------------------------------------------------------  Home medications    Amiodarone 400mg BID x 7 days then 200mg daily  Eliquis 5mg BID  Plavix 75mg daily  pepcid 10mg daily  zocor 20mg daily  Tramadol-acetaminophen    Inpatient Medications      potassium chloride, 40 mEq, oral, Once    amiodarone    apixaban    clopidogreL    coenzyme Q10    famotidine    multivit,iron,minerals/lutein (CENTRUM SILVER ULTRA WOMEN'S ORAL)    pantoprazole    simvastatin     traMADoL-acetaminophen    ------------------------------------------------------------------------------------------------------------------------------------------  ALLERGIES  ------------------------------------------------------------------------------------------------------------------------------------------  Celebrex [celecoxib], Cipro [ciprofloxacin hcl], Clindamycin, Crestor [rosuvastatin], Lipitor [atorvastatin], and Voltaren [diclofenac sodium]    ------------------------------------------------------------------------------------------------------------------------------------------  SOCIAL HISTORY  ------------------------------------------------------------------------------------------------------------------------------------------  No smoking or alcohol    ------------------------------------------------------------------------------------------------------------------------------------------  FAMILY HISTORY  ------------------------------------------------------------------------------------------------------------------------------------------  No premature CAD  father  @ 80- [place], pacemaker  mother  @ 92- old age    ------------------------------------------------------------------------------------------------------------------------------------------  REVIEW OF SYSTEMS  ------------------------------------------------------------------------------------------------------------------------------------------  Constitutional: - fever, - chills, + weakness, - weight loss  HEENT: - blurred vision, - sore throat, - hoarseness  Respiratory: - dyspnea, - cough, - hemoptysis  Cardiovascular: + chest pain, + dyspnea, - orthopnea, - PND, + edema, - palpitations, - syncope  Gastrointestinal: - nausea, - vomiting, - diarrhea, - hematemesis, - melena  Genitourinary: - dysuria, - frequency  Integument: - rash, - itching  Hematologic/lymphatic:  - bruising, - petechiae  Musculoskeletal: -  arthalgias, - myalgias  Neurological: - vertigo, - tremors, - headache, - speech deficit, - focal weakness  Behavioral/Psych: - anxiety, - depression  Endocrine: - cold intolerance, - heat intolerance, - weight change    ------------------------------------------------------------------------------------------------------------------------------------------  PHYSICAL EXAM  ------------------------------------------------------------------------------------------------------------------------------------------  VITAL SIGNS:  Heart Rate:  [109-118] 118  Resp:  [18] 18  BP: (166)/(79-84) 166/79  SaO2: 98%  No intake or output data in the 24 hours ending 07/27/22 1143    PHYSICAL EXAM:  General appearance: alert and cooperative  Head: without obvious abnormality  Eyes: PERRLA, extraocular movements intact  Neck: No JVD, carotid bruits, thyromegaly  Lungs: clear to auscultation bilaterally, no crackles or wheezing  Heart: irreg irreg, S1-S2 normal, no murmurs, clicks, rubs or gallops; small pocket hematoma with old ecchymosis. No erythema or drainage   Abdomen: soft, non-tender, bowel sounds normal  Extremities: nonpitting ankle  edema, peripheral pulses present  Skin: Skin color, texture, turgor normal. No rashes or lesions  Neurologic: Alert and oriented X 3, no focal deficits    ------------------------------------------------------------------------------------------------------------------------------------------  LABS / IMAGING / EKG / TELEMETRY  ------------------------------------------------------------------------------------------------------------------------------------------  LABS:  Results from last 7 days   Lab Units 07/27/22  0954 07/25/22  1109 07/24/22  0409 07/23/22  2123 07/23/22  1657 07/23/22  0430   SODIUM mEQ/L 139 135* 139  --   --  138   POTASSIUM mEQ/L 3.4* 4.6 3.8  --   --  4.2   MAGNESIUM mg/dL  --  1.8 1.9  --   --  2.1   CHLORIDE mEQ/L 106 107 105  --   --  103   CO2 mEQ/L 26 19* 24  --    --  25   BUN mg/dL 32* 41* 44*  --   --  36*   CREATININE mg/dL 1.1 1.4* 1.8*  --   --  1.4*   AST IU/L 27  --   --   --   --   --    ALT IU/L 19  --   --   --   --   --    HIGH SENSITIVE TROPONIN I pg/mL 34.2*  --  66.0* 76.5*   < >  --     < > = values in this interval not displayed.     Results from last 7 days   Lab Units 22  0954 22  0238 22  0727   WBC K/uL 7.91 6.53 7.82   HEMOGLOBIN g/dL 10.8* 12.7 12.6   HEMATOCRIT % 33.7* 38.5 37.9   PLATELETS K/uL 257 242 232     Lab Results   Component Value Date    TSH 1.89 2022     No results found for: CHOL, LDLCALC, HDL, TRIG  No results found for: BNP    IMAGING:  CXR 2022: moderate left sided pleural effusion     EC2022 09:44 atrial fibrillation rate 109, normal axis, normal QRS, no ST segment changes       TELEMETRY:  Atrial fibrillation rate       ------------------------------------------------------------------------------------------------------------------------------------------  ASSESSMENT AND PLAN  ------------------------------------------------------------------------------------------------------------------------------------------    Weakness: She has increasing weakness, fatigue, pleuritic chest pain and dyspnea over the pats 24 hours. I suspect that she is very symptomatic with her newly diagnosed atrial fibrillation. Her rates are not well controlled. See below.      Atrial fibrillation: Newly diagnosed, highly symptomatic and rates not well controlled despite amiodarone 400mg BID started 22.  She has known CAD, cannot be on Class Ic antiarrhythmics. Sotalol was started last week but she developed DENNY and a prolonged QTc. Now that she has has been on amiodarone, will plan for SUSI/DCCV tomorrow to restore NSR.      Pacemaker in situ: Implanted 22.  Small pocket hematoma without any evidence of infection.    CAD: s/p recent PCI to RCA and LCx 2021 c/b acute thrombosis and ST elevation; returned  to cath lab that day for antioplasty of her RCA. Now on plavix and Eliquis.  No further exertional symptoms. Repeat trop to be sure this is trending down.     HFpEF: small to moderate new left sided pleural effusion. Likely from underlying atrial fibrillation and poorly controlled rates. Recommend IV lasix x 1 today, monitor I/O and plan for cardioversion tomorrow.      Anticoagulant therapy: She is on Eliquis 5mg BID for primary CVA prophylaxis. Eliquis was initiated after her PPM placement on 7/21/22, will need SUSI tomorrow prior to cardioversion.     Pleuritic chest pain: will check limited echo today to r/o pericardial effusion s/p PPM.  Could also be pericarditis, PE [although now on Eliquis], pleurisy.        AMBER Muse  7/27/2022    Primary Care Doctor: Jonathan Meza DO

## 2022-07-27 NOTE — H&P
Hospital Medicine Service -  History & Physical        CHIEF COMPLAINT   Weakness      HISTORY OF PRESENT ILLNESS      Emily Ramirez is a 77 y.o. female with a past medical history as stated below who presents with weakness.    Patient had a cardiac stent placed June 9 and developed in-stent thrombosis soon afterwards requiring a second cath.  She was discharged to home.  She had another admission from July 19 until July 25 for paroxysmal atrial fibrillation.  She was found to be in A. fib with multiple conversion pauses up to 6 seconds.  She underwent a pacemaker placement during that admission.  She was initially placed on sotalol but had an DENNY and prolonged QT this was was stopped and she was started on amiodarone.  She was also started on Eliquis.  She was discharged to home.  She initially was doing well however today felt very weak, fatigued, with pleuritic chest discomfort.  She came back to the emergency department for evaluation.  She is back in rapid atrial fibrillation.  She will be admitted for further management.  Patient feels cold but denies fevers, shaking chills, abdominal pain, nausea, vomiting.  She has had a poor appetite at home.  Her daughter just tested positive for COVID-19.  Patient screening COVID test is negative.    Jevon's daughter updated over phone and son updated at bedside     PAST MEDICAL AND SURGICAL HISTORY      Past Medical History:   Diagnosis Date    Aortic regurgitation     Chronic kidney disease     Coronary artery disease     History of loop recorder     Hypertension     Lipid disorder     Mitral regurgitation     SVT (supraventricular tachycardia) (CMS/[facility])        History reviewed. No pertinent surgical history.    PCP: Jonathan Meza, DO    MEDICATIONS      Prior to Admission medications    Medication Sig Start Date End Date Taking? Authorizing Provider   amiodarone (PACERONE) 200 mg tablet By mouth. Take 400mg (2 tab) twice daily for 7 days, then  200mg (1 tab) daily  Patient taking differently: Take by mouth See admin instr. By mouth. Take 400mg (2 tab) twice daily for 7 days (ending Aug 1), then 200mg (1 tab) daily (begin Aug 2) 7/25/22   Samreen Trent CRNP   apixaban (ELIQUIS) 5 mg tablet Take 1 tablet (5 mg total) by mouth 2 (two) times a day Indications: blood clot in a deep vein of the extremities, a clot in the lung. 7/22/22 11/19/22  Tonja Amaya PA C   clopidogreL (PLAVIX) 75 mg tablet Take 75 mg by mouth daily.    Amna Lockwood MD   coenzyme Q10 (COQ10) 100 mg capsule Take 100 mg by mouth daily. Takes in the PM    Amna Lockwood MD   famotidine (PEPCID) 40 mg tablet Take 40 mg by mouth nightly.    Amna Lockwood MD   multivit,iron,minerals/lutein (CENTRUM SILVER ULTRA WOMEN'S ORAL) Take 1 tablet by mouth every other day.    Amna Lockwood MD   pantoprazole (PROTONIX) 40 mg EC tablet Take 40 mg by mouth daily. 30 min prior to breakfast    Amna Lockwood MD   simvastatin (ZOCOR) 20 mg tablet Take 20 mg by mouth nightly.    Amna Lockwood MD   traMADoL-acetaminophen (ULTRACET) 37.5-325 mg per tablet Take 1 tablet by mouth 2 (two) times a day.    Amna Lockwood MD   sotaloL (BETAPACE) 80 mg tablet Take 1 tablet (80 mg total) by mouth every 12 (twelve) hours. 7/22/22 7/25/22  Tonja Amaya PA C       ALLERGIES      Celebrex [celecoxib], Cipro [ciprofloxacin hcl], Clindamycin, Crestor [rosuvastatin], Lipitor [atorvastatin], and Voltaren [diclofenac sodium]    FAMILY HISTORY      History reviewed. No pertinent family history.    SOCIAL HISTORY      Social History     Socioeconomic History    Marital status:      Spouse name: None    Number of children: None    Years of education: None    Highest education level: None   Tobacco Use    Smoking status: Never Smoker    Smokeless tobacco: Never Used   Substance and Sexual Activity    Alcohol use: Yes     Alcohol/week:  1.0 standard drink     Types: 1 Glasses of wine per week     Comment: 1x/month    Drug use: Not Currently    Sexual activity: Defer     Social Determinants of Health     Food Insecurity: No Food Insecurity    Worried About Running Out of Food in the Last Year: Never true    Ran Out of Food in the Last Year: Never true       REVIEW OF SYSTEMS      All other systems reviewed and negative except as noted in HPI    PHYSICAL EXAMINATION      Temp:  [36.4 °C (97.6 °F)] 36.4 °C (97.6 °F)  Heart Rate:  [] 89  Resp:  [18-20] 18  BP: (152-180)/(68-86) 180/86  Body mass index is 30.04 kg/m².    Physical Exam  Constitutional:       Comments: Frail appearing    HENT:      Head: Atraumatic.   Eyes:      General: No scleral icterus.  Cardiovascular:      Rate and Rhythm: Normal rate. Rhythm irregular.   Pulmonary:      Effort: Pulmonary effort is normal.      Comments: Crackles at the bases   Abdominal:      General: Bowel sounds are normal. There is no distension.      Palpations: Abdomen is soft. There is no mass.      Tenderness: There is no abdominal tenderness.   Musculoskeletal:         General: Normal range of motion.      Cervical back: Neck supple.      Comments: Trace edema bilaterally    Skin:     General: Skin is warm and dry.   Neurological:      Comments: Awake, alert  Conversant           LABS / IMAGING / EKG        Labs  Labs reviewed . See A/P for plan regarding pertinent labs.       Imaging  I have independently reviewed the pertinent imaging from the last 24 hrs.    SARS-CoV-2 (COVID-19) (no units)   Date/Time Value   07/27/2022 1344 Negative       ECG/Telemetry  I have independently reviewed the telemetry. No events for the last 24 hours., I have independently reviewed the ECG. Significant findings include AFlutter , no significant st changes .    ASSESSMENT AND PLAN           Pericardial effusion  Assessment & Plan  - Noted on limited echocardiogram  - Hold Eliquis per cardiology  - start colchicine    - cardiology following     GERD (gastroesophageal reflux disease)  Assessment & Plan  COnt Protonix     Hyperlipidemia  Assessment & Plan  Cont statin     CAD (coronary artery disease)  Assessment & Plan  Recent stent placed 6/9/22 with acute stent thrombosis requiring a second cath  Currently on Plavix and Eliquis.  Since Eliquis on hold for pericardial effusion, will resume aspirin .  D/w cardiology  Cont DAPT, statin     * Paroxysmal atrial fibrillation (CMS/HCC)  Assessment & Plan  - Admit  - Cardiology following  - Cont amiodarone  - Plan for SUSI/cardioversion tomorrow  - Hold Eliquis per cardiology due to pericardial effusion     Pacemaker  Assessment & Plan  Recent pacemaker placement, now with pericardial effusion  EP cardiology consult     Anemia  Assessment & Plan  Hgb 10.8, previously in the 12s on last admission  Holding Elqiuis   Cont DAPT with recent stent with acute stent thrombosis  Trend     Pleural effusion  Assessment & Plan  New left pleural effusion.  Also effusion on right?  On room air   Monitor for now       VTE Assessment: Padua    VTE Prophylaxis: Holding Eliquis   Code Status: Full Code     Estimated Discharge Date: 7/29/2022  Disposition Planning: pending hospital course      AMBER Horn  7/27/2022

## 2022-07-27 NOTE — ASSESSMENT & PLAN NOTE
- Seen by cardiologist morning and SUSI with cardioversion is not indicated at this time.  - Patient with pericardial effusion status post pacemaker placement during last admission  - May need pericardiocentesis  - EP consult  - Cont amiodarone

## 2022-07-27 NOTE — UM PHYSICIAN REVIEW NOTE
Patient presented 6/9 for elective cath with stent placement, admitted as OP, post procedurally had STEMI requiring emergent cath with angioplasty.  Coded as IPO code for STEMI with stent.  I d/w coding.  Due to IPO nature of code and emergent nature of procedure, IP order was not placed but the case is appropriate for inpatient.

## 2022-07-27 NOTE — Clinical Note
Patient placed on procedure table in supine position with arms at side. Positioning devices: safety strap applied.

## 2022-07-27 NOTE — ED ATTESTATION NOTE
I have personally seen and examined the patient.  I reviewed and agree with physician assistant / nurse practitioners assessment and plan of care, with the following exceptions: None  My examination, assessment, and plan of care of Emily Ramirez is as follows:        77-year-old female with history of CAD, paroxysmal A. fib on Eliquis presents with chest pain, weakness, headache.  Patient was hospitalized and discharged 2 days ago following pacemaker placement.  On exam patient is awake alert in no acute distress.  Her head is atraumatic.  She has no respiratory distress.  Her heart is a regular.  Her skin is dry normal in color.  EKG was obtained and shows atrial flutter with a mildly elevated rate.  Patient was given 1 dose of 5 mg IV Lopressor with improvement of her heart rate.  Lab work reveals mildly elevated troponin at 34.  Chest x-ray shows a new left pleural effusion.  Cardiology was consulted.  They are recommending admission for cardioversion.  Patient was admitted to [facility].  Critical care time spent taking care of the patient was 34 minutes.     Yokasta Ramos MD  07/27/22 6948

## 2022-07-27 NOTE — ASSESSMENT & PLAN NOTE
- TTE: Moderate in size not associated with cardiac tamponade likely secondary to recent PPM placement  - Hold Eliquis per cardiology  - Start colchicine   - Cardiology following  - EP consult  - Febrile on admission.  Seen by ID monitor off antibiotics.  Incision site with no signs of infection  - Encourage incentive spirometer use    CT chest: Moderate pericardial effusion. Small left greater than right pleural effusions  -May need pericardiocentesis  -EP consult

## 2022-07-27 NOTE — ASSESSMENT & PLAN NOTE
Recent stent placed 6/9/22 with acute stent thrombosis requiring a second cath  Currently on Plavix and Eliquis.  Since Eliquis on hold for pericardial effusion, will resume aspirin .  D/w cardiology  Cont DAPT, statin

## 2022-07-28 ENCOUNTER — APPOINTMENT (OUTPATIENT)
Dept: RADIOLOGY | Facility: HOSPITAL | Age: 77
Setting detail: OBSERVATION
DRG: 315 | End: 2022-07-28
Attending: PHYSICIAN ASSISTANT
Payer: MEDICARE

## 2022-07-28 PROBLEM — I48.91 ATRIAL FIBRILLATION, UNSPECIFIED TYPE (CMS/HCC): Status: ACTIVE | Noted: 2022-07-28

## 2022-07-28 LAB
ANION GAP SERPL CALC-SCNC: 10 MEQ/L (ref 3–15)
ATRIAL RATE: 288
ATRIAL RATE: 312
BUN SERPL-MCNC: 22 MG/DL (ref 8–20)
CALCIUM SERPL-MCNC: 8.8 MG/DL (ref 8.9–10.3)
CHLORIDE SERPL-SCNC: 103 MEQ/L (ref 98–109)
CO2 SERPL-SCNC: 23 MEQ/L (ref 22–32)
CREAT SERPL-MCNC: 1 MG/DL (ref 0.6–1.1)
ERYTHROCYTE [DISTWIDTH] IN BLOOD BY AUTOMATED COUNT: 13.7 % (ref 11.7–14.4)
GFR SERPL CREATININE-BSD FRML MDRD: 53.6 ML/MIN/1.73M*2
GLUCOSE SERPL-MCNC: 120 MG/DL (ref 70–99)
HCT VFR BLDCO AUTO: 33.5 % (ref 35–45)
HGB BLD-MCNC: 10.9 G/DL (ref 11.8–15.7)
MAGNESIUM SERPL-MCNC: 2.1 MG/DL (ref 1.8–2.5)
MCH RBC QN AUTO: 29 PG (ref 28–33.2)
MCHC RBC AUTO-ENTMCNC: 32.5 G/DL (ref 32.2–35.5)
MCV RBC AUTO: 89.1 FL (ref 83–98)
PDW BLD AUTO: 11 FL (ref 9.4–12.3)
PLATELET # BLD AUTO: 278 K/UL (ref 150–369)
POTASSIUM SERPL-SCNC: 3.9 MEQ/L (ref 3.6–5.1)
QRS DURATION: 72
QRS DURATION: 80
QT INTERVAL: 344
QT INTERVAL: 390
QTC CALCULATION(BAZETT): 463
QTC CALCULATION(BAZETT): 474
R AXIS: 25
R AXIS: 7
RBC # BLD AUTO: 3.76 M/UL (ref 3.93–5.22)
SODIUM SERPL-SCNC: 136 MEQ/L (ref 136–144)
T WAVE AXIS: -11
T WAVE AXIS: -29
TROPONIN I SERPL HS-MCNC: 221.3 PG/ML
VENTRICULAR RATE: 109
VENTRICULAR RATE: 89
WBC # BLD AUTO: 9.64 K/UL (ref 3.8–10.5)

## 2022-07-28 PROCEDURE — 80048 BASIC METABOLIC PNL TOTAL CA: CPT | Performed by: PHYSICIAN ASSISTANT

## 2022-07-28 PROCEDURE — 71250 CT THORAX DX C-: CPT | Mod: MG

## 2022-07-28 PROCEDURE — 84484 ASSAY OF TROPONIN QUANT: CPT | Performed by: PHYSICIAN ASSISTANT

## 2022-07-28 PROCEDURE — 63700000 HC SELF-ADMINISTRABLE DRUG

## 2022-07-28 PROCEDURE — 63700000 HC SELF-ADMINISTRABLE DRUG: Performed by: PHYSICIAN ASSISTANT

## 2022-07-28 PROCEDURE — 85027 COMPLETE CBC AUTOMATED: CPT | Performed by: PHYSICIAN ASSISTANT

## 2022-07-28 PROCEDURE — 36415 COLL VENOUS BLD VENIPUNCTURE: CPT | Performed by: PHYSICIAN ASSISTANT

## 2022-07-28 PROCEDURE — 63700000 HC SELF-ADMINISTRABLE DRUG: Performed by: INTERNAL MEDICINE

## 2022-07-28 PROCEDURE — G0378 HOSPITAL OBSERVATION PER HR: HCPCS

## 2022-07-28 PROCEDURE — 96366 THER/PROPH/DIAG IV INF ADDON: CPT | Performed by: INTERNAL MEDICINE

## 2022-07-28 PROCEDURE — 99233 SBSQ HOSP IP/OBS HIGH 50: CPT | Performed by: HOSPITALIST

## 2022-07-28 PROCEDURE — 20600000 HC ROOM AND CARE INTERMEDIATE/TELEMETRY

## 2022-07-28 PROCEDURE — 83735 ASSAY OF MAGNESIUM: CPT | Performed by: PHYSICIAN ASSISTANT

## 2022-07-28 PROCEDURE — 87040 BLOOD CULTURE FOR BACTERIA: CPT | Performed by: HOSPITALIST

## 2022-07-28 PROCEDURE — 63600000 HC DRUGS/DETAIL CODE: Performed by: PHYSICIAN ASSISTANT

## 2022-07-28 PROCEDURE — 96365 THER/PROPH/DIAG IV INF INIT: CPT | Performed by: INTERNAL MEDICINE

## 2022-07-28 RX ORDER — SIMVASTATIN 20 MG/1
20 TABLET, FILM COATED ORAL NIGHTLY
Status: DISCONTINUED | OUTPATIENT
Start: 2022-07-28 | End: 2022-08-02 | Stop reason: HOSPADM

## 2022-07-28 RX ORDER — CETIRIZINE HYDROCHLORIDE 5 MG/1
5 TABLET ORAL DAILY
Status: DISCONTINUED | OUTPATIENT
Start: 2022-07-28 | End: 2022-08-02 | Stop reason: HOSPADM

## 2022-07-28 RX ORDER — ACETAMINOPHEN 325 MG/1
325 TABLET ORAL
Status: DISCONTINUED | OUTPATIENT
Start: 2022-07-28 | End: 2022-08-02 | Stop reason: HOSPADM

## 2022-07-28 RX ORDER — TRAMADOL HYDROCHLORIDE 50 MG/1
25 TABLET ORAL
Status: DISCONTINUED | OUTPATIENT
Start: 2022-07-28 | End: 2022-08-02 | Stop reason: HOSPADM

## 2022-07-28 RX ADMIN — MAGNESIUM SULFATE HEPTAHYDRATE 2 G: 40 INJECTION, SOLUTION INTRAVENOUS at 00:07

## 2022-07-28 RX ADMIN — AMIODARONE HYDROCHLORIDE 400 MG: 200 TABLET ORAL at 20:17

## 2022-07-28 RX ADMIN — ACETAMINOPHEN 325 MG: 325 TABLET, FILM COATED ORAL at 12:05

## 2022-07-28 RX ADMIN — PANTOPRAZOLE SODIUM 40 MG: 40 TABLET, DELAYED RELEASE ORAL at 08:32

## 2022-07-28 RX ADMIN — TRAMADOL HYDROCHLORIDE 25 MG: 50 TABLET, COATED ORAL at 22:39

## 2022-07-28 RX ADMIN — TRAMADOL HYDROCHLORIDE 25 MG: 50 TABLET, COATED ORAL at 12:05

## 2022-07-28 RX ADMIN — ASPIRIN 81 MG: 81 TABLET, COATED ORAL at 08:32

## 2022-07-28 RX ADMIN — SIMVASTATIN 20 MG: 20 TABLET, FILM COATED ORAL at 22:40

## 2022-07-28 RX ADMIN — COLCHICINE 0.6 MG: 0.6 TABLET, FILM COATED ORAL at 18:41

## 2022-07-28 RX ADMIN — ACETAMINOPHEN 325 MG: 325 TABLET, FILM COATED ORAL at 22:39

## 2022-07-28 RX ADMIN — AMIODARONE HYDROCHLORIDE 400 MG: 200 TABLET ORAL at 08:32

## 2022-07-28 RX ADMIN — CLOPIDOGREL BISULFATE 75 MG: 75 TABLET, FILM COATED ORAL at 08:32

## 2022-07-28 RX ADMIN — METOPROLOL TARTRATE 12.5 MG: 25 TABLET, FILM COATED ORAL at 20:17

## 2022-07-28 RX ADMIN — METOPROLOL TARTRATE 12.5 MG: 25 TABLET, FILM COATED ORAL at 08:32

## 2022-07-28 RX ADMIN — FAMOTIDINE 40 MG: 20 TABLET, FILM COATED ORAL at 22:39

## 2022-07-28 RX ADMIN — CETIRIZINE HYDROCHLORIDE 5 MG: 5 TABLET ORAL at 14:33

## 2022-07-28 ASSESSMENT — ENCOUNTER SYMPTOMS
NAUSEA: 0
DIAPHORESIS: 0
FATIGUE: 1
DIZZINESS: 0
VOMITING: 0
NUMBNESS: 0
ABDOMINAL PAIN: 0
CHILLS: 0
SHORTNESS OF BREATH: 1
BACK PAIN: 0
WEAKNESS: 1
COUGH: 1
FEVER: 0
LIGHT-HEADEDNESS: 0
MYALGIAS: 0

## 2022-07-28 NOTE — PLAN OF CARE
Problem: Adult Inpatient Plan of Care  Goal: Plan of Care Review  Outcome: Progressing  Flowsheets (Taken 7/28/2022 0491)  Progress: improving  Plan of Care Reviewed With: patient  Outcome Summary: Patient with improved pain this shift, using incentive spirometer as ordered, VSS.   Plan of Care Review  Plan of Care Reviewed With: patient  Progress: improving  Outcome Summary: Patient with improved pain this shift, using incentive spirometer as ordered, VSS.

## 2022-07-28 NOTE — CONSULTS
Cardiac Electrophysiology Consult Note      Emily Ramirez is a 77 y.o. female who was admitted for Atrial fibrillation (CMS/HCC) [I48.91]  Paroxysmal atrial fibrillation (CMS/HCC) [I48.0]  Tachy-marco a syndrome (CMS/HCC) [I49.5]  Elevated troponin [R77.8]  Generalized weakness [R53.1]  Coronary artery disease involving native coronary artery of native heart without angina pectoris [I25.10]  Atrial fibrillation, unspecified type (CMS/HCC) [I48.91]. Cardiac Electrophysiology was consulted by Dr Gill for pericardial effusion post pacemaker.      History of Present Illness: 77-year-old white female who re-presents to [facility] with pleuritic type chest pain and is found to have a moderate-sized pericardial effusion s/p implantation of a dual-chamber permanent pacemaker on 7/21/2022.  The patient had a loop recorder.  She underwent cardiac catheterization with implantation of drug-eluting stents to the right coronary and proximal circumflex on June 9, 2022.  She felt better after revascularization.  Unfortunately she once again noted shortness of breath soon thereafter.  She checked her apple watch which revealed that she was in atrial fibrillation.  She also had episodes of lightheadedness.  She was found to have long pauses associated with near syncope.    The patient also has a history of hypertension, hyperlipidemia, obesity, frequent premature ventricular ectopic beats, chronic renal insufficiency and preserved left ventricular function.    The patient had her loop recorder removed and a dual-chamber permanent pacemaker was implanted on 7/21/2022.  She required cardioversion during the procedure.  Thereafter she was placed on sotalol.  Unfortunately her creatinine increased to 1.8 and the sotalol needed to be discontinued.  She developed chest pain a few days later.  She was discharged home on amiodarone Eliquis and Plavix.  The plan was for her to come back in 3 weeks time for cardioversion.    An  echocardiogram on admission reveals a moderate sized pericardial effusion.  The right ventricular lead is actively fixated in the septum.  The right atrial lead does not appear to have migrated on x-ray.  The leads appear to be functioning appropriately although the atrial lead cannot be fully evaluated as the patient is in atrial fibrillation.  High output pacing from both the atrial and ventricular leads was not associated with diaphragmatic or phrenic nerve capture.  The patient has been placed on colchicine.  Her Eliquis is being held.  Outside records reviewed.    PAST MEDICAL HISTORY:   Past Medical History:   Diagnosis Date    Aortic regurgitation     Chronic kidney disease     Coronary artery disease     History of loop recorder     Hypertension     Lipid disorder     Mitral regurgitation     SVT (supraventricular tachycardia) (CMS/[facility])        PAST SURGICAL HISTORY: Bilateral shoulder replacement surgery     SOCIAL HISTORY: She  Social History     Tobacco Use    Smoking status: Never Smoker    Smokeless tobacco: Never Used   Substance Use Topics    Alcohol use: Yes     Alcohol/week: 1.0 standard drink     Types: 1 Glasses of wine per week     Comment: 1x/month    Drug use: Not Currently       FAMILY HISTORY:No family history of premature coronary artery disease.  Her father did have a pacemaker.  Her mother is  at 92 years of age.    ALLERGIES:  Allergies   Allergen Reactions    Celebrex [Celecoxib]     Cipro [Ciprofloxacin Hcl]     Clindamycin     Crestor [Rosuvastatin]     Lipitor [Atorvastatin]     Voltaren [Diclofenac Sodium]        Home Meds    amiodarone, By mouth. Take 400mg (2 tab) twice daily for 7 days, then 200mg (1 tab) daily (Patient taking differently: Take by mouth See admin instr. By mouth. Take 400mg (2 tab) twice daily for 7 days (ending Aug 1), then 200mg (1 tab) daily (begin Aug 2))    apixaban, Take 1 tablet (5 mg total) by mouth 2 (two) times a day  "Indications: blood clot in a deep vein of the extremities, a clot in the lung.    clopidogreL, Take 75 mg by mouth daily.    coenzyme Q10, Take 100 mg by mouth daily. Takes in the PM    famotidine, Take 40 mg by mouth nightly.    multivit,iron,minerals/lutein (CENTRUM SILVER ULTRA WOMEN'S ORAL), Take 1 tablet by mouth every other day.    pantoprazole, Take 40 mg by mouth daily. 30 min prior to breakfast    simvastatin, Take 20 mg by mouth nightly.    traMADoL-acetaminophen, Take 1 tablet by mouth 2 (two) times a day.    CURRENT MEDICATIONS:     acetaminophen, 650 mg, oral, q4h PRN    [START ON 8/2/2022] amiodarone, 200 mg, oral, Daily    amiodarone, 400 mg, oral, BID    aspirin, 81 mg, oral, Daily    atropine, 0.5 mg, intravenous, q5 min PRN    clopidogreL, 75 mg, oral, Daily    colchicine, 0.6 mg, oral, Daily (6p)    glucose, 16-32 g of dextrose, oral, PRN **OR** dextrose, 15-30 g of dextrose, oral, PRN **OR** glucagon, 1 mg, intramuscular, PRN **OR** dextrose 50 % in water (D50), 25 mL, intravenous, PRN    famotidine, 40 mg, oral, Nightly    magnesium sulfate, 2 g, intravenous, PRN    metoprolol tartrate, 12.5 mg, oral, BID    nitroglycerin, 0.4 mg, sublingual, q5 min PRN    pantoprazole, 40 mg, oral, Daily    Review of Systems:  Pertinent items are noted in HPI.  GENERAL: Unwell  ENT: No complaints of hearing loss, throat pain or epistaxis  CVS: Complains of pleuritic type chest pain and shortness of breath  RES: Pleuritic chest pain and shortness of breath  ABD: No change in bowel habit or abdominal pain  CNS: No focal weakness or tremor  END: No complaints of feeling hot or cold  MSK: No joint pain  HEME: Small hematoma overlying a pacemaker  PSYCHE: No depression or anxiety  Physical Exam:    Visit Vitals  BP (!) 159/93 (BP Location: Right upper arm, Patient Position: Lying)   Pulse (!) 120   Temp 36.5 °C (97.7 °F) (Axillary)   Resp 18   Ht 1.626 m (5' 4\")   Wt 79.4 kg (175 lb)   SpO2 92% "   BMI 30.04 kg/m²       General appearance: alert, appears stated age and cooperative  Head and Neck: without obvious abnormality  Eyes: Pupils equal and clear conjunctiva  Lungs: Decreased air entry at the bases, small hematoma overlying pacemaker  heart: S1, S2 normal, no murmur, click, rub or gallop, irregular rate and rhythm, no JVD  Abdomen: soft, non-tender; bowel sounds normal; no masses  Extremities/Musculoskeletal: peripheral pulses intact, no edema, no joint deformities  Skin: Skin color, texture, turgor normal. No rashes or lesions  Neurologic: Alert and oriented X 3, no focal deficits  Psyche: Appropriate affect, no evidence for depression or anxiety  Endocrine: no thyromegaly    Labs:   Results from last 7 days   Lab Units 07/28/22  0453 07/27/22  0954 07/25/22  1109   SODIUM mEQ/L 136 139 135*   CO2 mEQ/L 23 26 19*   BUN mg/dL 22* 32* 41*   CREATININE mg/dL 1.0 1.1 1.4*   CALCIUM mg/dL 8.8* 9.2 8.8*   ALT IU/L  --  19  --    AST IU/L  --  27  --      Results from last 7 days   Lab Units 07/28/22  0453 07/27/22  0954 07/22/22  0238   WBC K/uL 9.64 7.91 6.53   PLATELETS K/uL 278 257 242           No lab exists for component: CPKNo results found for: PROBNP    Telemetry (07/28/22): Atrial fibrillation with a controlled ventricular response rate  Cardiac Testing:    Electrogram (07/28/22): Atrial fibrillation with a controlled ventricular response rate, no ST segment elevations  Echocardiogram:    Cardiac Imaging    TRANSTHORACIC ECHO (TTE) LIMITED 07/27/2022    Interpretation Summary  1.  This is a technically limited study.  2.  Normal LV size, with mild LVH, with normal LV function with ejection fraction of 60 to 65%.  3.  No regional wall motion abnormalities seen within limits of the study.  4.  Small to moderate, and mostly moderate (1.5 cm) inferolateral pericardial effusion without tamponade physiology.  5.  No evidence of pulmonary hypertension .  6.  Dilated IVC.    CT scan of  chest  IMPRESSION:  1. Left chest wall battery pack with surrounding inflammatory change but without  definite drainable collection. This could be further evaluated with ultrasound,  as warranted.     2. Atrial lead terminates at the inferior right atrial appendage/superior right  atrium, tip likely within the lumen; ventricular lead makes an abrupt turn with  tip terminating overlying the expected location of the interventricular septum  rather than the apex, uncertain if within the septum. Consider contrast enhanced  chest CT for better delineation of anatomy if intervention is planned.     3. Moderate pericardial effusion. Small left greater than right pleural  effusions.     4. Atrophy of medial lobe of liver, suggesting cirrhosis, with questionable  hypodensity at the junction of segments 2 and 4A. Recommend correlation with  prior imaging; if not available, outpatient contrast enhanced MR could be  obtained.     5. Few scattered sub-6 mm pulmonary nodules. If the patient is at high risk for  malignancy, consider follow-up in 12 months.     6. Moderate to large hiatal hernia with gastroesophageal reflux.      Impression and Plan:    77 y.o. female with the following problems,      1.  Pericardial effusion s/p implantation of a pacemaker-the patient has a moderate sized pericardial effusion.  It is likely that this was related to pacemaker implantation followed by the initiation of Eliquis and continuation of antiplatelet therapy.  The patient does not have any indication that she has cardiac tamponade.  Her jugular venous pressure is not elevated.  She does not have right ventricular collapse on her echocardiogram.  The CT scan of her chest reveals that the right ventricular lead is indeed in the septum of the right ventricle.  There is no evidence that the right atrial lead is through the right atrial appendage.  The lead thresholds and impedances do not clearly suggest any issue with the leads at this time.   It is my recommendation that she continue on colchicine.  We will continue to monitor her effusion with a repeat echocardiogram.  She will need to remain off of Eliquis at this time.    2.  Atrial fibrillation-the patient has persistent atrial fibrillation.  She failed to maintain sinus rhythm with sotalol.  Her QT interval prolonged and the sotalol was discontinued.  She was on low-dose sotalol.  Her creatinine was elevated at the time.  She was subsequently placed on amiodarone.  I recommend continuing on amiodarone therapy at this time for rate control.  I do not recommend that she undergo cardioversion at this point.  We will need to consider cardioversion once her pericardial effusion has stabilized and we can resume Eliquis.    3.  Renal insufficiency-the patient has had fluctuating renal function.  Her current creatinine measures 1.0.    4.  Coronary artery disease-the patient underwent placement of stents in June 2022.  She will need to remain on aspirin and clopidogrel at this time.      Electronic signature:    Henry Angel MD 07/28/22

## 2022-07-28 NOTE — PATIENT CARE CONFERENCE
Care Progression Rounds Note  Date: 7/28/2022  Time: 4:58 PM     Patient Name: Emily Ramirez     Medical Record Number: [id number]   YOB: 1945  Sex: Female      Room/Bed: 3029    Admitting Diagnosis: Atrial fibrillation (CMS/HCC) [I48.91]  Paroxysmal atrial fibrillation (CMS/HCC) [I48.0]  Tachy-marco a syndrome (CMS/HCC) [I49.5]  Elevated troponin [R77.8]  Generalized weakness [R53.1]  Coronary artery disease involving native coronary artery of native heart without angina pectoris [I25.10]  Atrial fibrillation, unspecified type (CMS/HCC) [I48.91]   Admit Date/Time: 7/27/2022  9:33 AM    Primary Diagnosis: Pericardial effusion  Principal Problem: Pericardial effusion    GMLOS: pending  Anticipated Discharge Date: 8/1/2022    AM-PAC:  Mobility Score:      Discharge Planning:  Concerns to be Addressed: care coordination/care conferences, discharge planning  Anticipated Discharge Disposition: home with home health  Type of Home Care Services: nursing, home OT    Barriers to Discharge:  Medical issues not resolved    Comments:       Participants:  nursing, social work/services

## 2022-07-28 NOTE — [facility] PHYSICIAN REVIEW NOTE
Inpatient status is appropriate for this 78yo presetning with weakness.  Had recent stenting then in-stent thrombosis.  Also, recent pacer placement.  Imaging shows a new pericardial effusion.  Has a fever possibly from effusion, but need to exclude other sources.  Seen by Cards, ID.  Will be evaluated by EP.  Will require >2MN stay.    Dylan Brown MD

## 2022-07-28 NOTE — ED PROVIDER NOTES
"Emergency Medicine Note  HPI   HISTORY OF PRESENT ILLNESS       History provided by:  Patient   used: No      78 y/o F with PMH of valvular heart disease, CKD III, CAD, HTN, HLD, SVT, ILD, GERD presents to the ED for evaluation of fatigue and generalized weakness.     On 6/9/22, patient presented for an outpatient L/RHC by Dr. Gallo and was found to have severe 2-vessel disease (left circumflex and RCA) now s/p NIKITA to left circumflex and RCA. Patient developed ST elevations after cath and needed balloon angioplasty of thrombosis in RCA stent. Patient was ultimately discharged home on ASA, Plavix, Toprol, Simvastatin, and Maxide. After discharge, patient was doing well. About 1.5 months later, patient noted her apple watch identified Atrial Fibrillation and about 12 hours after that she \"felt like she was hit by a wall.\" Patient notes she developed significant fatigue and generalized weakness. She came here for evaluation on 7/19/22 and was admitted. She was found to be in Afib with multiple conversion pauses up to 6 seconds. Pt was seen by cardiology and EP. Pt underwent PPM placement. Pt was started on sotalol but had prolonged QT and worsening renal fx so instead started on amiodarone (400mg bid x 7 days, then 200mg daily). Pt was also started on eliquis. Patient was medically stable and cleared for discharge on 7/25/22 with outpatient cardiology, EP, and PCP follow up.     Immediately after discharge, patient again felt improved, however, last night, shortly after showering, patient again developed that sudden onset fatigue and generalized weakness, like a \"wave came over her.\" Patient noted some PASCUAL which she reports is ongoing and a cough of productive sputum. She feels unsafe at home due to her recent history and came here for further evaluation. She also notes some midsternal chest pain that is worse with inspiration. She states \"I feel something pulling in my chest when I breathe.\" She " rates pain 4/10 in severity. She does not feel at baseline. Denies fevers, chills, diaphoresis, n/v, abdominal pain, palpitations, lightheadedness, dizziness, numbness in the upper or lower extremities. No recent illness but notes daughter does have COVID.     PCP - Susana  Cardiologist - Rani        Patient History   PAST HISTORY     Reviewed from Nursing Triage:  Tobacco  Allergies  Meds  Problems  Med Hx  Surg Hx  Fam Hx  Soc   Hx        Past Medical History:   Diagnosis Date    Aortic regurgitation     Chronic kidney disease     Coronary artery disease     History of loop recorder     Hypertension     Lipid disorder     Mitral regurgitation     SVT (supraventricular tachycardia) (CMS/[facility])        History reviewed. No pertinent surgical history.    History reviewed. No pertinent family history.    Social History     Tobacco Use    Smoking status: Never Smoker    Smokeless tobacco: Never Used   Substance Use Topics    Alcohol use: Yes     Alcohol/week: 1.0 standard drink     Types: 1 Glasses of wine per week     Comment: 1x/month    Drug use: Not Currently         Review of Systems   REVIEW OF SYSTEMS     Review of Systems   Constitutional: Positive for fatigue. Negative for chills, diaphoresis and fever.   HENT: Negative for congestion.    Respiratory: Positive for cough and shortness of breath (PASCUAL).    Cardiovascular: Positive for chest pain.   Gastrointestinal: Negative for abdominal pain, nausea and vomiting.   Musculoskeletal: Negative for back pain and myalgias.   Skin: Negative for rash.   Neurological: Positive for weakness (generalized). Negative for dizziness, light-headedness and numbness.         VITALS     ED Vitals    Date/Time Temp Pulse Resp BP SpO2 [facility]   07/27/22 1504 -- 89 18 180/86 98 % CW   07/27/22 1420 -- -- -- 152/68 -- TG   07/27/22 1257 36.4 °C (97.6 °F) 81 20 152/68 97 % AII   07/27/22 1121 -- 118 18 166/79 98 %    07/27/22 1120 -- 110 -- -- --    07/27/22 0940 --  109 18 166/84 96 % SB        Pulse Ox %: 96 % (07/27/22 0940)  Pulse Ox Interpretation: Normal (07/27/22 0940)  Heart Rate: 109 (07/27/22 0940)  Rhythm Strip Interpretation: Atrial Flutter (07/27/22 0940)     Physical Exam   PHYSICAL EXAM     Physical Exam  Vitals and nursing note reviewed.   Constitutional:       Appearance: She is well-developed.      Comments: Appears fatigued and generally weak.   HENT:      Head: Normocephalic and atraumatic.      Nose: Nose normal.      Mouth/Throat:      Mouth: Mucous membranes are moist.   Eyes:      Conjunctiva/sclera: Conjunctivae normal.   Cardiovascular:      Rate and Rhythm: Tachycardia present. Rhythm irregularly irregular.      Heart sounds: Normal heart sounds.   Pulmonary:      Effort: Pulmonary effort is normal. No respiratory distress.      Breath sounds: Examination of the left-lower field reveals decreased breath sounds. Decreased breath sounds present.   Chest:      Chest wall: No tenderness.      Comments: No reproducible chest wall tenderness to palpation.  Abdominal:      Palpations: Abdomen is soft.      Tenderness: There is no abdominal tenderness.   Musculoskeletal:         General: Normal range of motion.      Cervical back: Normal range of motion and neck supple.      Right lower leg: No tenderness.      Left lower leg: No tenderness.   Lymphadenopathy:      Cervical: No cervical adenopathy.   Skin:     General: Skin is warm and dry.      Findings: No rash.   Neurological:      General: No focal deficit present.      Mental Status: She is alert and oriented to person, place, and time.   Psychiatric:         Mood and Affect: Mood normal.         Behavior: Behavior normal.           PROCEDURES     Procedures     DATA     Results     Procedure Component Value Units Date/Time    [place] Draw Panel [[id number]] Collected: 07/27/22 0954    Specimen: Blood, Venous Updated: 07/27/22 1802    Narrative:      The following orders were created for panel order  [place] Draw Panel.  Procedure                               Abnormality         Status                     ---------                               -----------         ------                     RAINBOW RED[[id number]]                                      Final result               RAINBOW LT BLUE[[id number]]                                  Final result               RAINBOW GOLD[[id number]]                                     Final result                 Please view results for these tests on the individual orders.    RAINBOW RED [[id number]] Collected: 07/27/22 0954    Specimen: Blood, Venous Updated: 07/27/22 1802    INGRID LT BLUE [[id number]] Collected: 07/27/22 0954    Specimen: Blood, Venous Updated: 07/27/22 1802    INGRID GOLD [[id number]] Collected: 07/27/22 0954    Specimen: Blood, Venous Updated: 07/27/22 1802    SARS-CoV-2 (COVID-19), PCR Nasopharynx [[id number]]  (Normal) Collected: 07/27/22 1344    Specimen: Nasopharyngeal Swab from Nasopharynx Updated: 07/27/22 1441    Narrative:      The following orders were created for panel order SARS-CoV-2 (COVID-19), PCR Nasopharynx.  Procedure                               Abnormality         Status                     ---------                               -----------         ------                     SARS-CoV-2 (COVID-19), P...[[id number]]  Normal              Final result                 Please view results for these tests on the individual orders.    SARS-CoV-2 (COVID-19), PCR Nasopharynx [[id number]]  (Normal) Collected: 07/27/22 1344    Specimen: Nasopharyngeal Swab from Nasopharynx Updated: 07/27/22 1441     SARS-CoV-2 (COVID-19) Negative    Narrative:      Testing performed using real-time PCR for detection of COVID-19. EUA approved validation studies performed on site.     Comprehensive metabolic panel [[id number]]  (Abnormal) Collected: 07/27/22 0954    Specimen: Blood, Venous Updated: 07/27/22 1033     Sodium 139 mEQ/L      Potassium 3.4 mEQ/L       Comment: Results obtained on plasma. Plasma Potassium values may be up to 0.4 mEQ/L less than serum values. The differences may be greater for patients with high platelet or white cell counts.        Chloride 106 mEQ/L      CO2 26 mEQ/L      BUN 32 mg/dL      Creatinine 1.1 mg/dL      Glucose 138 mg/dL      Calcium 9.2 mg/dL      AST (SGOT) 27 IU/L      ALT (SGPT) 19 IU/L      Alkaline Phosphatase 71 IU/L      Total Protein 6.9 g/dL      Comment: Test performed on plasma which typically contains approximately 0.4 g/dL more protein than serum.        Albumin 3.4 g/dL      Bilirubin, Total 0.8 mg/dL      eGFR 48.0 mL/min/1.73m*2      Anion Gap 7 mEQ/L     HS Troponin I (with 2 hour reflex) [[id number]]  (Abnormal) Collected: 07/27/22 0954    Specimen: Blood, Venous Updated: 07/27/22 1027     High Sens Troponin I 34.2 pg/mL     CBC and differential [[id number]]  (Abnormal) Collected: 07/27/22 0954    Specimen: Blood, Venous Updated: 07/27/22 1004     WBC 7.91 K/uL      RBC 3.72 M/uL      Hemoglobin 10.8 g/dL      Hematocrit 33.7 %      MCV 90.6 fL      MCH 29.0 pg      MCHC 32.0 g/dL      RDW 13.9 %      Platelets 257 K/uL      MPV 10.6 fL      Differential Type Auto     nRBC 0.0 %      Immature Granulocytes 0.3 %      Neutrophils 76.8 %      Lymphocytes 10.6 %      Monocytes 10.0 %      Eosinophils 1.8 %      Basophils 0.5 %      Immature Granulocytes, Absolute 0.02 K/uL      Neutrophils, Absolute 6.08 K/uL      Lymphocytes, Absolute 0.84 K/uL      Monocytes, Absolute 0.79 K/uL      Eosinophils, Absolute 0.14 K/uL      Basophils, Absolute 0.04 K/uL           Imaging Results          X-RAY CHEST 1 VIEW (Final result)  Result time 07/27/22 11:44:48    Final result                 Impression:    IMPRESSION:  New left pleural effusion and basilar atelectasis. Stable cardiomegaly.               Narrative:    Single frontal view chest x-ray    CLINICAL HISTORY:  Chest pain, weakness    COMPARISON: Chest x-ray dated  7/21/2022    COMMENT:    There is a new small to moderate left pleural effusion and basilar atelectasis.  The right lung is clear. No pneumothorax appreciated    There is stable cardiomegaly.  The osseous structures are stable.                                  ECG 12 lead   Final Result      ECG 12 lead   Final Result          Scoring tools                                 ED Course & MDM   MDM / ED COURSE / CLINICAL IMPRESSIONS / DISPO     MDM    ED Course as of 07/28/22 1405   Wed Jul 27, 2022   1101 Impression: Cp, generalized weakness,     Plan: labs, trop, EKG, CXR, consult cardiology    Discussed case with Dr. Ramos.  [EB]   1104 EKG shows Atrial flutter with rate of 109.  [EB]   1106 Cardiology to see. [EB]   1110 Potassium(!): 3.4  Will replace orally.  [EB]   1110 High Sens Troponin I(!): 34.2 [EB]   1155 CXR IMPRESSION:  New left pleural effusion and basilar atelectasis. Stable cardiomegaly. [EB]   1241 Spoke with cardiology who advised admission with plan to cardiovert. Paged HMS now.  [EB]      ED Course User Index  [EB] Sandra Bustamante PA C     Admit / Observation         Sandra Bustamante PA C  07/28/22 1405

## 2022-07-28 NOTE — ASSESSMENT & PLAN NOTE
- Seen by cardiologist morning and SUSI with cardioversion is not indicated at this time.  - Patient with pericardial effusion status post pacemaker placement during last admission  - May need pericardiocentesis  - EP consult  - Cont amiodarone  -Rate uncontrolled.  Metoprolol increased

## 2022-07-28 NOTE — ASSESSMENT & PLAN NOTE
- TTE: Moderate in size not associated with cardiac tamponade likely secondary to recent PPM placement  - Hold Eliquis per cardiology  - Start colchicine   - Cardiology following  - EP consult  - Febrile on admission.  Seen by ID monitor off antibiotics.  Incision site with no signs of infection  - Encourage incentive spirometer use    CT chest: Moderate pericardial effusion. Small left greater than right pleural effusions  -Repeat echocardiogram today.  Will reassess for pericardiocentesis  -Metoprolol increased.  -Blood pressure is elevated, but will not aggressively treat because of effusion  -EP consult

## 2022-07-28 NOTE — PROGRESS NOTES
"Daily Progress Note (LOS: 0)    Interval History:   Patient reports no further CP.  Breathing OK.  Echo showed moderate pericardial effusion. No plans for cardioversion.     Review of Systems:  All organ systems normal except as per HPI.    Current Medications:   acetaminophen  325 mg oral BID (9a, 10p)    [START ON 8/2/2022] amiodarone  200 mg oral Daily    amiodarone  400 mg oral BID    aspirin  81 mg oral Daily    clopidogreL  75 mg oral Daily    colchicine  0.6 mg oral Daily (6p)    famotidine  40 mg oral Nightly    metoprolol tartrate  12.5 mg oral BID    NON FORMULARY MEDICATION REQUEST  20 mg oral Nightly    pantoprazole  40 mg oral Daily    traMADoL  25 mg oral BID (9a, 10p)       Physical Exam:  Visit Vitals  BP (!) 159/93 (BP Location: Right upper arm, Patient Position: Lying)   Pulse (!) 120   Temp 36.5 °C (97.7 °F) (Axillary)   Resp 18   Ht 1.626 m (5' 4\")   Wt 79.4 kg (175 lb)   SpO2 92%   BMI 30.04 kg/m²       Gen: NAD  HEENT: no JVD, no thyromegaly  Heart: irregularly irregular, nl S1, S2, no m/r/g  Lungs: Clear bilaterally  Abd: soft, nt, nd, +bs  Ext: no edema      I&Os:    Intake/Output Summary (Last 24 hours) at 7/28/2022 0849  Last data filed at 7/28/2022 0826  Gross per 24 hour   Intake --   Output 900 ml   Net -900 ml       Weights:   Wt Readings from Last 3 Encounters:   07/27/22 79.4 kg (175 lb)   07/20/22 84.4 kg (186 lb)   06/09/22 79.8 kg (176 lb)       Labs:  Results from last 7 days   Lab Units 07/28/22  0453 07/27/22  1510 07/27/22  0954 07/25/22  1109   SODIUM mEQ/L 136  --  139 135*   POTASSIUM mEQ/L 3.9  --  3.4* 4.6   CHLORIDE mEQ/L 103  --  106 107   CO2 mEQ/L 23  --  26 19*   BUN mg/dL 22*  --  32* 41*   CREATININE mg/dL 1.0  --  1.1 1.4*   GLUCOSE mg/dL 120*  --  138* 119*   CALCIUM mg/dL 8.8*  --  9.2 8.8*   MAGNESIUM mg/dL 2.1 1.7*  --  1.8             Results from last 7 days   Lab Units 07/28/22  0453 07/27/22  1809 07/27/22  0954 07/22/22  0238   WBC K/uL 9.64  -- "  7.91 6.53   HEMOGLOBIN g/dL 10.9* 10.3* 10.8* 12.7   HEMATOCRIT % 33.5* 31.1* 33.7* 38.5   PLATELETS K/uL 278  --  257 242     Lab Results   Component Value Date     (H) 07/27/2022     Lab Results   Component Value Date    INR 1.1 05/26/2022       A/P: 77 year-old woman with PMHx CAD s/p recent PCI to RCA (6/2022), LCx, ILR, HTN, DLD, recent admission for new diagnosis of atrial fibrillation/flutter with conversion pauses, s/p DCPPM implantation and amio therapy, now returns with fatigue and pleuritic chest pain, found to have moderate pericardial effusion.      #Pericardial effusion  -Moderate in size not associated with cardiac tamponade  -Likely due to recent ppm implantation  -EP consulted  -Will likely proceed with CT chest today to assess lead placement and EP to review if leads need repositioning   -Hold eliquis - no plans for cardioversion  -Agree with ASA and plavix given recent PCI.    -OK to continue colchicine 0.6mg once daily as well  -Continue protonix for GI ppx   -Tramadol ordered for pain relief   -Cardiology team is debating a pericardiocentesis. No hemodynamic compromise currently.  Further imaging anticipated today.  Keep NPO for now.    #pAF/flutter, SSS   -Currently in atrial flutter but was in and out of sinus rhythm during last admission   -s/p DCPPM for the conversion pauses  -Continue amio load  -Eliquis on hold due to pericardial effusion  -No plans for DCCV given need to hold eliquis      #CAD, recent PCI  -Stable, no angina  -Continue ASA and plavix   -Continue simvastatin (previously did not tolerate rosuvastatin or atorvastatin)     #HTN  -BP has been elevated  -Given pericardial effusion, will not be overly aggressive with lowering BP  -Continue low dose metoprolol - may need to increase for ventricular rate control     #HLD  -Continue statin     EP to see patient today.  Further imaging anticipated. May need pericardiocentesis.  Keep NPO for now.

## 2022-07-28 NOTE — PLAN OF CARE
Problem: Adult Inpatient Plan of Care  Goal: Readiness for Transition of Care  Intervention: Mutually Develop Transition Plan  Flowsheets (Taken 7/28/2022 1129)  Anticipated Discharge Disposition: home with home health  Discharge Coordination/Progress: ECINed referral to [facility] and spoke with johan Mccallum  Assistive Device/Animal Currently Used at Home:   stair glide   walker, front-wheeled   shower chair   cane, straight  Concerns Comments: ECINed referral to [facility] and spoke with johan mccallum  Outpatient/Agency/Support Group Needs: homecare agency  Readmission Within the Last 30 Days: other (see comments)  Patient/Family Anticipated Services at Transition: home health care  Patient/Family Anticipates Transition to: home with family  Transportation Anticipated: family or friend will provide  Concerns to be Addressed:   care coordination/care conferences   discharge planning  Patient's Choice of Community Agency(s): [facility] is current with pt  Offered/Gave Vendor List: yes  Current Outpatient/Agency/Support Group: homecare agency  Type of Home Care Services:   nursing   home OT   Chart reviewed and met with pt. Pt is readmission to hospital and had been at home with [facility].  Pt resides with her spouse in a 2 story home.  Pt's bed and bath are on 2nd floor and she has 1/2 bath on 1st floor.  Pt amb with a walker and was I ADL prior to admission.  Pt drives and is retired.  Pt has stairglide, walker, showerchair, and cane at home.  Pt is current with [facility].  Pt wants to return to home with [facility] upon d/c.  Pt became nauseous and I notified nursing right away.  ECINed referral to [facility] and spoke with johan Mccallum.  PLAN: home with spouse and [facility].  Keiko LOPEZ LSW

## 2022-07-28 NOTE — PROGRESS NOTES
Hospital Medicine Service -  Daily Progress Note       SUBJECTIVE   Interval History: Reports that she is still feeling crappy.  Seen and examined.  Stable on room air.  No events overnight.     OBJECTIVE      Vital signs in last 24 hours:  Temp:  [36.5 °C (97.7 °F)-38.4 °C (101.2 °F)] 37.3 °C (99.2 °F)  Heart Rate:  [] 86  Resp:  [18] 18  BP: (147-183)/(68-93) 151/75    Intake/Output Summary (Last 24 hours) at 7/28/2022 1324  Last data filed at 7/28/2022 0826  Gross per 24 hour   Intake --   Output 900 ml   Net -900 ml       PHYSICAL EXAMINATION      Physical Exam  Eyes:      Extraocular Movements: Extraocular movements intact.      Pupils: Pupils are equal, round, and reactive to light.   Cardiovascular:      Rate and Rhythm: Normal rate. Rhythm irregular.      Pulses: Normal pulses.      Heart sounds: Normal heart sounds.   Abdominal:      General: Bowel sounds are normal.   Skin:     General: Skin is warm.   Neurological:      General: No focal deficit present.      Mental Status: She is alert and oriented to person, place, and time.   Psychiatric:         Mood and Affect: Mood normal.         Behavior: Behavior normal.         Thought Content: Thought content normal.         Judgment: Judgment normal.            LINES, CATHETERS, DRAINS, AIRWAYS, AND WOUNDS   Lines, Drains, and Airways:  Wounds (agree with documentation and present on admission):  External Urinary Catheter Female (one size) (Active)   Number of days: 7         Comments:      LABS / IMAGING / TELE      Labs  Results from last 7 days   Lab Units 07/28/22  0453 07/27/22  0954 07/25/22  1109   SODIUM mEQ/L 136 139 135*   POTASSIUM mEQ/L 3.9 3.4* 4.6   CHLORIDE mEQ/L 103 106 107   CO2 mEQ/L 23 26 19*   BUN mg/dL 22* 32* 41*   CREATININE mg/dL 1.0 1.1 1.4*   GLUCOSE mg/dL 120* 138* 119*   CALCIUM mg/dL 8.8* 9.2 8.8*     Results from last 7 days   Lab Units 07/28/22  0453 07/27/22  1809 07/27/22  0954 07/22/22  0238   WBC K/uL 9.64  --  7.91  6.53   HEMOGLOBIN g/dL 10.9* 10.3* 10.8* 12.7   HEMATOCRIT % 33.5* 31.1* 33.7* 38.5   PLATELETS K/uL 278  --  257 242       SARS-CoV-2 (COVID-19) (no units)   Date/Time Value   07/27/2022 1344 Negative       Imaging  X-RAY CHEST 1 VIEW    Result Date: 7/27/2022  Single frontal view chest x-ray CLINICAL HISTORY: Chest pain, weakness COMPARISON: Chest x-ray dated 7/21/2022 COMMENT: There is a new small to moderate left pleural effusion and basilar atelectasis. The right lung is clear. No pneumothorax appreciated There is stable cardiomegaly. The osseous structures are stable.     IMPRESSION: New left pleural effusion and basilar atelectasis. Stable cardiomegaly.     Transthoracic echo (TTE) limited    Result Date: 7/27/2022  1.  This is a technically limited study. 2.  Normal LV size, with mild LVH, with normal LV function with ejection fraction of 60 to 65%. 3.  No regional wall motion abnormalities seen within limits of the study. 4.  Small to moderate, and mostly moderate (1.5 cm) inferolateral pericardial effusion without tamponade physiology. 5.  No evidence of pulmonary hypertension . 6.  Dilated IVC.      ECG/Telemetry  I have independently reviewed the telemetry. No events for the last 24 hours.  Atrial flutter.  No events overnight.    ASSESSMENT AND PLAN      * Pericardial effusion  Assessment & Plan  - TTE: Moderate in size not associated with cardiac tamponade likely secondary to recent PPM placement  - Hold Eliquis per cardiology  - Start colchicine   - Cardiology following  - EP consult  - Febrile on admission.  Seen by ID monitor off antibiotics.  Incision site with no signs of infection  - Encourage incentive spirometer use    CT chest: Moderate pericardial effusion. Small left greater than right pleural effusions  -May need pericardiocentesis  -EP consult      GERD (gastroesophageal reflux disease)  Assessment & Plan  COnt Protonix     Hyperlipidemia  Assessment & Plan  Cont statin     Paroxysmal  atrial fibrillation (CMS/HCC)  Assessment & Plan  - Seen by cardiologist morning and SUSI with cardioversion is not indicated at this time.  - Patient with pericardial effusion status post pacemaker placement during last admission  - May need pericardiocentesis  - EP consult  - Cont amiodarone    Pacemaker  Assessment & Plan  Recent pacemaker placement, now with pericardial effusion  EP cardiology consult     Anemia  Assessment & Plan  Hgb 10.8, previously in the 12s on last admission  Holding Elqiuis   Cont DAPT with recent stent with acute stent thrombosis  Trend     Pleural effusion  Assessment & Plan  -New pericardial effusion  -On room air  -Cardiology consult  -Repeat echocardiogram        CAD (coronary artery disease)  Assessment & Plan  Recent stent placed 6/9/22 with acute stent thrombosis requiring a second cath  Currently on Plavix and Eliquis.  Since Eliquis on hold for pericardial effusion, will resume aspirin .  D/w cardiology  Cont DAPT, statin          VTE Assessment: Padua    VTE Prophylaxis:  Current anticoagulants:    None      Code Status: Full Code      Estimated Discharge Date: 7/29/2022     Disposition Planning: Awaiting EP recommendations.  Possible discharge in 1 to 2 days.     VENTURA Clifford  7/28/2022

## 2022-07-28 NOTE — CONSULTS
Infectious Disease Consult Note    Patient Name: Emily Ramirez  MR#: [id number]  : 1945  Admission Date: 2022  Consult Date: 22 9:11 AM   Consultant: Miguel Paiz MD    Reason for Consult: r/o SBE  Referring Provider: Jose Elias Borjas      History of Present Illness     Emily Ramirez is a 77 y.o. female with PMH of CKD, CAD, HTN who was admitted on 2022 with generalized weakness with mild cough which started since earlier this month after having cardiac stent placed on  and then developed in-stent thrombosis requiring a second cath soon after. She had PPM placed on  and then discharged two days ago but lethargy worsened associated with pleuritic chest discomfort. She denies any fever, abdominal pain, N/V/D, dysuria, or skin rashes. On admission, she was febrile at 101.2 without leukocytosis. CXR showed new left pleural effusion and basilar atelectasis and blood cx are in process while pt remains off antibiotics.     Outside records were reviewed.    Allergies:   Allergies   Allergen Reactions    Celebrex [Celecoxib]     Cipro [Ciprofloxacin Hcl]     Clindamycin     Crestor [Rosuvastatin]     Lipitor [Atorvastatin]     Voltaren [Diclofenac Sodium]        Medical History:   Past Medical History:   Diagnosis Date    Aortic regurgitation     Chronic kidney disease     Coronary artery disease     History of loop recorder     Hypertension     Lipid disorder     Mitral regurgitation     SVT (supraventricular tachycardia) (CMS/[facility])        Surgical History: History reviewed. No pertinent surgical history.    Social History:   Social History     Socioeconomic History    Marital status:      Spouse name: None    Number of children: None    Years of education: None    Highest education level: None   Tobacco Use    Smoking status: Never Smoker    Smokeless tobacco: Never Used   Substance and Sexual Activity    Alcohol use: Yes     Alcohol/week: 1.0 standard  "drink     Types: 1 Glasses of wine per week     Comment: 1x/month    Drug use: Not Currently    Sexual activity: Defer     Social Determinants of Health     Food Insecurity: No Food Insecurity    Worried About Running Out of Food in the Last Year: Never true    Ran Out of Food in the Last Year: Never true          Travel Exposure:   Travel and Exposure Screening    Flowsheet Row Most Recent Value   Travel Screening    Overnight hospitalization outside the [place] in the last year? No Filed On: 07/27/2022 0942   Exposure Screening    Symptoms         Family History: History reviewed. No pertinent family history.    Review of Systems    All other systems reviewed and negative except as noted in the HPI.    Medications:    Current IP Meds (From admission, onward)        Frequency     amiodarone (PACERONE) tablet 200 mg         Daily     NON FORMULARY MEDICATION REQUEST         Nightly     traMADoL (ULTRAM) tablet 25 mg         2 times daily (9a, 10p)     acetaminophen (TYLENOL) tablet 325 mg         2 times daily (9a, 10p)     traMADoL (ULTRAM) tablet 50 mg         Once     famotidine (PEPCID) tablet 40 mg         Nightly     amiodarone (PACERONE) tablet 400 mg         2 times daily     clopidogreL (PLAVIX) tablet 75 mg         Daily     pantoprazole (PROTONIX) tablet,delayed release (DR/EC) 40 mg         Daily     aspirin enteric coated tablet 81 mg         Daily     furosemide (LASIX) injection 20 mg         Once     magnesium sulfate IVPB 2g in 50 mL NSS/D5W/SWFI         As needed     acetaminophen (TYLENOL) tablet 650 mg  (Analgesics - Acetaminophen pain or fever)         Every 4 hours PRN     atropine injection 0.5 mg         Every 5 min PRN     nitroglycerin (NITROSTAT) SL tablet 0.4 mg         Every 5 min PRN     glucose chewable tablet 16-32 g of dextrose  (Hypoglycemia Treatment Protocol and Hyperglycemia Validation Protocol)        \"Or\" Linked Group Details    As needed     dextrose 40 % oral gel 15-30 g of " "dextrose  (Hypoglycemia Treatment Protocol and Hyperglycemia Validation Protocol)        Note to Pharmacy: Pharmacist: [facility] is the depot location for this medication.   \"Or\" Linked Group Details    As needed     glucagon (GLUCAGEN) injection 1 mg  (Hypoglycemia Treatment Protocol and Hyperglycemia Validation Protocol)        \"Or\" Linked Group Details    As needed     dextrose 50 % in water (D50) injection 12.5 g  (Hypoglycemia Treatment Protocol and Hyperglycemia Validation Protocol)        \"Or\" Linked Group Details    As needed     colchicine (COLCRYS) tablet 0.6 mg         Daily (6p)     metoprolol tartrate (LOPRESSOR) split tablet 12.5 mg         2 times daily     acetaminophen (TYLENOL) tablet 650 mg         Once     potassium chloride (KLOR-CON M) ER tablet (particles/crystals) 40 mEq  ([facility] ED MED QUICK LIST ELECTROLYTE REP)         Once     acetaminophen (TYLENOL) 325 mg tablet  - Pyxis Override Pull        Note to Pharmacy: Piper Marsh: cabinet override         metoprolol (LOPRESSOR) injection 5 mg  (BP Control )         Once     acetaminophen (TYLENOL) suppository 650 mg  Status:  Discontinued         Once     sodium chloride 0.9 % bolus 500 mL         Once                Objective     Vital Signs:    Patient Vitals for the past 72 hrs:   BP Temp Temp src Pulse Resp SpO2 Height Weight   07/28/22 0737 (!) 159/93 36.5 °C (97.7 °F) Axillary (!) 120 18 92 % -- --   07/28/22 0653 -- -- -- 96 -- -- -- --   07/28/22 0400 (!) 147/79 36.8 °C (98.2 °F) Oral 80 18 95 % -- --   07/28/22 0300 -- -- -- (!) 103 -- -- -- --   07/27/22 2300 (!) 148/78 37.8 °C (100 °F) Oral (!) 147 18 95 % -- --   07/27/22 2000 (!) 183/81 (!) 38.4 °C (101.2 °F) Oral 95 18 95 % -- --   07/27/22 1900 -- -- -- (!) 106 -- -- -- --   07/27/22 1657 -- -- -- 96 -- -- -- --   07/27/22 1656 (!) 179/88 37.2 °C (99 °F) Oral 95 18 97 % -- --   07/27/22 1504 (!) 180/86 -- -- 89 18 98 % -- --   07/27/22 1420 (!) 152/68 -- -- -- -- -- 1.626 m (5' 4\") " "79.4 kg (175 lb)   22 1257 (!) 152/68 36.4 °C (97.6 °F) Temporal 81 20 97 % -- --   22 1121 (!) 166/79 -- -- (!) 118 18 98 % -- --   22 1120 -- -- -- (!) 110 -- -- -- --   22 0940 (!) 166/84 -- -- (!) 109 18 96 % 1.626 m (5' 4\") 79.4 kg (175 lb)       Temp (72hrs), Av.2 °C (99 °F), Min:36.4 °C (97.6 °F), Max:38.4 °C (101.2 °F)      Physical Exam:    General appearance: alert and cooperative  Head: normocephalic, without obvious abnormality, atraumatic  Eyes: anicteric sclera  Neck: supple  Lungs: diminished breath sounds bilateral  Chest wall: PPM insertion site clean  Heart: regular rate and rhythm  Abdomen: soft, non-tender  Extremities: edema none  Joints: no swelling  Skin: no rashes  Neurologic: Grossly normal    Lines, Drains, Airways, Wounds:       Labs:    CBC Results       22     0453 1809 0954    WBC 9.64 -- 7.91    RBC 3.76 -- 3.72    HGB 10.9 10.3 10.8    HCT 33.5 31.1 33.7    MCV 89.1 -- 90.6    MCH 29.0 -- 29.0    MCHC 32.5 -- 32.0     -- 257      BMP Results       22     0453 0954 1109     139 135    K 3.9 3.4 4.6    Cl 103 106 107    CO2 23 26 19    Glucose 120 138 119    BUN 22 32 41    Creatinine 1.0 1.1 1.4    Calcium 8.8 9.2 8.8    Anion Gap 10 7 9    EGFR 53.6 48.0 36.4         Comment for K at 0954 on 22: Results obtained on plasma. Plasma Potassium values may be up to 0.4 mEQ/L less than serum values. The differences may be greater for patients with high platelet or white cell counts.    Comment for K at 1109 on 22: MODERATE HEMOLYSIS, RESULT MAY BE INCREASED.      PT/PTT Results       22     1250    PT 13.7    INR 1.1    PTT 30         Comment for INR at 1250 on 22: INR has no defined significance when PT is within Reference Range.      UA Results    No lab values to display.     Lactate Results    No lab values to display.         Microbiology Results     Procedure Component Value " Units Date/Time    SARS-CoV-2 (COVID-19), PCR Nasopharynx [[id number]]  (Normal) Collected: 07/27/22 1344    Specimen: Nasopharyngeal Swab from Nasopharynx Updated: 07/27/22 1441    Narrative:      The following orders were created for panel order SARS-CoV-2 (COVID-19), PCR Nasopharynx.  Procedure                               Abnormality         Status                     ---------                               -----------         ------                     SARS-CoV-2 (COVID-19), P...[[id number]]  Normal              Final result                 Please view results for these tests on the individual orders.    SARS-CoV-2 (COVID-19), PCR Nasopharynx [[id number]]  (Normal) Collected: 07/27/22 1344    Specimen: Nasopharyngeal Swab from Nasopharynx Updated: 07/27/22 1441     SARS-CoV-2 (COVID-19) Negative    Narrative:      Testing performed using real-time PCR for detection of COVID-19. EUA approved validation studies performed on site.     SARS-CoV-2 (COVID-19), PCR Nasopharynx [[id number]]  (Normal) Collected: 07/24/22 1655    Specimen: Nasopharyngeal Swab from Nasopharynx Updated: 07/24/22 1723    Narrative:      The following orders were created for panel order SARS-CoV-2 (COVID-19), PCR Nasopharynx.  Procedure                               Abnormality         Status                     ---------                               -----------         ------                     SARS-CoV-2 (COVID-19), P...[[id number]]  Normal              Final result                 Please view results for these tests on the individual orders.    SARS-CoV-2 (COVID-19), PCR Nasopharynx [[id number]]  (Normal) Collected: 07/24/22 1655    Specimen: Nasopharyngeal Swab from Nasopharynx Updated: 07/24/22 1723     SARS-CoV-2 (COVID-19) Negative    Narrative:      Testing performed using real-time PCR for detection of COVID-19. EUA approved validation studies performed on site.     SARS-CoV-2 (COVID-19), PCR Nasopharynx [[id number]]  (Normal)  Collected: 07/19/22 2158    Specimen: Nasopharyngeal Swab from Nasopharynx Updated: 07/19/22 2243    Narrative:      The following orders were created for panel order SARS-CoV-2 (COVID-19), PCR Nasopharynx.  Procedure                               Abnormality         Status                     ---------                               -----------         ------                     SARS-CoV-2 (COVID-19), P...[[id number]]  Normal              Final result                 Please view results for these tests on the individual orders.    SARS-CoV-2 (COVID-19), PCR Nasopharynx [[id number]]  (Normal) Collected: 07/19/22 2158    Specimen: Nasopharyngeal Swab from Nasopharynx Updated: 07/19/22 2243     SARS-CoV-2 (COVID-19) Negative    Narrative:      Testing performed using real-time PCR for detection of COVID-19. EUA approved validation studies performed on site.           Pathology Results     ** No results found for the last 720 hours. **          Echo:     Cardiac Imaging    TRANSTHORACIC ECHO (TTE) LIMITED 07/27/2022    Interpretation Summary  1.  This is a technically limited study.  2.  Normal LV size, with mild LVH, with normal LV function with ejection fraction of 60 to 65%.  3.  No regional wall motion abnormalities seen within limits of the study.  4.  Small to moderate, and mostly moderate (1.5 cm) inferolateral pericardial effusion without tamponade physiology.  5.  No evidence of pulmonary hypertension .  6.  Dilated IVC.      Imaging:    Radiology Imaging    XR CHEST 1 VW    Narrative  Single frontal view chest x-ray    CLINICAL HISTORY:  Chest pain, weakness    COMPARISON: Chest x-ray dated 7/21/2022    COMMENT:    There is a new small to moderate left pleural effusion and basilar atelectasis.  The right lung is clear. No pneumothorax appreciated    There is stable cardiomegaly.  The osseous structures are stable.      --    Impression  New left pleural effusion and basilar atelectasis. Stable  cardiomegaly.      Assessment     1. Fever - unclear etiology in the setting of recent PPM placement on 7/21. Possibly due to pericardial effusion but need to r/o bacteremia. Chest wall site shows no signs of incision site infection. CXR shows new left pleural effusion and basilar atelectasis and TTE showed mostly moderate inferolateral pericardial effusion without tamponade physiology.        Plan     1. Follow blood cx and monitor fever curve while off antibiotics and encouraged IS use.

## 2022-07-29 ENCOUNTER — APPOINTMENT (INPATIENT)
Dept: CARDIOLOGY | Facility: HOSPITAL | Age: 77
DRG: 315 | End: 2022-07-29
Attending: NURSE PRACTITIONER
Payer: MEDICARE

## 2022-07-29 ENCOUNTER — APPOINTMENT (INPATIENT)
Dept: CARDIOLOGY | Facility: HOSPITAL | Age: 77
DRG: 315 | End: 2022-07-29
Attending: PHYSICIAN ASSISTANT
Payer: MEDICARE

## 2022-07-29 LAB
ANION GAP SERPL CALC-SCNC: 9 MEQ/L (ref 3–15)
APPEARANCE FLD: ABNORMAL
APTT PPP: 34 SEC (ref 23–35)
BODY FLD TYPE: ABNORMAL
BSA FOR ECHO PROCEDURE: 1.94 M2
BSA FOR ECHO PROCEDURE: 1.94 M2
BUN SERPL-MCNC: 24 MG/DL (ref 8–20)
CALCIUM SERPL-MCNC: 9.1 MG/DL (ref 8.9–10.3)
CHLORIDE SERPL-SCNC: 99 MEQ/L (ref 98–109)
CO2 SERPL-SCNC: 27 MEQ/L (ref 22–32)
COLOR FLD: ABNORMAL
CREAT SERPL-MCNC: 1 MG/DL (ref 0.6–1.1)
EOSINOPHIL NFR FLD MANUAL: 1 %
ERYTHROCYTE [DISTWIDTH] IN BLOOD BY AUTOMATED COUNT: 13.8 % (ref 11.7–14.4)
GFR SERPL CREATININE-BSD FRML MDRD: 53.6 ML/MIN/1.73M*2
GLUCOSE BLD-MCNC: 104 MG/DL (ref 70–99)
GLUCOSE SERPL-MCNC: 112 MG/DL (ref 70–99)
HCT VFR BLDCO AUTO: 32.1 % (ref 35–45)
HGB BLD-MCNC: 10.7 G/DL (ref 11.8–15.7)
INR PPP: 1.4
LYMPHOCYTES NFR FLD MANUAL: 37 %
MAGNESIUM SERPL-MCNC: 1.9 MG/DL (ref 1.8–2.5)
MCH RBC QN AUTO: 28.8 PG (ref 28–33.2)
MCHC RBC AUTO-ENTMCNC: 33.3 G/DL (ref 32.2–35.5)
MCV RBC AUTO: 86.3 FL (ref 83–98)
MONOS+MACROS NFR FLD MANUAL: 16 %
NEUTROPHILS NFR FLD MANUAL: 46 %
PDW BLD AUTO: 10.4 FL (ref 9.4–12.3)
PLATELET # BLD AUTO: 300 K/UL (ref 150–369)
POCT TEST: ABNORMAL
POTASSIUM SERPL-SCNC: 3.8 MEQ/L (ref 3.6–5.1)
PROTHROMBIN TIME: 16.6 SEC (ref 12.2–14.5)
RBC # BLD AUTO: 3.72 M/UL (ref 3.93–5.22)
RBC # SNV: ABNORMAL CELLS/CU MM (ref 0–10000)
SODIUM SERPL-SCNC: 135 MEQ/L (ref 136–144)
SPECIMEN SOURCE: ABNORMAL
WBC # BLD AUTO: 9.64 K/UL (ref 3.8–10.5)
WBC # SNV AUTO: 3442 CELLS/CU MM (ref 0–200)

## 2022-07-29 PROCEDURE — 33016 PERICARDIOCENTESIS W/IMAGING: CPT | Performed by: INTERNAL MEDICINE

## 2022-07-29 PROCEDURE — 71000011 HC PACU PHASE 1 EA ADDL MIN: Performed by: INTERNAL MEDICINE

## 2022-07-29 PROCEDURE — 80048 BASIC METABOLIC PNL TOTAL CA: CPT

## 2022-07-29 PROCEDURE — 63700000 HC SELF-ADMINISTRABLE DRUG

## 2022-07-29 PROCEDURE — 83735 ASSAY OF MAGNESIUM: CPT

## 2022-07-29 PROCEDURE — 36415 COLL VENOUS BLD VENIPUNCTURE: CPT

## 2022-07-29 PROCEDURE — 88112 CYTOPATH CELL ENHANCE TECH: CPT | Performed by: PHYSICIAN ASSISTANT

## 2022-07-29 PROCEDURE — 63700000 HC SELF-ADMINISTRABLE DRUG: Performed by: INTERNAL MEDICINE

## 2022-07-29 PROCEDURE — 87205 SMEAR GRAM STAIN: CPT | Performed by: PHYSICIAN ASSISTANT

## 2022-07-29 PROCEDURE — 87206 SMEAR FLUORESCENT/ACID STAI: CPT | Performed by: PHYSICIAN ASSISTANT

## 2022-07-29 PROCEDURE — 93308 TTE F-UP OR LMTD: CPT

## 2022-07-29 PROCEDURE — 63600000 HC DRUGS/DETAIL CODE: Performed by: PHYSICIAN ASSISTANT

## 2022-07-29 PROCEDURE — 89051 BODY FLUID CELL COUNT: CPT | Performed by: PHYSICIAN ASSISTANT

## 2022-07-29 PROCEDURE — C1894 INTRO/SHEATH, NON-LASER: HCPCS | Performed by: INTERNAL MEDICINE

## 2022-07-29 PROCEDURE — 99233 SBSQ HOSP IP/OBS HIGH 50: CPT | Performed by: HOSPITALIST

## 2022-07-29 PROCEDURE — 20600000 HC ROOM AND CARE INTERMEDIATE/TELEMETRY

## 2022-07-29 PROCEDURE — 85027 COMPLETE CBC AUTOMATED: CPT

## 2022-07-29 PROCEDURE — 85730 THROMBOPLASTIN TIME PARTIAL: CPT | Performed by: PHYSICIAN ASSISTANT

## 2022-07-29 PROCEDURE — 25000000 HC PHARMACY GENERAL: Performed by: INTERNAL MEDICINE

## 2022-07-29 PROCEDURE — 87015 SPECIMEN INFECT AGNT CONCNTJ: CPT | Performed by: PHYSICIAN ASSISTANT

## 2022-07-29 PROCEDURE — 63600000 HC DRUGS/DETAIL CODE: Performed by: INTERNAL MEDICINE

## 2022-07-29 PROCEDURE — 71000001 HC PACU PHASE 1 INITIAL 30MIN: Performed by: INTERNAL MEDICINE

## 2022-07-29 PROCEDURE — 63700000 HC SELF-ADMINISTRABLE DRUG: Performed by: PHYSICIAN ASSISTANT

## 2022-07-29 PROCEDURE — 0W9D3ZZ DRAINAGE OF PERICARDIAL CAVITY, PERCUTANEOUS APPROACH: ICD-10-PCS | Performed by: INTERNAL MEDICINE

## 2022-07-29 PROCEDURE — 27200000 HC STERILE SUPPLY: Performed by: INTERNAL MEDICINE

## 2022-07-29 PROCEDURE — 87102 FUNGUS ISOLATION CULTURE: CPT | Performed by: PHYSICIAN ASSISTANT

## 2022-07-29 PROCEDURE — 85610 PROTHROMBIN TIME: CPT | Performed by: PHYSICIAN ASSISTANT

## 2022-07-29 PROCEDURE — 87070 CULTURE OTHR SPECIMN AEROBIC: CPT | Performed by: PHYSICIAN ASSISTANT

## 2022-07-29 RX ORDER — METOPROLOL TARTRATE 25 MG/1
25 TABLET, FILM COATED ORAL 2 TIMES DAILY
Status: DISCONTINUED | OUTPATIENT
Start: 2022-07-29 | End: 2022-08-01

## 2022-07-29 RX ORDER — LIDOCAINE HYDROCHLORIDE 10 MG/ML
INJECTION, SOLUTION INFILTRATION; PERINEURAL
Status: DISCONTINUED | OUTPATIENT
Start: 2022-07-29 | End: 2022-07-29 | Stop reason: HOSPADM

## 2022-07-29 RX ORDER — MIDAZOLAM HYDROCHLORIDE 2 MG/2ML
INJECTION, SOLUTION INTRAMUSCULAR; INTRAVENOUS
Status: DISCONTINUED | OUTPATIENT
Start: 2022-07-29 | End: 2022-07-29 | Stop reason: HOSPADM

## 2022-07-29 RX ORDER — HEPARIN SODIUM 10000 [USP'U]/100ML
100-4000 INJECTION, SOLUTION INTRAVENOUS
Status: DISCONTINUED | OUTPATIENT
Start: 2022-07-29 | End: 2022-08-01

## 2022-07-29 RX ORDER — FENTANYL CITRATE 50 UG/ML
INJECTION, SOLUTION INTRAMUSCULAR; INTRAVENOUS
Status: DISCONTINUED | OUTPATIENT
Start: 2022-07-29 | End: 2022-07-29 | Stop reason: HOSPADM

## 2022-07-29 RX ADMIN — COLCHICINE 0.6 MG: 0.6 TABLET, FILM COATED ORAL at 19:01

## 2022-07-29 RX ADMIN — ACETAMINOPHEN 325 MG: 325 TABLET, FILM COATED ORAL at 21:28

## 2022-07-29 RX ADMIN — METOPROLOL TARTRATE 25 MG: 25 TABLET, FILM COATED ORAL at 20:24

## 2022-07-29 RX ADMIN — METOPROLOL TARTRATE 25 MG: 25 TABLET, FILM COATED ORAL at 08:56

## 2022-07-29 RX ADMIN — ACETAMINOPHEN 325 MG: 325 TABLET, FILM COATED ORAL at 08:55

## 2022-07-29 RX ADMIN — CLOPIDOGREL BISULFATE 75 MG: 75 TABLET, FILM COATED ORAL at 08:55

## 2022-07-29 RX ADMIN — TRAMADOL HYDROCHLORIDE 25 MG: 50 TABLET, COATED ORAL at 21:28

## 2022-07-29 RX ADMIN — TRAMADOL HYDROCHLORIDE 25 MG: 50 TABLET, COATED ORAL at 08:55

## 2022-07-29 RX ADMIN — SIMVASTATIN 20 MG: 20 TABLET, FILM COATED ORAL at 21:28

## 2022-07-29 RX ADMIN — FAMOTIDINE 40 MG: 20 TABLET, FILM COATED ORAL at 21:29

## 2022-07-29 RX ADMIN — ASPIRIN 81 MG: 81 TABLET, COATED ORAL at 08:55

## 2022-07-29 RX ADMIN — AMIODARONE HYDROCHLORIDE 400 MG: 200 TABLET ORAL at 20:24

## 2022-07-29 RX ADMIN — HEPARIN SODIUM 1000 UNITS/HR: 10000 INJECTION, SOLUTION INTRAVENOUS at 18:58

## 2022-07-29 RX ADMIN — AMIODARONE HYDROCHLORIDE 400 MG: 200 TABLET ORAL at 08:55

## 2022-07-29 RX ADMIN — PANTOPRAZOLE SODIUM 40 MG: 40 TABLET, DELAYED RELEASE ORAL at 08:55

## 2022-07-29 RX ADMIN — CETIRIZINE HYDROCHLORIDE 5 MG: 5 TABLET ORAL at 08:55

## 2022-07-29 NOTE — PLAN OF CARE
Problem: Fall Injury Risk  Goal: Absence of Fall and Fall-Related Injury  Outcome: Progressing     Problem: Skin Injury Risk Increased  Goal: Skin Health and Integrity  Outcome: Progressing   Plan of Care Review  Plan of Care Reviewed With: patient  Progress: improving  Outcome Summary: VSS, pericardialcentesis completed today. Hep GTT started. Plan for echo tomorrow. Remains in Afib. Denies pain and SOB

## 2022-07-29 NOTE — PROGRESS NOTES
1112: Pt discussed in rounds, chart reviewed. Plan is home with [facility] when pt medically stable for d/c.    RNCC received SC from VENTURA Lu, pt not stable for d/c today. RNCC will continue to follow to support d/c plan.

## 2022-07-29 NOTE — PROGRESS NOTES
[facility]....Pt was in a recent episode of homecare for home RN, PT visits.      New referral started today.        HC Referral Faxed to Weekend Hold Bin..    Please call [phone] if patient is Discharged over the Weekend to home.  Thanks brenda

## 2022-07-29 NOTE — PROGRESS NOTES
"Daily Progress Note (LOS: 1)    Interval History:   Had some pleuritic CP with deep inspiration.  Mild SOB this AM.  Atrial flutter with mildly elevated ventricular rate on telemetry.     Review of Systems:  All organ systems normal except as per HPI.    Current Medications:   acetaminophen  325 mg oral BID (9a, 10p)    [START ON 8/2/2022] amiodarone  200 mg oral Daily    amiodarone  400 mg oral BID    aspirin  81 mg oral Daily    cetirizine  5 mg oral Daily    clopidogreL  75 mg oral Daily    colchicine  0.6 mg oral Daily (6p)    famotidine  40 mg oral Nightly    metoprolol tartrate  12.5 mg oral BID    pantoprazole  40 mg oral Daily    simvastatin  20 mg oral Nightly    traMADoL  25 mg oral BID (9a, 10p)       Physical Exam:  Visit Vitals  BP (!) 153/92 (BP Location: Right upper arm, Patient Position: Lying)   Pulse (!) 113   Temp 37 °C (98.6 °F) (Oral)   Resp 18   Ht 1.626 m (5' 4\")   Wt 83.2 kg (183 lb 6.4 oz)   SpO2 97%   BMI 31.48 kg/m²       Gen: NAD  HEENT: no JVD, no thyromegaly  Heart: tachycardic, irregular, nl S1, S2, no m/r/g  Lungs: Clear bilaterally  Abd: soft, nt, nd, +bs  Ext: trace edema  Neuro: AAOx3  Skin: no significant skin lesions  Psych: appropriate mood and affect      I&Os:    Intake/Output Summary (Last 24 hours) at 7/29/2022 0736  Last data filed at 7/28/2022 0826  Gross per 24 hour   Intake --   Output 900 ml   Net -900 ml       Weights:   Wt Readings from Last 3 Encounters:   07/28/22 83.2 kg (183 lb 6.4 oz)   07/20/22 84.4 kg (186 lb)   06/09/22 79.8 kg (176 lb)       Labs:  Results from last 7 days   Lab Units 07/29/22  0426 07/28/22  0453 07/27/22  1510 07/27/22  0954   SODIUM mEQ/L 135* 136  --  139   POTASSIUM mEQ/L 3.8 3.9  --  3.4*   CHLORIDE mEQ/L 99 103  --  106   CO2 mEQ/L 27 23  --  26   BUN mg/dL 24* 22*  --  32*   CREATININE mg/dL 1.0 1.0  --  1.1   GLUCOSE mg/dL 112* 120*  --  138*   CALCIUM mg/dL 9.1 8.8*  --  9.2   MAGNESIUM mg/dL 1.9 2.1 1.7*  --            "   Results from last 7 days   Lab Units 07/29/22  0426 07/28/22  0453 07/27/22  1809 07/27/22  0954   WBC K/uL 9.64 9.64  --  7.91   HEMOGLOBIN g/dL 10.7* 10.9* 10.3* 10.8*   HEMATOCRIT % 32.1* 33.5* 31.1* 33.7*   PLATELETS K/uL 300 278  --  257     Lab Results   Component Value Date     (H) 07/27/2022     Lab Results   Component Value Date    INR 1.1 05/26/2022       A/P: 77 year-old woman with PMHx CAD s/p recent PCI to RCA (6/2022), LCx, ILR, HTN, DLD, recent admission for new diagnosis of atrial fibrillation/flutter with conversion pauses, s/p DCPPM implantation and amio therapy, now returns with fatigue and pleuritic chest pain, found to have moderate pericardial effusion.      #Pericardial effusion  -Moderate in size not associated with cardiac tamponade  -Likely due to recent ppm implantation  -EP following.   -CT chest suggested normal lead placement  -Repeat limited echo today - if effusion not larger, will likely stop ASA and resume therapeutic AC    -OK to continue colchicine 0.6mg once daily as well  -Continue protonix for GI ppx   -Tramadol ordered for pain relief   -No plans for pericardiocentesis currently.  If effusion larger, will need to reconsider.      #pAF/flutter, SSS   -Currently in atrial flutter but was in and out of sinus rhythm during last admission   -s/p DCPPM for the conversion pauses  -Continue amio load  -Eliquis on hold due to pericardial effusion  -No plans for DCCV as not likely to be successful   -Will increase metoprolol for better rate control.      #CAD, recent PCI  -Stable, no angina  -Continue ASA and plavix   -Continue simvastatin (previously did not tolerate rosuvastatin or atorvastatin)     #HTN  -BP has been elevated  -Given pericardial effusion, will not be overly aggressive with lowering BP  -Will increase metoprolol as above      #HLD  -Continue statin      Limited echo today.

## 2022-07-29 NOTE — DISCHARGE INSTRUCTIONS
[facility]....[phone]...Office will call you to schedule the RN , PT visits....Please call the Office if you have not gotten a Scheduling Call within 24 hrs    Please follow-up with your primary care physician and you cardiology 1 week after discharge from hospital    Placed discussed with cardiology for timing repeat echocardiogram to evaluate pericardial effusion    Your  blood pressure widely change, will start metoprolol 50 mg twice daily, please check your blood pressure at home discussed with cardiology for further management

## 2022-07-29 NOTE — POST-PROCEDURE NOTE
Patient resting comfortably post procedure, report given by Hailee SAENZ to Pete SAENZ, patient's family updated by Dr. Gallo, right chest wall dressing c/d/i, will continue to monitor.

## 2022-07-29 NOTE — PROGRESS NOTES
Hospital Medicine Service -  Daily Progress Note       SUBJECTIVE   Interval History: She reports feeling well.  Seen and examined.  No events overnight.     OBJECTIVE      Vital signs in last 24 hours:  Temp:  [36.8 °C (98.3 °F)-37.3 °C (99.2 °F)] 37.1 °C (98.7 °F)  Heart Rate:  [] 116  Resp:  [18] 18  BP: (137-163)/(75-92) 139/82  No intake or output data in the 24 hours ending 07/29/22 0925    PHYSICAL EXAMINATION      Physical Exam  Eyes:      Extraocular Movements: Extraocular movements intact.      Pupils: Pupils are equal, round, and reactive to light.   Cardiovascular:      Rate and Rhythm: Tachycardia present. Rhythm irregular.      Pulses: Normal pulses.      Heart sounds: Normal heart sounds.   Pulmonary:      Effort: Pulmonary effort is normal.      Breath sounds: Normal breath sounds.   Abdominal:      General: Bowel sounds are normal.      Palpations: Abdomen is soft.   Skin:     General: Skin is warm.   Neurological:      General: No focal deficit present.      Mental Status: She is alert and oriented to person, place, and time.   Psychiatric:         Mood and Affect: Mood normal.         Behavior: Behavior normal.         Thought Content: Thought content normal.         Judgment: Judgment normal.            LINES, CATHETERS, DRAINS, AIRWAYS, AND WOUNDS   Lines, Drains, and Airways:  Wounds (agree with documentation and present on admission):  External Urinary Catheter Female (one size) (Active)   Number of days: 8         Comments:      LABS / IMAGING / TELE      Labs  Results from last 7 days   Lab Units 07/29/22  0426 07/28/22  0453 07/27/22  0954   SODIUM mEQ/L 135* 136 139   POTASSIUM mEQ/L 3.8 3.9 3.4*   CHLORIDE mEQ/L 99 103 106   CO2 mEQ/L 27 23 26   BUN mg/dL 24* 22* 32*   CREATININE mg/dL 1.0 1.0 1.1   GLUCOSE mg/dL 112* 120* 138*   CALCIUM mg/dL 9.1 8.8* 9.2     Results from last 7 days   Lab Units 07/29/22  0426 07/28/22  0453 07/27/22  1809 07/27/22  0954   WBC K/uL 9.64 9.64   --  7.91   HEMOGLOBIN g/dL 10.7* 10.9* 10.3* 10.8*   HEMATOCRIT % 32.1* 33.5* 31.1* 33.7*   PLATELETS K/uL 300 278  --  257       SARS-CoV-2 (COVID-19) (no units)   Date/Time Value   07/27/2022 1344 Negative       Imaging  CT CHEST WITHOUT IV CONTRAST    Result Date: 7/28/2022  CLINICAL HISTORY:  Pericardial effusion/possible pacemaker lead displacement. Chest pain. COMPARISON:    Same day chest radiograph. COMMENT: Technique: CT of the Chest was performed without IV contrast.  KV and/or mA were adjusted according to the patient's size and body part for CT dose reduction. CHEST: Lungs: Bibasilar atelectasis. Few scattered sub-6 mm pulmonary nodules, such as a 5 mm nodule in the right upper lobe on series 3 image 87. Airways: Widely patent central airways. Pleura: Small left greater than right pleural effusions. Lower Neck: within normal limits. Axilla: No enlarged nodes. Mediastinum and May: No enlarged nodes. Esophagus: Moderate to large hiatal hernia containing at least half of the stomach. Patulous esophagus containing debris, compatible with gastroesophageal reflux. Heart and Pericardium: Moderate pericardial effusion. Normal heart size. Scattered coronary arterial calcifications and stents. Vessels:  Atherosclerotic changes of the aorta and coronary arteries. Chest Wall: Left chest wall battery pack with surrounding inflammatory change but without definite drainable collection. Leads are subclavian approach with the atrial lead terminating at the inferior right atrial appendage/superior right atrium, tip likely within the lumen. The ventricular lead makes an abrupt turn with tip terminating overlying the expected location of the interventricular septum rather than the apex. Upper Abdomen:  Nonobstructing subcentimeter left renal stone. There is atrophy of the medial lobe of the liver with questionable hypodensity at the junction of segments 2 and 4A. Bones: Multilevel degenerative spondylosis with preserved  vertebral body heights. Bilateral shoulder arthroplasties.     IMPRESSION: 1. Left chest wall battery pack with surrounding inflammatory change but without definite drainable collection. This could be further evaluated with ultrasound, as warranted. 2. Atrial lead terminates at the inferior right atrial appendage/superior right atrium, tip likely within the lumen; ventricular lead makes an abrupt turn with tip terminating overlying the expected location of the interventricular septum rather than the apex, uncertain if within the septum. Consider contrast enhanced chest CT for better delineation of anatomy if intervention is planned. 3. Moderate pericardial effusion. Small left greater than right pleural effusions. 4. Atrophy of medial lobe of liver, suggesting cirrhosis, with questionable hypodensity at the junction of segments 2 and 4A. Recommend correlation with prior imaging; if not available, outpatient contrast enhanced MR could be obtained. 5. Few scattered sub-6 mm pulmonary nodules. If the patient is at high risk for malignancy, consider follow-up in 12 months. 6. Moderate to large hiatal hernia with gastroesophageal reflux.      ECG/Telemetry  I have independently reviewed the telemetry. No events for the last 24 hours.  Atrial fibrillation.  No events overnight.    ASSESSMENT AND PLAN      * Pericardial effusion  Assessment & Plan  - TTE: Moderate in size not associated with cardiac tamponade likely secondary to recent PPM placement  - Hold Eliquis per cardiology  - Start colchicine   - Cardiology following  - EP consult  - Febrile on admission.  Seen by ID monitor off antibiotics.  Incision site with no signs of infection  - Encourage incentive spirometer use    CT chest: Moderate pericardial effusion. Small left greater than right pleural effusions  -Repeat echocardiogram today.  Will reassess for pericardiocentesis  -Metoprolol increased.  -Blood pressure is elevated, but will not aggressively treat  because of effusion  -EP consult      GERD (gastroesophageal reflux disease)  Assessment & Plan  COnt Protonix     Hyperlipidemia  Assessment & Plan  Cont statin     Paroxysmal atrial fibrillation (CMS/HCC)  Assessment & Plan  - Seen by cardiologist morning and SUSI with cardioversion is not indicated at this time.  - Patient with pericardial effusion status post pacemaker placement during last admission  - May need pericardiocentesis  - EP consult  - Cont amiodarone  -Rate uncontrolled.  Metoprolol increased    Pacemaker  Assessment & Plan  Recent pacemaker placement, now with pericardial effusion  EP cardiology consult     Anemia  Assessment & Plan  Hgb 10.8, previously in the 12s on last admission  Holding Elqiuis   Cont DAPT with recent stent with acute stent thrombosis  Trend     Pleural effusion  Assessment & Plan  -New pericardial effusion  -On room air  -Cardiology consult  -Repeat echocardiogram        CAD (coronary artery disease)  Assessment & Plan  Recent stent placed 6/9/22 with acute stent thrombosis requiring a second cath  Currently on Plavix and Eliquis.  Since Eliquis on hold for pericardial effusion, will resume aspirin .  D/w cardiology  Cont DAPT, statin          VTE Assessment: Padua    VTE Prophylaxis:  Current anticoagulants:    None      Code Status: Full Code      Estimated Discharge Date: 8/1/2022     Disposition Planning: Monitoring pleural effusion.  Possible discharge in 3 to 4 days.     VENTURA Clifford  7/29/2022

## 2022-07-29 NOTE — ASSESSMENT & PLAN NOTE
- TTE: Moderate in size not associated with cardiac tamponade likely secondary to recent PPM placement    Dx: Pericardial effusion concerning pericarditis    - Hold Eliquis per cardiology  - Start colchicine   - Cardiology following  - EP consult  - Febrile on admission.  Seen by ID monitor off antibiotics.  Incision site with no signs of infection  - Encourage incentive spirometer use    CT chest: Moderate pericardial effusion. Small left greater than right pleural effusions  -Repeat echocardiogram today.  Will reassess for pericardiocentesis  -Blood pressure is elevated, but will not aggressively treat because of effusion  -EP consult    Status post pericardiocentesis on July 29, 2022  Successful pericardiocentesis with 300 cc of fluid removed.  F/u fluid study   Colchicine discontinued because of increased diarrhea  8/1 repeat echocardiogram

## 2022-07-29 NOTE — PATIENT CARE CONFERENCE
Care Progression Rounds Note  Date: 7/29/2022  Time: 9:15 AM     Patient Name: Emily Ramirez     Medical Record Number: [id number]   YOB: 1945  Sex: Female      Room/Bed: 3029    Admitting Diagnosis: Atrial fibrillation (CMS/HCC) [I48.91]  Paroxysmal atrial fibrillation (CMS/HCC) [I48.0]  Tachy-marco a syndrome (CMS/HCC) [I49.5]  Elevated troponin [R77.8]  Generalized weakness [R53.1]  Coronary artery disease involving native coronary artery of native heart without angina pectoris [I25.10]  Atrial fibrillation, unspecified type (CMS/HCC) [I48.91]   Admit Date/Time: 7/27/2022  9:33 AM    Primary Diagnosis: Pericardial effusion  Principal Problem: Pericardial effusion    GMLOS: 2.8  Anticipated Discharge Date: 8/1/2022    AM-PAC:  Mobility Score:      Discharge Planning:  Concerns to be Addressed: care coordination/care conferences, discharge planning  Anticipated Discharge Disposition: home with home health, home with assistance  Type of Home Care Services: nursing, home OT    Barriers to Discharge:  Medical issues not resolved, Test pending    Comments:       Participants:  , nursing

## 2022-07-29 NOTE — PROGRESS NOTES
"Infectious Disease Progress Note    Patient Name: Emily Ramirez  MR#: [id number]  : 1945  Admission Date: 2022  Date: 22   Time: 8:42 AM   Author: Miguel Paiz MD    Major Events: none    Antibiotics:        Subjective     Review of Systems    Still feels lousy and nauseous but remained afebrile overnight and denies worsening pain. Remainder of 12 ROS is unchanged.     Objective     Vital Signs:    Patient Vitals for the past 72 hrs:   BP Temp Temp src Pulse Resp SpO2 Height Weight   22 0751 139/82 -- -- -- -- -- 1.626 m (5' 4\") 83 kg (183 lb)   22 0749 139/82 37.1 °C (98.7 °F) Oral (!) 116 18 93 % -- --   22 0700 -- -- -- (!) 106 -- -- -- --   22 0417 (!) 153/92 37 °C (98.6 °F) Oral (!) 113 18 97 % -- --   22 2309 (!) 146/92 37.1 °C (98.8 °F) Oral (!) 109 18 95 % -- --   22 1934 (!) 163/77 36.9 °C (98.5 °F) Oral (!) 108 18 94 % -- --   22 1900 -- -- -- 100 -- -- -- --   22 1615 137/84 36.8 °C (98.3 °F) Oral 80 18 94 % -- --   22 1455 -- -- -- 86 -- -- -- --   22 1154 (!) 151/75 37.3 °C (99.2 °F) Oral 86 18 94 % -- --   22 0900 -- -- -- -- -- -- -- 83.2 kg (183 lb 6.4 oz)   22 0737 (!) 159/93 36.5 °C (97.7 °F) Axillary (!) 120 18 92 % -- --   22 0653 -- -- -- 96 -- -- -- --   22 0400 (!) 147/79 36.8 °C (98.2 °F) Oral 80 18 95 % -- --   22 0300 -- -- -- (!) 103 -- -- -- --   22 2300 (!) 148/78 37.8 °C (100 °F) Oral (!) 147 18 95 % -- --   22 2000 (!) 183/81 (!) 38.4 °C (101.2 °F) Oral 95 18 95 % -- --   22 1900 -- -- -- (!) 106 -- -- -- --   22 1657 -- -- -- 96 -- -- -- --   22 1656 (!) 179/88 37.2 °C (99 °F) Oral 95 18 97 % -- --   22 1504 (!) 180/86 -- -- 89 18 98 % -- --   22 1420 (!) 152/68 -- -- -- -- -- 1.626 m (5' 4\") 79.4 kg (175 lb)   22 1257 (!) 152 36.4 °C (97.6 °F) Temporal 81 20 97 % -- --   22 1121 (!) 166/79 -- -- (!) 118 18 98 " "% -- --   22 1120 -- -- -- (!) 110 -- -- -- --   22 0940 (!) 166/84 -- -- (!) 109 18 96 % 1.626 m (5' 4\") 79.4 kg (175 lb)       Temp (72hrs), Av.1 °C (98.8 °F), Min:36.4 °C (97.6 °F), Max:38.4 °C (101.2 °F)      Physical Exam:    General appearance: NAD  Head: normocephalic, without obvious abnormality, atraumatic  Eyes: anicteric sclera  Neck: supple  Lungs: diminished breath sounds bilateral  Chest wall: PPM site without erythema or swelling  Heart: regular rate and rhythm  Abdomen: soft, non-tender  Extremities: edema none  Joints: no swelling  Skin: no rashes  Neurologic: following commands    Lines, Drains, Airways, Wounds:  External Urinary Catheter Female (one size) (Active)   Number of days: 8       Labs:    CBC Results       22     0426 0453 1809    WBC 9.64 9.64 --    RBC 3.72 3.76 --    HGB 10.7 10.9 10.3    HCT 32.1 33.5 31.1    MCV 86.3 89.1 --    MCH 28.8 29.0 --    MCHC 33.3 32.5 --     278 --      BMP Results       22     0426 0453 0954     136 139    K 3.8 3.9 3.4    Cl 99 103 106    CO2 27 23 26    Glucose 112 120 138    BUN 24 22 32    Creatinine 1.0 1.0 1.1    Calcium 9.1 8.8 9.2    Anion Gap 9 10 7    EGFR 53.6 53.6 48.0         Comment for K at 0954 on 22: Results obtained on plasma. Plasma Potassium values may be up to 0.4 mEQ/L less than serum values. The differences may be greater for patients with high platelet or white cell counts.      PT/PTT Results       22     1250    PT 13.7    INR 1.1    PTT 30         Comment for INR at 1250 on 22: INR has no defined significance when PT is within Reference Range.      UA Results    No lab values to display.     Lactate Results    No lab values to display.         Microbiology Results     Procedure Component Value Units Date/Time    SARS-CoV-2 (COVID-19), PCR Nasopharynx [[id number]]  (Normal) Collected: 22 1344    Specimen: Nasopharyngeal Swab from " Nasopharynx Updated: 07/27/22 1441    Narrative:      The following orders were created for panel order SARS-CoV-2 (COVID-19), PCR Nasopharynx.  Procedure                               Abnormality         Status                     ---------                               -----------         ------                     SARS-CoV-2 (COVID-19), P...[[id number]]  Normal              Final result                 Please view results for these tests on the individual orders.    SARS-CoV-2 (COVID-19), PCR Nasopharynx [[id number]]  (Normal) Collected: 07/27/22 1344    Specimen: Nasopharyngeal Swab from Nasopharynx Updated: 07/27/22 1441     SARS-CoV-2 (COVID-19) Negative    Narrative:      Testing performed using real-time PCR for detection of COVID-19. EUA approved validation studies performed on site.     SARS-CoV-2 (COVID-19), PCR Nasopharynx [[id number]]  (Normal) Collected: 07/24/22 1655    Specimen: Nasopharyngeal Swab from Nasopharynx Updated: 07/24/22 1723    Narrative:      The following orders were created for panel order SARS-CoV-2 (COVID-19), PCR Nasopharynx.  Procedure                               Abnormality         Status                     ---------                               -----------         ------                     SARS-CoV-2 (COVID-19), P...[[id number]]  Normal              Final result                 Please view results for these tests on the individual orders.    SARS-CoV-2 (COVID-19), PCR Nasopharynx [[id number]]  (Normal) Collected: 07/24/22 1655    Specimen: Nasopharyngeal Swab from Nasopharynx Updated: 07/24/22 1723     SARS-CoV-2 (COVID-19) Negative    Narrative:      Testing performed using real-time PCR for detection of COVID-19. EUA approved validation studies performed on site.     SARS-CoV-2 (COVID-19), PCR Nasopharynx [[id number]]  (Normal) Collected: 07/19/22 2158    Specimen: Nasopharyngeal Swab from Nasopharynx Updated: 07/19/22 2243    Narrative:      The following orders were created  for panel order SARS-CoV-2 (COVID-19), PCR Nasopharynx.  Procedure                               Abnormality         Status                     ---------                               -----------         ------                     SARS-CoV-2 (COVID-19), P...[[id number]]  Normal              Final result                 Please view results for these tests on the individual orders.    SARS-CoV-2 (COVID-19), PCR Nasopharynx [[id number]]  (Normal) Collected: 07/19/22 2158    Specimen: Nasopharyngeal Swab from Nasopharynx Updated: 07/19/22 2243     SARS-CoV-2 (COVID-19) Negative    Narrative:      Testing performed using real-time PCR for detection of COVID-19. EUA approved validation studies performed on site.           Pathology Results     ** No results found for the last 720 hours. **          Echo:     Cardiac Imaging    TRANSTHORACIC ECHO (TTE) LIMITED 07/27/2022    Interpretation Summary  1.  This is a technically limited study.  2.  Normal LV size, with mild LVH, with normal LV function with ejection fraction of 60 to 65%.  3.  No regional wall motion abnormalities seen within limits of the study.  4.  Small to moderate, and mostly moderate (1.5 cm) inferolateral pericardial effusion without tamponade physiology.  5.  No evidence of pulmonary hypertension .  6.  Dilated IVC.      Imaging:    Radiology Imaging    XR CHEST 1 VW    Narrative  Single frontal view chest x-ray    CLINICAL HISTORY:  Chest pain, weakness    COMPARISON: Chest x-ray dated 7/21/2022    COMMENT:    There is a new small to moderate left pleural effusion and basilar atelectasis.  The right lung is clear. No pneumothorax appreciated    There is stable cardiomegaly.  The osseous structures are stable.      --    Impression  New left pleural effusion and basilar atelectasis. Stable cardiomegaly.      Assessment     1. Fever - reactive due to pericardial effusion and resolved while off antibiotics. Blood cx are showing no growth to date. CT chest on  independent review showed moderate pericardial effusion and left chest wall battery pack with surrounding inflammatory change but without definite drainable collection.     Plan      1. Continue to monitor off antibiotics with infectious workup being negative and fever having resolved now.

## 2022-07-30 PROBLEM — I48.91 ATRIAL FIBRILLATION (CMS/HCC): Status: RESOLVED | Noted: 2022-07-27 | Resolved: 2022-07-30

## 2022-07-30 PROBLEM — I48.0 PAROXYSMAL ATRIAL FIBRILLATION (CMS/HCC): Status: RESOLVED | Noted: 2022-07-19 | Resolved: 2022-07-30

## 2022-07-30 LAB
APTT PPP: 65 SEC (ref 23–35)
APTT PPP: 89 SEC (ref 23–35)
APTT PPP: 92 SEC (ref 23–35)
ERYTHROCYTE [DISTWIDTH] IN BLOOD BY AUTOMATED COUNT: 13.7 % (ref 11.7–14.4)
FUNGUS STAIN: NORMAL
HCT VFR BLDCO AUTO: 34.6 % (ref 35–45)
HGB BLD-MCNC: 11.4 G/DL (ref 11.8–15.7)
MAGNESIUM SERPL-MCNC: 1.8 MG/DL (ref 1.8–2.5)
MCH RBC QN AUTO: 28.7 PG (ref 28–33.2)
MCHC RBC AUTO-ENTMCNC: 32.9 G/DL (ref 32.2–35.5)
MCV RBC AUTO: 87.2 FL (ref 83–98)
PDW BLD AUTO: 10.7 FL (ref 9.4–12.3)
PLATELET # BLD AUTO: 349 K/UL (ref 150–369)
RBC # BLD AUTO: 3.97 M/UL (ref 3.93–5.22)
RHODAMINE-AURAMINE STN SPEC: NORMAL
WBC # BLD AUTO: 8.29 K/UL (ref 3.8–10.5)

## 2022-07-30 PROCEDURE — 83735 ASSAY OF MAGNESIUM: CPT

## 2022-07-30 PROCEDURE — 20600000 HC ROOM AND CARE INTERMEDIATE/TELEMETRY

## 2022-07-30 PROCEDURE — 63700000 HC SELF-ADMINISTRABLE DRUG: Performed by: INTERNAL MEDICINE

## 2022-07-30 PROCEDURE — 93005 ELECTROCARDIOGRAM TRACING: CPT | Performed by: HOSPITALIST

## 2022-07-30 PROCEDURE — 63600000 HC DRUGS/DETAIL CODE: Performed by: PHYSICIAN ASSISTANT

## 2022-07-30 PROCEDURE — 36415 COLL VENOUS BLD VENIPUNCTURE: CPT | Performed by: HOSPITALIST

## 2022-07-30 PROCEDURE — 85730 THROMBOPLASTIN TIME PARTIAL: CPT | Performed by: HOSPITALIST

## 2022-07-30 PROCEDURE — 99232 SBSQ HOSP IP/OBS MODERATE 35: CPT | Performed by: HOSPITALIST

## 2022-07-30 PROCEDURE — 63700000 HC SELF-ADMINISTRABLE DRUG: Performed by: PHYSICIAN ASSISTANT

## 2022-07-30 PROCEDURE — 63700000 HC SELF-ADMINISTRABLE DRUG

## 2022-07-30 PROCEDURE — 85027 COMPLETE CBC AUTOMATED: CPT

## 2022-07-30 PROCEDURE — 93005 ELECTROCARDIOGRAM TRACING: CPT | Performed by: INTERNAL MEDICINE

## 2022-07-30 RX ADMIN — AMIODARONE HYDROCHLORIDE 400 MG: 200 TABLET ORAL at 20:48

## 2022-07-30 RX ADMIN — ACETAMINOPHEN 325 MG: 325 TABLET, FILM COATED ORAL at 22:34

## 2022-07-30 RX ADMIN — HEPARIN SODIUM 1150 UNITS/HR: 10000 INJECTION, SOLUTION INTRAVENOUS at 10:33

## 2022-07-30 RX ADMIN — COLCHICINE 0.6 MG: 0.6 TABLET, FILM COATED ORAL at 18:12

## 2022-07-30 RX ADMIN — PANTOPRAZOLE SODIUM 40 MG: 40 TABLET, DELAYED RELEASE ORAL at 10:01

## 2022-07-30 RX ADMIN — SIMVASTATIN 20 MG: 20 TABLET, FILM COATED ORAL at 22:35

## 2022-07-30 RX ADMIN — ACETAMINOPHEN 325 MG: 325 TABLET, FILM COATED ORAL at 10:01

## 2022-07-30 RX ADMIN — FAMOTIDINE 40 MG: 20 TABLET, FILM COATED ORAL at 22:35

## 2022-07-30 RX ADMIN — METOPROLOL TARTRATE 25 MG: 25 TABLET, FILM COATED ORAL at 10:01

## 2022-07-30 RX ADMIN — METOPROLOL TARTRATE 25 MG: 25 TABLET, FILM COATED ORAL at 20:48

## 2022-07-30 RX ADMIN — CLOPIDOGREL BISULFATE 75 MG: 75 TABLET, FILM COATED ORAL at 10:01

## 2022-07-30 RX ADMIN — TRAMADOL HYDROCHLORIDE 25 MG: 50 TABLET, COATED ORAL at 10:02

## 2022-07-30 RX ADMIN — AMIODARONE HYDROCHLORIDE 400 MG: 200 TABLET ORAL at 10:03

## 2022-07-30 RX ADMIN — HEPARIN SODIUM 1150 UNITS/HR: 10000 INJECTION, SOLUTION INTRAVENOUS at 18:09

## 2022-07-30 RX ADMIN — CETIRIZINE HYDROCHLORIDE 5 MG: 5 TABLET ORAL at 10:00

## 2022-07-30 RX ADMIN — TRAMADOL HYDROCHLORIDE 25 MG: 50 TABLET, COATED ORAL at 22:35

## 2022-07-30 NOTE — ASSESSMENT & PLAN NOTE
Rate controlled by  Last day amiodarone loading.  Tomorrow we will start on 200 mg daily  Metoprolol increased to 50 mg twice daily      Anticoagulation currently on  Eliquis on hold  On heparin drip

## 2022-07-30 NOTE — PLAN OF CARE
Plan of Care Review  Outcome Summary: AAOx4. VSS. Remains Afib on monitor. Heparin drip infusing as per protocol. Denies pain or discomfort. Resting comfortably in recliner chair at this time. Will continue to monitor.

## 2022-07-30 NOTE — PLAN OF CARE
Problem: Adult Inpatient Plan of Care  Goal: Plan of Care Review  Outcome: Progressing  Goal: Patient-Specific Goal (Individualized)  Outcome: Progressing  Goal: Absence of Hospital-Acquired Illness or Injury  Outcome: Progressing  Goal: Optimal Comfort and Wellbeing  Outcome: Progressing  Goal: Readiness for Transition of Care  Outcome: Progressing     Problem: Fall Injury Risk  Goal: Absence of Fall and Fall-Related Injury  Outcome: Progressing     Problem: Skin Injury Risk Increased  Goal: Skin Health and Integrity  Outcome: Progressing    Pt converted to normal sinus rhythm at 8:25am. EKG verbal order as per Dr. Lowery.

## 2022-07-30 NOTE — PROGRESS NOTES
Hospital Medicine Service -  Daily Progress Note       SUBJECTIVE   Interval History:   Emily Ramirez was seen and examined in the morning  no acute overnight event reported  patient reported no chest pain, sob, N/V/D, overt bleeding.    Patient seems better stronger patient is comfortable calm in chair still complaining of low appetite but willing to trial today  Status post pericardiocentesis yesterday, will continue colchicine in the setting of concern of pericarditis  Currently patient is on heparin drip, plan repeat echocardiogram on Monday to reassess   OBJECTIVE      Vital signs in last 24 hours:  Vitals:    07/30/22 1001   BP:    Pulse: 73   Resp:    Temp:    SpO2:        Intake/Output Summary (Last 24 hours) at 7/30/2022 1140  Last data filed at 7/29/2022 1508  Gross per 24 hour   Intake --   Output 10 ml   Net -10 ml     PHYSICAL EXAMINATION      General Appearance:  Awake, Alert, no distress     Head:    Normocephalic, without obvious abnormality, atraumatic   Neck: Supple      Lungs:   Bilateral equal expansion  No labored breathing     Heart:  S1 and S2 normal  no rub or gallop     Abdomen:   Soft  no masses  no organomegaly     Vascular: Bilateral radial pulse equally palpated      Extremities: no calf tenderness      Behavior/Emotional: Mood stable      Skin:                                     no rash     Neuro:                                 Spontaneous move bilateral upper and lower extremity                                                No focal deficits   LINES, CATHETERS, DRAINS, AIRWAYS, AND WOUNDS   Lines, Drains, Airways, Wounds:  External Urinary Catheter Female (one size) (Active)   Number of days: 9       Surgical Incision Abdomen Left;Upper (Active)   Number of days: 1       Comments:      LABS / IMAGING / TELE      Labs  CMP Results       07/29/22 07/28/22 07/27/22     0426 0453 0954     136 139    K 3.8 3.9 3.4    Cl 99 103 106    CO2 27 23 26    Glucose 112 120 138     BUN 24 22 32    Creatinine 1.0 1.0 1.1    Calcium 9.1 8.8 9.2    Anion Gap 9 10 7    AST -- -- 27    ALT -- -- 19    Albumin -- -- 3.4    EGFR 53.6 53.6 48.0         Comment for K at 0954 on 07/27/22: Results obtained on plasma. Plasma Potassium values may be up to 0.4 mEQ/L less than serum values. The differences may be greater for patients with high platelet or white cell counts.        CBC Results       07/30/22 07/29/22 07/28/22     0418 0426 0453    WBC 8.29 9.64 9.64    RBC 3.97 3.72 3.76    HGB 11.4 10.7 10.9    HCT 34.6 32.1 33.5    MCV 87.2 86.3 89.1    MCH 28.7 28.8 29.0    MCHC 32.9 33.3 32.5     300 278        Troponin I Results       07/28/22 07/27/22 07/27/22 0453 2257 2008    HS Troponin I 221.3 239.0 203.0        PT/PTT Results       07/30/22 07/30/22 07/29/22     0920 0042 1841    PT -- -- 16.6    INR -- -- 1.4    PTT 65 92 34         Comment for INR at 1841 on 07/29/22: Moderate Intensity Anticoagulation = 2.0 to 3.0, High Intensity = 2.5 to 3.5    Comment for PTT at 0920 on 07/30/22: The Standard Therapeutic Range for Heparin is 68 to 101 seconds.    Comment for PTT at 0042 on 07/30/22: The Standard Therapeutic Range for Heparin is 68 to 101 seconds.        Lab Results   Component Value Date     (H) 07/27/2022    TSH 1.89 07/19/2022     Imaging  CT CHEST WITHOUT IV CONTRAST    Result Date: 7/28/2022  IMPRESSION: 1. Left chest wall battery pack with surrounding inflammatory change but without definite drainable collection. This could be further evaluated with ultrasound, as warranted. 2. Atrial lead terminates at the inferior right atrial appendage/superior right atrium, tip likely within the lumen; ventricular lead makes an abrupt turn with tip terminating overlying the expected location of the interventricular septum rather than the apex, uncertain if within the septum. Consider contrast enhanced chest CT for better delineation of anatomy if intervention is planned. 3. Moderate  pericardial effusion. Small left greater than right pleural effusions. 4. Atrophy of medial lobe of liver, suggesting cirrhosis, with questionable hypodensity at the junction of segments 2 and 4A. Recommend correlation with prior imaging; if not available, outpatient contrast enhanced MR could be obtained. 5. Few scattered sub-6 mm pulmonary nodules. If the patient is at high risk for malignancy, consider follow-up in 12 months. 6. Moderate to large hiatal hernia with gastroesophageal reflux.    X-RAY CHEST 1 VIEW    Result Date: 7/27/2022  IMPRESSION: New left pleural effusion and basilar atelectasis. Stable cardiomegaly.     X-RAY CHEST 1 VIEW    Result Date: 7/21/2022  IMPRESSION: No pneumothorax.     X-RAY CHEST 1 VIEW    Result Date: 7/20/2022  IMPRESSION: No acute disease in the chest.     ECG/Telemetry  ASSESSMENT AND PLAN        Atrial fibrillation, unspecified type (CMS/HCC)  Assessment & Plan  Rate controlled by  Amiodarone  Metoprolol      Anticoagulation currently on  Eliquis on hold  On heparin drip    Sick sinus syndrome (CMS/HCC)  Assessment & Plan  Sick sinus syndrome, status post pacemaker placement with A. fib a flutter with conversion sinus pause    CT scan check lead  EP cardiology consult    * Pericardial effusion  Assessment & Plan  - TTE: Moderate in size not associated with cardiac tamponade likely secondary to recent PPM placement    Dx: Pericardial effusion concerning pericarditis    - Hold Eliquis per cardiology  - Start colchicine   - Cardiology following  - EP consult  - Febrile on admission.  Seen by ID monitor off antibiotics.  Incision site with no signs of infection  - Encourage incentive spirometer use    CT chest: Moderate pericardial effusion. Small left greater than right pleural effusions  -Repeat echocardiogram today.  Will reassess for pericardiocentesis  -Metoprolol increased.  -Blood pressure is elevated, but will not aggressively treat because of effusion  -EP  consult    Status post pericardiocentesis on July 29, 2022  Successful pericardiocentesis with 300 cc of fluid removed.  F/u fluid study   Continue colchicine    GERD (gastroesophageal reflux disease)  Assessment & Plan  COnt Protonix     Hyperlipidemia  Assessment & Plan  Cont statin     Pacemaker  Assessment & Plan  Recent pacemaker placement, now with pericardial effusion  EP cardiology consult     Anemia  Assessment & Plan  Hgb 10.8, previously in the 12s on last admission  Holding Elqiuis   Cont DAPT with recent stent with acute stent thrombosis  Trend     Pleural effusion  Assessment & Plan  -New pericardial effusion  -On room air  -Cardiology consult  -Repeat echocardiogram        CAD (coronary artery disease)  Assessment & Plan  Recent stent placed 6/9/22 with acute stent thrombosis requiring a second cath  Currently on Plavix and Eliquis.  Since Eliquis on hold for pericardial effusion, will resume aspirin .  D/w cardiology  Cont DAPT, statin         Disposition Planning: Pending progression     VTE Assessment: Padua    VTE Prophylaxis Plan: Continue current DVT Prophylaxis   Code Status: Full Code  Estimated Discharge Date: 8/1/2022    This patient note has been dictated using speech recognition software. Inadvertent speech recognition errors should be disregarded. Please do not hesitate to call [facility] office for clarifications.     Jose Elias Borjas MD  7/30/2022

## 2022-07-30 NOTE — ASSESSMENT & PLAN NOTE
Sick sinus syndrome, status post pacemaker placement with NING burt a flutter with conversion sinus pause    CT scan check lead  EP cardiology consult

## 2022-07-31 LAB
ANION GAP SERPL CALC-SCNC: 10 MEQ/L (ref 3–15)
APTT PPP: 125 SEC (ref 23–35)
APTT PPP: 127 SEC (ref 23–35)
APTT PPP: 93 SEC (ref 23–35)
BASOPHILS # BLD: 0.02 K/UL (ref 0.01–0.1)
BASOPHILS NFR BLD: 0.3 %
BUN SERPL-MCNC: 31 MG/DL (ref 8–20)
CALCIUM SERPL-MCNC: 9 MG/DL (ref 8.9–10.3)
CHLORIDE SERPL-SCNC: 101 MEQ/L (ref 98–109)
CO2 SERPL-SCNC: 26 MEQ/L (ref 22–32)
CREAT SERPL-MCNC: 1.2 MG/DL (ref 0.6–1.1)
DIFFERENTIAL METHOD BLD: ABNORMAL
EOSINOPHIL # BLD: 0.29 K/UL (ref 0.04–0.36)
EOSINOPHIL NFR BLD: 3.9 %
ERYTHROCYTE [DISTWIDTH] IN BLOOD BY AUTOMATED COUNT: 13.7 % (ref 11.7–14.4)
GFR SERPL CREATININE-BSD FRML MDRD: 43.4 ML/MIN/1.73M*2
GLUCOSE SERPL-MCNC: 90 MG/DL (ref 70–99)
HCT VFR BLDCO AUTO: 34.2 % (ref 35–45)
HGB BLD-MCNC: 10.9 G/DL (ref 11.8–15.7)
IMM GRANULOCYTES # BLD AUTO: 0.06 K/UL (ref 0–0.08)
IMM GRANULOCYTES NFR BLD AUTO: 0.8 %
LYMPHOCYTES # BLD: 1.61 K/UL (ref 1.2–3.5)
LYMPHOCYTES NFR BLD: 21.6 %
MAGNESIUM SERPL-MCNC: 1.8 MG/DL (ref 1.8–2.5)
MCH RBC QN AUTO: 28.8 PG (ref 28–33.2)
MCHC RBC AUTO-ENTMCNC: 31.9 G/DL (ref 32.2–35.5)
MCV RBC AUTO: 90.2 FL (ref 83–98)
MONOCYTES # BLD: 0.55 K/UL (ref 0.28–0.8)
MONOCYTES NFR BLD: 7.4 %
NEUTROPHILS # BLD: 4.92 K/UL (ref 1.7–7)
NEUTS SEG NFR BLD: 66 %
NRBC BLD-RTO: 0 %
PDW BLD AUTO: 11.6 FL (ref 9.4–12.3)
PHOSPHATE SERPL-MCNC: 4.1 MG/DL (ref 2.4–4.7)
PLATELET # BLD AUTO: 390 K/UL (ref 150–369)
POTASSIUM SERPL-SCNC: 3.9 MEQ/L (ref 3.6–5.1)
RBC # BLD AUTO: 3.79 M/UL (ref 3.93–5.22)
SODIUM SERPL-SCNC: 137 MEQ/L (ref 136–144)
WBC # BLD AUTO: 7.45 K/UL (ref 3.8–10.5)

## 2022-07-31 PROCEDURE — 63700000 HC SELF-ADMINISTRABLE DRUG: Performed by: PHYSICIAN ASSISTANT

## 2022-07-31 PROCEDURE — 85730 THROMBOPLASTIN TIME PARTIAL: CPT | Performed by: HOSPITALIST

## 2022-07-31 PROCEDURE — 63700000 HC SELF-ADMINISTRABLE DRUG

## 2022-07-31 PROCEDURE — 63600000 HC DRUGS/DETAIL CODE: Performed by: PHYSICIAN ASSISTANT

## 2022-07-31 PROCEDURE — 36415 COLL VENOUS BLD VENIPUNCTURE: CPT | Performed by: HOSPITALIST

## 2022-07-31 PROCEDURE — 20600000 HC ROOM AND CARE INTERMEDIATE/TELEMETRY

## 2022-07-31 PROCEDURE — 83735 ASSAY OF MAGNESIUM: CPT | Performed by: HOSPITALIST

## 2022-07-31 PROCEDURE — 63600000 HC DRUGS/DETAIL CODE: Performed by: HOSPITALIST

## 2022-07-31 PROCEDURE — 80048 BASIC METABOLIC PNL TOTAL CA: CPT | Performed by: HOSPITALIST

## 2022-07-31 PROCEDURE — 85025 COMPLETE CBC W/AUTO DIFF WBC: CPT | Performed by: HOSPITALIST

## 2022-07-31 PROCEDURE — 99232 SBSQ HOSP IP/OBS MODERATE 35: CPT | Performed by: HOSPITALIST

## 2022-07-31 PROCEDURE — 63700000 HC SELF-ADMINISTRABLE DRUG: Performed by: INTERNAL MEDICINE

## 2022-07-31 PROCEDURE — 84100 ASSAY OF PHOSPHORUS: CPT | Performed by: HOSPITALIST

## 2022-07-31 RX ORDER — DEXTROSE MONOHYDRATE AND SODIUM CHLORIDE 5; .9 G/100ML; G/100ML
INJECTION, SOLUTION INTRAVENOUS CONTINUOUS
Status: DISCONTINUED | OUTPATIENT
Start: 2022-07-31 | End: 2022-08-02

## 2022-07-31 RX ADMIN — FAMOTIDINE 40 MG: 20 TABLET, FILM COATED ORAL at 21:45

## 2022-07-31 RX ADMIN — HEPARIN SODIUM 850 UNITS/HR: 10000 INJECTION, SOLUTION INTRAVENOUS at 21:47

## 2022-07-31 RX ADMIN — ACETAMINOPHEN 325 MG: 325 TABLET, FILM COATED ORAL at 21:46

## 2022-07-31 RX ADMIN — AMIODARONE HYDROCHLORIDE 400 MG: 200 TABLET ORAL at 20:02

## 2022-07-31 RX ADMIN — AMIODARONE HYDROCHLORIDE 400 MG: 200 TABLET ORAL at 09:31

## 2022-07-31 RX ADMIN — DEXTROSE AND SODIUM CHLORIDE: 5; 900 INJECTION, SOLUTION INTRAVENOUS at 11:24

## 2022-07-31 RX ADMIN — PANTOPRAZOLE SODIUM 40 MG: 40 TABLET, DELAYED RELEASE ORAL at 09:31

## 2022-07-31 RX ADMIN — METOPROLOL TARTRATE 25 MG: 25 TABLET, FILM COATED ORAL at 09:31

## 2022-07-31 RX ADMIN — CLOPIDOGREL BISULFATE 75 MG: 75 TABLET, FILM COATED ORAL at 09:31

## 2022-07-31 RX ADMIN — SIMVASTATIN 20 MG: 20 TABLET, FILM COATED ORAL at 21:48

## 2022-07-31 RX ADMIN — CETIRIZINE HYDROCHLORIDE 5 MG: 5 TABLET ORAL at 09:31

## 2022-07-31 RX ADMIN — ACETAMINOPHEN 325 MG: 325 TABLET, FILM COATED ORAL at 09:31

## 2022-07-31 RX ADMIN — TRAMADOL HYDROCHLORIDE 25 MG: 50 TABLET, COATED ORAL at 09:31

## 2022-07-31 RX ADMIN — TRAMADOL HYDROCHLORIDE 25 MG: 50 TABLET, COATED ORAL at 21:45

## 2022-07-31 RX ADMIN — METOPROLOL TARTRATE 25 MG: 25 TABLET, FILM COATED ORAL at 20:02

## 2022-07-31 NOTE — PROGRESS NOTES
SUBJECTIVE     Patient reports nausea and diarrhea.    Patient denies chest pain, shortness of breath, orthopnea, paroxysmal nocturnal dyspnea, progressive leg edema, lightheadedness or palpitations.  The patient denies any pain, swelling, or bruising at the left anterior chest wall pacer site.    Unfortunately, atrial fibrillation has replaced atrial paced rhythm noted on 7/30/2022.    OBJECTIVE     VITAL SIGNS:  Temp:  [36.2 °C (97.2 °F)-36.7 °C (98 °F)] 36.2 °C (97.2 °F)  Heart Rate:  [60-90] 79  Resp:  [16-18] 18  BP: (130-164)/(59-87) 131/63  SPO2 97%    Intake/Output Summary (Last 24 hours) at 7/31/2022 1201  Last data filed at 7/31/2022 0600  Gross per 24 hour   Intake 373.26 ml   Output --   Net 373.26 ml     PHYSICAL EXAM:  General Appearance: No apparent distress.  Cooperative.  Neck: No JVD  Lungs: Clear to auscultation bilaterally  Heart: Irregular S1 and S2 without new / changing murmur, gallop or rub  Abdomen: Bowel sounds are present.  Extremities: No significant peripheral edema is appreciated.  Skin: Warm.  Left anterior chest wall pacer site with mild ecchymosis but no evidence of a hematoma.  Incision appears clean dry and intact.  Neurologic: Alert and oriented.    LABS / IMAGING / EKG / TELEMETRY     LABS:  Results from last 7 days   Lab Units 07/31/22  0418 07/30/22  0418 07/29/22  0426 07/28/22  0453 07/27/22  1510 07/27/22  0954   SODIUM mEQ/L 137  --  135* 136  --  139   POTASSIUM mEQ/L 3.9  --  3.8 3.9  --  3.4*   MAGNESIUM mg/dL 1.8 1.8 1.9 2.1   < >  --    CALCIUM mg/dL 9.0  --  9.1 8.8*  --  9.2   CHLORIDE mEQ/L 101  --  99 103  --  106   CO2 mEQ/L 26  --  27 23  --  26   BUN mg/dL 31*  --  24* 22*  --  32*   CREATININE mg/dL 1.2*  --  1.0 1.0  --  1.1   AST IU/L  --   --   --   --   --  27   ALT IU/L  --   --   --   --   --  19    < > = values in this interval not displayed.     Results from last 7 days   Lab Units 07/31/22  0418 07/30/22  0418 07/29/22  1841 07/29/22  0426   WBC  K/uL 7.45 8.29  --  9.64   HEMOGLOBIN g/dL 10.9* 11.4*  --  10.7*   HEMATOCRIT % 34.2* 34.6*  --  32.1*   PLATELETS K/uL 390* 349  --  300   INR   --   --  1.4  --      Lab Results   Component Value Date    TSH 1.89 07/19/2022     No results found for: CHOL, LDLCALC, HDL, TRIG  Lab Results   Component Value Date     (H) 07/27/2022     Results from last 7 days   Lab Units 07/28/22  0453 07/27/22  2257 07/27/22 2008   HIGH SENSITIVE TROPONIN I pg/mL 221.3* 239.0* 203.0*       TELEMETRY:  Sinus rhythm with occasional supraventricular ectopy    EKG (7/30/2022):   Atrial paced, ventricular sensed rhythm.    MEDICATIONS         acetaminophen  325 mg oral BID (9a, 10p)    [START ON 8/2/2022] amiodarone  200 mg oral Daily    amiodarone  400 mg oral BID    cetirizine  5 mg oral Daily    clopidogreL  75 mg oral Daily    colchicine  0.6 mg oral Daily (6p)    famotidine  40 mg oral Nightly    metoprolol tartrate  25 mg oral BID    pantoprazole  40 mg oral Daily    simvastatin  20 mg oral Nightly    traMADoL  25 mg oral BID (9a, 10p)       ASSESSMENT AND PLAN     Atrial fibrillation-flutter: The patient converted to sinus rhythm at approximately 8:30 AM on 7/30/2022 but unfortunately converted back to atrial fibrillation with reasonable ventricular response.  She reports being symptomatic in atrial fibrillation and will likely require rhythm restoration moving forward if atrial fibrillation persists.  She has been prescribed amiodarone 200 mg daily.  She is anticoagulated with intravenous unfractionated heparin for the prevention of thromboembolism.    Anticoagulation: At present she is on intravenous unfractionated heparin with plans to transition to DOAC on 8/1/2022.    Pericardial effusion: Post procedure day #2 status post ultrasound-guided pericardiocentesis.  No evidence of tachycardia, hypertension, or jugular venous distention to suggest reaccumulation of pericardial fluid; plan to reassess limited  "echocardiogram on 8/1/2022, sooner for change in clinical status.  Concerns of pericarditis, the patient was prescribed colchicine 0.6 mg daily (see \"azotemia\" below).  At present the patient denies any significant pleuritic chest pain or shortness of breath.    Coronary artery disease: Proximal to mid left circumflex and proximal right coronary artery PCI performed 6/9/2022; complicated by RCA stent thrombosis status post intervention.  The patient is receiving clopidogrel 75 mg daily in conjunction with systemic anticoagulation.  The patient denies anginal symptoms.  Continue medical management with clopidogrel, simvastatin, and metoprolol.    Tachybradycardia syndrome: Asymptomatic in the context of left bundle dual-chamber chronic pacemaker implant on 7/21/2022.  Continue device surveillance and management deferred to EP.    Hypertension: The blood pressure has been intermittently elevated.  Continue medical management and routine surveillance per unit protocol.  If persistent may require adjustment of antihypertensive medications.    Hyperlipidemia: The patient is managed with simvastatin.    Azotemia: In the context of decreased oral intake, nausea and diarrhea, recommend IV fluids; nausea and diarrhea perhaps related to colchicine (hold at least 1 dose with further discussions on continuation with Dr. Saravia and Stanley upon their return 8/1/2022).    As of tomorrow the patient will be followed by Dr. Saravia.    Baljinder Lowery MD  7/31/2022    Primary Care Doctor: Jonathan Meza, DO  "

## 2022-07-31 NOTE — PROGRESS NOTES
Hospital Medicine Service -  Daily Progress Note       SUBJECTIVE   Interval History:   Emily Ramirez was seen and examined in the morning  no acute overnight event reported  patient reported no chest pain, sob, overt bleeding.  Patient reports to feel very tired, has a diarrhea last night, suspect secondary to colchicine, recommend can skip 1 dose colchicine today patient reported use dry, will give hydration, continually on heparin drip, right forearm IV access infiltrated consult IV team for midline placement continue heparin drip pending echocardiogram on Monday to review evaluation       OBJECTIVE      Vital signs in last 24 hours:  Vitals:    07/31/22 0800   BP: (!) 164/87   Pulse: 90   Resp: 18   Temp: 36.2 °C (97.2 °F)   SpO2: 95%       Intake/Output Summary (Last 24 hours) at 7/31/2022 1052  Last data filed at 7/31/2022 0600  Gross per 24 hour   Intake 373.26 ml   Output --   Net 373.26 ml     PHYSICAL EXAMINATION      General Appearance:  Awake, Alert, no distress     Head:    Normocephalic, without obvious abnormality, atraumatic   Neck: Supple      Lungs:   Bilateral equal expansion  No labored breathing     Heart:  S1 and S2 normal  no rub or gallop     Abdomen:   Soft  no masses  no organomegaly     Vascular: Bilateral radial pulse equally palpated      Extremities: no calf tenderness      Behavior/Emotional: Mood stable      Skin:                                     no rash     Neuro:                                 Spontaneous move bilateral upper and lower extremity                                                No focal deficits   LINES, CATHETERS, DRAINS, AIRWAYS, AND WOUNDS   Lines, Drains, Airways, Wounds:  Peripheral IV (Adult) 07/21/22 Left Antecubital (Active)   Number of days: 10       Peripheral IV (Adult) 07/30/22 Anterior;Right Forearm (Active)   Number of days: 1       External Urinary Catheter Female (one size) (Active)   Number of days: 10       Wound Surgical Midline Sternum  (Active)   Number of days: 10       Surgical Incision Chest Left;Upper;Anterior (Active)   Number of days: 10       Surgical Incision Abdomen Left;Upper (Active)   Number of days: 2       Comments:      LABS / IMAGING / TELE      Labs  CMP Results       07/31/22 07/29/22 07/28/22 0418 0426 0453     135 136    K 3.9 3.8 3.9    Cl 101 99 103    CO2 26 27 23    Glucose 90 112 120    BUN 31 24 22    Creatinine 1.2 1.0 1.0    Calcium 9.0 9.1 8.8    Anion Gap 10 9 10    EGFR 43.4 53.6 53.6        CBC Results       07/31/22 07/30/22 07/29/22 0418 0418 0426    WBC 7.45 8.29 9.64    RBC 3.79 3.97 3.72    HGB 10.9 11.4 10.7    HCT 34.2 34.6 32.1    MCV 90.2 87.2 86.3    MCH 28.8 28.7 28.8    MCHC 31.9 32.9 33.3     349 300         Comment for PLT at 0418 on 07/31/22: PLT CLUMPING SUSPECTED. PLT COUNT MAY BE SLIGHTLY HIGHER THAN REPORTED. IF CLINICALLY WARRANTED, SUGGEST COLLECTING SODIUM CITRATE TUBE (BLUE TOP) IN ADDITION TO EDTA TUBE FOR FOLLOW UP CBC TESTING.        Troponin I Results       07/28/22 07/27/22 07/27/22 0453 2257 2008    HS Troponin I 221.3 239.0 203.0        PT/PTT Results       07/31/22 07/30/22 07/30/22     0101 1655 0920     89 65         Comment for PTT at 0101 on 07/31/22: The Standard Therapeutic Range for Heparin is 68 to 101 seconds.    Comment for PTT at 1655 on 07/30/22: The Standard Therapeutic Range for Heparin is 68 to 101 seconds.    Comment for PTT at 0920 on 07/30/22: The Standard Therapeutic Range for Heparin is 68 to 101 seconds.        Lab Results   Component Value Date     (H) 07/27/2022    TSH 1.89 07/19/2022     Imaging  CT CHEST WITHOUT IV CONTRAST    Result Date: 7/28/2022  IMPRESSION: 1. Left chest wall battery pack with surrounding inflammatory change but without definite drainable collection. This could be further evaluated with ultrasound, as warranted. 2. Atrial lead terminates at the inferior right atrial appendage/superior right atrium,  tip likely within the lumen; ventricular lead makes an abrupt turn with tip terminating overlying the expected location of the interventricular septum rather than the apex, uncertain if within the septum. Consider contrast enhanced chest CT for better delineation of anatomy if intervention is planned. 3. Moderate pericardial effusion. Small left greater than right pleural effusions. 4. Atrophy of medial lobe of liver, suggesting cirrhosis, with questionable hypodensity at the junction of segments 2 and 4A. Recommend correlation with prior imaging; if not available, outpatient contrast enhanced MR could be obtained. 5. Few scattered sub-6 mm pulmonary nodules. If the patient is at high risk for malignancy, consider follow-up in 12 months. 6. Moderate to large hiatal hernia with gastroesophageal reflux.    X-RAY CHEST 1 VIEW    Result Date: 7/27/2022  IMPRESSION: New left pleural effusion and basilar atelectasis. Stable cardiomegaly.     X-RAY CHEST 1 VIEW    Result Date: 7/21/2022  IMPRESSION: No pneumothorax.     X-RAY CHEST 1 VIEW    Result Date: 7/20/2022  IMPRESSION: No acute disease in the chest.     ECG/Telemetry  ASSESSMENT AND PLAN        Atrial fibrillation, unspecified type (CMS/HCC)  Assessment & Plan  Rate controlled by  Amiodarone  Metoprolol      Anticoagulation currently on  Eliquis on hold  On heparin drip    Sick sinus syndrome (CMS/HCC)  Assessment & Plan  Sick sinus syndrome, status post pacemaker placement with A. fib a flutter with conversion sinus pause    CT scan check lead  EP cardiology consult    * Pericardial effusion  Assessment & Plan  - TTE: Moderate in size not associated with cardiac tamponade likely secondary to recent PPM placement    Dx: Pericardial effusion concerning pericarditis    - Hold Eliquis per cardiology  - Start colchicine   - Cardiology following  - EP consult  - Febrile on admission.  Seen by ID monitor off antibiotics.  Incision site with no signs of infection  -  Encourage incentive spirometer use    CT chest: Moderate pericardial effusion. Small left greater than right pleural effusions  -Repeat echocardiogram today.  Will reassess for pericardiocentesis  -Metoprolol increased.  -Blood pressure is elevated, but will not aggressively treat because of effusion  -EP consult    Status post pericardiocentesis on July 29, 2022  Successful pericardiocentesis with 300 cc of fluid removed.  F/u fluid study   Continue colchicine    GERD (gastroesophageal reflux disease)  Assessment & Plan  COnt Protonix     Hyperlipidemia  Assessment & Plan  Cont statin     Pacemaker  Assessment & Plan  Recent pacemaker placement, now with pericardial effusion  EP cardiology consult     Anemia  Assessment & Plan  Hgb 10.8, previously in the 12s on last admission  Holding Elqiuis   Cont DAPT with recent stent with acute stent thrombosis  Trend     Pleural effusion  Assessment & Plan  -New pericardial effusion  -On room air  -Cardiology consult  -Repeat echocardiogram        CAD (coronary artery disease)  Assessment & Plan  Recent stent placed 6/9/22 with acute stent thrombosis requiring a second cath  Currently on Plavix and Eliquis.  Since Eliquis on hold for pericardial effusion, will resume aspirin .  D/w cardiology  Cont DAPT, statin         Disposition Planning: Pending progression     VTE Assessment: Padua    VTE Prophylaxis Plan: Continue current DVT Prophylaxis   Code Status: Full Code  Estimated Discharge Date: 8/1/2022    This patient note has been dictated using speech recognition software. Inadvertent speech recognition errors should be disregarded. Please do not hesitate to call [facility] office for clarifications.     Jose Elias Borjas MD  7/31/2022

## 2022-08-01 ENCOUNTER — APPOINTMENT (INPATIENT)
Dept: CARDIOLOGY | Facility: HOSPITAL | Age: 77
DRG: 315 | End: 2022-08-01
Attending: PHYSICIAN ASSISTANT
Payer: MEDICARE

## 2022-08-01 LAB
ANION GAP SERPL CALC-SCNC: 4 MEQ/L (ref 3–15)
APTT PPP: 127 SEC (ref 23–35)
APTT PPP: 63 SEC (ref 23–35)
APTT PPP: 97 SEC (ref 23–35)
BASOPHILS # BLD: 0.02 K/UL (ref 0.01–0.1)
BASOPHILS NFR BLD: 0.3 %
BSA FOR ECHO PROCEDURE: 1.94 M2
BUN SERPL-MCNC: 20 MG/DL (ref 8–20)
CALCIUM SERPL-MCNC: 8.6 MG/DL (ref 8.9–10.3)
CHLORIDE SERPL-SCNC: 108 MEQ/L (ref 98–109)
CO2 SERPL-SCNC: 23 MEQ/L (ref 22–32)
CREAT SERPL-MCNC: 1.2 MG/DL (ref 0.6–1.1)
DIFFERENTIAL METHOD BLD: ABNORMAL
EOSINOPHIL # BLD: 0.19 K/UL (ref 0.04–0.36)
EOSINOPHIL NFR BLD: 3 %
ERYTHROCYTE [DISTWIDTH] IN BLOOD BY AUTOMATED COUNT: 13.8 % (ref 11.7–14.4)
FRACTIONAL SHORTENING: 37.7 %
GFR SERPL CREATININE-BSD FRML MDRD: 43.4 ML/MIN/1.73M*2
GLUCOSE SERPL-MCNC: 118 MG/DL (ref 70–99)
HCT VFR BLDCO AUTO: 35.4 % (ref 35–45)
HGB BLD-MCNC: 11.3 G/DL (ref 11.8–15.7)
IMM GRANULOCYTES # BLD AUTO: 0.04 K/UL (ref 0–0.08)
IMM GRANULOCYTES NFR BLD AUTO: 0.6 %
INTERVENTRICULAR SEPTUM: 1 CM
LAD 2D: 3.6 CM
LEFT INTERNAL DIMENSION IN SYSTOLE: 2.31 CM (ref 2.63–3.98)
LEFT VENTRICULAR INTERNAL DIMENSION IN DIASTOLE: 3.71 CM (ref 4.46–6.19)
LEFT VENTRICULAR POSTERIOR WALL IN END DIASTOLE: 1 CM (ref 0.58–1.09)
LYMPHOCYTES # BLD: 1.08 K/UL (ref 1.2–3.5)
LYMPHOCYTES NFR BLD: 17.3 %
MAGNESIUM SERPL-MCNC: 1.6 MG/DL (ref 1.8–2.5)
MCH RBC QN AUTO: 29.1 PG (ref 28–33.2)
MCHC RBC AUTO-ENTMCNC: 31.9 G/DL (ref 32.2–35.5)
MCV RBC AUTO: 91.2 FL (ref 83–98)
MONOCYTES # BLD: 0.41 K/UL (ref 0.28–0.8)
MONOCYTES NFR BLD: 6.6 %
NEUTROPHILS # BLD: 4.5 K/UL (ref 1.7–7)
NEUTS SEG NFR BLD: 72.2 %
NRBC BLD-RTO: 0 %
PDW BLD AUTO: 10.5 FL (ref 9.4–12.3)
PHOSPHATE SERPL-MCNC: 3.1 MG/DL (ref 2.4–4.7)
PLATELET # BLD AUTO: 444 K/UL (ref 150–369)
POSTERIOR WALL: 1 CM
POTASSIUM SERPL-SCNC: 3.4 MEQ/L (ref 3.6–5.1)
RBC # BLD AUTO: 3.88 M/UL (ref 3.93–5.22)
SODIUM SERPL-SCNC: 135 MEQ/L (ref 136–144)
TAPSE: 1.7 CM
WBC # BLD AUTO: 6.24 K/UL (ref 3.8–10.5)
Z-SCORE OF LEFT VENTRICULAR DIMENSION IN END DIASTOLE: -3.47
Z-SCORE OF LEFT VENTRICULAR DIMENSION IN END SYSTOLE: -2.67
Z-SCORE OF LEFT VENTRICULAR POSTERIOR WALL IN END DIASTOLE: 1.19

## 2022-08-01 PROCEDURE — 99232 SBSQ HOSP IP/OBS MODERATE 35: CPT | Performed by: HOSPITALIST

## 2022-08-01 PROCEDURE — 85730 THROMBOPLASTIN TIME PARTIAL: CPT | Performed by: HOSPITALIST

## 2022-08-01 PROCEDURE — 84100 ASSAY OF PHOSPHORUS: CPT | Performed by: HOSPITALIST

## 2022-08-01 PROCEDURE — 63700000 HC SELF-ADMINISTRABLE DRUG: Performed by: PHYSICIAN ASSISTANT

## 2022-08-01 PROCEDURE — 80048 BASIC METABOLIC PNL TOTAL CA: CPT | Performed by: HOSPITALIST

## 2022-08-01 PROCEDURE — 63700000 HC SELF-ADMINISTRABLE DRUG

## 2022-08-01 PROCEDURE — 63600000 HC DRUGS/DETAIL CODE: Performed by: HOSPITALIST

## 2022-08-01 PROCEDURE — 85025 COMPLETE CBC W/AUTO DIFF WBC: CPT | Performed by: HOSPITALIST

## 2022-08-01 PROCEDURE — 83735 ASSAY OF MAGNESIUM: CPT | Performed by: HOSPITALIST

## 2022-08-01 PROCEDURE — 20600000 HC ROOM AND CARE INTERMEDIATE/TELEMETRY

## 2022-08-01 PROCEDURE — 36415 COLL VENOUS BLD VENIPUNCTURE: CPT | Performed by: HOSPITALIST

## 2022-08-01 PROCEDURE — 63600000 HC DRUGS/DETAIL CODE: Performed by: PHYSICIAN ASSISTANT

## 2022-08-01 PROCEDURE — 93308 TTE F-UP OR LMTD: CPT

## 2022-08-01 PROCEDURE — 63700000 HC SELF-ADMINISTRABLE DRUG: Performed by: INTERNAL MEDICINE

## 2022-08-01 RX ORDER — FAMOTIDINE 20 MG/1
20 TABLET, FILM COATED ORAL NIGHTLY
Status: DISCONTINUED | OUTPATIENT
Start: 2022-08-01 | End: 2022-08-02 | Stop reason: HOSPADM

## 2022-08-01 RX ORDER — METOPROLOL TARTRATE 50 MG/1
50 TABLET ORAL 2 TIMES DAILY
Status: DISCONTINUED | OUTPATIENT
Start: 2022-08-01 | End: 2022-08-02 | Stop reason: HOSPADM

## 2022-08-01 RX ORDER — HEPARIN SODIUM 10000 [USP'U]/100ML
100-4000 INJECTION, SOLUTION INTRAVENOUS
Status: DISCONTINUED | OUTPATIENT
Start: 2022-08-01 | End: 2022-08-01

## 2022-08-01 RX ADMIN — TRAMADOL HYDROCHLORIDE 25 MG: 50 TABLET, COATED ORAL at 21:06

## 2022-08-01 RX ADMIN — APIXABAN 5 MG: 5 TABLET, FILM COATED ORAL at 21:05

## 2022-08-01 RX ADMIN — CLOPIDOGREL BISULFATE 75 MG: 75 TABLET, FILM COATED ORAL at 09:02

## 2022-08-01 RX ADMIN — METOPROLOL TARTRATE 50 MG: 50 TABLET, FILM COATED ORAL at 21:05

## 2022-08-01 RX ADMIN — SIMVASTATIN 20 MG: 20 TABLET, FILM COATED ORAL at 21:06

## 2022-08-01 RX ADMIN — METOPROLOL TARTRATE 50 MG: 50 TABLET, FILM COATED ORAL at 09:04

## 2022-08-01 RX ADMIN — AMIODARONE HYDROCHLORIDE 400 MG: 200 TABLET ORAL at 09:01

## 2022-08-01 RX ADMIN — PANTOPRAZOLE SODIUM 40 MG: 40 TABLET, DELAYED RELEASE ORAL at 09:01

## 2022-08-01 RX ADMIN — HEPARIN SODIUM 850 UNITS/HR: 10000 INJECTION, SOLUTION INTRAVENOUS at 14:35

## 2022-08-01 RX ADMIN — MAGNESIUM SULFATE HEPTAHYDRATE 2 G: 40 INJECTION, SOLUTION INTRAVENOUS at 16:14

## 2022-08-01 RX ADMIN — ACETAMINOPHEN 325 MG: 325 TABLET, FILM COATED ORAL at 09:01

## 2022-08-01 RX ADMIN — DEXTROSE AND SODIUM CHLORIDE: 5; 900 INJECTION, SOLUTION INTRAVENOUS at 00:06

## 2022-08-01 RX ADMIN — FAMOTIDINE 20 MG: 20 TABLET, FILM COATED ORAL at 21:05

## 2022-08-01 RX ADMIN — CETIRIZINE HYDROCHLORIDE 5 MG: 5 TABLET ORAL at 09:01

## 2022-08-01 RX ADMIN — TRAMADOL HYDROCHLORIDE 25 MG: 50 TABLET, COATED ORAL at 09:02

## 2022-08-01 RX ADMIN — AMIODARONE HYDROCHLORIDE 400 MG: 200 TABLET ORAL at 21:05

## 2022-08-01 RX ADMIN — ACETAMINOPHEN 325 MG: 325 TABLET, FILM COATED ORAL at 21:06

## 2022-08-01 ASSESSMENT — PATIENT HEALTH QUESTIONNAIRE - PHQ9: SUM OF ALL RESPONSES TO PHQ9 QUESTIONS 1 & 2: 0

## 2022-08-01 NOTE — PLAN OF CARE
Problem: Adult Inpatient Plan of Care  Goal: Plan of Care Review  Flowsheets (Taken 8/1/2022 1420)  Plan of Care Reviewed With: other (see comments)    Pt discussed in rounds, chart reviewed. Pt for ECHO today, plan for bridge to Eliquis. RNCC met with VENTURA Lu, stated likely d/c tomorrow. Pt has been set up with [facility].     RNCC requested PT/OT consult, awaiting evaluation. RNCC will continue to follow to support d/c plan.

## 2022-08-01 NOTE — PLAN OF CARE
Problem: Adult Inpatient Plan of Care  Goal: Plan of Care Review  Outcome: Progressing  Flowsheets (Taken 8/1/2022 7402)  Progress: improving  Plan of Care Reviewed With: patient  Outcome Summary: Patient AAOx4, VSS, heparin gtt d/cd this shift, OOB to chair this shift, no c/o pain/discomfort.   Plan of Care Review  Plan of Care Reviewed With: patient  Progress: improving  Outcome Summary: Patient AAOx4, VSS, heparin gtt d/cd this shift, OOB to chair this shift, no c/o pain/discomfort.

## 2022-08-01 NOTE — PATIENT CARE CONFERENCE
Care Progression Rounds Note  Date: 8/1/2022  Time: 9:11 AM     Patient Name: Emily Ramirez     Medical Record Number: [id number]   YOB: 1945  Sex: Female      Room/Bed: 3029    Admitting Diagnosis: Atrial fibrillation (CMS/HCC) [I48.91]  Paroxysmal atrial fibrillation (CMS/HCC) [I48.0]  Tachy-marco a syndrome (CMS/HCC) [I49.5]  Elevated troponin [R77.8]  Generalized weakness [R53.1]  Coronary artery disease involving native coronary artery of native heart without angina pectoris [I25.10]  Atrial fibrillation, unspecified type (CMS/HCC) [I48.91]   Admit Date/Time: 7/27/2022  9:33 AM    Primary Diagnosis: Pericardial effusion  Principal Problem: Pericardial effusion    GMLOS: 2.8  Anticipated Discharge Date: 8/2/2022    AM-PAC:  Mobility Score:      Discharge Planning:  Concerns to be Addressed: care coordination/care conferences, discharge planning  Anticipated Discharge Disposition: skilled nursing facility  Type of Home Care Services: nursing, home OT    Barriers to Discharge:  Medical issues not resolved    Comments:       Participants:  , pharmacy, nursing

## 2022-08-01 NOTE — PROGRESS NOTES
"Daily Progress Note (LOS: 4)    Interval History:   Patient reports some nausea and some diarrhea.  No pleuritic CP.  Currently in atrial flutter but was in sinus rhythm over part of weekend.     Review of Systems:  All organ systems normal except as per HPI.    Current Medications:   acetaminophen  325 mg oral BID (9a, 10p)    [START ON 8/2/2022] amiodarone  200 mg oral Daily    amiodarone  400 mg oral BID    cetirizine  5 mg oral Daily    clopidogreL  75 mg oral Daily    famotidine  40 mg oral Nightly    metoprolol tartrate  50 mg oral BID    pantoprazole  40 mg oral Daily    simvastatin  20 mg oral Nightly    traMADoL  25 mg oral BID (9a, 10p)       Physical Exam:  Visit Vitals  BP (!) 164/93 (BP Location: Right upper arm, Patient Position: Lying)   Pulse 90   Temp 36.7 °C (98 °F) (Oral)   Resp 18   Ht 1.626 m (5' 4\")   Wt 83 kg (183 lb)   SpO2 95%   BMI 31.41 kg/m²       Gen: NAD  HEENT: no JVD, no thyromegaly  Heart: irregularly irregular, nl S1, S2, no m/r/g  Lungs: Clear bilaterally  Abd: soft, nt, nd, +bs  Ext: no edema      I&Os:    Intake/Output Summary (Last 24 hours) at 8/1/2022 0803  Last data filed at 8/1/2022 0600  Gross per 24 hour   Intake 1691.98 ml   Output --   Net 1691.98 ml       Weights:   Wt Readings from Last 3 Encounters:   07/29/22 83 kg (183 lb)   07/20/22 84.4 kg (186 lb)   06/09/22 79.8 kg (176 lb)       Labs:  Results from last 7 days   Lab Units 07/31/22  0418 07/30/22  0418 07/29/22  0426 07/28/22  0453   SODIUM mEQ/L 137  --  135* 136   POTASSIUM mEQ/L 3.9  --  3.8 3.9   CHLORIDE mEQ/L 101  --  99 103   CO2 mEQ/L 26  --  27 23   BUN mg/dL 31*  --  24* 22*   CREATININE mg/dL 1.2*  --  1.0 1.0   GLUCOSE mg/dL 90  --  112* 120*   CALCIUM mg/dL 9.0  --  9.1 8.8*   MAGNESIUM mg/dL 1.8 1.8 1.9 2.1             Results from last 7 days   Lab Units 07/31/22  0418 07/30/22  0418 07/29/22  0426   WBC K/uL 7.45 8.29 9.64   HEMOGLOBIN g/dL 10.9* 11.4* 10.7*   HEMATOCRIT % 34.2* 34.6* " 32.1*   PLATELETS K/uL 390* 349 300     Lab Results   Component Value Date     (H) 07/27/2022     Lab Results   Component Value Date    INR 1.4 07/29/2022    INR 1.1 05/26/2022       A/P: 77 year-old woman with PMHx CAD s/p recent PCI to RCA (6/2022), LCx, ILR, HTN, DLD, recent admission for new diagnosis of atrial fibrillation/flutter with conversion pauses, s/p DCPPM implantation and amio therapy, now returns with fatigue and pleuritic chest pain, found to have moderate pericardial effusion.      #Pericardial effusion  -Moderate in size not associated with cardiac tamponade  -Suspect due to recent PPM implantation.    -CT chest suggested normal lead placement  -EP following.   -s/p pericardiocentesis 7/29  -Has remained on unfractionated heparin gtt over weekend without clinical decline.  Repeat limited echo today to reassess.  If no significant re-accumulation of pericardial effusion, can transition off heparin gtt to eliquis 5mg BID  -Will stop colchicine 0.6mg given side effects   -Continue protonix for GI ppx   -Tramadol ordered for pain relief   -Would benefit from one more night of monitoring in hospital if OK with HMS    #pAF/flutter, SSS   -Currently in atrial flutter but has been in and out of sinus rhythm   -s/p DCPPM for the conversion pauses  -Continue amio load, last day today.  Transition to 200mg daily starting tomorrow.   -Will increase metoprolol to 50mg BID for better rate control.   -AC management as above      #CAD, recent PCI  -Stable, no angina  -Continue plavix, heparin.  AV plans as above.  -Continue simvastatin (previously did not tolerate rosuvastatin or atorvastatin)     #HTN  -BP has been elevated  -Given pericardial effusion, will not be overly aggressive with lowering BP  -Will increase metoprolol as above      #HLD  -Continue statin

## 2022-08-01 NOTE — PROGRESS NOTES
Hospital Medicine Service -  Daily Progress Note       SUBJECTIVE   Interval History: Reports feeling much better since the pericardiocentesis.  Seen and examined.  No events overnight.     OBJECTIVE      Vital signs in last 24 hours:  Temp:  [36.5 °C (97.7 °F)-36.8 °C (98.3 °F)] 36.7 °C (98 °F)  Heart Rate:  [71-93] 90  Resp:  [18] 18  BP: (131-164)/(64-93) 164/93    Intake/Output Summary (Last 24 hours) at 8/1/2022 1105  Last data filed at 8/1/2022 0600  Gross per 24 hour   Intake 1691.98 ml   Output --   Net 1691.98 ml       PHYSICAL EXAMINATION      Physical Exam  Eyes:      Extraocular Movements: Extraocular movements intact.      Pupils: Pupils are equal, round, and reactive to light.   Cardiovascular:      Rate and Rhythm: Normal rate and regular rhythm.      Pulses: Normal pulses.      Heart sounds: Normal heart sounds.   Pulmonary:      Effort: Pulmonary effort is normal.      Breath sounds: Normal breath sounds.   Abdominal:      General: Bowel sounds are normal.      Palpations: Abdomen is soft.   Skin:     General: Skin is warm.   Neurological:      General: No focal deficit present.      Mental Status: She is alert and oriented to person, place, and time.   Psychiatric:         Mood and Affect: Mood normal.         Behavior: Behavior normal.         Thought Content: Thought content normal.         Judgment: Judgment normal.            LINES, CATHETERS, DRAINS, AIRWAYS, AND WOUNDS   Lines, Drains, and Airways:  Wounds (agree with documentation and present on admission):  Peripheral IV (Adult) 07/21/22 Left Antecubital (Active)   Number of days: 11       Peripheral IV (Adult) 07/30/22 Anterior;Right Forearm (Active)   Number of days: 2       External Urinary Catheter Female (one size) (Active)   Number of days: 11       Wound Surgical Midline Sternum (Active)   Number of days: 11       Surgical Incision Chest Left;Upper;Anterior (Active)   Number of days: 11       Surgical Incision Abdomen Left;Upper  (Active)   Number of days: 3         Comments:      LABS / IMAGING / TELE      Labs  Results from last 7 days   Lab Units 07/31/22 0418 07/29/22  0426 07/28/22  0453   SODIUM mEQ/L 137 135* 136   POTASSIUM mEQ/L 3.9 3.8 3.9   CHLORIDE mEQ/L 101 99 103   CO2 mEQ/L 26 27 23   BUN mg/dL 31* 24* 22*   CREATININE mg/dL 1.2* 1.0 1.0   GLUCOSE mg/dL 90 112* 120*   CALCIUM mg/dL 9.0 9.1 8.8*     Results from last 7 days   Lab Units 07/31/22 0418 07/30/22 0418 07/29/22  0426   WBC K/uL 7.45 8.29 9.64   HEMOGLOBIN g/dL 10.9* 11.4* 10.7*   HEMATOCRIT % 34.2* 34.6* 32.1*   PLATELETS K/uL 390* 349 300       SARS-CoV-2 (COVID-19) (no units)   Date/Time Value   07/27/2022 1344 Negative       Imaging  No results found.    ECG/Telemetry  I have independently reviewed the telemetry. No events for the last 24 hours.  Sinus rhythm.  No events overnight.    ASSESSMENT AND PLAN      * Pericardial effusion  Assessment & Plan  - TTE: Moderate in size not associated with cardiac tamponade likely secondary to recent PPM placement    Dx: Pericardial effusion concerning pericarditis    - Hold Eliquis per cardiology  - Start colchicine   - Cardiology following  - EP consult  - Febrile on admission.  Seen by ID monitor off antibiotics.  Incision site with no signs of infection  - Encourage incentive spirometer use    CT chest: Moderate pericardial effusion. Small left greater than right pleural effusions  -Repeat echocardiogram today.  Will reassess for pericardiocentesis  -Blood pressure is elevated, but will not aggressively treat because of effusion  -EP consult    Status post pericardiocentesis on July 29, 2022  Successful pericardiocentesis with 300 cc of fluid removed.  F/u fluid study   Colchicine discontinued because of increased diarrhea  8/1 repeat echocardiogram    GERD (gastroesophageal reflux disease)  Assessment & Plan  COnt Protonix     Hyperlipidemia  Assessment & Plan  Cont statin     Atrial fibrillation, unspecified type  (CMS/[facility])  Assessment & Plan  Rate controlled by  Last day amiodarone loading.  Tomorrow we will start on 200 mg daily  Metoprolol increased to 50 mg twice daily      Anticoagulation currently on  Eliquis on hold  On heparin drip    Pacemaker  Assessment & Plan  Recent pacemaker placement, now with pericardial effusion  EP cardiology consult     Anemia  Assessment & Plan  Hgb 10.8, previously in the 12s on last admission  Holding Elqiuis   Cont DAPT with recent stent with acute stent thrombosis  Trend     Pleural effusion  Assessment & Plan  -New pericardial effusion  -On room air  -Cardiology consult  -Repeat echocardiogram        Sick sinus syndrome (CMS/[facility])  Assessment & Plan  Sick sinus syndrome, status post pacemaker placement with A. fib a flutter with conversion sinus pause    CT scan check lead  EP cardiology consult    CAD (coronary artery disease)  Assessment & Plan  Recent stent placed 6/9/22 with acute stent thrombosis requiring a second cath  Currently on Plavix and Eliquis.  Since Eliquis on hold for pericardial effusion, will resume aspirin .  D/w cardiology  Cont DAPT, statin          VTE Assessment: Padua    VTE Prophylaxis:  Current anticoagulants:  heparin infusion in 0.45%  units/mL, intravenous, Titrated      Code Status: Full Code      Estimated Discharge Date: 8/2/2022     Disposition Planning: Awaiting repeat echo.  Possible discharge in 1 to 2 days.     VENTURA Clifford  8/1/2022

## 2022-08-02 ENCOUNTER — TRANSCRIBE ORDERS (OUTPATIENT)
Dept: SCHEDULING | Age: 77
End: 2022-08-02

## 2022-08-02 VITALS
BODY MASS INDEX: 31.24 KG/M2 | OXYGEN SATURATION: 96 % | WEIGHT: 183 LBS | SYSTOLIC BLOOD PRESSURE: 152 MMHG | DIASTOLIC BLOOD PRESSURE: 72 MMHG | HEIGHT: 64 IN | TEMPERATURE: 98.1 F | HEART RATE: 64 BPM | RESPIRATION RATE: 16 BRPM

## 2022-08-02 DIAGNOSIS — I47.20 VENTRICULAR TACHYCARDIA (CMS/HCC): Primary | ICD-10-CM

## 2022-08-02 DIAGNOSIS — I25.10 ATHEROSCLEROTIC HEART DISEASE OF NATIVE CORONARY ARTERY WITHOUT ANGINA PECTORIS: ICD-10-CM

## 2022-08-02 DIAGNOSIS — R94.31 ABNORMAL ELECTROCARDIOGRAM (ECG) (EKG): ICD-10-CM

## 2022-08-02 LAB
ANION GAP SERPL CALC-SCNC: 5 MEQ/L (ref 3–15)
BACTERIA BLD CULT: NORMAL
BACTERIA BLD CULT: NORMAL
BASOPHILS # BLD: 0.02 K/UL (ref 0.01–0.1)
BASOPHILS NFR BLD: 0.4 %
BUN SERPL-MCNC: 16 MG/DL (ref 8–20)
CALCIUM SERPL-MCNC: 8.3 MG/DL (ref 8.9–10.3)
CASE RPRT: NORMAL
CHLORIDE SERPL-SCNC: 111 MEQ/L (ref 98–109)
CO2 SERPL-SCNC: 23 MEQ/L (ref 22–32)
CREAT SERPL-MCNC: 1 MG/DL (ref 0.6–1.1)
DIFFERENTIAL METHOD BLD: ABNORMAL
EOSINOPHIL # BLD: 0.3 K/UL (ref 0.04–0.36)
EOSINOPHIL NFR BLD: 5.7 %
ERYTHROCYTE [DISTWIDTH] IN BLOOD BY AUTOMATED COUNT: 13.8 % (ref 11.7–14.4)
GFR SERPL CREATININE-BSD FRML MDRD: 53.6 ML/MIN/1.73M*2
GLUCOSE SERPL-MCNC: 104 MG/DL (ref 70–99)
GRAM STN SPEC: NORMAL
GRAM STN SPEC: NORMAL
HCT VFR BLDCO AUTO: 29.2 % (ref 35–45)
HGB BLD-MCNC: 9.4 G/DL (ref 11.8–15.7)
IMM GRANULOCYTES # BLD AUTO: 0.01 K/UL (ref 0–0.08)
IMM GRANULOCYTES NFR BLD AUTO: 0.2 %
LYMPHOCYTES # BLD: 1.47 K/UL (ref 1.2–3.5)
LYMPHOCYTES NFR BLD: 28 %
MAGNESIUM SERPL-MCNC: 1.8 MG/DL (ref 1.8–2.5)
MCH RBC QN AUTO: 28.9 PG (ref 28–33.2)
MCHC RBC AUTO-ENTMCNC: 32.2 G/DL (ref 32.2–35.5)
MCV RBC AUTO: 89.8 FL (ref 83–98)
MICROORGANISM SPEC CULT: NORMAL
MONOCYTES # BLD: 0.42 K/UL (ref 0.28–0.8)
MONOCYTES NFR BLD: 8 %
NEUTROPHILS # BLD: 3.03 K/UL (ref 1.7–7)
NEUTS SEG NFR BLD: 57.7 %
NRBC BLD-RTO: 0 %
PATH REPORT.FINAL DX SPEC: NORMAL
PATH REPORT.GROSS SPEC: NORMAL
PDW BLD AUTO: 10.3 FL (ref 9.4–12.3)
PHOSPHATE SERPL-MCNC: 3.3 MG/DL (ref 2.4–4.7)
PLATELET # BLD AUTO: 357 K/UL (ref 150–369)
POTASSIUM SERPL-SCNC: 3.3 MEQ/L (ref 3.6–5.1)
RBC # BLD AUTO: 3.25 M/UL (ref 3.93–5.22)
SODIUM SERPL-SCNC: 139 MEQ/L (ref 136–144)
WBC # BLD AUTO: 5.25 K/UL (ref 3.8–10.5)

## 2022-08-02 PROCEDURE — 97166 OT EVAL MOD COMPLEX 45 MIN: CPT | Mod: GO

## 2022-08-02 PROCEDURE — 85025 COMPLETE CBC W/AUTO DIFF WBC: CPT | Performed by: HOSPITALIST

## 2022-08-02 PROCEDURE — 83735 ASSAY OF MAGNESIUM: CPT | Performed by: HOSPITALIST

## 2022-08-02 PROCEDURE — 36415 COLL VENOUS BLD VENIPUNCTURE: CPT | Performed by: HOSPITALIST

## 2022-08-02 PROCEDURE — 63700000 HC SELF-ADMINISTRABLE DRUG

## 2022-08-02 PROCEDURE — 63600000 HC DRUGS/DETAIL CODE: Performed by: HOSPITALIST

## 2022-08-02 PROCEDURE — 99239 HOSP IP/OBS DSCHRG MGMT >30: CPT | Performed by: HOSPITALIST

## 2022-08-02 PROCEDURE — 80048 BASIC METABOLIC PNL TOTAL CA: CPT | Performed by: HOSPITALIST

## 2022-08-02 PROCEDURE — 63700000 HC SELF-ADMINISTRABLE DRUG: Performed by: PHYSICIAN ASSISTANT

## 2022-08-02 PROCEDURE — 97162 PT EVAL MOD COMPLEX 30 MIN: CPT | Mod: GP

## 2022-08-02 PROCEDURE — 84100 ASSAY OF PHOSPHORUS: CPT | Performed by: HOSPITALIST

## 2022-08-02 RX ORDER — HYDRALAZINE HYDROCHLORIDE 20 MG/ML
10 INJECTION INTRAMUSCULAR; INTRAVENOUS EVERY 8 HOURS PRN
Status: DISCONTINUED | OUTPATIENT
Start: 2022-08-02 | End: 2022-08-02 | Stop reason: HOSPADM

## 2022-08-02 RX ORDER — POTASSIUM CHLORIDE 750 MG/1
20 TABLET, EXTENDED RELEASE ORAL AS NEEDED
Status: DISCONTINUED | OUTPATIENT
Start: 2022-08-02 | End: 2022-08-02 | Stop reason: HOSPADM

## 2022-08-02 RX ORDER — POTASSIUM CHLORIDE 750 MG/1
40 TABLET, EXTENDED RELEASE ORAL AS NEEDED
Status: DISCONTINUED | OUTPATIENT
Start: 2022-08-02 | End: 2022-08-02 | Stop reason: HOSPADM

## 2022-08-02 RX ORDER — METOPROLOL TARTRATE 50 MG/1
50 TABLET ORAL 2 TIMES DAILY
Qty: 60 TABLET | Refills: 0 | Status: SHIPPED | OUTPATIENT
Start: 2022-08-02 | End: 2022-09-16 | Stop reason: ENTERED-IN-ERROR

## 2022-08-02 RX ADMIN — CLOPIDOGREL BISULFATE 75 MG: 75 TABLET, FILM COATED ORAL at 08:34

## 2022-08-02 RX ADMIN — TRAMADOL HYDROCHLORIDE 25 MG: 50 TABLET, COATED ORAL at 08:35

## 2022-08-02 RX ADMIN — AMIODARONE HYDROCHLORIDE 200 MG: 200 TABLET ORAL at 08:38

## 2022-08-02 RX ADMIN — POTASSIUM CHLORIDE 40 MEQ: 750 TABLET, EXTENDED RELEASE ORAL at 05:02

## 2022-08-02 RX ADMIN — ACETAMINOPHEN 325 MG: 325 TABLET, FILM COATED ORAL at 08:34

## 2022-08-02 RX ADMIN — DEXTROSE AND SODIUM CHLORIDE: 5; 900 INJECTION, SOLUTION INTRAVENOUS at 03:30

## 2022-08-02 RX ADMIN — CETIRIZINE HYDROCHLORIDE 5 MG: 5 TABLET ORAL at 08:34

## 2022-08-02 RX ADMIN — APIXABAN 5 MG: 5 TABLET, FILM COATED ORAL at 08:35

## 2022-08-02 RX ADMIN — PANTOPRAZOLE SODIUM 40 MG: 40 TABLET, DELAYED RELEASE ORAL at 08:34

## 2022-08-02 ASSESSMENT — COGNITIVE AND FUNCTIONAL STATUS - GENERAL
HELP NEEDED FOR PERSONAL GROOMING: 3 - A LITTLE
AFFECT: WFL
STANDING UP FROM CHAIR USING ARMS: 4 - NONE
WALKING IN HOSPITAL ROOM: 4 - NONE
MOVING TO AND FROM BED TO CHAIR: 4 - NONE
EATING MEALS: 4 - NONE
DRESSING REGULAR UPPER BODY CLOTHING: 3 - A LITTLE
TOILETING: 3 - A LITTLE
CLIMB 3 TO 5 STEPS WITH RAILING: 3 - A LITTLE
DRESSING REGULAR LOWER BODY CLOTHING: 3 - A LITTLE
AFFECT: WFL
HELP NEEDED FOR BATHING: 3 - A LITTLE

## 2022-08-02 NOTE — CONSULTS
Nutrition Note    Nutrition Status Classification: Mild nutritional compromise     Clinical Course: Patient is a 77 y.o. female who was admitted on 7/27/2022 with a diagnosis of Atrial fibrillation (CMS/HCC) [I48.91]  Paroxysmal atrial fibrillation (CMS/HCC) [I48.0]  Tachy-marco a syndrome (CMS/HCC) [I49.5]  Elevated troponin [R77.8]  Generalized weakness [R53.1]  Coronary artery disease involving native coronary artery of native heart without angina pectoris [I25.10]  Atrial fibrillation, unspecified type (CMS/HCC) [I48.91].   Past Medical History:   Diagnosis Date    Aortic regurgitation     Chronic kidney disease     Coronary artery disease     History of loop recorder     Hypertension     Lipid disorder     Mitral regurgitation     SVT (supraventricular tachycardia) (CMS/[facility])      History reviewed. No pertinent surgical history.    Nutrition Interventions/Recommendations:   1. Continue cardiac diet as tolerated    - monitor and document % intake   2. ONS (Ensure compact vs Boost Plus) as desired   3. Labs/lytes - monitor and treat prn    - replete K to WNL  4. Weekly weights   5. Monitor bowel patterns    - LBM 8/1         Dietary Orders   (From admission, onward)             Start     Ordered    07/29/22 1505  Adult Diet Regular; Cardiac (2gm Sodium/Low Fat); RD/LDN may adjust order  Diet effective now        References:    IDDSI Diet reference   Question Answer Comment   Diet Texture Regular    Other Restriction(s): Cardiac (2gm Sodium/Low Fat)    Delegation of Authority. Diet orders written by PA/Ninfa may not be adjusted by RD/LDNs. RD/LDN may adjust order        07/29/22 1504                Reason for Assessment  Reason For Assessment: per organizational policy (LOS)  Diagnosis: cardiac disease (pericardial effusion)    [facility] Nutrition Screen Tool  Has patient lost weight without trying?: 0-->No  If yes,how much weight has been lost?: 0-->Patient has not lost weight  Has patient been eating poorly due  "to decreased appetite?: 0-->No  [facility] Nutrition Screen Score: 0    Nutrition/Diet History  Typical Food/Fluid Intake: From home with spouse  Diet Prior to Admission: 3 meals/day  Appetite Prior to Admission:  (Good per pt)  Functional Status: able to prepare meals, ambulatory  Food Allergies: no known food allergies  Factors Affecting Nutritional Intake: diarrhea, nausea (over the weekend, now improving but with early satiety/anorexia)    Physical Findings  Overall Physical Appearance: edematous, overweight  Gastrointestinal: diarrhea (resolving)  Last Bowel Movement: 08/01/22  Skin: surgical incision (abdomen, left chest, sternum; edema to hands noted)    Last Bowel Movement: 08/01/22    Nutrition Order  Nutrition Order: meets nutritional requirements  Nutrition Order Comments: cardiac  Current TF/TPN Regimen: none    Anthropometrics  Height: 162.6 cm (5' 4\")      Admit Weight  Admit Weight Method: measured  Admit Weight: 83.2 kg (183 lb 6.8 oz)    Current Weight  Weight Method: Bed scale  Weight: 83 kg (183 lb)    Ideal Body Weight (IBW)  Ideal Body Weight (IBW) (kg): 55  % Ideal Body Weight: 150.91    Usual Body Weight (UBW)  Usual Body Weight: 79.4 kg (175 lb)  Weight Loss: no weight change    Body Mass Index (BMI)  BMI (Calculated): 31.4  BMI Assessment: BMI 30-34.9: obesity grade I    Labs/Procedures/Meds  Lab Results Reviewed: reviewed, pertinent  Lab Results Comments: K 3.3 eGFR 53.6      Results from last 7 days   Lab Units 08/02/22  0255 08/01/22  1149 07/31/22  0418   SODIUM mEQ/L 139 135* 137   POTASSIUM mEQ/L 3.3* 3.4* 3.9   CHLORIDE mEQ/L 111* 108 101   CO2 mEQ/L 23 23 26   BUN mg/dL 16 20 31*   CREATININE mg/dL 1.0 1.2* 1.2*   GLUCOSE mg/dL 104* 118* 90   CALCIUM mg/dL 8.3* 8.6* 9.0      Results from last 7 days   Lab Units 07/27/22  0954   ALK PHOS IU/L 71   BILIRUBIN TOTAL mg/dL 0.8   ALBUMIN g/dL 3.4   ALT IU/L 19   AST IU/L 27          No results found for: HGBA1C  No results found for: " [id number]  Lab Results   Component Value Date    CALCIUM 8.3 (L) 08/02/2022    PHOS 3.3 08/02/2022     Results from last 7 days   Lab Units 08/02/22  0255 08/01/22  1149 07/31/22  0418   WBC K/uL 5.25 6.24 7.45   HEMOGLOBIN g/dL 9.4* 11.3* 10.9*   HEMATOCRIT % 29.2* 35.4 34.2*   PLATELETS K/uL 357 444* 390*     Results from last 7 days   Lab Units 08/02/22  0255 08/01/22  1149 07/31/22  0418   MAGNESIUM mg/dL 1.8 1.6* 1.8      Diagnostic Tests/Procedures  Diagnostic Test/Procedure Reviewed: reviewed, pertinent    Medications  Pertinent Medications Reviewed: reviewed, pertinent  Pertinent Medications Comments: pepcid, protonix, simvastatin   acetaminophen  325 mg oral BID (9a, 10p)    amiodarone  200 mg oral Daily    apixaban  5 mg oral BID    cetirizine  5 mg oral Daily    clopidogreL  75 mg oral Daily    famotidine  20 mg oral Nightly    metoprolol tartrate  50 mg oral BID    pantoprazole  40 mg oral Daily    simvastatin  20 mg oral Nightly    traMADoL  25 mg oral BID (9a, 10p)       Weights (last 7 days)     Date/Time Weight    08/01/22 0841 83 kg (183 lb)    07/29/22 1518 83 kg (183 lb)    07/29/22 0751 83 kg (183 lb)    07/28/22 0900 83.2 kg (183 lb 6.4 oz)    07/27/22 0940 79.4 kg (175 lb) - stated      7/20/2022 186 lb 84.369 kg -   7/20/2022 186 lb 84.369 kg -   7/20/2022 186 lb 3.2 oz 84.46 kg Bed scale   7/19/2022 179 lb 81.194 kg Stated   6/9/2022 176 lb 79.833 kg Stated   3 lb/1.6% weight loss x < 2 weeks     Clinical Comments:  76 y/o female admitted for pericardial effusion, screened for LOS.  PMH includes CKD, CAD, HTN, HLD.  Presented with CC of weakness.  Had recent admission 7/19-7/25.  S/p pericardiocentesis 7/29 with 300 mL removed.  Chart reviewed, discussed with RN and patient seen OOB in chair.  Patient reports appetite was good prior to hospitalization and at beginning of stay.  However, had side effect of nausea/diarrhea from medication which contributed to minimal intake.  Reports  nausea resolved and has not had bowel movement today, but appetite remains poor.  Had small breakfast this morning.  Denies difficulty chewing/swallowing.  Reports UBW of 175 lb.  Weight in chart elevated compared to UBW, suspect this is d/t fluid as noted to have some accumulation in hands.  Encouraged small frequent meals with decreased appetite.  Discussed use of ONS as desired with decreased appetite.  Lab/smeds reviewed.  K 3.3.  Will continue to follow if not discharged.     Date: 08/02/22  Signature: LONDON Root

## 2022-08-02 NOTE — CONSULTS
Pt welcomed 's visit . Said hopes to go home today and noted been in hospital several times since early July. Thankful for visit and prayers offered..     Racheal Das charting via Mindy Oppenheimer

## 2022-08-02 NOTE — PROGRESS NOTES
Pt discussed in rounds, chart reviewed. RNCC met with Dr. Borjas, stated pt medically stable for d/c. RNCC met with PT, stated safe to go home with home care. RNCC updated Yuliya Mc, [facility] liaison, with discharge date.     1231: RNCC met with pt at the bedside, reviewed d/c plan and MCL. Pt agreeable to home with [facility], declined to appeal d/c. Pt confirmed she has transport home.

## 2022-08-02 NOTE — PROGRESS NOTES
[facility]....Pt was in a recent episode of homecare for home RN, PT visits.    New referral started.  Per CM, pt is agreeable to home RN, PT visits.    Called and spoke with pt's dgtr Kirstin.  Her Covid positive isolation ends today as per her PCP, however, dgtr does not live with pt for any exposure.    Called pt's spouse Lucius to review the homecare referral again today.  [place] re-verified.  Homecare referral completed today.

## 2022-08-02 NOTE — PLAN OF CARE
Problem: Adult Inpatient Plan of Care  Goal: Plan of Care Review  Outcome: Progressing  Flowsheets (Taken 8/2/2022 0048)  Progress: improving  Plan of Care Reviewed With: patient  Outcome Summary: Patient A&O, denies pain, VSS, coop. with care, purwick in place per patient request, call bell use encouraged, alarms active   Plan of Care Review  Plan of Care Reviewed With: patient  Progress: improving  Outcome Summary: Patient A&O, denies pain, VSS, coop. with care, purwick in place per patient request, call bell use encouraged, alarms active

## 2022-08-02 NOTE — PATIENT CARE CONFERENCE
Care Progression Rounds Note  Date: 8/2/2022  Time: 9:03 AM     Patient Name: Emily Ramirez     Medical Record Number: [id number]   YOB: 1945  Sex: Female      Room/Bed: 3029    Admitting Diagnosis: Atrial fibrillation (CMS/HCC) [I48.91]  Paroxysmal atrial fibrillation (CMS/HCC) [I48.0]  Tachy-marco a syndrome (CMS/HCC) [I49.5]  Elevated troponin [R77.8]  Generalized weakness [R53.1]  Coronary artery disease involving native coronary artery of native heart without angina pectoris [I25.10]  Atrial fibrillation, unspecified type (CMS/HCC) [I48.91]   Admit Date/Time: 7/27/2022  9:33 AM    Primary Diagnosis: Pericardial effusion  Principal Problem: Pericardial effusion    GMLOS: 2.8  Anticipated Discharge Date: 8/2/2022    AM-PAC:  Mobility Score:      Discharge Planning:  Concerns to be Addressed: care coordination/care conferences, discharge planning  Anticipated Discharge Disposition: home with home health, skilled nursing facility  Type of Home Care Services: nursing, home OT    Barriers to Discharge:  None    Comments:       Participants:  , nursing, pharmacy

## 2022-08-02 NOTE — PLAN OF CARE
Problem: Adult Inpatient Plan of Care  Goal: Plan of Care Review  Outcome: Progressing  Flowsheets (Taken 8/2/2022 6535)  Progress: no change  Plan of Care Reviewed With: patient  Outcome Summary: PT evaluation complete. Patient performs bed mobility, sit<>stand transfers and household distance ambulation w/ RW. Limited primarily by fatigue and deconditioning. She appears at her current functional baseline w/ no acute PT needs in this setting. Recommend return home w/ family assist and supervision as needed, home PT to progress functional safety and mobility w/in home environment. PT will sign off at this time, re-consult as needed if patient status changes and new orders are received. AM-PAC 23/24.

## 2022-08-02 NOTE — HOSPITAL COURSE
Emily is a 77 y.o. female admitted on 7/27/2022 with Atrial fibrillation (CMS/HCC) [I48.91]  Paroxysmal atrial fibrillation (CMS/HCC) [I48.0]  Tachy-marco a syndrome (CMS/HCC) [I49.5]  Elevated troponin [R77.8]  Generalized weakness [R53.1]  Coronary artery disease involving native coronary artery of native heart without angina pectoris [I25.10]  Atrial fibrillation, unspecified type (CMS/HCC) [I48.91]. Principal problem is Pericardial effusion.    Past Medical History  Emily has a past medical history of Aortic regurgitation, Chronic kidney disease, Coronary artery disease, History of loop recorder, Hypertension, Lipid disorder, Mitral regurgitation, and SVT (supraventricular tachycardia) (CMS/HCC).    History of Present Illness   Presents with weakness and chest pain and is found to have a moderate-sized pericardial effusion s/p implantation of a dual-chamber permanent pacemaker on 7/21/2022.    77 y.o. female with a past medical history as stated below who presents with weakness.    Patient had a cardiac stent placed June 9 and developed in-stent thrombosis soon afterwards requiring a second cath.  She was discharged to home.  She had another admission from July 19 until July 25 for paroxysmal atrial fibrillation.  She was found to be in A. fib with multiple conversion pauses up to 6 seconds.  She underwent a pacemaker placement during that admission.  She was initially placed on sotalol but had an DENNY and prolonged QT this was was stopped and she was started on amiodarone.  She was also started on Eliquis.  She was discharged to home.  She initially was doing well however today felt very weak, fatigued, with pleuritic chest discomfort.  She came back to the emergency department for evaluation.  She is back in rapid atrial fibrillation.  She will be admitted for further management.    Cardiology following.  s/p pericardiocentesis 7/29

## 2022-08-02 NOTE — PROGRESS NOTES
Patient:  Emily Ramirez  Location:  [facility] 3A 3029  MRN:  [id number]  Today's date:  8/2/2022     General Observations  Start: Pt received in bathroom; she was agreeable to therapy and no issues or concerns identified by nurse prior to session   End: Pt seated in chair at end of session; call bell in reach, chair alarm activated, all needs met, personal items in reach and nurse notified of patient performance, patient's position and change in vital signs       Emily is a 77 y.o. female admitted on 7/27/2022 with Atrial fibrillation (CMS/HCC) [I48.91]  Paroxysmal atrial fibrillation (CMS/HCC) [I48.0]  Tachy-marco a syndrome (CMS/HCC) [I49.5]  Elevated troponin [R77.8]  Generalized weakness [R53.1]  Coronary artery disease involving native coronary artery of native heart without angina pectoris [I25.10]  Atrial fibrillation, unspecified type (CMS/HCC) [I48.91]. Principal problem is Pericardial effusion.    Past Medical History  Emily has a past medical history of Aortic regurgitation, Chronic kidney disease, Coronary artery disease, History of loop recorder, Hypertension, Lipid disorder, Mitral regurgitation, and SVT (supraventricular tachycardia) (CMS/HCC).    History of Present Illness   Presents with weakness and chest pain and is found to have a moderate-sized pericardial effusion s/p implantation of a dual-chamber permanent pacemaker on 7/21/2022.    77 y.o. female with a past medical history as stated below who presents with weakness.    Patient had a cardiac stent placed June 9 and developed in-stent thrombosis soon afterwards requiring a second cath.  She was discharged to home.  She had another admission from July 19 until July 25 for paroxysmal atrial fibrillation.  She was found to be in A. fib with multiple conversion pauses up to 6 seconds.  She underwent a pacemaker placement during that admission.  She was initially placed on sotalol but had an DENNY and prolonged QT this was was  stopped and she was started on amiodarone.  She was also started on Eliquis.  She was discharged to home.  She initially was doing well however today felt very weak, fatigued, with pleuritic chest discomfort.  She came back to the emergency department for evaluation.  She is back in rapid atrial fibrillation.  She will be admitted for further management.    Cardiology following.  s/p pericardiocentesis 7/29      OT Vitals    Date/Time Pulse SpO2 Pt Activity O2 Therapy BP BP Location BP Method Pt Position [facility]   08/02/22 0945 86 97 % At rest None (Room air) 197/84 Right upper arm Automatic Sitting MTU   08/02/22 1000 87 97 % At rest None (Room air) 147/84 Right upper arm Automatic Sitting KK      OT Pain    Date/Time Pain Type Rating: Rest Rating: Activity [facility]   08/02/22 0945 Pain Reassessment;Post Activity 0 - no pain 0 - no pain MTU   08/02/22 1000 Pain Assessment 0 - no pain 0 - no pain KK          Prior Living Environment    Flowsheet Row Most Recent Value   People in Home spouse   Current Living Arrangements home   Living Environment Comment 1 step to enter, powder room on first floor, stair glide to second floor bedroom and shower stall bathroom        Prior Level of Function    Flowsheet Row Most Recent Value   Dominant Hand right   Ambulation independent   Transferring independent   Toileting independent   Bathing independent   Dressing independent   Prior Level of Function Comment typically IND with no AD use and IND ADLs, recent rollator use and assist for toileting since recent hospital stay, drives   Assistive Device Currently Used at Home walker, front-wheeled, shower chair, stair glide        Occupational Profile    Flowsheet Row Most Recent Value   Reason for Services/Referral recent pacemaker   Successful Occupations ADLs   Occupational History/Life Experiences retired teacher   Performance Patterns enjoys puzzels   Environmental Supports and Barriers supportive spouse   Patient Goals home           OT  Evaluation and Treatment - 08/02/22 0939        OT Time Calculation    Start Time 0939     Stop Time 1000     Time Calculation (min) 21 min        Session Details    Document Type initial evaluation     Mode of Treatment occupational therapy        General Information    Patient Profile Reviewed yes     Existing Precautions/Restrictions fall;pacemaker        Cognition/Psychosocial    Affect/Mental Status (Cognition) WFL     Orientation Status (Cognition) oriented x 4     Follows Commands (Cognition) WFL     Cognitive Function safety deficit     Safety Deficit (Cognition) minimal deficit;safety precautions awareness     Comment, Cognition pleasant and receptive to education on pacemaker precautions        Hearing Assessment    Hearing Status WFL        Vision Assessment/Intervention    Visual Impairment/Limitations WFL;corrective lenses full-time        Sensory Assessment (Somatosensory)    Sensory Assessment (Somatosensory) sensation intact;bilateral UE        Range of Motion (ROM)    Range of Motion ROM is WFL;right upper extremity     Left Upper Extremity (ROM) left UE ROM is WFL except;shoulder     Shoulder, Left (ROM) FF ~90 due to pacemaker precautions        Strength (Manual Muscle Testing)    Strength (Manual Muscle Testing) strength is WFL;bilateral upper extremities   L UE not formally assessed due to recent pacemaker but appears WFL ADLs       Edema Symptoms/Interventions    Description (Edema) diffuse   L hand/forearm, educated on hand/wrist/elbow ROM and elevation       Sit to Stand Transfer    [place], Sit to Stand Transfer supervision     Verbal Cues safety;technique     Assistive Device walker, front-wheeled     Comment chair        Stand to Sit Transfer    [place], Stand to Sit Transfer supervision     Verbal Cues safety;hand placement     Assistive Device walker, front-wheeled     Comment chair        Toilet Transfer    Transfer Technique sit-stand     [place], Toilet Transfer  supervision;verbal cues     Verbal Cues safety;technique     Assistive Device walker, front-wheeled     Comment functional mobility in room/bathroom RW close SUP        Balance    Static Sitting Balance WFL;supported;sitting in chair     Dynamic Sitting Balance WFL;unsupported;sitting in chair     Static Standing Balance mild impairment;supported     Dynamic Standing Balance mild impairment;supported     Comment, Balance RW        Motor Skills    Functional Endurance impaired: fatigues with activity        Upper Body Dressing    Comment Pt receptive to education on adaptive techniques but declined practice        Lower Body Dressing    Tasks doff;don;socks     [place] minimum assist (75% or more patient effort)     Position supported sitting     Comment figure 4, use of R hand        Grooming    Self-Performance washes, rinses and dries hands     [place] supervision     Position unsupported standing     Comment abandoned RW, education provided to remain with RW        Toileting    [place] adjust/manage clothing;perform bladder hygiene;minimum assist (75% or more patient effort)     Position supported standing     Comment assist to manage L side of underwear due to pacemaker precautions        AM-PAC (TM) - ADL (Current Function)    Putting on and taking off regular lower body clothing? 3 - A Little     Bathing? 3 - A Little     Toileting? 3 - A Little     Putting on/taking off regular upper body clothing? 3 - A Little     How much help for taking care of personal grooming? 3 - A Little     Eating meals? 4 - None     AM-PAC (TM) ADL Score 19        Assessment/Plan (OT)    Daily Outcome Statement [facility] 19, SUP functional transfers with RW, MIN A toileting, MIN A LB Dressing, SUP grooming with education to remain with RW, ADLs impacted by impairments related to balance/endurance/pacemaker precautions, Rec continued OT and home with homecare and assist, reports supportive spouse     Rehab Potential good,  to achieve stated therapy goals     Therapy Frequency 3 times/wk     Planned Therapy Interventions activity tolerance training;functional balance retraining;occupation/activity based interventions;patient/caregiver education/training;transfer/mobility retraining               OT Assessment/Plan    Flowsheet Row Most Recent Value   OT Recommended Discharge Disposition home with assistance, home with home health at 08/02/2022 0939   Anticipated Equipment Needs At Discharge (OT) none at 08/02/2022 0939   Patient/Family Therapy Goal Statement home at 08/02/2022 0939                    Education Documentation  Self-Care, taught by Tonia Savage, OT at 8/2/2022 12:11 PM.  Learner: Patient  Readiness: Acceptance  Method: Explanation  Response: Verbalizes Understanding, Demonstrated Understanding  Comment: safe functional transfers, remaining with RW in bathroom, pacemaker precautions, adaptive ADLs, role of OT, call bell, falls pervention, easing into routines          OT Goals    Flowsheet Row Most Recent Value   Transfer Goal 1    Activity/Assistive Device sit-to-stand/stand-to-sit, toilet, walker, front-wheeled at 08/02/2022 0939   [place] modified independence at 08/02/2022 0939   Time Frame by discharge at 08/02/2022 0939   Progress/Outcome goal ongoing at 08/02/2022 0939   Dressing Goal 1    Activity/Adaptive Equipment dressing skills, all at 08/02/2022 0939   [place] modified independence at 08/02/2022 0939   Time Frame by discharge at 08/02/2022 0939   Progress/Outcome goal ongoing at 08/02/2022 0939   Toileting Goal 1    Activity/Assistive Device toileting skills, all at 08/02/2022 0939   [place] modified independence at 08/02/2022 0939   Time Frame by discharge at 08/02/2022 0939   Progress/Outcome goal ongoing at 08/02/2022 0939   Grooming Goal 1    Activity/Assistive Device grooming skills, all at 08/02/2022 0939   [place] modified independence at 08/02/2022 0939   Time Frame by discharge at  08/02/2022 0939   Progress/Outcome goal ongoing at 08/02/2022 0998

## 2022-08-02 NOTE — NURSING NOTE
Patient appropriate for discharge, IV and telemetry removed, medications returned, belongings returned, AVS reviewed with patient who verbalizes understanding.

## 2022-08-02 NOTE — PROGRESS NOTES
Patient: Emily Ramirez  Location:  [facility] 3A 3029  MRN:  [id number]  Today's date:  8/2/2022    Patient left end of session seated in locked recliner w/ +chair alarm, OT present completing OT evaluation. Discussed PT evaluation outcomes and recommendations w/ RN.    Emily is a 77 y.o. female admitted on 7/27/2022 with Atrial fibrillation (CMS/HCC) [I48.91]  Paroxysmal atrial fibrillation (CMS/HCC) [I48.0]  Tachy-marco a syndrome (CMS/HCC) [I49.5]  Elevated troponin [R77.8]  Generalized weakness [R53.1]  Coronary artery disease involving native coronary artery of native heart without angina pectoris [I25.10]  Atrial fibrillation, unspecified type (CMS/HCC) [I48.91]. Principal problem is Pericardial effusion.    Past Medical History  Emily has a past medical history of Aortic regurgitation, Chronic kidney disease, Coronary artery disease, History of loop recorder, Hypertension, Lipid disorder, Mitral regurgitation, and SVT (supraventricular tachycardia) (CMS/HCC).    History of Present Illness   Presents with weakness and chest pain and is found to have a moderate-sized pericardial effusion s/p implantation of a dual-chamber permanent pacemaker on 7/21/2022.    77 y.o. female with a past medical history as stated below who presents with weakness.    Patient had a cardiac stent placed June 9 and developed in-stent thrombosis soon afterwards requiring a second cath.  She was discharged to home.  She had another admission from July 19 until July 25 for paroxysmal atrial fibrillation.  She was found to be in A. fib with multiple conversion pauses up to 6 seconds.  She underwent a pacemaker placement during that admission.  She was initially placed on sotalol but had an DENNY and prolonged QT this was was stopped and she was started on amiodarone.  She was also started on Eliquis.  She was discharged to home.  She initially was doing well however today felt very weak, fatigued, with pleuritic chest  discomfort.  She came back to the emergency department for evaluation.  She is back in rapid atrial fibrillation.  She will be admitted for further management.    Cardiology following.  s/p pericardiocentesis 7/29      PT Vitals    Date/Time Pulse SpO2 Pt Activity O2 Therapy BP BP Location BP Method Pt Position [facility]   08/02/22 0930 72 96 % At rest None (Room air) 148/66 Right upper arm Automatic Lying MTU   08/02/22 0945 86 97 % At rest None (Room air) 197/84 Right upper arm Automatic Sitting MTU      PT Pain    Date/Time Pain Type Rating: Rest Rating: Activity [facility]   08/02/22 0930 Pain Assessment 0 - no pain -- MTU   08/02/22 0945 Pain Reassessment;Post Activity 0 - no pain 0 - no pain MTU          Prior Living Environment    Flowsheet Row Most Recent Value   People in Home spouse   Current Living Arrangements home   Living Environment Comment 1 step to enter, powder room on first floor, stair glide to second floor bedroom and shower stall bathroom        Prior Level of Function    Flowsheet Row Most Recent Value   Dominant Hand right   Ambulation independent   Transferring independent   Toileting independent   Bathing independent   Dressing independent   Prior Level of Function Comment typically IND with no AD use and IND ADLs, recent rollator use and assist for toileting since recent hospital stay, drives   Assistive Device Currently Used at Home walker, front-wheeled, shower chair, stair glide           PT Evaluation and Treatment - 08/02/22 0928        PT Time Calculation    Start Time 0928     Stop Time 0948     Time Calculation (min) 20 min        Session Details    Document Type initial evaluation     Mode of Treatment physical therapy        General Information    Patient Profile Reviewed yes     Onset of Illness/Injury or Date of Surgery 07/27/22     Referring Physician [place]     Patient/Family/Caregiver Comments/Observations Patient cleared by RN for PT evaluation.     General Observations of Patient  Received in bed w/ +bed alarm, name and  verified on hospital wristband, agreeable to PT evaluation.     Existing Precautions/Restrictions fall;pacemaker        Cognition/Psychosocial    Affect/Mental Status (Cognition) WFL     Orientation Status (Cognition) oriented x 4     Follows Commands (Cognition) WFL;follows multi-step commands     Cognitive Function WFL;safety deficit     Safety Deficit (Cognition) minimal deficit;insight into deficits/self-awareness     Comment, Cognition pleasant and cooperative, eager to participate w/ PT and go home, receptive to PT cues, education and recommendations.        Sensory    Hearing Status WFL        Sensory Assessment (Somatosensory)    Sensory Assessment (Somatosensory) sensation intact;bilateral LE     Bilateral LE Sensory Assessment general sensation;light touch awareness     Sensory Subjective Reports --   denies       Range of Motion (ROM)    Range of Motion ROM is WFL;bilateral lower extremities        Strength (Manual Muscle Testing)    Strength (Manual Muscle Testing) strength is WFL;bilateral lower extremities        Strength Comprehensive (MMT)    Comment 4+/5 throughout bilateral LE        Bed Mobility    [place], Supine to Sit distant supervision     Assistive Device head of bed elevated;bed rails     Comment (Bed Mobility) out of bed to L        Transfers    Transfers toilet transfer        Sit to Stand Transfer    [place], Sit to Stand Transfer distant supervision;verbal cues     Verbal Cues hand placement;safety;technique     Assistive Device walker, front-wheeled     Comment from edge of bed vs recliner w/ RUE push-off to maintain PPM precautions        Stand to Sit Transfer    [place], Stand to Sit Transfer distant supervision;verbal cues     Verbal Cues hand placement;safety;technique     Assistive Device walker, front-wheeled     Comment to recliner, good eccentric control w/ RUE support to maintain PPM precautions        Toilet Transfer     Transfer Technique stand-sit;sit-stand     [place], Toilet Transfer distant supervision;verbal cues     Verbal Cues hand placement;safety;technique     Assistive Device walker, front-wheeled;grab bars/safety frame     Comment L grab-bar and R sink for UE support        Gait Training    [place], Gait distant supervision     Assistive Device walker, front-wheeled     Distance in Feet 130 feet     Pattern (Gait) step-through     Deviations/Abnormal Patterns (Gait) vandana decreased;gait speed decreased     Comment (Gait/Stairs) 20 ft to/ from bathroom + 110 ft, slow but steady reciprocal gait pattern, fair foot clearance w/ no LOB, good safety awareness navigating confined room space, distance limited by fatigue        Stairs Training    [place], Stairs not tested     Comment based on strength and functional assessment do not anticipate difficulty w/ 1 CARTER home environment.        Safety Issues, Functional Mobility    Safety Issues Affecting Function (Mobility) insight into deficits/self-awareness;safety precautions follow-through/compliance     Impairments Affecting Function (Mobility) balance;endurance/activity tolerance     Comment, Safety Issues/Impairments (Mobility) recalled 2/3 PPM precautions, reviewed throughout session w/ patient demonstrating understanding        Balance    Balance Assessment sitting static balance;sitting dynamic balance;sit to stand dynamic balance;standing static balance;standing dynamic balance     Static Sitting Balance WFL;sitting, edge of bed     Dynamic Sitting Balance WFL;sitting, edge of bed     Sit to Stand Dynamic Balance WFL;supported     Static Standing Balance WFL;supported     Dynamic Standing Balance mild impairment;supported     Comment, Balance w/ RW        Motor Skills    Motor Skills functional endurance     Functional Endurance fair+, reported RPE 7/10 w/ activity, SpO2 stable 97-98% on room air        AM-PAC (TM) - Mobility (Current Function)     Turning from your back to your side while in a flat bed without using bedrails? 4 - None     Moving from lying on your back to sitting on the side of a flat bed without using bedrails? 4 - None     Moving to and from a bed to a chair? 4 - None     Standing up from a chair using your arms? 4 - None     To walk in a hospital room? 4 - None     Climbing 3-5 steps with a railing? 3 - A Little     AM-PAC (TM) Mobility Score 23        Assessment/Plan (PT)    Daily Outcome Statement PT evaluation complete. Patient performs bed mobility, sit<>stand transfers and household distance ambulation w/ RW. Limited primarily by fatigue and deconditioning. She appears at her current functional baseline w/ no acute PT needs in this setting. Recommend return home w/ family assist and supervision as needed, home PT to progress functional safety and mobility w/in home environment. PT will sign off at this time, re-consult as needed if patient status changes and new orders are received. AM-PAC 23/24.     Therapy Frequency evaluation only               PT Assessment/Plan    Flowsheet Row Most Recent Value   PT Recommended Discharge Disposition home with assistance, home with home health at 08/02/2022 0928   Anticipated Equipment Needs at Discharge (PT) none at 08/02/2022 0928   Patient/Family Therapy Goals Statement To go home soon. at 08/02/2022 0928                    Education Documentation  Home Safety, taught by Crissy Smith PT at 8/2/2022  1:47 PM.  Learner: Patient  Readiness: Acceptance  Method: Explanation  Response: Verbalizes Understanding  Comment: PT role and POC, discharge goals and recommendations, gradual activity progression to previous levels, activity modification, energy conservation, safety and fall prevention in home and hospital settings.    Energy Conservation, taught by Crissy Smith PT at 8/2/2022  1:47 PM.  Learner: Patient  Readiness: Acceptance  Method: Explanation  Response: Verbalizes  Understanding  Comment: PT role and POC, discharge goals and recommendations, gradual activity progression to previous levels, activity modification, energy conservation, safety and fall prevention in home and hospital settings.    Fall Prevention, taught by Crissy Smith PT at 8/2/2022  1:47 PM.  Learner: Patient  Readiness: Acceptance  Method: Explanation  Response: Verbalizes Understanding  Comment: PT role and POC, discharge goals and recommendations, gradual activity progression to previous levels, activity modification, energy conservation, safety and fall prevention in home and hospital settings.    Call Light Use, taught by Crissy Smith PT at 8/2/2022  1:47 PM.  Learner: Patient  Readiness: Acceptance  Method: Explanation  Response: Verbalizes Understanding  Comment: PT role and POC, discharge goals and recommendations, gradual activity progression to previous levels, activity modification, energy conservation, safety and fall prevention in home and hospital settings.    Treatment Plan, taught by Crissy Smith PT at 8/2/2022  1:47 PM.  Learner: Patient  Readiness: Acceptance  Method: Explanation  Response: Verbalizes Understanding  Comment: PT role and POC, discharge goals and recommendations, gradual activity progression to previous levels, activity modification, energy conservation, safety and fall prevention in home and hospital settings.

## 2022-08-02 NOTE — PLAN OF CARE
Problem: Adult Inpatient Plan of Care  Goal: Plan of Care Review  Outcome: Progressing  Flowsheets (Taken 8/2/2022 1210)  Progress: improving  Plan of Care Reviewed With: patient  Outcome Summary: OT: SUP functional transfers with RW, MIN A toileting, MIN A LB Dressing

## 2022-08-02 NOTE — PROGRESS NOTES
"Daily Progress Note (LOS: 5)    Interval History:   No cardiac complaints.  Sinus rhythm vs. A-pacing on telemetry.     Review of Systems:  All organ systems normal except as per HPI.    Current Medications:   acetaminophen  325 mg oral BID (9a, 10p)    amiodarone  200 mg oral Daily    apixaban  5 mg oral BID    cetirizine  5 mg oral Daily    clopidogreL  75 mg oral Daily    famotidine  20 mg oral Nightly    metoprolol tartrate  50 mg oral BID    pantoprazole  40 mg oral Daily    simvastatin  20 mg oral Nightly    traMADoL  25 mg oral BID (9a, 10p)       Physical Exam:  Visit Vitals  BP (!) 166/77 (BP Location: Right upper arm, Patient Position: Lying)   Pulse (!) 59   Temp 36.6 °C (97.9 °F) (Oral)   Resp 16   Ht 1.626 m (5' 4\")   Wt 83 kg (183 lb)   SpO2 96%   BMI 31.41 kg/m²     Gen: NAD  HEENT: no JVD, no thyromegaly  Heart: RRR, nl S1, S2, no m/r/g  Lungs: Clear bilaterally  Abd: soft, nt, nd, +bs  Ext: no edema      I&Os:  No intake or output data in the 24 hours ending 08/02/22 0724    Weights:   Wt Readings from Last 3 Encounters:   08/01/22 83 kg (183 lb)   07/20/22 84.4 kg (186 lb)   06/09/22 79.8 kg (176 lb)       Labs:  Results from last 7 days   Lab Units 08/02/22  0255 08/01/22  1149 07/31/22  0418   SODIUM mEQ/L 139 135* 137   POTASSIUM mEQ/L 3.3* 3.4* 3.9   CHLORIDE mEQ/L 111* 108 101   CO2 mEQ/L 23 23 26   BUN mg/dL 16 20 31*   CREATININE mg/dL 1.0 1.2* 1.2*   GLUCOSE mg/dL 104* 118* 90   CALCIUM mg/dL 8.3* 8.6* 9.0   MAGNESIUM mg/dL 1.8 1.6* 1.8             Results from last 7 days   Lab Units 08/02/22  0255 08/01/22  1149 07/31/22  0418   WBC K/uL 5.25 6.24 7.45   HEMOGLOBIN g/dL 9.4* 11.3* 10.9*   HEMATOCRIT % 29.2* 35.4 34.2*   PLATELETS K/uL 357 444* 390*     Lab Results   Component Value Date     (H) 07/27/2022     Lab Results   Component Value Date    INR 1.4 07/29/2022    INR 1.1 05/26/2022     A/P: 77 year-old woman with PMHx CAD s/p recent PCI to RCA (6/2022), Lionel, ILR, " HTN, DLD, recent admission for new diagnosis of atrial fibrillation/flutter with conversion pauses, s/p DCPPM implantation and amio therapy, now returns with fatigue and pleuritic chest pain, found to have moderate pericardial effusion.      #Pericardial effusion  -Was moderate in size not associated with cardiac tamponade  -Suspect due to recent PPM implantation.    -CT chest suggested normal lead placement  -EP following.   -s/p pericardiocentesis 7/29  -Had remained on unfractionated heparin gtt over weekend without clinical decline.  Repeat limited echo 8/1 showed small pericardial effusion. Patient transitioned back to eliquis 5mg BID.    -Will stop colchicine 0.6mg given side effects   -Continue protonix for GI ppx   -Tramadol ordered for pain relief   -Repeat echo on Thursday as outpt. - will arrange     #pAF/flutter, SSS   -Currently sinus with intermittent atrial pacing  -s/p DCPPM for the conversion pauses  -Continue amio 200mg daily.  Finished amio load.   -Continue metoprolol to 50mg BID  -Continue eliquis.      #CAD, recent PCI  -Stable, no angina  -Continue plavix, eliquis.    -Continue simvastatin (previously did not tolerate rosuvastatin or atorvastatin)     #HTN  -BP has been elevated  -Given pericardial effusion, will not be overly aggressive with lowering BP  -Continue current meds and monitor     #HLD  -Continue statin     #Anemia  -Hgb drop overnight 11.3 --> 9.4.  Has been on IV fluids.  No bleeding.  May be dilutional.  No change in cardiac exam or symptoms.     If OK with [facility], no objection to discharge today.  We will arrange for echo Thursday and close follow-up in the office.

## 2022-08-02 NOTE — PROGRESS NOTES
08/02/22 1354   Hospital to Home   Patient Enrolled in [facility]? Yes   H2 Coordinator Comment CC spoke with pt to review the Hospital to Home program. Pt was readmitted before she was able to have her visit last time, and is agreeable to rescheduling a MyChart video visit. Virtual appt with H2 provider scheduled for friday 8/5 at 1400. Pt states she has an appt with PCP next week already scheduled.

## 2022-08-02 NOTE — DISCHARGE SUMMARY
Hospital Medicine Service -  Inpatient Discharge Summary        BRIEF OVERVIEW   Admitting Provider: Jose Elias Borjas MD  Attending Provider: Jose Elias Borjas MD Attending phys phone: [phone]    PCP: Jonathan Meza [phone]    Admission Date: 7/27/2022  Discharge Date: 8/2/2022     DISCHARGE DIAGNOSES      Primary Discharge Diagnosis  Pericardial effusion    Secondary Discharge Diagnoses  Active Hospital Problems    Diagnosis Date Noted    Atrial fibrillation, unspecified type (CMS/[facility]) 07/28/2022     Priority: High    Pericardial effusion 07/27/2022     Priority: High    Sick sinus syndrome (CMS/HCC) 07/19/2022     Priority: High    GERD (gastroesophageal reflux disease) 07/19/2022     Priority: Medium    Hyperlipidemia 07/19/2022     Priority: Medium    Pleural effusion 07/27/2022    Anemia 07/27/2022    Pacemaker 07/27/2022    CAD (coronary artery disease) 06/10/2022      Resolved Hospital Problems    Diagnosis Date Noted Date Resolved    Atrial fibrillation (CMS/[facility]) 07/27/2022 07/30/2022    Paroxysmal atrial fibrillation (CMS/[facility]) 07/19/2022 07/30/2022       Problem List on Day of Discharge  No new Assessment & Plan notes have been filed under this hospital service since the last note was generated.  Service: Hospitalist    SUMMARY OF HOSPITALIZATION      HPI per H/P   Emily Ramirez is a 77 y.o. female with a past medical history as stated below who presents with weakness.    Patient had a cardiac stent placed June 9 and developed in-stent thrombosis soon afterwards requiring a second cath.  She was discharged to home.  She had another admission from July 19 until July 25 for paroxysmal atrial fibrillation.  She was found to be in A. fib with multiple conversion pauses up to 6 seconds.  She underwent a pacemaker placement during that admission.  She was initially placed on sotalol but had an DENNY and prolonged QT this was was stopped and she was started on amiodarone.  She was also  started on Eliquis.  She was discharged to home.  She initially was doing well however today felt very weak, fatigued, with pleuritic chest discomfort.  She came back to the emergency department for evaluation.  She is back in rapid atrial fibrillation.  She will be admitted for further management.  Patient feels cold but denies fevers, shaking chills, abdominal pain, nausea, vomiting.  She has had a poor appetite at home.  Her daughter just tested positive for COVID-19.  Patient screening COVID test is negative.     Jevon's daughter updated over phone and son updated at bedside      Hospital Course  Very delightful 77 years old female with medical history include atrial fibrillation, tachybradycardia syndrome, pacemaker, CAD, hypertension, who presented to [facility] emergency room with chief complaint of severe fatigue weakness, echocardiogram demonstrated moderate cardial effusion, started colchicine treated for pericarditis, s/p pericardiocentesis, drain 300 cc fluid, fluid study at this moment without any signs of infection, repeat echocardiogram demonstrated without evidence of reaccumulation, CT scan chest that demonstrated pacemaker lead in satisfactory position, cardiology, EP cardiology all consult initially placed on heparin drip then transition back to Eliquis, patient colchicine being stopped due to side effects and diarrhea, however patient clinically improving consult physical therapy recommend home with home care, patient blood pressure widely change, started metoprolol 50 mg twice daily better controlled, per discussion with cardiology would favor blood pressure on the higher side, in the setting of pericardial effusion, patient should follow-up with cardiology as outpatient repeat echocardiogram to further titrate management patient blood pressure, discussed with  patient is medically stable for discharge      Interval History:   Emily Ramirez was seen and examined, no  acute overnight event reported, patient reported no chest pain, sob, N/V/D, overt bleeding.  Patient is calm comfortably in bed not in acute distress few more stronger today  I personally reviewed patient chart, imaging study, lab study, I personally discussed case with patient's nursing staff, , case management, I personally organize patient discharge instruction and medication reconciliation.      I personally discussed with patient  Lucius, also patient daughter Afua by phone I provide comprehensive medical update also questions to the best of my knowledge they fully understood agree current plan    Patient vital signs stable, labs unremarkable, patient medically ready and stable for discharge.    Vitals:    08/02/22 0750 08/02/22 0930 08/02/22 0945 08/02/22 1126   BP: (!) 178/81 (!) 148/66 (!) 197/84 (!) 152/72   BP Location: Right upper arm Right upper arm Right upper arm Right upper arm   Patient Position: Lying Lying Sitting Sitting   Pulse: 60 72 86 61   Resp: 16   16   Temp: 36.7 °C (98.1 °F)   36.7 °C (98.1 °F)   TempSrc: Oral   Oral   SpO2: 96% 96% 97% 96%   Weight:       Height:           Exam on Day of Discharge  General Appearance:  Awake, Alert, no distress     Head:  Normocephalic, without obvious abnormality, atraumatic   Neck: Supple      Lungs:   Clear to auscultation bilaterally  respirations unlabored  no rales  no wheezing     Heart:  Regular rhythm  S1 and S2 normal  no murmur, rub or gallop     Abdomen:   Soft, non-tender, no masses, no organomegaly     Vascular: Pulses 2+ and symmetric all extremities     Extremities: no calf tenderness      Behavior/Emotional: Appropriate, cooperative     Consults During Admission  IP CONSULT TO CARDIOLOGY  IP CONSULT TO ELECTROPHYSIOLOGY  IP CONSULT TO INFECTIOUS DISEASE  IP CONSULT TO IV TEAM    DISCHARGE MEDICATIONS        Medication List      START taking these medications    metoprolol tartrate 50 mg tablet  Commonly known as:  LOPRESSOR  Take 1 tablet (50 mg total) by mouth 2 (two) times a day.  Dose: 50 mg        CONTINUE taking these medications    amiodarone 200 mg tablet  Commonly known as: PACERONE  By mouth. Take 400mg (2 tab) twice daily for 7 days, then 200mg (1 tab) daily     apixaban 5 mg tablet  Commonly known as: ELIQUIS  Take 1 tablet (5 mg total) by mouth 2 (two) times a day Indications: blood clot in a deep vein of the extremities, a clot in the lung.  Dose: 5 mg     CENTRUM SILVER ULTRA WOMEN'S ORAL  Take 1 tablet by mouth every other day.  Dose: 1 tablet     clopidogreL 75 mg tablet  Commonly known as: PLAVIX  Take 75 mg by mouth daily.  Dose: 75 mg     coenzyme Q10 100 mg capsule  Commonly known as: COQ10  Take 100 mg by mouth daily. Takes in the PM  Dose: 100 mg     famotidine 40 mg tablet  Commonly known as: PEPCID  Take 40 mg by mouth nightly.  Dose: 40 mg     pantoprazole 40 mg EC tablet  Commonly known as: PROTONIX  Take 40 mg by mouth daily. 30 min prior to breakfast  Dose: 40 mg     simvastatin 20 mg tablet  Commonly known as: ZOCOR  Take 20 mg by mouth nightly.  Dose: 20 mg     traMADoL-acetaminophen 37.5-325 mg per tablet  Commonly known as: ULTRACET  Take 1 tablet by mouth 2 (two) times a day.  Dose: 1 tablet                 PROCEDURES / LABS / IMAGING      Operative Procedures  [unfilled]    Pertinent Labs  CMP Results       08/02/22 08/01/22 07/31/22     0255 1149 0418     135 137    K 3.3 3.4 3.9    Cl 111 108 101    CO2 23 23 26    Glucose 104 118 90    BUN 16 20 31    Creatinine 1.0 1.2 1.2    Calcium 8.3 8.6 9.0    Anion Gap 5 4 10    EGFR 53.6 43.4 43.4        CBC Results       08/02/22 08/01/22 07/31/22     0255 1149 0418    WBC 5.25 6.24 7.45    RBC 3.25 3.88 3.79    HGB 9.4 11.3 10.9    HCT 29.2 35.4 34.2    MCV 89.8 91.2 90.2    MCH 28.9 29.1 28.8    MCHC 32.2 31.9 31.9     444 390         Comment for PLT at 0418 on 07/31/22: PLT CLUMPING SUSPECTED. PLT COUNT MAY BE SLIGHTLY HIGHER THAN  REPORTED. IF CLINICALLY WARRANTED, SUGGEST COLLECTING SODIUM CITRATE TUBE (BLUE TOP) IN ADDITION TO EDTA TUBE FOR FOLLOW UP CBC TESTING.        Troponin I Results       07/28/22 07/27/22 07/27/22     3665 4283 2008    [facility] Troponin I 221.3 239.0 203.0        PT/PTT Results       08/01/22 08/01/22 08/01/22     1149 0639 0012    PTT 63 97 127         Comment for PTT at 1149 on 08/01/22: The Standard Therapeutic Range for Heparin is 68 to 101 seconds.    Comment for PTT at 0639 on 08/01/22: The Standard Therapeutic Range for Heparin is 68 to 101 seconds.    Comment for PTT at 0012 on 08/01/22: The Standard Therapeutic Range for Heparin is 68 to 101 seconds.        Lab Results   Component Value Date     (H) 07/27/2022    TSH 1.89 07/19/2022       Pertinent Imaging  CT CHEST WITHOUT IV CONTRAST    Result Date: 7/28/2022  IMPRESSION: 1. Left chest wall battery pack with surrounding inflammatory change but without definite drainable collection. This could be further evaluated with ultrasound, as warranted. 2. Atrial lead terminates at the inferior right atrial appendage/superior right atrium, tip likely within the lumen; ventricular lead makes an abrupt turn with tip terminating overlying the expected location of the interventricular septum rather than the apex, uncertain if within the septum. Consider contrast enhanced chest CT for better delineation of anatomy if intervention is planned. 3. Moderate pericardial effusion. Small left greater than right pleural effusions. 4. Atrophy of medial lobe of liver, suggesting cirrhosis, with questionable hypodensity at the junction of segments 2 and 4A. Recommend correlation with prior imaging; if not available, outpatient contrast enhanced MR could be obtained. 5. Few scattered sub-6 mm pulmonary nodules. If the patient is at high risk for malignancy, consider follow-up in 12 months. 6. Moderate to large hiatal hernia with gastroesophageal reflux.    X-RAY CHEST 1  VIEW    Result Date: 7/27/2022  IMPRESSION: New left pleural effusion and basilar atelectasis. Stable cardiomegaly.     X-RAY CHEST 1 VIEW    Result Date: 7/21/2022  IMPRESSION: No pneumothorax.     X-RAY CHEST 1 VIEW    Result Date: 7/20/2022  IMPRESSION: No acute disease in the chest.       OUTPATIENT  FOLLOW-UP / REFERRALS / PENDING TESTS      Important Issues to Address in Follow-Up  Please follow up with PCP in One week       DISCHARGE DISPOSITION      Disposition: Home     This patient note has been dictated using speech recognition software. Inadvertent speech recognition errors should be disregarded. Please do not hesitate to call [facility] [place] office for clarifications.

## 2022-08-04 ENCOUNTER — HOSPITAL ENCOUNTER (OUTPATIENT)
Dept: CARDIOLOGY | Facility: HOSPITAL | Age: 77
Discharge: HOME | End: 2022-08-04
Attending: INTERNAL MEDICINE
Payer: MEDICARE

## 2022-08-04 VITALS — HEIGHT: 64 IN | WEIGHT: 183 LBS | BODY MASS INDEX: 31.24 KG/M2

## 2022-08-04 DIAGNOSIS — I25.10 ATHEROSCLEROTIC HEART DISEASE OF NATIVE CORONARY ARTERY WITHOUT ANGINA PECTORIS: ICD-10-CM

## 2022-08-04 DIAGNOSIS — I47.20 VENTRICULAR TACHYCARDIA (CMS/HCC): ICD-10-CM

## 2022-08-04 DIAGNOSIS — R94.31 ABNORMAL ELECTROCARDIOGRAM (ECG) (EKG): ICD-10-CM

## 2022-08-04 LAB — BSA FOR ECHO PROCEDURE: 1.94 M2

## 2022-08-04 PROCEDURE — 93308 TTE F-UP OR LMTD: CPT

## 2022-08-05 ENCOUNTER — TELEMEDICINE (OUTPATIENT)
Dept: HOSPITALIST | Facility: CLINIC | Age: 77
End: 2022-08-05
Payer: MEDICARE

## 2022-08-05 DIAGNOSIS — I31.39 PERICARDIAL EFFUSION: ICD-10-CM

## 2022-08-05 DIAGNOSIS — I48.91 ATRIAL FIBRILLATION WITH RVR (CMS/HCC): ICD-10-CM

## 2022-08-05 DIAGNOSIS — N18.31 STAGE 3A CHRONIC KIDNEY DISEASE (CMS/HCC): Primary | ICD-10-CM

## 2022-08-05 RX ORDER — AMIODARONE HYDROCHLORIDE 200 MG/1
200 TABLET ORAL DAILY
Qty: 60 TABLET | Refills: 0
Start: 2022-08-02 | End: 2022-09-16 | Stop reason: ENTERED-IN-ERROR

## 2022-08-05 NOTE — PROGRESS NOTES
"       Hospital to Home Program Visit       Hospital to Home Program Visit:  Audio Only Encounter       DISCHARGE INFORMATION   Assessment completed with: Patient  Discharge Diagnosis: CKD III, pericardial effusion, afib with RVR  Discharging Facility: [facility]  Date of Last Discharge: 08/02/22  Discharge Disposition: Home  Patient's perception of their health status since discharge: Improving (\"Getting better every day. Cough starting to go away.\")    PCP: Jonathan Meza, DO       HPI / OVERVIEW OF VISIT      Emily Ramirez is a 77 y.o. female with a past medical history of afib (on Eliquis), tachybrady syndrome, pacer, CAD, HTN, GERD, SSS, HLD, CKD IIIa (base Cr 1-1.4) who was admitted to [facility] 7/27-8/2/22 with afib with RVR and a pericardial effusion. She presented with weakness, fatigue, and pleuritic chest pain and found to be in afib with RVR and found to have a pericardial effusion likely 2/2 to her recent pacer implantation.  She was started on colchicine and underwent a pericardiocentesis on 7/29/22 with an improvement in the effusion on repeat ECHO.      Of note, she was admitted 7/19-7/24/22 with afib, pauses, and s/p pacer and admitted 6/9-6/10/22 with stent placement with in-stent restenosis and return to the cath lab.    She is seen today for an [facility] visit for CKD.  She feels that she is improving each day.  She had a repeat ECHO yesterday and has follow up appointments with her PCP and cardiologist.  She is taking her medications as prescribed on discharge.  She has concerns regarding the long-term plan of being on Eliquis given its high cost with her insurance, and I encouraged her to speak with Dr. Saravia about other possible options that may be available, such as warfarin or a Watchman's procedure.  She is monitoring her blood pressures regularly at home, which have been at goal.      What would cause you to go back to the hospital?   \"If my watch came on that I was in afib and " "felt like a 'wet dish cloth.'\"     Who would you call if you had a problem?  \"Dr. Saravia if heart or PCP.\"     Overall, how was your care that was provided and do you have any suggestions for improvement?  Above Average - \"They were wonderful.\"      PROBLEMS      Patient Active Problem List   Diagnosis   • CAD (coronary artery disease)   • Hyperlipidemia   • Hypokalemia   • GERD (gastroesophageal reflux disease)   • Acute kidney injury superimposed on CKD (CMS/HCC)   • Sick sinus syndrome (CMS/HCC)   • Hypomagnesemia   • Valvular heart disease   • Elevated troponin   • Class 1 obesity in adult   • Pericardial effusion   • Pleural effusion   • Anemia   • Pacemaker   • Atrial fibrillation, unspecified type (CMS/HCC)       MEDICATIONS      Reconciled the current and discharge medications: Yes      Current Outpatient Medications:   •  amiodarone (PACERONE) 200 mg tablet, Take 1 tablet (200 mg total) by mouth daily., Disp: 60 tablet, Rfl: 0  •  apixaban (ELIQUIS) 5 mg tablet, Take 1 tablet (5 mg total) by mouth 2 (two) times a day Indications: blood clot in a deep vein of the extremities, a clot in the lung., Disp: 60 tablet, Rfl: 3  •  clopidogreL (PLAVIX) 75 mg tablet, Take 75 mg by mouth daily., Disp: , Rfl:   •  coenzyme Q10 (COQ10) 100 mg capsule, Take 100 mg by mouth daily. Takes in the PM, Disp: , Rfl:   •  famotidine (PEPCID) 40 mg tablet, Take 40 mg by mouth nightly., Disp: , Rfl:   •  metoprolol tartrate (LOPRESSOR) 50 mg tablet, Take 1 tablet (50 mg total) by mouth 2 (two) times a day., Disp: 60 tablet, Rfl: 0  •  multivit,iron,minerals/lutein (CENTRUM SILVER ULTRA WOMEN'S ORAL), Take 1 tablet by mouth every other day., Disp: , Rfl:   •  pantoprazole (PROTONIX) 40 mg EC tablet, Take 40 mg by mouth daily. 30 min prior to breakfast, Disp: , Rfl:   •  simvastatin (ZOCOR) 20 mg tablet, Take 20 mg by mouth nightly., Disp: , Rfl:   •  traMADoL-acetaminophen (ULTRACET) 37.5-325 mg per tablet, Take 1 tablet by mouth 2 " (two) times a day., Disp: , Rfl:     I have changed Emily Ramirez's amiodarone. I am also having her maintain her simvastatin, traMADoL-acetaminophen, coenzyme Q10, (multivit,iron,minerals/lutein (CENTRUM SILVER ULTRA WOMEN'S ORAL)), pantoprazole, famotidine, clopidogreL, apixaban, and metoprolol tartrate.    REVIEW OF SYSTEMS      All other systems reviewed and negative except as noted in HPI.      POST DISCHARGE MEDICAL EQUIPMENT      DME Interventions: No intervention required  Oxygen Use: No     Has all Durable Medical Equipment (DME) been delivered?: Other (N/A)    FOLLOW-UP APPOINTMENTS      Appointment Provider: Dr. Jonathan Meza  Appointment Date: 08/09/22    Appointment Provider: Dr. Saravia  Appointment Date: 8/18/22    PATIENT INSTRUCTIONS      Patient Instructions   Follow up with your PCP, Dr. Meza, at your appointment on 8/9/22.    Follow up with your cardiologist, Dr. Saravia, at your appointment on 8/18/22.  Please discuss with him your concerns regarding the price of Eliquis and if there are other available options such as warfarin or a Watchman's device.    Continue to monitor your blood pressure at least once a day and keep a record for your providers.    It was a pleasure participating in your care today!         VENTURA Little  8/5/2022

## 2022-08-05 NOTE — LETTER
"I saw this patient today for an H2H visit for CKD.  She feels that she is improving every day, has follow ups scheduled with her PCP and cardiologist, and is monitoring her BP regularly with stable readings.  She has questions regarding alternatives to Eliquis long-term as it has a high cost with her prescription plan, and I encouraged her to discuss this with Dr. Saravia to see if a Watchman's device or warfarin may be an option.    Please let me know if I can be of any further assistance in this patient's care.         Hospital to Home Program Visit       Hospital to Home Program Visit:  Audio Only Encounter       DISCHARGE INFORMATION   Assessment completed with: Patient  Discharge Diagnosis: CKD III, pericardial effusion, afib with RVR  Discharging Facility: [facility]  Date of Last Discharge: 08/02/22  Discharge Disposition: Home  Patient's perception of their health status since discharge: Improving (\"Getting better every day. Cough starting to go away.\")    PCP: Jonathan Meza, DO       HPI / OVERVIEW OF VISIT      Emily Ramirez is a 77 y.o. female with a past medical history of afib (on Eliquis), tachybrady syndrome, pacer, CAD, HTN, GERD, SSS, HLD, CKD IIIa (base Cr 1-1.4) who was admitted to [facility] 7/27-8/2/22 with afib with RVR and a pericardial effusion. She presented with weakness, fatigue, and pleuritic chest pain and found to be in afib with RVR and found to have a pericardial effusion likely 2/2 to her recent pacer implantation.  She was started on colchicine and underwent a pericardiocentesis on 7/29/22 with an improvement in the effusion on repeat ECHO.      Of note, she was admitted 7/19-7/24/22 with afib, pauses, and s/p pacer and admitted 6/9-6/10/22 with stent placement with in-stent restenosis and return to the cath lab.    She is seen today for an H2H visit for CKD.  She feels that she is improving each day.  She had a repeat ECHO yesterday and has follow up appointments " "with her PCP and cardiologist.  She is taking her medications as prescribed on discharge.  She has concerns regarding the long-term plan of being on Eliquis given its high cost with her insurance, and I encouraged her to speak with Dr. Saravia about other possible options that may be available, such as warfarin or a Watchman's procedure.  She is monitoring her blood pressures regularly at home, which have been at goal.      What would cause you to go back to the hospital?   \"If my watch came on that I was in afib and felt like a 'wet dish cloth.'\"     Who would you call if you had a problem?  \"Dr. Saravia if heart or PCP.\"     Overall, how was your care that was provided and do you have any suggestions for improvement?  Above Average - \"They were wonderful.\"      PROBLEMS      Patient Active Problem List   Diagnosis   • CAD (coronary artery disease)   • Hyperlipidemia   • Hypokalemia   • GERD (gastroesophageal reflux disease)   • Acute kidney injury superimposed on CKD (CMS/HCC)   • Sick sinus syndrome (CMS/HCC)   • Hypomagnesemia   • Valvular heart disease   • Elevated troponin   • Class 1 obesity in adult   • Pericardial effusion   • Pleural effusion   • Anemia   • Pacemaker   • Atrial fibrillation, unspecified type (CMS/HCC)       MEDICATIONS      Reconciled the current and discharge medications: Yes      Current Outpatient Medications:   •  amiodarone (PACERONE) 200 mg tablet, Take 1 tablet (200 mg total) by mouth daily., Disp: 60 tablet, Rfl: 0  •  apixaban (ELIQUIS) 5 mg tablet, Take 1 tablet (5 mg total) by mouth 2 (two) times a day Indications: blood clot in a deep vein of the extremities, a clot in the lung., Disp: 60 tablet, Rfl: 3  •  clopidogreL (PLAVIX) 75 mg tablet, Take 75 mg by mouth daily., Disp: , Rfl:   •  coenzyme Q10 (COQ10) 100 mg capsule, Take 100 mg by mouth daily. Takes in the PM, Disp: , Rfl:   •  famotidine (PEPCID) 40 mg tablet, Take 40 mg by mouth nightly., Disp: , Rfl:   •  metoprolol " tartrate (LOPRESSOR) 50 mg tablet, Take 1 tablet (50 mg total) by mouth 2 (two) times a day., Disp: 60 tablet, Rfl: 0  •  multivit,iron,minerals/lutein (CENTRUM SILVER ULTRA WOMEN'S ORAL), Take 1 tablet by mouth every other day., Disp: , Rfl:   •  pantoprazole (PROTONIX) 40 mg EC tablet, Take 40 mg by mouth daily. 30 min prior to breakfast, Disp: , Rfl:   •  simvastatin (ZOCOR) 20 mg tablet, Take 20 mg by mouth nightly., Disp: , Rfl:   •  traMADoL-acetaminophen (ULTRACET) 37.5-325 mg per tablet, Take 1 tablet by mouth 2 (two) times a day., Disp: , Rfl:     I have changed Emily Ramirez's amiodarone. I am also having her maintain her simvastatin, traMADoL-acetaminophen, coenzyme Q10, (multivit,iron,minerals/lutein (CENTRUM SILVER ULTRA WOMEN'S ORAL)), pantoprazole, famotidine, clopidogreL, apixaban, and metoprolol tartrate.    REVIEW OF SYSTEMS      All other systems reviewed and negative except as noted in HPI.      POST DISCHARGE MEDICAL EQUIPMENT      DME Interventions: No intervention required  Oxygen Use: No     Has all Durable Medical Equipment (DME) been delivered?: Other (N/A)    FOLLOW-UP APPOINTMENTS      Appointment Provider: Dr. Jonathan Meza  Appointment Date: 08/09/22    Appointment Provider: Dr. Saravia  Appointment Date: 8/18/22    PATIENT INSTRUCTIONS      Patient Instructions   Follow up with your PCP, Dr. Meza, at your appointment on 8/9/22.    Follow up with your cardiologist, Dr. Saravia, at your appointment on 8/18/22.  Please discuss with him your concerns regarding the price of Eliquis and if there are other available options such as warfarin or a Watchman's device.    Continue to monitor your blood pressure at least once a day and keep a record for your providers.    It was a pleasure participating in your care today!         VENTURA Little  8/5/2022

## 2022-08-05 NOTE — PATIENT INSTRUCTIONS
Follow up with your PCP, Dr. Meza, at your appointment on 8/9/22.    Follow up with your cardiologist, Dr. Saravia, at your appointment on 8/18/22.  Please discuss with him your concerns regarding the price of Eliquis and if there are other available options such as warfarin or a Watchman's device.    Continue to monitor your blood pressure at least once a day and keep a record for your providers.    It was a pleasure participating in your care today!

## 2022-08-22 ENCOUNTER — TRANSCRIBE ORDERS (OUTPATIENT)
Dept: SCHEDULING | Age: 77
End: 2022-08-22

## 2022-08-22 DIAGNOSIS — Z95.5 PRESENCE OF CORONARY ANGIOPLASTY IMPLANT AND GRAFT: ICD-10-CM

## 2022-08-22 DIAGNOSIS — I48.0 PAROXYSMAL ATRIAL FIBRILLATION (CMS/HCC): ICD-10-CM

## 2022-08-22 DIAGNOSIS — I31.2 HEMOPERICARDIUM, NOT ELSEWHERE CLASSIFIED: ICD-10-CM

## 2022-08-22 DIAGNOSIS — I49.5 SICK SINUS SYNDROME (CMS/HCC): ICD-10-CM

## 2022-08-22 DIAGNOSIS — I10 ESSENTIAL (PRIMARY) HYPERTENSION: ICD-10-CM

## 2022-08-22 DIAGNOSIS — Z95.0 PRESENCE OF CARDIAC PACEMAKER: Primary | ICD-10-CM

## 2022-08-22 DIAGNOSIS — I25.10 ATHEROSCLEROTIC HEART DISEASE OF NATIVE CORONARY ARTERY WITHOUT ANGINA PECTORIS: ICD-10-CM

## 2022-08-22 DIAGNOSIS — N28.9 DISORDER OF KIDNEY AND URETER, UNSPECIFIED: ICD-10-CM

## 2022-08-22 DIAGNOSIS — E78.5 HYPERLIPIDEMIA, UNSPECIFIED: ICD-10-CM

## 2022-08-26 LAB — FUNGUS SPEC CULT: NORMAL

## 2022-09-10 LAB — MYCOBACTERIUM SPEC CULT: NORMAL

## 2022-09-16 ENCOUNTER — APPOINTMENT (EMERGENCY)
Dept: RADIOLOGY | Facility: HOSPITAL | Age: 77
DRG: 418 | End: 2022-09-16
Attending: EMERGENCY MEDICINE
Payer: MEDICARE

## 2022-09-16 ENCOUNTER — HOSPITAL ENCOUNTER (INPATIENT)
Facility: HOSPITAL | Age: 77
LOS: 9 days | Discharge: HOME HEALTH | DRG: 418 | End: 2022-09-25
Attending: EMERGENCY MEDICINE | Admitting: INTERNAL MEDICINE
Payer: MEDICARE

## 2022-09-16 DIAGNOSIS — K80.20 CALCULUS OF GALLBLADDER WITHOUT CHOLECYSTITIS WITHOUT OBSTRUCTION: ICD-10-CM

## 2022-09-16 DIAGNOSIS — R60.1 GENERALIZED EDEMA: ICD-10-CM

## 2022-09-16 DIAGNOSIS — I48.91 ATRIAL FIBRILLATION, UNSPECIFIED TYPE (CMS/HCC): Chronic | ICD-10-CM

## 2022-09-16 DIAGNOSIS — I73.9 PAD (PERIPHERAL ARTERY DISEASE) (CMS/HCC): ICD-10-CM

## 2022-09-16 DIAGNOSIS — I25.10 CORONARY ARTERY DISEASE INVOLVING NATIVE CORONARY ARTERY OF NATIVE HEART WITHOUT ANGINA PECTORIS: Chronic | ICD-10-CM

## 2022-09-16 DIAGNOSIS — K85.90 ACUTE PANCREATITIS, UNSPECIFIED COMPLICATION STATUS, UNSPECIFIED PANCREATITIS TYPE: ICD-10-CM

## 2022-09-16 DIAGNOSIS — K80.20 GALLBLADDER CALCULUS WITHOUT CHOLECYSTITIS AND NO OBSTRUCTION: ICD-10-CM

## 2022-09-16 DIAGNOSIS — R10.9 ABDOMINAL PAIN, UNSPECIFIED ABDOMINAL LOCATION: Primary | ICD-10-CM

## 2022-09-16 PROBLEM — I49.5 SICK SINUS SYNDROME (CMS/HCC): Chronic | Status: ACTIVE | Noted: 2022-07-19

## 2022-09-16 PROBLEM — R74.01 TRANSAMINITIS: Status: ACTIVE | Noted: 2022-09-16

## 2022-09-16 PROBLEM — E78.5 HYPERLIPIDEMIA: Chronic | Status: ACTIVE | Noted: 2022-07-19

## 2022-09-16 PROBLEM — K21.9 GERD (GASTROESOPHAGEAL REFLUX DISEASE): Chronic | Status: ACTIVE | Noted: 2022-07-19

## 2022-09-16 LAB
ALBUMIN SERPL-MCNC: 4.1 G/DL (ref 3.4–5)
ALP SERPL-CCNC: 183 IU/L (ref 35–126)
ALT SERPL-CCNC: 396 IU/L (ref 11–54)
ANION GAP SERPL CALC-SCNC: 11 MEQ/L (ref 3–15)
APTT PPP: 120 SEC (ref 23–35)
APTT PPP: 34 SEC (ref 23–35)
AST SERPL-CCNC: 890 IU/L (ref 15–41)
ATRIAL RATE: 81
BASOPHILS # BLD: 0.01 K/UL (ref 0.01–0.1)
BASOPHILS NFR BLD: 0.1 %
BILIRUB SERPL-MCNC: 1.6 MG/DL (ref 0.3–1.2)
BUN SERPL-MCNC: 33 MG/DL (ref 8–20)
CALCIUM SERPL-MCNC: 9.4 MG/DL (ref 8.9–10.3)
CHLORIDE SERPL-SCNC: 104 MEQ/L (ref 98–109)
CHOLEST SERPL-MCNC: 125 MG/DL
CO2 SERPL-SCNC: 26 MEQ/L (ref 22–32)
CREAT SERPL-MCNC: 1.5 MG/DL (ref 0.6–1.1)
D DIMER PPP IA.FEU-MCNC: 2.38 UG/ML FEU (ref 0–0.5)
DIFFERENTIAL METHOD BLD: ABNORMAL
EOSINOPHIL # BLD: 0.01 K/UL (ref 0.04–0.36)
EOSINOPHIL NFR BLD: 0.1 %
ERYTHROCYTE [DISTWIDTH] IN BLOOD BY AUTOMATED COUNT: 14.9 % (ref 11.7–14.4)
GFR SERPL CREATININE-BSD FRML MDRD: 33.6 ML/MIN/1.73M*2
GLUCOSE SERPL-MCNC: 143 MG/DL (ref 70–99)
HCT VFR BLDCO AUTO: 41 % (ref 35–45)
HDLC SERPL-MCNC: 49 MG/DL
HDLC SERPL: 2.6 {RATIO}
HGB BLD-MCNC: 13 G/DL (ref 11.8–15.7)
IMM GRANULOCYTES # BLD AUTO: 0.05 K/UL (ref 0–0.08)
IMM GRANULOCYTES NFR BLD AUTO: 0.4 %
INR PPP: 1.5
LACTATE SERPL-SCNC: 1.5 MMOL/L (ref 0.4–2)
LDLC SERPL CALC-MCNC: 63 MG/DL
LIPASE SERPL-CCNC: 1436 U/L (ref 20–51)
LYMPHOCYTES # BLD: 0.45 K/UL (ref 1.2–3.5)
LYMPHOCYTES NFR BLD: 3.4 %
MAGNESIUM SERPL-MCNC: 1.6 MG/DL (ref 1.8–2.5)
MCH RBC QN AUTO: 28.1 PG (ref 28–33.2)
MCHC RBC AUTO-ENTMCNC: 31.7 G/DL (ref 32.2–35.5)
MCV RBC AUTO: 88.7 FL (ref 83–98)
MONOCYTES # BLD: 0.79 K/UL (ref 0.28–0.8)
MONOCYTES NFR BLD: 5.9 %
NEUTROPHILS # BLD: 12.03 K/UL (ref 1.7–7)
NEUTS SEG NFR BLD: 90.1 %
NONHDLC SERPL-MCNC: 76 MG/DL
NRBC BLD-RTO: 0 %
PDW BLD AUTO: 11.1 FL (ref 9.4–12.3)
PLATELET # BLD AUTO: 304 K/UL (ref 150–369)
POTASSIUM SERPL-SCNC: 3 MEQ/L (ref 3.6–5.1)
PROT SERPL-MCNC: 7.1 G/DL (ref 6–8.2)
PROTHROMBIN TIME: 17.8 SEC (ref 12.2–14.5)
QRS DURATION: 88
QT INTERVAL: 450
QTC CALCULATION(BAZETT): 522
R AXIS: -22
RBC # BLD AUTO: 4.62 M/UL (ref 3.93–5.22)
SARS-COV-2 RNA RESP QL NAA+PROBE: NEGATIVE
SODIUM SERPL-SCNC: 141 MEQ/L (ref 136–144)
T WAVE AXIS: -18
TRIGL SERPL-MCNC: 66 MG/DL (ref 30–149)
TROPONIN I SERPL HS-MCNC: 17.5 PG/ML
TROPONIN I SERPL HS-MCNC: 7.7 PG/ML
TROPONIN I SERPL HS-MCNC: 9.7 PG/ML
VENTRICULAR RATE: 81
WBC # BLD AUTO: 13.34 K/UL (ref 3.8–10.5)

## 2022-09-16 PROCEDURE — 25000000 HC PHARMACY GENERAL: Performed by: INTERNAL MEDICINE

## 2022-09-16 PROCEDURE — 63600000 HC DRUGS/DETAIL CODE: Performed by: INTERNAL MEDICINE

## 2022-09-16 PROCEDURE — 85730 THROMBOPLASTIN TIME PARTIAL: CPT | Performed by: INTERNAL MEDICINE

## 2022-09-16 PROCEDURE — U0003 INFECTIOUS AGENT DETECTION BY NUCLEIC ACID (DNA OR RNA); SEVERE ACUTE RESPIRATORY SYNDROME CORONAVIRUS 2 (SARS-COV-2) (CORONAVIRUS DISEASE [COVID-19]), AMPLIFIED PROBE TECHNIQUE, MAKING USE OF HIGH THROUGHPUT TECHNOLOGIES AS DESCRIBED BY CMS-2020-01-R: HCPCS | Performed by: EMERGENCY MEDICINE

## 2022-09-16 PROCEDURE — 84484 ASSAY OF TROPONIN QUANT: CPT | Performed by: EMERGENCY MEDICINE

## 2022-09-16 PROCEDURE — 96374 THER/PROPH/DIAG INJ IV PUSH: CPT

## 2022-09-16 PROCEDURE — 85379 FIBRIN DEGRADATION QUANT: CPT | Performed by: EMERGENCY MEDICINE

## 2022-09-16 PROCEDURE — 63600000 HC DRUGS/DETAIL CODE: Performed by: EMERGENCY MEDICINE

## 2022-09-16 PROCEDURE — 63600105 HC IODINE BASED CONTRAST: Performed by: EMERGENCY MEDICINE

## 2022-09-16 PROCEDURE — 25800000 HC PHARMACY IV SOLUTIONS: Performed by: INTERNAL MEDICINE

## 2022-09-16 PROCEDURE — 36415 COLL VENOUS BLD VENIPUNCTURE: CPT | Performed by: EMERGENCY MEDICINE

## 2022-09-16 PROCEDURE — 83735 ASSAY OF MAGNESIUM: CPT | Performed by: INTERNAL MEDICINE

## 2022-09-16 PROCEDURE — 80061 LIPID PANEL: CPT | Performed by: INTERNAL MEDICINE

## 2022-09-16 PROCEDURE — 85610 PROTHROMBIN TIME: CPT | Performed by: INTERNAL MEDICINE

## 2022-09-16 PROCEDURE — 93005 ELECTROCARDIOGRAM TRACING: CPT | Performed by: EMERGENCY MEDICINE

## 2022-09-16 PROCEDURE — 80053 COMPREHEN METABOLIC PANEL: CPT | Performed by: EMERGENCY MEDICINE

## 2022-09-16 PROCEDURE — 85025 COMPLETE CBC W/AUTO DIFF WBC: CPT | Performed by: EMERGENCY MEDICINE

## 2022-09-16 PROCEDURE — 25800000 HC PHARMACY IV SOLUTIONS: Performed by: EMERGENCY MEDICINE

## 2022-09-16 PROCEDURE — 83690 ASSAY OF LIPASE: CPT | Performed by: EMERGENCY MEDICINE

## 2022-09-16 PROCEDURE — 99223 1ST HOSP IP/OBS HIGH 75: CPT | Performed by: INTERNAL MEDICINE

## 2022-09-16 PROCEDURE — 74174 CTA ABD&PLVS W/CONTRAST: CPT | Mod: MG

## 2022-09-16 PROCEDURE — 21400000 HC ROOM AND CARE CCU/INTERMEDIATE

## 2022-09-16 PROCEDURE — 84484 ASSAY OF TROPONIN QUANT: CPT | Performed by: INTERNAL MEDICINE

## 2022-09-16 PROCEDURE — 63700000 HC SELF-ADMINISTRABLE DRUG: Performed by: INTERNAL MEDICINE

## 2022-09-16 PROCEDURE — 83605 ASSAY OF LACTIC ACID: CPT | Performed by: EMERGENCY MEDICINE

## 2022-09-16 PROCEDURE — 99285 EMERGENCY DEPT VISIT HI MDM: CPT | Mod: 25

## 2022-09-16 RX ORDER — IBUPROFEN 200 MG
16-32 TABLET ORAL AS NEEDED
Status: DISCONTINUED | OUTPATIENT
Start: 2022-09-16 | End: 2022-09-25 | Stop reason: HOSPADM

## 2022-09-16 RX ORDER — ONDANSETRON HYDROCHLORIDE 2 MG/ML
4 INJECTION, SOLUTION INTRAVENOUS ONCE
Status: COMPLETED | OUTPATIENT
Start: 2022-09-16 | End: 2022-09-16

## 2022-09-16 RX ORDER — POTASSIUM CHLORIDE 750 MG/1
40 TABLET, EXTENDED RELEASE ORAL AS NEEDED
Status: DISCONTINUED | OUTPATIENT
Start: 2022-09-16 | End: 2022-09-25 | Stop reason: HOSPADM

## 2022-09-16 RX ORDER — AMLODIPINE BESYLATE 5 MG/1
5 TABLET ORAL EVERY MORNING
COMMUNITY

## 2022-09-16 RX ORDER — LIDOCAINE 560 MG/1
1 PATCH PERCUTANEOUS; TOPICAL; TRANSDERMAL DAILY
Status: DISCONTINUED | OUTPATIENT
Start: 2022-09-16 | End: 2022-09-25 | Stop reason: HOSPADM

## 2022-09-16 RX ORDER — DEXTROSE 50 % IN WATER (D50W) INTRAVENOUS SYRINGE
25 AS NEEDED
Status: DISCONTINUED | OUTPATIENT
Start: 2022-09-16 | End: 2022-09-25 | Stop reason: HOSPADM

## 2022-09-16 RX ORDER — METRONIDAZOLE 500 MG/100ML
500 INJECTION, SOLUTION INTRAVENOUS
Status: DISCONTINUED | OUTPATIENT
Start: 2022-09-16 | End: 2022-09-21

## 2022-09-16 RX ORDER — MORPHINE SULFATE 4 MG/ML
4 INJECTION, SOLUTION INTRAMUSCULAR; INTRAVENOUS ONCE
Status: COMPLETED | OUTPATIENT
Start: 2022-09-16 | End: 2022-09-16

## 2022-09-16 RX ORDER — METOPROLOL SUCCINATE 50 MG/1
50 TABLET, EXTENDED RELEASE ORAL EVERY MORNING
COMMUNITY

## 2022-09-16 RX ORDER — AMIODARONE HYDROCHLORIDE 200 MG/1
200 TABLET ORAL DAILY
Status: DISCONTINUED | OUTPATIENT
Start: 2022-09-16 | End: 2022-09-25 | Stop reason: HOSPADM

## 2022-09-16 RX ORDER — MORPHINE SULFATE 2 MG/ML
2 INJECTION, SOLUTION INTRAMUSCULAR; INTRAVENOUS
Status: DISCONTINUED | OUTPATIENT
Start: 2022-09-16 | End: 2022-09-21

## 2022-09-16 RX ORDER — POTASSIUM CHLORIDE 750 MG/1
20 TABLET, EXTENDED RELEASE ORAL AS NEEDED
Status: DISCONTINUED | OUTPATIENT
Start: 2022-09-16 | End: 2022-09-25 | Stop reason: HOSPADM

## 2022-09-16 RX ORDER — METOPROLOL TARTRATE 50 MG/1
50 TABLET ORAL 2 TIMES DAILY
Status: DISCONTINUED | OUTPATIENT
Start: 2022-09-16 | End: 2022-09-16

## 2022-09-16 RX ORDER — SENNOSIDES 8.6 MG/1
1 TABLET ORAL 2 TIMES DAILY PRN
Status: DISCONTINUED | OUTPATIENT
Start: 2022-09-16 | End: 2022-09-25 | Stop reason: HOSPADM

## 2022-09-16 RX ORDER — METOPROLOL SUCCINATE 50 MG/1
50 TABLET, EXTENDED RELEASE ORAL EVERY MORNING
Status: DISCONTINUED | OUTPATIENT
Start: 2022-09-16 | End: 2022-09-25 | Stop reason: HOSPADM

## 2022-09-16 RX ORDER — SODIUM CHLORIDE 9 MG/ML
INJECTION, SOLUTION INTRAVENOUS CONTINUOUS
Status: DISCONTINUED | OUTPATIENT
Start: 2022-09-16 | End: 2022-09-16

## 2022-09-16 RX ORDER — ONDANSETRON HYDROCHLORIDE 2 MG/ML
4 INJECTION, SOLUTION INTRAVENOUS EVERY 8 HOURS PRN
Status: DISCONTINUED | OUTPATIENT
Start: 2022-09-16 | End: 2022-09-16

## 2022-09-16 RX ORDER — HEPARIN SODIUM 10000 [USP'U]/100ML
100-4000 INJECTION, SOLUTION INTRAVENOUS
Status: DISCONTINUED | OUTPATIENT
Start: 2022-09-16 | End: 2022-09-16

## 2022-09-16 RX ORDER — PANTOPRAZOLE SODIUM 40 MG/10ML
40 INJECTION, POWDER, LYOPHILIZED, FOR SOLUTION INTRAVENOUS EVERY 12 HOURS
Status: DISCONTINUED | OUTPATIENT
Start: 2022-09-16 | End: 2022-09-23

## 2022-09-16 RX ORDER — DEXTROSE 40 %
15-30 GEL (GRAM) ORAL AS NEEDED
Status: DISCONTINUED | OUTPATIENT
Start: 2022-09-16 | End: 2022-09-25 | Stop reason: HOSPADM

## 2022-09-16 RX ORDER — HEPARIN SODIUM 10000 [USP'U]/100ML
100-4000 INJECTION, SOLUTION INTRAVENOUS
Status: DISCONTINUED | OUTPATIENT
Start: 2022-09-16 | End: 2022-09-19

## 2022-09-16 RX ORDER — CLOPIDOGREL BISULFATE 75 MG/1
75 TABLET ORAL DAILY
Status: DISCONTINUED | OUTPATIENT
Start: 2022-09-16 | End: 2022-09-22

## 2022-09-16 RX ORDER — NITROGLYCERIN 0.4 MG/1
0.4 TABLET SUBLINGUAL EVERY 5 MIN PRN
Status: DISCONTINUED | OUTPATIENT
Start: 2022-09-16 | End: 2022-09-25 | Stop reason: HOSPADM

## 2022-09-16 RX ORDER — DOCUSATE SODIUM 100 MG/1
100 CAPSULE, LIQUID FILLED ORAL 2 TIMES DAILY
Status: DISCONTINUED | OUTPATIENT
Start: 2022-09-16 | End: 2022-09-25 | Stop reason: HOSPADM

## 2022-09-16 RX ORDER — AMIODARONE HYDROCHLORIDE 200 MG/1
200 TABLET ORAL EVERY MORNING
COMMUNITY

## 2022-09-16 RX ORDER — ATROPINE SULFATE 0.1 MG/ML
0.5 INJECTION INTRAVENOUS EVERY 5 MIN PRN
Status: DISCONTINUED | OUTPATIENT
Start: 2022-09-16 | End: 2022-09-25 | Stop reason: HOSPADM

## 2022-09-16 RX ORDER — SODIUM CHLORIDE, SODIUM LACTATE, POTASSIUM CHLORIDE, CALCIUM CHLORIDE 600; 310; 30; 20 MG/100ML; MG/100ML; MG/100ML; MG/100ML
INJECTION, SOLUTION INTRAVENOUS CONTINUOUS
Status: DISCONTINUED | OUTPATIENT
Start: 2022-09-16 | End: 2022-09-22

## 2022-09-16 RX ORDER — AMLODIPINE BESYLATE 5 MG/1
5 TABLET ORAL EVERY MORNING
Status: DISCONTINUED | OUTPATIENT
Start: 2022-09-16 | End: 2022-09-25 | Stop reason: HOSPADM

## 2022-09-16 RX ORDER — ALUMINUM HYDROXIDE, MAGNESIUM HYDROXIDE, AND SIMETHICONE 1200; 120; 1200 MG/30ML; MG/30ML; MG/30ML
30 SUSPENSION ORAL EVERY 4 HOURS PRN
Status: DISCONTINUED | OUTPATIENT
Start: 2022-09-16 | End: 2022-09-25 | Stop reason: HOSPADM

## 2022-09-16 RX ORDER — LORAZEPAM 1 MG/1
1 TABLET ORAL ONCE
Status: ACTIVE | OUTPATIENT
Start: 2022-09-16 | End: 2022-09-16

## 2022-09-16 RX ADMIN — METOPROLOL SUCCINATE 50 MG: 50 TABLET, EXTENDED RELEASE ORAL at 14:59

## 2022-09-16 RX ADMIN — SODIUM CHLORIDE 1 G: 900 INJECTION INTRAVENOUS at 16:40

## 2022-09-16 RX ADMIN — METRONIDAZOLE 500 MG: 500 INJECTION, SOLUTION INTRAVENOUS at 20:46

## 2022-09-16 RX ADMIN — IOHEXOL 125 ML: 350 INJECTION, SOLUTION INTRAVENOUS at 06:37

## 2022-09-16 RX ADMIN — MULTIPLE VITAMINS W/ MINERALS TAB 1 TABLET: TAB at 14:59

## 2022-09-16 RX ADMIN — AMLODIPINE BESYLATE 5 MG: 5 TABLET ORAL at 14:58

## 2022-09-16 RX ADMIN — CLOPIDOGREL BISULFATE 75 MG: 75 TABLET, FILM COATED ORAL at 14:58

## 2022-09-16 RX ADMIN — SODIUM CHLORIDE, POTASSIUM CHLORIDE, SODIUM LACTATE AND CALCIUM CHLORIDE: 600; 310; 30; 20 INJECTION, SOLUTION INTRAVENOUS at 22:21

## 2022-09-16 RX ADMIN — HEPARIN SODIUM 1000 UNITS/HR: 10000 INJECTION, SOLUTION INTRAVENOUS at 15:09

## 2022-09-16 RX ADMIN — HEPARIN SODIUM 950 UNITS/HR: 10000 INJECTION, SOLUTION INTRAVENOUS at 22:19

## 2022-09-16 RX ADMIN — ONDANSETRON 4 MG: 2 INJECTION INTRAMUSCULAR; INTRAVENOUS at 05:52

## 2022-09-16 RX ADMIN — SODIUM CHLORIDE, POTASSIUM CHLORIDE, SODIUM LACTATE AND CALCIUM CHLORIDE: 600; 310; 30; 20 INJECTION, SOLUTION INTRAVENOUS at 09:53

## 2022-09-16 RX ADMIN — MORPHINE SULFATE 4 MG: 4 INJECTION, SOLUTION INTRAMUSCULAR; INTRAVENOUS at 05:53

## 2022-09-16 RX ADMIN — SODIUM CHLORIDE 1000 ML: 9 INJECTION, SOLUTION INTRAVENOUS at 07:38

## 2022-09-16 RX ADMIN — METRONIDAZOLE 500 MG: 500 INJECTION, SOLUTION INTRAVENOUS at 14:53

## 2022-09-16 RX ADMIN — PANTOPRAZOLE SODIUM 40 MG: 40 INJECTION, POWDER, FOR SOLUTION INTRAVENOUS at 14:54

## 2022-09-16 ASSESSMENT — PATIENT HEALTH QUESTIONNAIRE - PHQ9: SUM OF ALL RESPONSES TO PHQ9 QUESTIONS 1 & 2: 0

## 2022-09-16 ASSESSMENT — ENCOUNTER SYMPTOMS
DIFFICULTY URINATING: 0
FEVER: 1
NECK PAIN: 0
NAUSEA: 1
VOMITING: 1
BACK PAIN: 0
HEMATURIA: 0
COUGH: 0
SHORTNESS OF BREATH: 0
ACTIVITY CHANGE: 0
HEADACHES: 0
AGITATION: 0
DIARRHEA: 0
WEAKNESS: 0
CHILLS: 1
DIAPHORESIS: 1
SORE THROAT: 0
FACIAL SWELLING: 0
ABDOMINAL PAIN: 1
COLOR CHANGE: 0

## 2022-09-16 NOTE — ASSESSMENT & PLAN NOTE
S/p PPM placement 7/2022  Monitor electrolytes and replete as required for potassium greater than 4, magnesium greater than 2  Cardiology following

## 2022-09-16 NOTE — CONSULTS
REASON FOR CONSULT: amio, recent PCI, 'PPM failure', PPM compatibility with MRI    CONSULT FROM: Qi Alexander DO    PRIMARY CARDIOLOGIST: Dr. Saravia    ------------------------------------------------------------------------------------------------------------------------------------------  HISTORY OF PRESENTING ILLNESS  ------------------------------------------------------------------------------------------------------------------------------------------  Emily Ramirez is a 77 y.o. female who is admitted with N/V, diaphoresis/chills    # cardiac risk factors: HTN, HL, obesity, age  # CAD- PCI with NIKITA to prox RCA and prox LCx [p/op developed ST elevations inferiorly- thrombotic occlusion RCA s/p repeat angioplasty] 6/9/22  # bilateral carotid artery disease- mild  # mild MR/AR  # Afib 7/2022  # SSS -> Medtronic Dual chamber PPM 7/21/22  # PMH: dyspnea on exertion, CKD III, Rt hip replacement- 4/2011     Echo 8/4/22 limited: Limited echo performed following pericardiocentesis.  LVEF 60% without regional wall motion abnormality.  No significant valvular heart disease identified by limited two-dimensional imaging.  Small residual pericardial effusion, primarily posterolateral.  Preserved RV function, pacer wire in place, normal diastolic expansion  Echo (1/2022): EF 65-70%, mild LVH, mild LAE, mild MR, mild AR, mild/mod TR, mod NY, mild pHTN with PASP 35-40 mmHg.  LHC (6/9/2022): 90% prox RCA, 90% prox LCx --> PCI to RCA, PCI to LCX     Ms. Ramirez had some shortness of breath which prompted a stress test which was abnormal she then had a cardiac catheterization on 6/9/2022.  She had NIKITA to proximal RCA then post intervention deveoped STEMI and thrombotic occlusion which required repeat angioplasty to the RCA.  She was discharged on routine medications.     Then at home she developed atrial fibrillation and was admitted 7/19/2022.  She was found to have sick sinus syndrome with pauses.  On  7/21/2022 she had a dual-chamber permanent pacemaker placed.  She was started on sotalol and developed DENNY and prolonged QT.  Sotaolol was stopped and amiodarone was started.  On 7/29/2022 she developed pericardial effusion and pericardialcentesis and 300cc off.  She has remained on amiodarone, Plavix, Eliquis and Lopressor.    She returns to [facility] today with abdominal pain nausea and vomiting.  She was deemed to have a gallstone pancreatitis.  Her liver enzymes are elevated.  She is anticipated to have an MRCP to evaluate for CBD stone.  And may require ERCP.    Patient currently denies chest pain, shortness of breath, orthopnea, PND, ankle edema, palpitations or syncope.  ------------------------------------------------------------------------------------------------------------------------------------------  PAST MEDICAL HISTORY  ------------------------------------------------------------------------------------------------------------------------------------------  Past Medical History:   Diagnosis Date    Aortic regurgitation     Chronic kidney disease     Coronary artery disease     History of loop recorder     Hypertension     Lipid disorder     Mitral regurgitation     SVT (supraventricular tachycardia) (CMS/[facility])      No past surgical history on file.    ------------------------------------------------------------------------------------------------------------------------------------------  MEDICATIONS  ------------------------------------------------------------------------------------------------------------------------------------------  Home medications  coenzyme Q10 100 mg capsule (coenzyme q10) Take 1 capsule by mouth once a day   fluticasone propionate 50 mcg/actuation spray,suspension (fluticasone propionate) Spray 2 spray into both nostrils once a day   simvastatin 20 mg tablet (simvastatin) Take 1 tablet every night   tramadol-acetaminophen 37.5-325 mg tablet  (tramadol-acetaminophen) Take 1 tablet by mouth twice a day   famotidine 40 mg tablet (famotidine) Take 1 tablet by mouth at bedtime   metoprolol succinate 50 mg tablet extended release 24 hr (metoprolol succinate) Take 1 tablet by mouth once a day   pantoprazole 40 mg tablet,delayed release (DR/EC) (pantoprazole) 1 tablet by mouth once a day   clopidogrel 75 mg tablet (clopidogrel) Take 1 tablet by mouth once a day   amiodarone 200 mg tablet (amiodarone) Take 1 tablet by mouth once a day   * apixaban 5 mg tablet (apixaban) 1 tablet by mouth twice a day   amlodipine 5 mg tablet (amlodipine) Take 1 tablet by mouth once a day     Inpatient Medications      alum-mag hydroxide-simeth, 30 mL, oral, q4h PRN    amiodarone, 200 mg, oral, Daily    [Provider Managed Hold] apixaban, 5 mg, oral, BID    atropine, 0.5 mg, intravenous, q5 min PRN    clopidogreL, 75 mg, oral, Daily    glucose, 16-32 g of dextrose, oral, PRN **OR** dextrose, 15-30 g of dextrose, oral, PRN **OR** glucagon, 1 mg, intramuscular, PRN **OR** dextrose 50 % in water (D50), 25 mL, intravenous, PRN    docusate sodium, 100 mg, oral, BID    lactated ringer's, , intravenous, Continuous    lidocaine, 1 patch, Topical, Daily    LORazepam, 1 mg, oral, Once    magnesium sulfate, 1 g, intravenous, PRN **OR** magnesium sulfate, 2 g, intravenous, PRN    metoprolol tartrate, 50 mg, oral, BID    morphine, 2 mg, intravenous, q3h PRN    multivitamin, 1 tablet, oral, Daily    nitroglycerin, 0.4 mg, sublingual, q5 min PRN    ondansetron, 4 mg, intravenous, q8h PRN    pantoprazole, 40 mg, intravenous, q12h KRISTEN    potassium chloride, 20 mEq, oral, PRN    potassium chloride, 40 mEq, oral, PRN    senna, 1 tablet, oral, 2x daily PRN    amiodarone    apixaban    clopidogreL    coenzyme Q10    famotidine    metoprolol tartrate    multivit,iron,minerals/lutein (CENTRUM SILVER ULTRA WOMEN'S ORAL)    pantoprazole    simvastatin     traMADoL-acetaminophen    ------------------------------------------------------------------------------------------------------------------------------------------  ALLERGIES  ------------------------------------------------------------------------------------------------------------------------------------------  Celebrex [celecoxib], Cipro [ciprofloxacin hcl], Clindamycin, Crestor [rosuvastatin], Lipitor [atorvastatin], and Voltaren [diclofenac sodium]    ------------------------------------------------------------------------------------------------------------------------------------------  SOCIAL HISTORY  ------------------------------------------------------------------------------------------------------------------------------------------  No smoking or alcohol    ------------------------------------------------------------------------------------------------------------------------------------------  FAMILY HISTORY  ------------------------------------------------------------------------------------------------------------------------------------------  father  @ 80- [place], pacemaker  mother  @ 92- old age    ------------------------------------------------------------------------------------------------------------------------------------------  REVIEW OF SYSTEMS  ------------------------------------------------------------------------------------------------------------------------------------------  Constitutional: - fever, - chills, - weakness, - weight loss  HEENT: - blurred vision, - sore throat, - hoarseness  Respiratory: - dyspnea, - cough, - hemoptysis  Cardiovascular: - chest pain, - dyspnea, - orthopnea, - PND, - edema, - palpitations, - syncope  Gastrointestinal: + nausea, + vomiting, - diarrhea, - hematemesis, - melena  Genitourinary: - dysuria, - frequency  Integument: - rash, - itching  Hematologic/lymphatic:  - bruising, - petechiae  Musculoskeletal: - arthalgias, -  myalgias  Neurological: - vertigo, - tremors, - headache, - speech deficit, - focal weakness  Behavioral/Psych: - anxiety, - depression  Endocrine: - cold intolerance, - heat intolerance, - weight change    ------------------------------------------------------------------------------------------------------------------------------------------  PHYSICAL EXAM  ------------------------------------------------------------------------------------------------------------------------------------------  VITAL SIGNS:  Temp:  [36.2 °C (97.2 °F)] 36.2 °C (97.2 °F)  Heart Rate:  [64-70] 70  Resp:  [16-22] 16  BP: (142-172)/(65-73) 142/65  SaO2: 96%    Intake/Output Summary (Last 24 hours) at 9/16/2022 0914  Last data filed at 9/16/2022 0921  Gross per 24 hour   Intake 1000 ml   Output --   Net 1000 ml       PHYSICAL EXAM:  General appearance: alert and cooperative  Head: without obvious abnormality  Eyes: PERRLA, extraocular movements intact  Neck: No JVD, carotid bruits, thyromegaly  Lungs: clear to auscultation bilaterally, no crackles or wheezing  Heart: regular rate and rhythm, S1-S2 normal, no murmurs, clicks, rubs or gallops  Abdomen: soft, non-tender, bowel sounds normal  Extremities: no edema, peripheral pulses present  Skin: Skin color, texture, turgor normal. No rashes or lesions  Neurologic: Alert and oriented X 3, no focal deficits    ------------------------------------------------------------------------------------------------------------------------------------------  LABS / IMAGING / EKG / TELEMETRY  ------------------------------------------------------------------------------------------------------------------------------------------  LABS:  Results from last 7 days   Lab Units 09/16/22  0557 09/16/22  0536   SODIUM mEQ/L  --  141   POTASSIUM mEQ/L  --  3.0*   MAGNESIUM mg/dL 1.6*  --    CHLORIDE mEQ/L  --  104   CO2 mEQ/L  --  26   BUN mg/dL  --  33*   CREATININE mg/dL  --  1.5*   AST IU/L  --  890*   ALT  IU/L  --  396*     Results from last 7 days   Lab Units 22  0536   WBC K/uL 13.34*   HEMOGLOBIN g/dL 13.0   HEMATOCRIT % 41.0   PLATELETS K/uL 304     Results from last 7 days   Lab Units 22  0741 22  0557   HIGH SENSITIVE TROPONIN I pg/mL 9.7 7.7     Lab Results   Component Value Date    TSH 1.89 2022     Lab Results   Component Value Date    CHOL 125 2022    LDLCALC 63 2022    HDL 49 (L) 2022    TRIG 66 2022     Lab Results   Component Value Date     (H) 2022       IMAGING:  CT  1. No aortic aneurysm or dissection as clinically questioned.  2. Acute pancreatitis without evidence of pancreatic necrosis or collection.    EC2022 6:02 AM: A paced, low voltage QRS    TELEMETRY:  A paced    ------------------------------------------------------------------------------------------------------------------------------------------  ASSESSMENT AND PLAN  ------------------------------------------------------------------------------------------------------------------------------------------  Coronary Artery disease: inferior STEMI ->Successful PTCA acute thrombosis RCA stent on 22.  Patient remains on Plavix, Lopressor.  Statin is being held secondary to elevated liver function    Atrial fibrillation:Failed sotalol. Remains on amiodarone A paced    Oral anticoagulation: CHADSVASC=5  eliquis 5mg po BID on hold pending ERCP    PPM: Medtronic dual chamber PPM placed 22 for SSS. No evidence of device failure. Capture a paced and sensed v paced Confirmed with rep the PPM IS MRI compatible    Preoperative cardiac clearance:  ERCP 'if possible'.  Patient with recent complicated RCA stent on 2022.  No evidence of decompensated congestive heart failure or acute ischemia.  Not ideal candidate and stopping the Plavix given her recent stent and thrombolytic event.  Patient high [id number] score for cardio Bolick event.  Patient would be deemed a high  risk candidate.  See above for Dr. Lowery's recommendation    VENTURA Valderrama  9/16/2022    Primary Care Doctor: Jonathan Meza,

## 2022-09-16 NOTE — H&P
Hospital Medicine Service -  History & Physical        CHIEF COMPLAINT   Chest, Abdominal pain     HISTORY OF PRESENT ILLNESS      Emily Ramirez is a 77 y.o. female with a past medical history of coronary disease (PCI with NIKITA to prox RCA and prox LCx [p/op developed ST elevations inferiorly- thrombotic occlusion RCA s/p repeat angioplasty), atrial fibrillation with SSS/pauses s/p PPM 7/21/22 (on Eliquis), mild AR, MR, hypertension, hyperlipidemia, and chronic kidney disease stage III  who presents to [facility] ER on 9/16/2022 with 2-day history of chest and midepigastric abdominal pain.  According to patient she was in her usual state of health until yesterday evening when shortly after dinner she developed sudden onset of substernal chest and midepigastric pain.  Symptoms resolved temporarily without any major intervention however, later in the evening symptoms reoccurred waking patient from sleep with pain now radiating throughout her abdomen and into her back.  This is also accompanied by nausea and several episodes of nonbilious, non bloody emesis.  Symptoms to continue to progress throughout the early morning and as a result patient ultimately presented to the ER for further evaluation.  Currently, patient continues to complain of ongoing midepigastric pain although somewhat improved since administration of morphine.  She denies any fevers does admit to chills earlier today.  Denies any shortness of breath, cough, wheezing, heart palpitations.  Denies any recent diarrhea but does admit to intermittent constipation over the past week.  Denies any urinary symptoms.  Denies any prior history of gallstones or gallbladder disease.     77-year-old female with a history of CAD, CKD, HTN, pacer here for chest pain.  Symptoms started a few hours ago with pain in the sternum, rated 6 out of 10.  Symptoms increased and migrated down to the upper abdomen.  Associated nausea, vomiting, diaphoresis.  No  dyspnea.  No dizziness.  No focal neuro symptoms.  No diarrhea.  Subjective fever and chills.  States has a history of GERD but never had this kind of pain before.       PAST MEDICAL AND SURGICAL HISTORY      Past Medical History:   Diagnosis Date    Aortic regurgitation     Chronic kidney disease     Coronary artery disease     History of loop recorder     Hypertension     Lipid disorder     Mitral regurgitation     SVT (supraventricular tachycardia) (CMS/[facility])        No past surgical history on file.    MEDICATIONS      Prior to Admission medications    Medication Sig Start Date End Date Taking? Authorizing Provider   amiodarone (PACERONE) 200 mg tablet Take 1 tablet (200 mg total) by mouth daily. 8/2/22   Jordana Land CRNP   apixaban (ELIQUIS) 5 mg tablet Take 1 tablet (5 mg total) by mouth 2 (two) times a day Indications: blood clot in a deep vein of the extremities, a clot in the lung. 7/22/22 11/19/22  Tonja Amaya PA C   clopidogreL (PLAVIX) 75 mg tablet Take 75 mg by mouth daily.    Amna Lockwood MD   coenzyme Q10 (COQ10) 100 mg capsule Take 100 mg by mouth daily. Takes in the PM    Amna Lockwood MD   famotidine (PEPCID) 40 mg tablet Take 40 mg by mouth nightly.    Amna Lockwood MD   metoprolol tartrate (LOPRESSOR) 50 mg tablet Take 1 tablet (50 mg total) by mouth 2 (two) times a day. 8/2/22 9/1/22  Jose Elias Borjas MD   multivit,iron,minerals/lutein (CENTRUM SILVER ULTRA WOMEN'S ORAL) Take 1 tablet by mouth every other day.    Amna Lockwood MD   pantoprazole (PROTONIX) 40 mg EC tablet Take 40 mg by mouth daily. 30 min prior to breakfast    Amna Lockwood MD   simvastatin (ZOCOR) 20 mg tablet Take 20 mg by mouth nightly.    Amna Lockwood MD   traMADoL-acetaminophen (ULTRACET) 37.5-325 mg per tablet Take 1 tablet by mouth 2 (two) times a day.    Amna Lockwood MD   sotaloL (BETAPACE) 80 mg tablet Take 1 tablet (80 mg total) by mouth  every 12 (twelve) hours. 7/22/22 7/25/22  Tonja Amaya PA C       ALLERGIES      Celebrex [celecoxib], Cipro [ciprofloxacin hcl], Clindamycin, Crestor [rosuvastatin], Lipitor [atorvastatin], and Voltaren [diclofenac sodium]    FAMILY HISTORY      No family history on file.    SOCIAL HISTORY      Social History     Socioeconomic History    Marital status:    Tobacco Use    Smoking status: Never Smoker    Smokeless tobacco: Never Used   Substance and Sexual Activity    Alcohol use: Yes     Alcohol/week: 1.0 standard drink     Types: 1 Glasses of wine per week     Comment: 1x/month    Drug use: Not Currently    Sexual activity: Defer     Social Determinants of Health     Food Insecurity: No Food Insecurity    Worried About Running Out of Food in the Last Year: Never true    Ran Out of Food in the Last Year: Never true       REVIEW OF SYSTEMS      All other systems reviewed and negative except as noted in HPI    PHYSICAL EXAMINATION      Temp:  [36.2 °C (97.2 °F)] 36.2 °C (97.2 °F)  Heart Rate:  [64-70] 70  Resp:  [16-22] 16  BP: (142-172)/(65-73) 142/65  Body mass index is 29.87 kg/m².    GEN: well-developed and well-nourished; not in acute distress  HEENT: normocephalic; atraumatic, mouth: Dry mucous membranes  NECK: no JVD  CARDIO: regular rate and rhythm; no murmurs or rubs  RESP: clear to auscultation bilaterally; no rales, rhonchi, or wheezes  ABD: soft, non-distended, decrease bowel sounds, (+) tenderness to palpation over medic epigastric area, no guarding or rigidity  EXT: no cyanosis, clubbing, or edema  SKIN: clean, dry, warm, and intact  MUSCULOSKELETAL: no injury or deformity  NEURO: alert and oriented x 3; nonfocal  BEHAVIOR/EMOTIONAL: appropriate; cooperative    LABS / IMAGING / EKG        Labs  Lab Results   Component Value Date    GLUCOSE 143 (H) 09/16/2022    CALCIUM 9.4 09/16/2022     09/16/2022    K 3.0 (L) 09/16/2022    CO2 26 09/16/2022     09/16/2022    BUN  33 (H) 09/16/2022    CREATININE 1.5 (H) 09/16/2022     Lab Results   Component Value Date    WBC 13.34 (H) 09/16/2022    HGB 13.0 09/16/2022    HCT 41.0 09/16/2022    MCV 88.7 09/16/2022     09/16/2022     Lab Results   Component Value Date    ALBUMIN 4.1 09/16/2022    BILITOT 1.6 (H) 09/16/2022    ALKPHOS 183 (H) 09/16/2022     (H) 09/16/2022     (H) 09/16/2022    PROTEIN 7.1 09/16/2022       Imaging  CT ANGIOGRAPHY CHEST/ABDOMEN/PELVIS WITH AND WITHOUT IV CONTRAST    Addendum Date: 9/16/2022    Please note that there is severe stenosis at the junction of the left subclavian and left brachiocephalic vein as it courses between the left anterior first rib and left clavicle, with subsequent marked left upper extremity as well as partially imaged left lower neck and left chest wall venous collaterals, with the stenosis likely sequela of chronic left chest wall pacer leads compounded by left arm abduction.    Result Date: 9/16/2022  IMPRESSION: 1. No aortic aneurysm or dissection as clinically questioned. 2. Acute pancreatitis without evidence of pancreatic necrosis or collection. COMMENT: CHEST LUNGS AND AIRWAYS: Mild bibasilar subsegmental atelectasis without consolidation, pneumothorax, or mass. The visualized airways are patent. PLEURA: no effusion. VESSELS: Mild enlargement of the main pulmonary artery measuring up to 3.4 cm in diameter, which may be seen with pulmonary hypertension in the appropriate clinical setting. No thoracic aortic aneurysm or dissection, noting diffuse atherosclerotic calcifications/changes throughout the aorta and coronary arteries. HEART: Cardiomegaly with extensive mitral annular calcification again noted. No pericardial effusion. MEDIASTINUM AND BRENNEN: within normal limits, no enlarged lymph nodes. CHEST WALL AND LOWER NECK: Dual-lead left chest wall pacer with leads in the right atrium and right ventricle. ABDOMEN: LIVER: normal morphology without suspicious mass.  BILE DUCTS: normal caliber. GALLBLADDER: Noncalcified cholelithiasis. No evidence of acute cholecystitis. PANCREAS: Diffuse peripancreatic stranding with slightly heterogeneous parenchymal perfusion/enhancement, compatible with acute interstitial pancreatitis. No evidence of pancreatic necrosis. SPLEEN: within normal limits. ADRENALS: within normal limits. KIDNEYS: A few right-sided cysts measuring up to 2.8 cm. PELVIS: Evaluation limited by right hip arthroplasty streak artifact. REPRODUCTIVE ORGANS: no pelvic masses. URETERS: within normal limits. BLADDER: within normal limits. BOWEL/PERITONEUM: Bowel demonstrates normal caliber without evidence of obstruction. Sigmoid and descending colonic diverticulosis without evidence of acute diverticulitis. Normal appendix. Large hiatal hernia again noted resulting in partial intrathoracic stomach, unchanged. No enlarged mesenteric lymph nodes. No ascites or free air, no fluid collection. VESSELS: atherosclerotic changes, without abdominal aortic aneurysm or dissection. There is moderate focal proximal left common iliac artery stenosis due to prominent noncalcified atheromatous plaque. RETROPERITONEUM: no enlarged retroperitoneal or pelvic nodes. ABDOMINAL WALL: intact. BONES: degenerative changes, no suspicious lytic or blastic lesions. Partially imaged right hip arthroplasty with associated streak artifact. Bilateral shoulder arthroplasties noted.       ECG/Telemetry  EKG: Accelerated junctional rhythm, ? A paced vs PPM failure per EKG read. HR 81, L axis, , no acute st/t wave changes compared to prior    ASSESSMENT AND PLAN           * Acute pancreatitis  Assessment & Plan  Presents with sudden onset of abdominal pain, nausea and vomiting admitted for acute pancreatitis.   Initial LFTs elevated with mixed hepatic injury and cholestatic picture, lipase 1436  Initial chest, abdomen, pelvis CTA with no aortic aneurysm or dissection as clinically questioned.. Acute  pancreatitis without evidence of pancreatic necrosis or collection. Imaging notable for cholelithiasis, however no tigist choledocholithiasis or biliary duct dilation. Denies any alcohol use    Plan:  Will admit to telemetry for continued monitoring  Check lipid panel  Will order abd mri/mrcp for further evaluation  Will initiate IVFs with LR at 200cc/hr  Will initiate lidoderm patch and cont w/morphine IV 2mg q3h PRN for pain management. Avoiding Toradol for now given DENNY  GI consulted for further evaluation and treatment recs  Cardiology consulted as well given ?side effects of amiodarone      Transaminitis  Assessment & Plan  Initial labs notable for transaminitis with both hepatic injury and cholestatic picture however, abnormalities primarily suggestive of hepatic injury.  While choledocholithiasis could be the cuplrit given primary evidence of hepatic injury ? whether amiodarone also may be contributing    Plan:  Cont w/evaluation of pancreatitis/Abd MRI/MRCP as stated above  Given recent complicated cardiac history will cont w/amiodarone for now unless otherwise instructed by Cardiology  Hold statin  Cont w/IVFs as stated  Monitor cmp    Acute kidney injury superimposed on CKD (CMS/HCC)  Assessment & Plan  Initial Cr 1.5, baseline ~1.2  Likely pre-renal in the setting of acute pancreatitis and n/v    Plan:  Check ua  Will initiate IVFs w/LR at 200cc/hr  Avoid nephrotoxins  Monitor bmp    PAD (peripheral artery disease) (CMS/HCC)  Assessment & Plan  C/A/P CTA w/severe stenosis at the junction of the left subclavian and left brachiocephalic vein as it courses between the left anterior first rib  and left clavicle, with subsequent marked left upper extremity as well as  partially imaged left lower neck and left chest wall venous collaterals, with  the stenosis likely sequela of chronic left chest wall pacer leads compounded by  left arm abduction.  Currently denies any significant LUE pain    Plan:  Cont  w/plavix, statin, and eliquis when able  Resume outpt f/u with Cardiology for continued monitoring    Atrial fibrillation, unspecified type (CMS/HCC)  Assessment & Plan  H/o new onset atrial fibrillation with SSS/pauses s/p PPM 7/21/22  EKG at with ? AV junctional rhythm and ?PPM malfunction, rate controlled    Plan:  Admit to telemetry for continued monitoring  Cont w/outpt regimen of metoprolol. Cont w/amiodarone for now as well although ?whether this may have contributed to acute pancreatitis. Will consult cards for further input  Monitor electrolytes and replete as required for K>4, Mg>2  Eliquis on hold in case of possible ERCP  Cardiology consulted. Will f/u any additional recs    Sick sinus syndrome (CMS/HCC)  Assessment & Plan  S/p PPM placement 7/2022  Initial EKG w/? PPM failure    Plan:  Will admit to telemetry for continued monitoring  Monitor electrolytes and replete as required for potassium greater than 4, magnesium greater than 2  Cardiology consulted for pacemaker evaluation and clearance for MRI    GERD (gastroesophageal reflux disease)  Assessment & Plan  Will place on PPI BID as stated above    Hyperlipidemia  Assessment & Plan  Hold statin for now given transaminitis  Repeat lipid panel as stated above    CAD (coronary artery disease)  Assessment & Plan   history of coronary disease (PCI with NIKITA to prox RCA and prox LCx [p/op developed ST elevations inferiorly- thrombotic occlusion RCA s/p repeat angioplasty),  Presents with atypical chest pain as stated, although EKG w/no acute st/t wave changes and HS troponin wnl x 1. Suspect sxs likely related to acute pancreatitis    Plan:  Monitor on telemetry and trend HS tropnins  Cont w/plavix. Hold eliquis for now unless otherwise specified by cardiology  Hold statin given transaminitis  Cont w/metoprolol  Cardiology consulted for further medication recommendations given recent cardiac interventions       VTE Assessment: Padua    Code Status: Full  Code  Estimated discharge date: 9/18/2022     Qi Alexander,   9/16/2022

## 2022-09-16 NOTE — ASSESSMENT & PLAN NOTE
C/A/P CTA w/severe stenosis at the junction of the left subclavian and left brachiocephalic vein as it courses between the left anterior first rib  and left clavicle, with subsequent marked left upper extremity as well as  partially imaged left lower neck and left chest wall venous collaterals, with  the stenosis likely sequela of chronic left chest wall pacer leads compounded by  left arm abduction.  Currently denies any significant LUE pain    Plan:  Resume meds after surgery  Resume outpt f/u with Cardiology for continued monitoring

## 2022-09-16 NOTE — ASSESSMENT & PLAN NOTE
Likely secondary to  gallstone pancreatitis  Presents with sudden onset of abdominal pain, nausea and vomiting admitted for acute pancreatitis.   Initial LFTs elevated with mixed hepatic injury and cholestatic picture, lipase 1436  Initial CT abdomen showed  Acute pancreatitis without evidence of pancreatic necrosis or collection. Imaging notable for cholelithiasis, however no tigist choledocholithiasis or biliary duct dilation. Denies any alcohol use  Triglycerides relatively stable  Sxs improving, LFTs downtrending  MRI Abdomen shows gallstones no evidence of choledocholithiasis  Cont w/lidoderm patch and cont w/morphine IV 2mg q3h PRN for pain management. Avoiding   S/p laparoscopic cholecystectomy by Dr. Valdivia 9/21  Patient doing well postoperatively and tolerating cardiac diet  Initiated on Plavix yesterday and Eliquis today  Follow hemoglobin  Ok to discharge home tomorrow if hemoglobin remains stable in am per   Updated patient's daughter

## 2022-09-16 NOTE — ASSESSMENT & PLAN NOTE
history of coronary disease (PCI with NIKITA to prox RCA and prox LCx [p/op developed ST elevations inferiorly- thrombotic occlusion RCA s/p repeat angioplasty),  restarted on Plavix  Hold statin given transaminitis  Cont w/metoprolol  Cardiology  Following

## 2022-09-16 NOTE — ED PROVIDER NOTES
Emergency Medicine Note  HPI   HISTORY OF PRESENT ILLNESS     77-year-old female with a history of CAD, CKD, HTN, pacer here for chest pain.  Symptoms started a few hours ago with pain in the sternum, rated 6 out of 10.  Symptoms increased and migrated down to the upper abdomen.  Associated nausea, vomiting, diaphoresis.  No dyspnea.  No dizziness.  No focal neuro symptoms.  No diarrhea.  Subjective fever and chills.  States has a history of GERD but never had this kind of pain before.            Patient History   PAST HISTORY     Reviewed from Nursing Triage:  Problems         Past Medical History:   Diagnosis Date    Aortic regurgitation     Chronic kidney disease     Coronary artery disease     History of loop recorder     Hypertension     Lipid disorder     Mitral regurgitation     SVT (supraventricular tachycardia) (CMS/[facility])        No past surgical history on file.    No family history on file.    Social History     Tobacco Use    Smoking status: Never Smoker    Smokeless tobacco: Never Used   Substance Use Topics    Alcohol use: Yes     Alcohol/week: 1.0 standard drink     Types: 1 Glasses of wine per week     Comment: 1x/month    Drug use: Not Currently         Review of Systems   REVIEW OF SYSTEMS     Review of Systems   Constitutional: Positive for chills, diaphoresis and fever. Negative for activity change.   HENT: Negative for facial swelling and sore throat.    Eyes: Negative for visual disturbance.   Respiratory: Negative for cough and shortness of breath.    Cardiovascular: Positive for chest pain. Negative for leg swelling.   Gastrointestinal: Positive for abdominal pain, nausea and vomiting. Negative for diarrhea.   Genitourinary: Negative for difficulty urinating and hematuria.   Musculoskeletal: Negative for back pain and neck pain.   Skin: Negative for color change and pallor.   Neurological: Negative for syncope, weakness and headaches.   Psychiatric/Behavioral: Negative for  agitation and behavioral problems.   All other systems reviewed and are negative.        VITALS     ED Vitals    Date/Time Temp Pulse Resp BP SpO2 [facility]   09/16/22 1501 -- 66 -- 151/65 95 % CC   09/16/22 1404 -- 64 12 146/65 95 % CC   09/16/22 1204 -- 60 14 128/57 96 % CC   09/16/22 1104 -- 60 14 126/58 96 %    09/16/22 1004 -- 64 14 141/58 96 %    09/16/22 0953 -- 68 16 152/65 94 %    09/16/22 0743 -- -- 16 142/65 96 %    09/16/22 0604 -- 70 18 153/69 99 % [facility]   09/16/22 0531 36.2 °C (97.2 °F) 64 22 172/73 -- MJ   09/16/22 0531 -- -- -- -- 99 % KN        Pulse Ox %: 99 % (09/16/22 0557)  Pulse Ox Interpretation: Normal (09/16/22 0557)           Physical Exam   PHYSICAL EXAM     Physical Exam  Vitals and nursing note reviewed.   Constitutional:       General: She is in acute distress.      Appearance: She is well-developed. She is not ill-appearing, toxic-appearing or diaphoretic.      Comments: Appears uncomfortable, moaning in bed   HENT:      Head: Normocephalic and atraumatic.   Eyes:      Extraocular Movements: Extraocular movements intact.      Pupils: Pupils are equal, round, and reactive to light.   Cardiovascular:      Rate and Rhythm: Normal rate and regular rhythm.      Heart sounds: Normal heart sounds.   Pulmonary:      Effort: Pulmonary effort is normal.   Chest:      Chest wall: Tenderness (Mid sternum) present.   Abdominal:      General: Abdomen is flat.      Palpations: Abdomen is soft.      Tenderness: There is abdominal tenderness in the right upper quadrant, epigastric area and left upper quadrant. There is no guarding or rebound. Negative signs include Butts's sign, Rovsing's sign, McBurney's sign and psoas sign.      Hernia: No hernia is present.   Neurological:      Mental Status: She is alert.           PROCEDURES     Procedures     DATA     Results     Procedure Component Value Units Date/Time    [place] Draw Panel [[id number]] Collected: 09/16/22 0536    Specimen: Blood, Venous  Updated: 09/16/22 1401    Narrative:      The following orders were created for panel order [facility] Draw Panel.  Procedure                               Abnormality         Status                     ---------                               -----------         ------                     RAINBOW RED[[id number]]                                      Final result               RAINBOW LT BLUE[[id number]]                                  Final result               RAINBOW GOLD[[id number]]                                     Final result                 Please view results for these tests on the individual orders.    RAINBOW GOLD [[id number]] Collected: 09/16/22 0536    Specimen: Blood, Venous Updated: 09/16/22 1401    RAINBOW RED [[id number]] Collected: 09/16/22 0536    Specimen: Blood, Venous Updated: 09/16/22 1401    RAINBOW LT BLUE [[id number]] Collected: 09/16/22 0536    Specimen: Blood, Venous Updated: 09/16/22 1401    Lipase [[id number]]  (Abnormal) Collected: 09/16/22 0557    Specimen: Blood, Venous Updated: 09/16/22 0833     Lipase 1,436 U/L      Comment: Checked by dilution       SARS-CoV-2 (COVID-19), PCR Nasopharynx [[id number]]  (Normal) Collected: 09/16/22 0656    Specimen: Nasopharyngeal Swab from Nasopharynx Updated: 09/16/22 0750    Narrative:      The following orders were created for panel order SARS-CoV-2 (COVID-19), PCR Nasopharynx.  Procedure                               Abnormality         Status                     ---------                               -----------         ------                     SARS-CoV-2 (COVID-19), P...[[id number]]  Normal              Final result                 Please view results for these tests on the individual orders.    SARS-CoV-2 (COVID-19), PCR Nasopharynx [[id number]]  (Normal) Collected: 09/16/22 0656    Specimen: Nasopharyngeal Swab from Nasopharynx Updated: 09/16/22 0750     SARS-CoV-2 (COVID-19) Negative    Narrative:      Testing performed using real-time PCR for  detection of COVID-19. EUA approved validation studies performed on site.     HS Troponin I (with 2 hour reflex) [[id number]]  (Normal) Collected: 09/16/22 0557    Specimen: Blood, Venous Updated: 09/16/22 0649     High Sens Troponin I 7.7 pg/mL     Comprehensive metabolic panel [[id number]]  (Abnormal) Collected: 09/16/22 0536    Specimen: Blood, Venous Updated: 09/16/22 0611     Sodium 141 mEQ/L      Potassium 3.0 mEQ/L      Comment: Results obtained on plasma. Plasma Potassium values may be up to 0.4 mEQ/L less than serum values. The differences may be greater for patients with high platelet or white cell counts.        Chloride 104 mEQ/L      CO2 26 mEQ/L      BUN 33 mg/dL      Creatinine 1.5 mg/dL      Glucose 143 mg/dL      Calcium 9.4 mg/dL      AST (SGOT) 890 IU/L      ALT (SGPT) 396 IU/L      Alkaline Phosphatase 183 IU/L      Total Protein 7.1 g/dL      Comment: Test performed on plasma which typically contains approximately 0.4 g/dL more protein than serum.        Albumin 4.1 g/dL      Bilirubin, Total 1.6 mg/dL      eGFR 33.6 mL/min/1.73m*2      Anion Gap 11 mEQ/L     D-dimer, quantitative [[id number]]  (Abnormal) Collected: 09/16/22 0536    Specimen: Blood, Venous Updated: 09/16/22 0608     D-Dimer, Quant 2.38 ug/mL FEU      Comment: Suggested Age Related Reference Range: 0.00 - 0.78  The D-Dimer assay can be used as an aid in the diagnosis of DVT or PE. The test can not be used by itself to exclude DVT or PE. When used as a diagnostic aid, the cutoff value is the same as the reference range: <0.5 ug/ml FEU.       Lactate, w/ reflex repeat if > 2.0 [[id number]]  (Normal) Collected: 09/16/22 0557    Specimen: Blood, Venous Updated: 09/16/22 0607     Lactate 1.5 mmol/L     CBC and differential [[id number]]  (Abnormal) Collected: 09/16/22 0536    Specimen: Blood, Venous Updated: 09/16/22 0603     WBC 13.34 K/uL      RBC 4.62 M/uL      Hemoglobin 13.0 g/dL      Hematocrit 41.0 %      MCV 88.7 fL      MCH 28.1 pg       MCHC 31.7 g/dL      RDW 14.9 %      Platelets 304 K/uL      MPV 11.1 fL      Differential Type Auto     nRBC 0.0 %      Immature Granulocytes 0.4 %      Neutrophils 90.1 %      Lymphocytes 3.4 %      Monocytes 5.9 %      Eosinophils 0.1 %      Basophils 0.1 %      Immature Granulocytes, Absolute 0.05 K/uL      Neutrophils, Absolute 12.03 K/uL      Lymphocytes, Absolute 0.45 K/uL      Monocytes, Absolute 0.79 K/uL      Eosinophils, Absolute 0.01 K/uL      Basophils, Absolute 0.01 K/uL           Imaging Results          CT ANGIOGRAPHY CHEST/ABDOMEN/PELVIS WITH AND WITHOUT IV CONTRAST (Edited Result - FINAL)  Result time 09/16/22 08:56:27    Addendum 1 of 1    Please note that there is severe stenosis at the junction of the left subclavian  and left brachiocephalic vein as it courses between the left anterior first rib  and left clavicle, with subsequent marked left upper extremity as well as  partially imaged left lower neck and left chest wall venous collaterals, with  the stenosis likely sequela of chronic left chest wall pacer leads compounded by  left arm abduction.                 Final result                 Impression:    IMPRESSION:  1. No aortic aneurysm or dissection as clinically questioned.  2. Acute pancreatitis without evidence of pancreatic necrosis or collection.    COMMENT:  CHEST    LUNGS AND AIRWAYS: Mild bibasilar subsegmental atelectasis without  consolidation, pneumothorax, or mass. The visualized airways are patent.  PLEURA: no effusion.  VESSELS: Mild enlargement of the main pulmonary artery measuring up to 3.4 cm in  diameter, which may be seen with pulmonary hypertension in the appropriate  clinical setting. No thoracic aortic aneurysm or dissection, noting diffuse  atherosclerotic calcifications/changes throughout the aorta and coronary  arteries.  HEART: Cardiomegaly with extensive mitral annular calcification again noted. No  pericardial effusion.  MEDIASTINUM AND BRENNEN: within normal  limits, no enlarged lymph nodes.  CHEST WALL AND LOWER NECK: Dual-lead left chest wall pacer with leads in the  right atrium and right ventricle.    ABDOMEN:  LIVER: normal morphology without suspicious mass.  BILE DUCTS: normal caliber.  GALLBLADDER: Noncalcified cholelithiasis. No evidence of acute cholecystitis.  PANCREAS: Diffuse peripancreatic stranding with slightly heterogeneous  parenchymal perfusion/enhancement, compatible with acute interstitial  pancreatitis. No evidence of pancreatic necrosis.  SPLEEN: within normal limits.  ADRENALS: within normal limits.  KIDNEYS: A few right-sided cysts measuring up to 2.8 cm.    PELVIS: Evaluation limited by right hip arthroplasty streak artifact.  REPRODUCTIVE ORGANS: no pelvic masses.  URETERS: within normal limits.  BLADDER: within normal limits.    BOWEL/PERITONEUM: Bowel demonstrates normal caliber without evidence of  obstruction. Sigmoid and descending colonic diverticulosis without evidence of  acute diverticulitis. Normal appendix. Large hiatal hernia again noted resulting  in partial intrathoracic stomach, unchanged. No enlarged mesenteric lymph nodes.  No ascites or free air, no fluid collection.    VESSELS: atherosclerotic changes, without abdominal aortic aneurysm or  dissection. There is moderate focal proximal left common iliac artery stenosis  due to prominent noncalcified atheromatous plaque.  RETROPERITONEUM: no enlarged retroperitoneal or pelvic nodes.  ABDOMINAL WALL: intact.  BONES: degenerative changes, no suspicious lytic or blastic lesions. Partially  imaged right hip arthroplasty with associated streak artifact. Bilateral  shoulder arthroplasties noted.                     Narrative:    CLINICAL HISTORY: Aortic aneurysm, known or suspected  Aortic dissection suspected    COMPARED WITH: Chest CT dated 07/28/22.    STUDY: CTA of the chest, abdomen, and pelvis was performed with pre and  postcontrast imaging. 125 cc Omnipaque 350 intravenous  contrast was  administered. Oral contrast was not administered per ordering physician's  request. 3D MIP and/or volume rendered image reconstruction performed and  reviewed. One or more dose reduction techniques (e.g., Automated exposure  control, adjustment of the mA and/or kV according to the patient size, use of  iterative reconstruction technique) utilized for this examination.                    ED Interpretation    CT ABDOMEN/PELVIS:  No abdominal aortic aneurysm or dissection. Moderate atherosclerotic plaque of the abdominal aorta and its branches. Moderate stenosis of the left common iliac artery due to plaque.    Diffuse peripancreatic stranding and edema, likely representing acute pancreatitis. Clinical correlation recommended. No evidence of pancreatic necrosis. No discrete peripancreatic fluid collections appreciated.    Mild thickening of the duodenum, likely reactive secondary to the adjacent pancreatic inflammation.    No evidence of bowel obstruction.    Colonic diverticulosis, without evidence of acute diverticulitis.    No free air.          Interpreted by: Tyree Quiroz MD, Sep 16, 2022 07:05 AM                              ECG 12 lead   Final Result      ECG 12 lead   ED Interpretation   Pacer.  Normal axis.  Prolonged QTC.  No ST elevation      Final Result          Scoring tools                                  ED Course & MDM   MDM / ED COURSE / CLINICAL IMPRESSION / DISPO     MDM  Number of Diagnoses or Management Options  Abdominal pain, unspecified abdominal location  Acute pancreatitis, unspecified complication status, unspecified pancreatitis type  Diagnosis management comments: Chest pain migrating to upper abdomen.  Pt tender to upper abdomen but without peritoneal findings. Concern for dissection; will CTA chest/abdomen/pelvis. Labs pending.        Amount and/or Complexity of Data Reviewed  Clinical lab tests: ordered and reviewed  Tests in the radiology section of CPT®: ordered and  reviewed  Tests in the medicine section of CPT®: reviewed and ordered  Decide to obtain previous medical records or to obtain history from someone other than the patient: yes  Independent visualization of images, tracings, or specimens: yes        ED Course as of 09/21/22 1401   Fri Sep 16, 2022   0608 CTs pending at time of sign out. Labs with new LFT elevation. Anticipate admission [DW]   0718 Patient continues to have right upper quadrant abdominal pain.  I believe this is pancreatitis.  Lipase is still running.  LFTs are quite elevated.  GI has been consulted.  [facility] [place] [SB]   [address] D/[address] L [SB]      ED Course User Index  [DW] Fernanda Lanza MD  [SB] Blake Tong MD     Clinical Impression      Abdominal pain, unspecified abdominal location   Acute pancreatitis, unspecified complication status, unspecified pancreatitis type     Disposition           Fernanda Lanza MD  09/21/22 1401

## 2022-09-16 NOTE — ASSESSMENT & PLAN NOTE
H/o new onset atrial fibrillation with SSS/pauses s/p PPM 7/21/22  EKG at with ? AV junctional rhythm and ?PPM malfunction, rate controlled  Cont w/outpt regimen of metoprolol, Amiodarone   Restarted on Eliquis today  Follow CBC closely

## 2022-09-16 NOTE — CONSULTS
GASTROENTEROLOGY CONSULTATION   [facility]     PATIENT NAME:  Emily Ramirez        YOB: 1945   AGE:  77 y.o.         MRN:  [id number]              HISTORY   CC: gallstone pancreatitis    76 y/o F w/PMHx of CKD, Afib s/p PPM on eliquis, CAD s/p stents on plavix, carotid artery stenosis who presents with epigastric pain. Pt reports epigastric pain radiating to her back started around 2am. She had nausea, vomiting, diaphoresis/chills assoc. No documented fever. She has never had pain like this before. She was able to fall back to sleep but when she woke up again her pain was worse so she came to the ED.     Here her liver enzymes are elevated (, , alk phos 183, tbili 1.6), lipase 1436, WBC 13.34. CT scan w/ pancreatitis, gallstones but no biliary dilation.     PAST MEDICAL HISTORY     Past Medical History:   Diagnosis Date    Aortic regurgitation     Chronic kidney disease     Coronary artery disease     History of loop recorder     Hypertension     Lipid disorder     Mitral regurgitation     SVT (supraventricular tachycardia) (CMS/HCC)        PAST SURGICAL HISTORY   No past surgical history on file.     FAMILY HISTORY   No family history on file.    SOCIAL HISTORY     Social History     Tobacco Use    Smoking status: Never Smoker    Smokeless tobacco: Never Used   Substance Use Topics    Alcohol use: Yes     Alcohol/week: 1.0 standard drink     Types: 1 Glasses of wine per week     Comment: 1x/month       MEDICATIONS   Home Medication:  Not in a hospital admission.    MEDICATIONS:  Infusions:     lactated ringer's              Scheduled:    LORazepam  1 mg oral Once       ALLERGIES     Allergies   Allergen Reactions    Celebrex [Celecoxib]     Cipro [Ciprofloxacin Hcl]     Clindamycin     Crestor [Rosuvastatin]     Lipitor [Atorvastatin]     Voltaren [Diclofenac Sodium]        REVIEW OF SYSTEMS   Systems reviewed and otherwise negative except as documented  above.    PHYSICAL EXAMINATION   Temp:  [36.2 °C (97.2 °F)] 36.2 °C (97.2 °F)  Heart Rate:  [64-70] 70  Resp:  [16-22] 16  BP: (142-172)/(65-73) 142/65    No intake or output data in the 24 hours ending 09/16/22 0842    General appearance: well developed, well nourished, no acute distress, appears stated age  Head: normocephalic, atraumatic  Eyes: EOMI  ENT: oral mucosa moist  Lungs: clear to auscultation anteriorly, non-labored respirations  Heart: regular rate and rhythm  Abdomen: soft, moderate epigastric TTP, mild distention; bowel sounds normal; no masses, no organomegaly  Extremities: no edema  Skin:  Warm, dry, no rashes, no lesions, no jaundice  Neurologic: awake, alert & oriented x 3  Psych: cooperative with exam, appropriate mood and affect    Diagnostic Data:     Labs reviewed  Results from last 7 days   Lab Units 09/16/22  0536   WBC K/uL 13.34*   HEMOGLOBIN g/dL 13.0   HEMATOCRIT % 41.0   PLATELETS K/uL 304   ]  Results from last 7 days   Lab Units 09/16/22  0536   SODIUM mEQ/L 141   POTASSIUM mEQ/L 3.0*   CHLORIDE mEQ/L 104   CO2 mEQ/L 26   BUN mg/dL 33*   CREATININE mg/dL 1.5*   CALCIUM mg/dL 9.4   ALBUMIN g/dL 4.1   BILIRUBIN TOTAL mg/dL 1.6*   ALK PHOS IU/L 183*   ALT IU/L 396*   AST IU/L 890*   GLUCOSE mg/dL 143*   ]  Results from last 7 days   Lab Units 09/16/22  0557   LIPASE U/L 1,436*   ]    ]    Imaging reviewed  CT ANGIOGRAPHY CHEST/ABDOMEN/PELVIS WITH AND WITHOUT IV CONTRAST    Result Date: 9/16/2022  IMPRESSION: 1. No aortic aneurysm or dissection as clinically questioned. 2. Acute pancreatitis without evidence of pancreatic necrosis or collection. COMMENT: CHEST LUNGS AND AIRWAYS: Mild bibasilar subsegmental atelectasis without consolidation, pneumothorax, or mass. The visualized airways are patent. PLEURA: no effusion. VESSELS: Mild enlargement of the main pulmonary artery measuring up to 3.4 cm in diameter, which may be seen with pulmonary hypertension in the appropriate clinical  setting. No thoracic aortic aneurysm or dissection, noting diffuse atherosclerotic calcifications/changes throughout the aorta and coronary arteries. HEART: Cardiomegaly with extensive mitral annular calcification again noted. No pericardial effusion. MEDIASTINUM AND BRENNEN: within normal limits, no enlarged lymph nodes. CHEST WALL AND LOWER NECK: Dual-lead left chest wall pacer with leads in the right atrium and right ventricle. ABDOMEN: LIVER: normal morphology without suspicious mass. BILE DUCTS: normal caliber. GALLBLADDER: Noncalcified cholelithiasis. No evidence of acute cholecystitis. PANCREAS: Diffuse peripancreatic stranding with slightly heterogeneous parenchymal perfusion/enhancement, compatible with acute interstitial pancreatitis. No evidence of pancreatic necrosis. SPLEEN: within normal limits. ADRENALS: within normal limits. KIDNEYS: A few right-sided cysts measuring up to 2.8 cm. PELVIS: Evaluation limited by right hip arthroplasty streak artifact. REPRODUCTIVE ORGANS: no pelvic masses. URETERS: within normal limits. BLADDER: within normal limits. BOWEL/PERITONEUM: Bowel demonstrates normal caliber without evidence of obstruction. Sigmoid and descending colonic diverticulosis without evidence of acute diverticulitis. Normal appendix. Large hiatal hernia again noted resulting in partial intrathoracic stomach, unchanged. No enlarged mesenteric lymph nodes. No ascites or free air, no fluid collection. VESSELS: atherosclerotic changes, without abdominal aortic aneurysm or dissection. There is moderate focal proximal left common iliac artery stenosis due to prominent noncalcified atheromatous plaque. RETROPERITONEUM: no enlarged retroperitoneal or pelvic nodes. ABDOMINAL WALL: intact. BONES: degenerative changes, no suspicious lytic or blastic lesions. Partially imaged right hip arthroplasty with associated streak artifact. Bilateral shoulder arthroplasties noted.         ASSESSMENT/PLAN     76 y/o F w/PMHx  of CKD, Afib s/p PPM on eliquis, CAD s/p stents on plavix, carotid artery stenosis who presents with epigastric pain, gallstone pancreatitis    1. Gallstone pancreatitis  2. Elevated liver enzymes - these were normal previously, need to r/o CBD stone  3. Afib s/p PPM - last dose of eliquis/plavix 9/15/22  4. CAD s/p coronary stents  5. CKD    Plan  IVFs, pain control, bowel rest  MRCP to eval for CBD stone if PPM MRI compatible. Will need ERCP if positive  Surgery consult    AUTHOR:  AMBER Nowak  9/16/2022

## 2022-09-17 LAB
ALBUMIN SERPL-MCNC: 2.8 G/DL (ref 3.4–5)
ALP SERPL-CCNC: 120 IU/L (ref 35–126)
ALT SERPL-CCNC: 279 IU/L (ref 11–54)
ANION GAP SERPL CALC-SCNC: 5 MEQ/L (ref 3–15)
APTT PPP: 142 SEC (ref 23–35)
APTT PPP: 70 SEC (ref 23–35)
APTT PPP: 85 SEC (ref 23–35)
AST SERPL-CCNC: 294 IU/L (ref 15–41)
BASOPHILS # BLD: 0.02 K/UL (ref 0.01–0.1)
BASOPHILS NFR BLD: 0.1 %
BILIRUB SERPL-MCNC: 0.8 MG/DL (ref 0.3–1.2)
BUN SERPL-MCNC: 24 MG/DL (ref 8–20)
CALCIUM SERPL-MCNC: 8.3 MG/DL (ref 8.9–10.3)
CHLORIDE SERPL-SCNC: 112 MEQ/L (ref 98–109)
CO2 SERPL-SCNC: 25 MEQ/L (ref 22–32)
CREAT SERPL-MCNC: 1.1 MG/DL (ref 0.6–1.1)
DIFFERENTIAL METHOD BLD: ABNORMAL
EOSINOPHIL # BLD: 0.06 K/UL (ref 0.04–0.36)
EOSINOPHIL NFR BLD: 0.4 %
ERYTHROCYTE [DISTWIDTH] IN BLOOD BY AUTOMATED COUNT: 15.1 % (ref 11.7–14.4)
GFR SERPL CREATININE-BSD FRML MDRD: 48 ML/MIN/1.73M*2
GLUCOSE SERPL-MCNC: 106 MG/DL (ref 70–99)
HCT VFR BLDCO AUTO: 34.5 % (ref 35–45)
HGB BLD-MCNC: 10.7 G/DL (ref 11.8–15.7)
IMM GRANULOCYTES # BLD AUTO: 0.06 K/UL (ref 0–0.08)
IMM GRANULOCYTES NFR BLD AUTO: 0.4 %
LYMPHOCYTES # BLD: 1.71 K/UL (ref 1.2–3.5)
LYMPHOCYTES NFR BLD: 11.9 %
MAGNESIUM SERPL-MCNC: 1.7 MG/DL (ref 1.8–2.5)
MCH RBC QN AUTO: 27.9 PG (ref 28–33.2)
MCHC RBC AUTO-ENTMCNC: 31 G/DL (ref 32.2–35.5)
MCV RBC AUTO: 90.1 FL (ref 83–98)
MONOCYTES # BLD: 0.83 K/UL (ref 0.28–0.8)
MONOCYTES NFR BLD: 5.8 %
NEUTROPHILS # BLD: 11.68 K/UL (ref 1.7–7)
NEUTS SEG NFR BLD: 81.4 %
NRBC BLD-RTO: 0 %
PDW BLD AUTO: 11.5 FL (ref 9.4–12.3)
PLATELET # BLD AUTO: 239 K/UL (ref 150–369)
POTASSIUM SERPL-SCNC: 3.4 MEQ/L (ref 3.6–5.1)
PROT SERPL-MCNC: 5.2 G/DL (ref 6–8.2)
RBC # BLD AUTO: 3.83 M/UL (ref 3.93–5.22)
SODIUM SERPL-SCNC: 142 MEQ/L (ref 136–144)
WBC # BLD AUTO: 14.36 K/UL (ref 3.8–10.5)

## 2022-09-17 PROCEDURE — 83735 ASSAY OF MAGNESIUM: CPT | Performed by: INTERNAL MEDICINE

## 2022-09-17 PROCEDURE — 63600000 HC DRUGS/DETAIL CODE: Performed by: INTERNAL MEDICINE

## 2022-09-17 PROCEDURE — 25000000 HC PHARMACY GENERAL: Performed by: INTERNAL MEDICINE

## 2022-09-17 PROCEDURE — 25800000 HC PHARMACY IV SOLUTIONS: Performed by: INTERNAL MEDICINE

## 2022-09-17 PROCEDURE — 85730 THROMBOPLASTIN TIME PARTIAL: CPT | Performed by: INTERNAL MEDICINE

## 2022-09-17 PROCEDURE — 36415 COLL VENOUS BLD VENIPUNCTURE: CPT | Performed by: INTERNAL MEDICINE

## 2022-09-17 PROCEDURE — 85025 COMPLETE CBC W/AUTO DIFF WBC: CPT | Performed by: INTERNAL MEDICINE

## 2022-09-17 PROCEDURE — 80053 COMPREHEN METABOLIC PANEL: CPT | Performed by: INTERNAL MEDICINE

## 2022-09-17 PROCEDURE — 63700000 HC SELF-ADMINISTRABLE DRUG: Performed by: INTERNAL MEDICINE

## 2022-09-17 PROCEDURE — 99233 SBSQ HOSP IP/OBS HIGH 50: CPT | Performed by: INTERNAL MEDICINE

## 2022-09-17 PROCEDURE — 21400000 HC ROOM AND CARE CCU/INTERMEDIATE

## 2022-09-17 RX ADMIN — MORPHINE SULFATE 2 MG: 2 INJECTION, SOLUTION INTRAMUSCULAR; INTRAVENOUS at 04:55

## 2022-09-17 RX ADMIN — MAGNESIUM SULFATE HEPTAHYDRATE 2 G: 40 INJECTION, SOLUTION INTRAVENOUS at 11:29

## 2022-09-17 RX ADMIN — MULTIPLE VITAMINS W/ MINERALS TAB 1 TABLET: TAB at 08:22

## 2022-09-17 RX ADMIN — AMLODIPINE BESYLATE 5 MG: 5 TABLET ORAL at 08:22

## 2022-09-17 RX ADMIN — POTASSIUM CHLORIDE 40 MEQ: 750 TABLET, EXTENDED RELEASE ORAL at 11:29

## 2022-09-17 RX ADMIN — PANTOPRAZOLE SODIUM 40 MG: 40 INJECTION, POWDER, FOR SOLUTION INTRAVENOUS at 21:57

## 2022-09-17 RX ADMIN — HEPARIN SODIUM 800 UNITS/HR: 10000 INJECTION, SOLUTION INTRAVENOUS at 23:21

## 2022-09-17 RX ADMIN — METOPROLOL SUCCINATE 50 MG: 50 TABLET, EXTENDED RELEASE ORAL at 08:22

## 2022-09-17 RX ADMIN — METRONIDAZOLE 500 MG: 500 INJECTION, SOLUTION INTRAVENOUS at 04:53

## 2022-09-17 RX ADMIN — METRONIDAZOLE 500 MG: 500 INJECTION, SOLUTION INTRAVENOUS at 21:58

## 2022-09-17 RX ADMIN — CLOPIDOGREL BISULFATE 75 MG: 75 TABLET, FILM COATED ORAL at 08:22

## 2022-09-17 RX ADMIN — SODIUM CHLORIDE 1 G: 900 INJECTION INTRAVENOUS at 13:31

## 2022-09-17 RX ADMIN — MORPHINE SULFATE 2 MG: 2 INJECTION, SOLUTION INTRAMUSCULAR; INTRAVENOUS at 14:55

## 2022-09-17 RX ADMIN — SODIUM CHLORIDE, POTASSIUM CHLORIDE, SODIUM LACTATE AND CALCIUM CHLORIDE: 600; 310; 30; 20 INJECTION, SOLUTION INTRAVENOUS at 06:58

## 2022-09-17 RX ADMIN — METRONIDAZOLE 500 MG: 500 INJECTION, SOLUTION INTRAVENOUS at 13:22

## 2022-09-17 RX ADMIN — PANTOPRAZOLE SODIUM 40 MG: 40 INJECTION, POWDER, FOR SOLUTION INTRAVENOUS at 08:23

## 2022-09-17 RX ADMIN — MORPHINE SULFATE 2 MG: 2 INJECTION, SOLUTION INTRAMUSCULAR; INTRAVENOUS at 08:28

## 2022-09-17 NOTE — PLAN OF CARE
Plan of Care Review  Plan of Care Reviewed With: patient  Progress: no change  Outcome Summary: pt slept through the night. pain controlled with med. no overnight event.

## 2022-09-17 NOTE — PROGRESS NOTES
Interventional Cardiology Progress Note    Gallstone pancreatitis with possible need for MRCP and/or ERCP/surgery.  Status post PCI placement of drug-eluting stents in the RCA and circumflex in early June of this year followed by placement of a permanent pacemaker in late July.  Has been on clopidogrel and Eliquis due to combination of PCI and atrial fibrillation.  Currently on heparin in lieu of Eliquis with continuation of clopidogrel.    No cardiac symptoms at this time.  Intermittent mild abdominal pain.    REVIEW OF SYSTEMS : No other significant changes to review of systems over the past 24 hours.    Current Facility-Administered Medications   Medication Dose Route Frequency Provider Last Rate Last Admin    alum-mag hydroxide-simeth (MAALOX) 200-200-20 mg/5 mL suspension 30 mL  30 mL oral q4h PRN Qi Alexander DO        [Provider Managed Hold] amiodarone (PACERONE) tablet 200 mg  200 mg oral Daily Qi Alexander DO        amLODIPine (NORVASC) tablet 5 mg  5 mg oral q AM Qi Alexander DO   5 mg at 09/16/22 1458    [Provider Managed Hold] apixaban (ELIQUIS) tablet 5 mg  5 mg oral BID Qi Alexander DO        atropine injection 0.5 mg  0.5 mg intravenous q5 min PRN Qi Alexander DO        cefTRIAXone (ROCEPHIN) IVPB 1 g in 100 mL NSS vial in bag  1 g intravenous q24h INT Kwame Peres MD   Stopped at 09/16/22 1649    clopidogreL (PLAVIX) tablet 75 mg  75 mg oral Daily Qi Alexander DO   75 mg at 09/16/22 1458    glucose chewable tablet 16-32 g of dextrose  16-32 g of dextrose oral PRN Qi Alexander DO        Or    dextrose 40 % oral gel 15-30 g of dextrose  15-30 g of dextrose oral PRN Qi Alexander DO        Or    glucagon (GLUCAGEN) injection 1 mg  1 mg intramuscular PRN Qi Alexander DO        Or    dextrose 50 % in water (D50) injection 12.5 g  25 mL intravenous PRN Qi Alexander DO        docusate sodium  (COLACE) capsule 100 mg  100 mg oral BID Qi Alexander DO        heparin (porcine) bolus from bag 2,650-5,350 Units  40-80 Units/kg (Adjusted) intravenous q6h PRN Qi Alexander DO        heparin infusion in 0.45%  units/mL  100-4,000 Units/hr intravenous Titrated Qi Alexander DO 8 mL/hr at 09/17/22 0540 800 Units/hr at 09/17/22 0540    lactated ringer's infusion   intravenous Continuous Qi Alexander  mL/hr at 09/17/22 0658 New Bag at 09/17/22 0658    lidocaine (ASPERCREME) 4 % topical patch 1 patch  1 patch Topical Daily Qi Alexander DO        magnesium sulfate IVPB 1g in 100 mL NSS/D5W/SWFI  1 g intravenous PRN Qi Alexander DO        Or    magnesium sulfate IVPB 2g in 50 mL NSS/D5W/SWFI  2 g intravenous PRN Qi Alexander DO        metoprolol succinate XL (TOPROL-XL) 24 hr ER tablet 50 mg  50 mg oral q AM Qi Alexander DO   50 mg at 09/16/22 1459    metroNIDAZOLE in NaCl (iso-os) (FLAGYL) IVPB 500 mg  500 mg intravenous q8h INT Kwame Peres MD   Stopped at 09/17/22 0610    morphine injection 2 mg  2 mg intravenous q3h PRN Qi Alexander DO   2 mg at 09/17/22 0455    multivitamin tablet 1 tablet  1 tablet oral Daily Qi Alexander DO   1 tablet at 09/16/22 1459    nitroglycerin (NITROSTAT) SL tablet 0.4 mg  0.4 mg sublingual q5 min PRN Qi Alexander DO        pantoprazole (PROTONIX) injection 40 mg  40 mg intravenous q12h KRISTEN Qi Alexander DO   40 mg at 09/16/22 1454    potassium chloride (KLOR-CON M) ER tablet (particles/crystals) 20 mEq  20 mEq oral PRN Qi Alexander DO        potassium chloride (KLOR-CON M) ER tablet (particles/crystals) 40 mEq  40 mEq oral PRN Qi Alexander DO        senna (SENOKOT) tablet 1 tablet  1 tablet oral 2x daily PRN Qi Alexander DO        trimethobenzamide (TIGAN) injection 200 mg  200 mg intramuscular q8h PRN  Qi Alexander,               Objective   Labs  Creatinine 1.1.  AST//279.  Hemoglobin 10.7.  High-sensitivity troponin 17.    Imaging  Significant findings include: Echocardiogram (8/4)  Limited echo performed following pericardiocentesis.  LVEF 60% without regional wall motion abnormality.  No significant valvular heart disease identified by limited two-dimensional imaging.  Small residual pericardial effusion, primarily posterolateral.  Preserved RV function, pacer wire in place, normal diastolic expansion.  IVC not well-visualized.  No echocardiographic evidence to suggest hemodynamic compromise.    Telemetry  I have personally reviewed the telemetry tracings.  Sinus rhythm.    Physical Exam  Temp:  [36.3 °C (97.4 °F)-37.2 °C (99 °F)] 37.2 °C (99 °F)  Heart Rate:  [59-72] 72  Resp:  [12-18] 18  BP: (126-152)/(57-70) 134/70    Intake/Output Summary (Last 24 hours) at 9/17/2022 0733  Last data filed at 9/16/2022 0921  Gross per 24 hour   Intake 1000 ml   Output --   Net 1000 ml       General: AAOx3, NAD.  Lungs: Clear bilaterally.  Heart: Regular.  No murmur.  Abdomen: Soft.  Mildly tender to palpation.  Extremities: Warm, no C/C/E.  Neuro: Moves all 4 extremities. Sensation intact.  Skin: No rash or discoloration.    ASSESSMENT / PLAN:    1.  Acute pancreatitis: MRCP planned for Monday, pending EP clearance for device and the MRI.    2.  CAD: Status post recent PCI due to acute coronary syndrome in early June, with stents in the circumflex and RCA.  Has been on clopidogrel and Eliquis due to atrial fibrillation.  To minimize risk of thrombotic stent occlusion, should remain on at least a single antiplatelet agent throughout, without interruption.  There is listed history of celecoxib/NSAID allergy, so aspirin listed as a contraindication at this time.  Continuing clopidogrel.    3.  Atrial fibrillation/atrial flutter: Has been on amiodarone and Eliquis, both currently on hold, but on heparin gtt.   Is on metoprolol succinate 50 mg daily.    4.  Hypertensive heart disease without CHF: Blood pressure appears stable.  Continue present antihypertensive therapy.    5.  Mixed hyperlipidemia: Statin therapy presently on hold due to elevated serum transaminases, though this likely secondary to gallstone pancreatitis.

## 2022-09-17 NOTE — PROGRESS NOTES
GASTROENTEROLOGY DAILY PROGRESS NOTE  [facility]     PATIENT NAME:  Emily Ramirez          YOB: 1945  AGE:  77 y.o.   MRN: [id number]      SUBJECTIVE   Patient seen and examined at bedside.  Admitted 1 day ago for abdominal pain.  CT scan suggestive of pancreatitis.  Currently patient's pain has improved today.  Her LFTs have also drastically improved as well.    REVIEW OF SYSTEMS   Systems reviewed and otherwise negative except as documented above.    VITAL SIGNS   Temp:  [36.3 °C (97.4 °F)-37.2 °C (99 °F)] 36.8 °C (98.2 °F)  Heart Rate:  [59-72] 61  Resp:  [12-19] 19  BP: (126-151)/(57-70) 144/61      PHYSICAL EXAM   Physical Exam  General appearance: alert, appears stated age and cooperative  Head: normocephalic  Heart: regular rate and rhythm  Lungs:  non-labored respirations  Abdomen: soft, mild tenderness   Extremities: no LE edema  Neurologic: awake and alert  Skin: no jaundice      IMAGING AND LABS REVIEWED   Labs reviewed    Imaging reviewed  CT ANGIOGRAPHY CHEST/ABDOMEN/PELVIS WITH AND WITHOUT IV CONTRAST    Addendum Date: 9/16/2022    Please note that there is severe stenosis at the junction of the left subclavian and left brachiocephalic vein as it courses between the left anterior first rib and left clavicle, with subsequent marked left upper extremity as well as partially imaged left lower neck and left chest wall venous collaterals, with the stenosis likely sequela of chronic left chest wall pacer leads compounded by left arm abduction.    Result Date: 9/16/2022  IMPRESSION: 1. No aortic aneurysm or dissection as clinically questioned. 2. Acute pancreatitis without evidence of pancreatic necrosis or collection. COMMENT: CHEST LUNGS AND AIRWAYS: Mild bibasilar subsegmental atelectasis without consolidation, pneumothorax, or mass. The visualized airways are patent. PLEURA: no effusion. VESSELS: Mild enlargement of the main pulmonary artery measuring up to 3.4 cm in diameter, which  may be seen with pulmonary hypertension in the appropriate clinical setting. No thoracic aortic aneurysm or dissection, noting diffuse atherosclerotic calcifications/changes throughout the aorta and coronary arteries. HEART: Cardiomegaly with extensive mitral annular calcification again noted. No pericardial effusion. MEDIASTINUM AND BRENNEN: within normal limits, no enlarged lymph nodes. CHEST WALL AND LOWER NECK: Dual-lead left chest wall pacer with leads in the right atrium and right ventricle. ABDOMEN: LIVER: normal morphology without suspicious mass. BILE DUCTS: normal caliber. GALLBLADDER: Noncalcified cholelithiasis. No evidence of acute cholecystitis. PANCREAS: Diffuse peripancreatic stranding with slightly heterogeneous parenchymal perfusion/enhancement, compatible with acute interstitial pancreatitis. No evidence of pancreatic necrosis. SPLEEN: within normal limits. ADRENALS: within normal limits. KIDNEYS: A few right-sided cysts measuring up to 2.8 cm. PELVIS: Evaluation limited by right hip arthroplasty streak artifact. REPRODUCTIVE ORGANS: no pelvic masses. URETERS: within normal limits. BLADDER: within normal limits. BOWEL/PERITONEUM: Bowel demonstrates normal caliber without evidence of obstruction. Sigmoid and descending colonic diverticulosis without evidence of acute diverticulitis. Normal appendix. Large hiatal hernia again noted resulting in partial intrathoracic stomach, unchanged. No enlarged mesenteric lymph nodes. No ascites or free air, no fluid collection. VESSELS: atherosclerotic changes, without abdominal aortic aneurysm or dissection. There is moderate focal proximal left common iliac artery stenosis due to prominent noncalcified atheromatous plaque. RETROPERITONEUM: no enlarged retroperitoneal or pelvic nodes. ABDOMINAL WALL: intact. BONES: degenerative changes, no suspicious lytic or blastic lesions. Partially imaged right hip arthroplasty with associated streak artifact. Bilateral  shoulder arthroplasties noted.       ASSESSMENT/PLAN     1. Acute pancreatitis   Details:  -Patient's LFTs suggestive of gallstone etiology however her imaging does not show dilated ducts.  -Suspect possible passage of gallstone.  Plan:  -Serial abdominal exams  -Pain control  -Would keep on clear liquids today with advancement to full liquids tomorrow if symptoms continue to improve.  -Given drastic improvement in labs will avoid further liver serologies at this time  -Continue LR but can decrease rate today so as to not volume overload patient  -Patient scheduled for MRI/MRCP on Monday for further evaluation of potential choledocholithiasis.  -Recommend general surgery consultation for discussion about cholecystectomy  -Further recommendations pending clinical course          AUTHOR:  DO MYKE Barone  9/17/2022

## 2022-09-17 NOTE — PLAN OF CARE
Plan of Care Review  Plan of Care Reviewed With: patient  Progress: improving  Outcome Summary: PRN Morphine as ordered. Tolerating clear liquid diet. MRCP pending (scheduled for 9/19).

## 2022-09-18 PROBLEM — R09.02 HYPOXIA: Status: ACTIVE | Noted: 2022-09-18

## 2022-09-18 LAB
ALBUMIN SERPL-MCNC: 2.8 G/DL (ref 3.4–5)
ALP SERPL-CCNC: 112 IU/L (ref 35–126)
ALT SERPL-CCNC: 186 IU/L (ref 11–54)
ANION GAP SERPL CALC-SCNC: 5 MEQ/L (ref 3–15)
APTT PPP: 69 SEC (ref 23–35)
AST SERPL-CCNC: 137 IU/L (ref 15–41)
BASOPHILS # BLD: 0.04 K/UL (ref 0.01–0.1)
BASOPHILS NFR BLD: 0.3 %
BILIRUB SERPL-MCNC: 0.7 MG/DL (ref 0.3–1.2)
BUN SERPL-MCNC: 16 MG/DL (ref 8–20)
CALCIUM SERPL-MCNC: 8.2 MG/DL (ref 8.9–10.3)
CHLORIDE SERPL-SCNC: 109 MEQ/L (ref 98–109)
CO2 SERPL-SCNC: 21 MEQ/L (ref 22–32)
CREAT SERPL-MCNC: 0.9 MG/DL (ref 0.6–1.1)
DIFFERENTIAL METHOD BLD: ABNORMAL
EOSINOPHIL # BLD: 0.21 K/UL (ref 0.04–0.36)
EOSINOPHIL NFR BLD: 1.7 %
ERYTHROCYTE [DISTWIDTH] IN BLOOD BY AUTOMATED COUNT: 15.2 % (ref 11.7–14.4)
GFR SERPL CREATININE-BSD FRML MDRD: >60 ML/MIN/1.73M*2
GLUCOSE SERPL-MCNC: 91 MG/DL (ref 70–99)
HCT VFR BLDCO AUTO: 34.5 % (ref 35–45)
HGB BLD-MCNC: 10.9 G/DL (ref 11.8–15.7)
IMM GRANULOCYTES # BLD AUTO: 0.05 K/UL (ref 0–0.08)
IMM GRANULOCYTES NFR BLD AUTO: 0.4 %
LYMPHOCYTES # BLD: 1.67 K/UL (ref 1.2–3.5)
LYMPHOCYTES NFR BLD: 13.7 %
MCH RBC QN AUTO: 28 PG (ref 28–33.2)
MCHC RBC AUTO-ENTMCNC: 31.6 G/DL (ref 32.2–35.5)
MCV RBC AUTO: 88.7 FL (ref 83–98)
MONOCYTES # BLD: 0.6 K/UL (ref 0.28–0.8)
MONOCYTES NFR BLD: 4.9 %
NEUTROPHILS # BLD: 9.66 K/UL (ref 1.7–7)
NEUTS SEG NFR BLD: 79 %
NRBC BLD-RTO: 0 %
PDW BLD AUTO: 11 FL (ref 9.4–12.3)
PLATELET # BLD AUTO: 243 K/UL (ref 150–369)
POTASSIUM SERPL-SCNC: 3.8 MEQ/L (ref 3.6–5.1)
PROT SERPL-MCNC: 5.1 G/DL (ref 6–8.2)
RBC # BLD AUTO: 3.89 M/UL (ref 3.93–5.22)
SODIUM SERPL-SCNC: 135 MEQ/L (ref 136–144)
WBC # BLD AUTO: 12.23 K/UL (ref 3.8–10.5)

## 2022-09-18 PROCEDURE — 97162 PT EVAL MOD COMPLEX 30 MIN: CPT | Mod: GP

## 2022-09-18 PROCEDURE — 63600000 HC DRUGS/DETAIL CODE: Performed by: INTERNAL MEDICINE

## 2022-09-18 PROCEDURE — 36415 COLL VENOUS BLD VENIPUNCTURE: CPT | Performed by: INTERNAL MEDICINE

## 2022-09-18 PROCEDURE — 21400000 HC ROOM AND CARE CCU/INTERMEDIATE

## 2022-09-18 PROCEDURE — 25000000 HC PHARMACY GENERAL: Performed by: INTERNAL MEDICINE

## 2022-09-18 PROCEDURE — 25800000 HC PHARMACY IV SOLUTIONS: Performed by: INTERNAL MEDICINE

## 2022-09-18 PROCEDURE — 85025 COMPLETE CBC W/AUTO DIFF WBC: CPT | Performed by: INTERNAL MEDICINE

## 2022-09-18 PROCEDURE — 80053 COMPREHEN METABOLIC PANEL: CPT | Performed by: INTERNAL MEDICINE

## 2022-09-18 PROCEDURE — 63700000 HC SELF-ADMINISTRABLE DRUG: Performed by: INTERNAL MEDICINE

## 2022-09-18 PROCEDURE — 97530 THERAPEUTIC ACTIVITIES: CPT | Mod: GP

## 2022-09-18 PROCEDURE — 99233 SBSQ HOSP IP/OBS HIGH 50: CPT | Performed by: INTERNAL MEDICINE

## 2022-09-18 PROCEDURE — 85730 THROMBOPLASTIN TIME PARTIAL: CPT | Performed by: INTERNAL MEDICINE

## 2022-09-18 RX ORDER — LORAZEPAM 0.5 MG/1
0.5 TABLET ORAL ONCE AS NEEDED
Status: COMPLETED | OUTPATIENT
Start: 2022-09-18 | End: 2022-09-19

## 2022-09-18 RX ORDER — IBUPROFEN/PSEUDOEPHEDRINE HCL 200MG-30MG
3 TABLET ORAL NIGHTLY PRN
Status: DISCONTINUED | OUTPATIENT
Start: 2022-09-18 | End: 2022-09-25 | Stop reason: HOSPADM

## 2022-09-18 RX ADMIN — PANTOPRAZOLE SODIUM 40 MG: 40 INJECTION, POWDER, FOR SOLUTION INTRAVENOUS at 08:28

## 2022-09-18 RX ADMIN — MORPHINE SULFATE 2 MG: 2 INJECTION, SOLUTION INTRAMUSCULAR; INTRAVENOUS at 01:20

## 2022-09-18 RX ADMIN — POTASSIUM CHLORIDE 20 MEQ: 750 TABLET, EXTENDED RELEASE ORAL at 14:00

## 2022-09-18 RX ADMIN — CLOPIDOGREL BISULFATE 75 MG: 75 TABLET, FILM COATED ORAL at 08:28

## 2022-09-18 RX ADMIN — Medication 3 MG: at 23:25

## 2022-09-18 RX ADMIN — METRONIDAZOLE 500 MG: 500 INJECTION, SOLUTION INTRAVENOUS at 20:59

## 2022-09-18 RX ADMIN — SODIUM CHLORIDE 1 G: 900 INJECTION INTRAVENOUS at 14:00

## 2022-09-18 RX ADMIN — AMLODIPINE BESYLATE 5 MG: 5 TABLET ORAL at 08:28

## 2022-09-18 RX ADMIN — PANTOPRAZOLE SODIUM 40 MG: 40 INJECTION, POWDER, FOR SOLUTION INTRAVENOUS at 20:59

## 2022-09-18 RX ADMIN — METRONIDAZOLE 500 MG: 500 INJECTION, SOLUTION INTRAVENOUS at 07:30

## 2022-09-18 RX ADMIN — MULTIPLE VITAMINS W/ MINERALS TAB 1 TABLET: TAB at 08:28

## 2022-09-18 RX ADMIN — METOPROLOL SUCCINATE 50 MG: 50 TABLET, EXTENDED RELEASE ORAL at 08:28

## 2022-09-18 RX ADMIN — METRONIDAZOLE 500 MG: 500 INJECTION, SOLUTION INTRAVENOUS at 14:00

## 2022-09-18 ASSESSMENT — COGNITIVE AND FUNCTIONAL STATUS - GENERAL
CLIMB 3 TO 5 STEPS WITH RAILING: 2 - A LOT
MOVING TO AND FROM BED TO CHAIR: 3 - A LITTLE
AFFECT: WFL
WALKING IN HOSPITAL ROOM: 3 - A LITTLE
STANDING UP FROM CHAIR USING ARMS: 3 - A LITTLE

## 2022-09-18 NOTE — HOSPITAL COURSE
Emily is a 77 y.o. female admitted on 9/16/2022 with Abdominal pain, unspecified abdominal location [R10.9]  Atrial fibrillation, unspecified type (CMS/HCC) [I48.91]  Acute pancreatitis, unspecified complication status, unspecified pancreatitis type [K85.90]. Principal problem is Acute pancreatitis.    Past Medical History  Emily has a past medical history of Aortic regurgitation, Chronic kidney disease, Coronary artery disease, History of loop recorder, Hypertension, Lipid disorder, Mitral regurgitation, and SVT (supraventricular tachycardia) (CMS/[facility]).    History of Present Illness   Emily Ramirez is a 77 y.o. female with a past medical history of coronary disease (PCI with NIKITA to prox RCA and prox LCx [p/op developed ST elevations inferiorly- thrombotic occlusion RCA s/p repeat angioplasty), atrial fibrillation with SSS/pauses s/p PPM 7/21/22 (on Eliquis), mild AR, MR, hypertension, hyperlipidemia, and chronic kidney disease stage III  who presents to [facility] ER on 9/16/2022 with 2-day history of chest and midepigastric abdominal pain.  According to patient she was in her usual state of health until yesterday evening when shortly after dinner she developed sudden onset of substernal chest and midepigastric pain.  Symptoms resolved temporarily without any major intervention however, later in the evening symptoms reoccurred waking patient from sleep with pain now radiating throughout her abdomen and into her back.  This is also accompanied by nausea and several episodes of nonbilious, non bloody emesis.  Symptoms to continue to progress throughout the early morning and as a result patient ultimately presented to the ER for further evaluation.  Currently, patient continues to complain of ongoing midepigastric pain although somewhat improved since administration of morphine.  She denies any fevers does admit to chills earlier today.  Denies any shortness of breath, cough, wheezing, heart  palpitations.  Denies any recent diarrhea but does admit to intermittent constipation over the past week.  Denies any urinary symptoms.  Denies any prior history of gallstones or gallbladder disease    9/21/22 CHOLECYSTECTOMY LAPAROSCOPIC

## 2022-09-18 NOTE — PROGRESS NOTES
Hospital Medicine Service -  Daily Progress Note       SUBJECTIVE   Interval History: No acute events overnight. Patient seen and examined. Cont to c/o abdominal pain but improved since yesterday     OBJECTIVE      Vital signs in last 24 hours:  Temp:  [36.7 °C (98 °F)-37.2 °C (99 °F)] 37.2 °C (99 °F)  Heart Rate:  [59-76] 63  Resp:  [18-19] 18  BP: (123-148)/(58-70) 148/65    Intake/Output Summary (Last 24 hours) at 9/17/2022 2238  Last data filed at 9/17/2022 2158  Gross per 24 hour   Intake 500 ml   Output --   Net 500 ml       PHYSICAL EXAMINATION      GEN: well-developed and well-nourished; not in acute distress  HEENT: normocephalic; atraumatic  NECK: no JVD; no bruits  CARDIO: regular rate and rhythm; no murmurs, rubs or gallops  RESP: clear to auscultation bilaterally; no rales, rhonchi, or wheezes  ABD: soft, non-distended, (+) moderate TTP over periumbilical area improved compared to yesterday, normal bowel sounds  EXT: no cyanosis, clubbing, or edema  SKIN: clean, dry, warm, and intact  MUSCULOSKELETAL: no injury or deformity  NEURO: alert and oriented x 3; nonfocal  BEHAVIOR/EMOTIONAL: appropriate; cooperative     LABS / IMAGING / TELE      Labs  Lab Results   Component Value Date    GLUCOSE 106 (H) 09/17/2022    CALCIUM 8.3 (L) 09/17/2022     09/17/2022    K 3.4 (L) 09/17/2022    CO2 25 09/17/2022     (H) 09/17/2022    BUN 24 (H) 09/17/2022    CREATININE 1.1 09/17/2022     Lab Results   Component Value Date    WBC 14.36 (H) 09/17/2022    HGB 10.7 (L) 09/17/2022    HCT 34.5 (L) 09/17/2022    MCV 90.1 09/17/2022     09/17/2022     Lab Results   Component Value Date    ALBUMIN 2.8 (L) 09/17/2022    BILITOT 0.8 09/17/2022    ALKPHOS 120 09/17/2022     (H) 09/17/2022     (H) 09/17/2022    PROTEIN 5.2 (L) 09/17/2022       Imaging  CT ANGIOGRAPHY CHEST/ABDOMEN/PELVIS WITH AND WITHOUT IV CONTRAST    Addendum Date: 9/16/2022    Please note that there is severe stenosis at the  junction of the left subclavian and left brachiocephalic vein as it courses between the left anterior first rib and left clavicle, with subsequent marked left upper extremity as well as partially imaged left lower neck and left chest wall venous collaterals, with the stenosis likely sequela of chronic left chest wall pacer leads compounded by left arm abduction.    Result Date: 9/16/2022  IMPRESSION: 1. No aortic aneurysm or dissection as clinically questioned. 2. Acute pancreatitis without evidence of pancreatic necrosis or collection. COMMENT: CHEST LUNGS AND AIRWAYS: Mild bibasilar subsegmental atelectasis without consolidation, pneumothorax, or mass. The visualized airways are patent. PLEURA: no effusion. VESSELS: Mild enlargement of the main pulmonary artery measuring up to 3.4 cm in diameter, which may be seen with pulmonary hypertension in the appropriate clinical setting. No thoracic aortic aneurysm or dissection, noting diffuse atherosclerotic calcifications/changes throughout the aorta and coronary arteries. HEART: Cardiomegaly with extensive mitral annular calcification again noted. No pericardial effusion. MEDIASTINUM AND BRENNEN: within normal limits, no enlarged lymph nodes. CHEST WALL AND LOWER NECK: Dual-lead left chest wall pacer with leads in the right atrium and right ventricle. ABDOMEN: LIVER: normal morphology without suspicious mass. BILE DUCTS: normal caliber. GALLBLADDER: Noncalcified cholelithiasis. No evidence of acute cholecystitis. PANCREAS: Diffuse peripancreatic stranding with slightly heterogeneous parenchymal perfusion/enhancement, compatible with acute interstitial pancreatitis. No evidence of pancreatic necrosis. SPLEEN: within normal limits. ADRENALS: within normal limits. KIDNEYS: A few right-sided cysts measuring up to 2.8 cm. PELVIS: Evaluation limited by right hip arthroplasty streak artifact. REPRODUCTIVE ORGANS: no pelvic masses. URETERS: within normal limits. BLADDER: within  normal limits. BOWEL/PERITONEUM: Bowel demonstrates normal caliber without evidence of obstruction. Sigmoid and descending colonic diverticulosis without evidence of acute diverticulitis. Normal appendix. Large hiatal hernia again noted resulting in partial intrathoracic stomach, unchanged. No enlarged mesenteric lymph nodes. No ascites or free air, no fluid collection. VESSELS: atherosclerotic changes, without abdominal aortic aneurysm or dissection. There is moderate focal proximal left common iliac artery stenosis due to prominent noncalcified atheromatous plaque. RETROPERITONEUM: no enlarged retroperitoneal or pelvic nodes. ABDOMINAL WALL: intact. BONES: degenerative changes, no suspicious lytic or blastic lesions. Partially imaged right hip arthroplasty with associated streak artifact. Bilateral shoulder arthroplasties noted.         ASSESSMENT AND PLAN      * Acute pancreatitis  Assessment & Plan  Presents with sudden onset of abdominal pain, nausea and vomiting admitted for acute pancreatitis.   Initial LFTs elevated with mixed hepatic injury and cholestatic picture, lipase 1436  Initial chest, abdomen, pelvis CTA with no aortic aneurysm or dissection as clinically questioned.. Acute pancreatitis without evidence of pancreatic necrosis or collection. Imaging notable for cholelithiasis, however no tigist choledocholithiasis or biliary duct dilation. Denies any alcohol use  Triglycerides relatively stable  Sxs improving, LFTs downtrending    Plan:  Cont to monitor on telemetry  plan abd mri/mrcp on Monday pending PPM clearance  LR dec 125cc/hr per GI  Cont w/lidoderm patch and cont w/morphine IV 2mg q3h PRN for pain management. Avoiding Toradol for now given DENNY  GI consulted for further evaluation and treatment recs - cont to f/u recs  Gen surg consulted per GI given likelihood of choledocholithiasis as etiology of pancreatitis  Cardiology consulted as well given ?side effects of amiodarone - cont to hold  amiodarone for now      Transaminitis  Assessment & Plan  Initial labs notable for transaminitis with both hepatic injury and cholestatic picture however, abnormalities primarily suggestive of hepatic injury.  While choledocholithiasis could be the cuplrit given primary evidence of hepatic injury ? whether amiodarone also may be contributing    Plan:  Cont w/evaluation of pancreatitis/Abd MRI/MRCP as stated above  Given recent complicated cardiac history will cont w/amiodarone for now unless otherwise instructed by Cardiology  Hold statin  Cont w/IVFs as stated  Monitor cmp    Acute kidney injury superimposed on CKD (CMS/[facility])  Assessment & Plan  Initial Cr 1.5, baseline ~1.2  Likely pre-renal in the setting of acute pancreatitis and n/v    Plan:  Check ua  Will initiate IVFs w/LR at 200cc/hr  Avoid nephrotoxins  Monitor bmp    PAD (peripheral artery disease) (CMS/[facility])  Assessment & Plan  C/A/P CTA w/severe stenosis at the junction of the left subclavian and left brachiocephalic vein as it courses between the left anterior first rib  and left clavicle, with subsequent marked left upper extremity as well as  partially imaged left lower neck and left chest wall venous collaterals, with  the stenosis likely sequela of chronic left chest wall pacer leads compounded by  left arm abduction.  Currently denies any significant LUE pain    Plan:  Cont w/plavix, statin, and eliquis when able  Resume outpt f/u with Cardiology for continued monitoring    Atrial fibrillation, unspecified type (CMS/[facility])  Assessment & Plan  H/o new onset atrial fibrillation with SSS/pauses s/p PPM 7/21/22  EKG at with ? AV junctional rhythm and ?PPM malfunction, rate controlled    Plan:  Admit to telemetry for continued monitoring  Cont w/outpt regimen of metoprolol. Cont w/amiodarone for now as well although ?whether this may have contributed to acute pancreatitis. Will consult cards for further input  Monitor electrolytes and replete as required  for K>4, Mg>2  Eliquis on hold in case of possible ERCP. Cont heparin gtt for now per cardiology  Cardiology consulted. Will f/u any additional recs    Sick sinus syndrome (CMS/HCC)  Assessment & Plan  S/p PPM placement 7/2022  Initial EKG w/? PPM failure    Plan:  Will admit to telemetry for continued monitoring  Monitor electrolytes and replete as required for potassium greater than 4, magnesium greater than 2  Cardiology consulted for pacemaker evaluation and clearance for MRI    GERD (gastroesophageal reflux disease)  Assessment & Plan  Will place on PPI BID as stated above    Hyperlipidemia  Assessment & Plan  Hold statin for now given transaminitis  Repeat lipid panel as stated above    CAD (coronary artery disease)  Assessment & Plan   history of coronary disease (PCI with NIKITA to prox RCA and prox LCx [p/op developed ST elevations inferiorly- thrombotic occlusion RCA s/p repeat angioplasty),  Presents with atypical chest pain as stated, although EKG w/no acute st/t wave changes and HS troponin wnl x 1. Suspect sxs likely related to acute pancreatitis    Plan:  Monitor on telemetry and trend HS tropnins  Cont w/plavix. Hold eliquis for now unless otherwise specified by cardiology  Hold statin given transaminitis  Cont w/metoprolol  Cardiology consulted for further medication recommendations given recent cardiac interventions         VTE Assessment: Padua    DVT PPX: heparin gtt, GI PPX: PPI  Diet: clears liquid  Dispo: F/T   Estimated discharge date: 9/18/2022  Code Status: Full Code     Qi Alexander, DO  9/17/2022

## 2022-09-18 NOTE — CONSULTS
General Surgery Consult  General Surgery History and Physical     Chief Complaint   Patient presents with    Abdominal Pain       HPI     Patient is a 77 y.o. female who presents with pancreatitis, elevated LFTs, Lipase 1400, gallstones.     Medical History:   Past Medical History:   Diagnosis Date    Aortic regurgitation     Chronic kidney disease     Coronary artery disease     History of loop recorder     Hypertension     Lipid disorder     Mitral regurgitation     SVT (supraventricular tachycardia) (CMS/[facility])        Surgical History: No past surgical history on file.    Social History:   Social History     Social History Narrative    Not on file       Family History: No family history on file.    Allergies: Celebrex [celecoxib], Cipro [ciprofloxacin hcl], Clindamycin, Crestor [rosuvastatin], Lipitor [atorvastatin], and Voltaren [diclofenac sodium]    Home Medications:    amiodarone, Take 200 mg by mouth every morning.    amLODIPine, Take 5 mg by mouth every morning.    apixaban, Take 5 mg by mouth 2 (two) times a day.    metoprolol succinate XL, Take 50 mg by mouth every morning.    clopidogreL, Take 75 mg by mouth every morning.    coenzyme Q10, Take 100 mg by mouth every evening. Takes in the PM    famotidine, Take 40 mg by mouth nightly.    pantoprazole, Take 40 mg by mouth daily before breakfast. 30 min prior to breakfast    simvastatin, Take 20 mg by mouth nightly.    traMADoL-acetaminophen, Take 1 tablet by mouth every 12 (twelve) hours.    Current Medications:    alum-mag hydroxide-simeth, 30 mL, oral, q4h PRN    [Provider Managed Hold] amiodarone, 200 mg, oral, Daily    amLODIPine, 5 mg, oral, q AM    [Provider Managed Hold] apixaban, 5 mg, oral, BID    atropine, 0.5 mg, intravenous, q5 min PRN    cefTRIAXone, 1 g, intravenous, q24h INT    clopidogreL, 75 mg, oral, Daily    glucose, 16-32 g of dextrose, oral, PRN **OR** dextrose, 15-30 g of dextrose, oral, PRN **OR** glucagon,  1 mg, intramuscular, PRN **OR** dextrose 50 % in water (D50), 25 mL, intravenous, PRN    docusate sodium, 100 mg, oral, BID    heparin (porcine), 40-80 Units/kg (Adjusted), intravenous, q6h PRN    heparin infusion - MAR calculator by PTT, 100-4,000 Units/hr, intravenous, Titrated    lactated ringer's, , intravenous, Continuous    lidocaine, 1 patch, Topical, Daily    magnesium sulfate, 1 g, intravenous, PRN **OR** magnesium sulfate, 2 g, intravenous, PRN    metoprolol succinate XL, 50 mg, oral, q AM    metroNIDAZOLE, 500 mg, intravenous, q8h INT    morphine, 2 mg, intravenous, q3h PRN    multivitamin, 1 tablet, oral, Daily    nitroglycerin, 0.4 mg, sublingual, q5 min PRN    pantoprazole, 40 mg, intravenous, q12h KRISTEN    potassium chloride, 20 mEq, oral, PRN    potassium chloride, 40 mEq, oral, PRN    senna, 1 tablet, oral, 2x daily PRN    trimethobenzamide, 200 mg, intramuscular, q8h PRN    Review of Systems  Mild epigastric abd pain, no nausea  No chest pain, SOB    Objective     Vital Signs for the last 24 hours:  Temp:  [36.7 °C (98 °F)-37.3 °C (99.1 °F)] 36.9 °C (98.5 °F)  Heart Rate:  [51-76] 67  Resp:  [18-19] 19  BP: (123-148)/(58-74) 145/74    Physicial Exam  Physical Exam   abd soft, nontender RUQ    Labs  Lab Results   Component Value Date    WBC 12.23 (H) 09/18/2022    HGB 10.9 (L) 09/18/2022    HCT 34.5 (L) 09/18/2022    MCV 88.7 09/18/2022     09/18/2022     Lab Results   Component Value Date    GLUCOSE 91 09/18/2022    CALCIUM 8.2 (L) 09/18/2022     (L) 09/18/2022    K 3.8 09/18/2022    CO2 21 (L) 09/18/2022     09/18/2022    BUN 16 09/18/2022    CREATININE 0.9 09/18/2022     Lab Results   Component Value Date     (H) 09/18/2022     (H) 09/18/2022    ALKPHOS 112 09/18/2022    BILITOT 0.7 09/18/2022       Imaging  I have reviewed the Imaging from the last 24 hrs.    Assessment   LFTs improving  No f/u lipase  abd soft  No acute cholecystitis  clinically  Await w/u (MRCP/ERCP)  Likely passed CBD stone  Plan   Elective lap oli after resolution pancreatitis  Code Status: Full Code

## 2022-09-18 NOTE — PROGRESS NOTES
GASTROENTEROLOGY DAILY PROGRESS NOTE  [facility]     PATIENT NAME:  Emily Ramirez          YOB: 1945  AGE:  77 y.o.   MRN: [id number]      SUBJECTIVE   Patient seen and examined at bedside.  Admitted 2  days ago for abdominal pain.  CT scan suggestive of pancreatitis.  Currently patient's pain has improved today.  Her LFTs have also drastically improved as well.  Feeling better today.     REVIEW OF SYSTEMS   Systems reviewed and otherwise negative except as documented above.    VITAL SIGNS   Temp:  [36.7 °C (98 °F)-37.3 °C (99.1 °F)] 36.8 °C (98.3 °F)  Heart Rate:  [51-69] 68  Resp:  [18-19] 18  BP: (123-162)/(58-78) 162/78      PHYSICAL EXAM   Physical Exam  General appearance: alert, appears stated age and cooperative  Head: normocephalic  Heart: regular rate and rhythm  Lungs:  non-labored respirations  Abdomen: soft, mild tenderness   Extremities: no LE edema  Neurologic: awake and alert  Skin: no jaundice      IMAGING AND LABS REVIEWED   Labs reviewed    Imaging reviewed  CT ANGIOGRAPHY CHEST/ABDOMEN/PELVIS WITH AND WITHOUT IV CONTRAST    Addendum Date: 9/16/2022    Please note that there is severe stenosis at the junction of the left subclavian and left brachiocephalic vein as it courses between the left anterior first rib and left clavicle, with subsequent marked left upper extremity as well as partially imaged left lower neck and left chest wall venous collaterals, with the stenosis likely sequela of chronic left chest wall pacer leads compounded by left arm abduction.    Result Date: 9/16/2022  IMPRESSION: 1. No aortic aneurysm or dissection as clinically questioned. 2. Acute pancreatitis without evidence of pancreatic necrosis or collection. COMMENT: CHEST LUNGS AND AIRWAYS: Mild bibasilar subsegmental atelectasis without consolidation, pneumothorax, or mass. The visualized airways are patent. PLEURA: no effusion. VESSELS: Mild enlargement of the main pulmonary artery measuring up to  3.4 cm in diameter, which may be seen with pulmonary hypertension in the appropriate clinical setting. No thoracic aortic aneurysm or dissection, noting diffuse atherosclerotic calcifications/changes throughout the aorta and coronary arteries. HEART: Cardiomegaly with extensive mitral annular calcification again noted. No pericardial effusion. MEDIASTINUM AND BRENNEN: within normal limits, no enlarged lymph nodes. CHEST WALL AND LOWER NECK: Dual-lead left chest wall pacer with leads in the right atrium and right ventricle. ABDOMEN: LIVER: normal morphology without suspicious mass. BILE DUCTS: normal caliber. GALLBLADDER: Noncalcified cholelithiasis. No evidence of acute cholecystitis. PANCREAS: Diffuse peripancreatic stranding with slightly heterogeneous parenchymal perfusion/enhancement, compatible with acute interstitial pancreatitis. No evidence of pancreatic necrosis. SPLEEN: within normal limits. ADRENALS: within normal limits. KIDNEYS: A few right-sided cysts measuring up to 2.8 cm. PELVIS: Evaluation limited by right hip arthroplasty streak artifact. REPRODUCTIVE ORGANS: no pelvic masses. URETERS: within normal limits. BLADDER: within normal limits. BOWEL/PERITONEUM: Bowel demonstrates normal caliber without evidence of obstruction. Sigmoid and descending colonic diverticulosis without evidence of acute diverticulitis. Normal appendix. Large hiatal hernia again noted resulting in partial intrathoracic stomach, unchanged. No enlarged mesenteric lymph nodes. No ascites or free air, no fluid collection. VESSELS: atherosclerotic changes, without abdominal aortic aneurysm or dissection. There is moderate focal proximal left common iliac artery stenosis due to prominent noncalcified atheromatous plaque. RETROPERITONEUM: no enlarged retroperitoneal or pelvic nodes. ABDOMINAL WALL: intact. BONES: degenerative changes, no suspicious lytic or blastic lesions. Partially imaged right hip arthroplasty with associated streak  artifact. Bilateral shoulder arthroplasties noted.       ASSESSMENT/PLAN     1. Acute pancreatitis   Details:  -Patient's LFTs suggestive of gallstone etiology however her imaging does not show dilated ducts.  -Suspect possible passage of gallstone.  Plan:  -Serial abdominal exams  -Pain control  -Patient scheduled for MRI/MRCP on Monday for further evaluation of potential choledocholithiasis.  Pacemaker interrogation prior   --gen surg following   -Further recommendations pending clinical course          AUTHOR:  DO MYKE Barone  9/18/2022

## 2022-09-18 NOTE — PROGRESS NOTES
Interventional Cardiology Progress Note    Resting comfortably.  No cardiac complaints.    REVIEW OF SYSTEMS : No other significant changes to review of systems over the past 24 hours.    Current Facility-Administered Medications   Medication Dose Route Frequency Provider Last Rate Last Admin    alum-mag hydroxide-simeth (MAALOX) 200-200-20 mg/5 mL suspension 30 mL  30 mL oral q4h PRN Qi Alexander DO        [Provider Managed Hold] amiodarone (PACERONE) tablet 200 mg  200 mg oral Daily Qi Alexander DO        amLODIPine (NORVASC) tablet 5 mg  5 mg oral q AM Qi Alexander DO   5 mg at 09/17/22 0822    [Provider Managed Hold] apixaban (ELIQUIS) tablet 5 mg  5 mg oral BID Qi Alexander DO        atropine injection 0.5 mg  0.5 mg intravenous q5 min PRN Qi Alexander DO        cefTRIAXone (ROCEPHIN) IVPB 1 g in 100 mL NSS vial in bag  1 g intravenous q24h INT Kwame Peres MD   Stopped at 09/17/22 1409    clopidogreL (PLAVIX) tablet 75 mg  75 mg oral Daily Qi Alexander DO   75 mg at 09/17/22 0822    glucose chewable tablet 16-32 g of dextrose  16-32 g of dextrose oral PRN Qi Alexander DO        Or    dextrose 40 % oral gel 15-30 g of dextrose  15-30 g of dextrose oral PRN Qi Alexander DO        Or    glucagon (GLUCAGEN) injection 1 mg  1 mg intramuscular PRN Qi Alexander DO        Or    dextrose 50 % in water (D50) injection 12.5 g  25 mL intravenous PRN Qi Alexander DO        docusate sodium (COLACE) capsule 100 mg  100 mg oral BID Qi Alexander DO        heparin (porcine) bolus from bag 2,650-5,350 Units  40-80 Units/kg (Adjusted) intravenous q6h PRN Qi Alexander DO        heparin infusion in 0.45%  units/mL  100-4,000 Units/hr intravenous Titrated Qi Alexander DO 8 mL/hr at 09/17/22 2321 800 Units/hr at 09/17/22 2321    lactated ringer's infusion   intravenous  Continuous [place]  mL/hr at 09/17/22 1052 Rate Change at 09/17/22 1052    lidocaine (ASPERCREME) 4 % topical patch 1 patch  1 patch Topical Daily Qi Alexander DO        magnesium sulfate IVPB 1g in 100 mL NSS/D5W/SWFI  1 g intravenous PRN Qi Alexander DO        Or    magnesium sulfate IVPB 2g in 50 mL NSS/D5W/SWFI  2 g intravenous PRN Qi Alexander DO   Stopped at 09/17/22 1210    metoprolol succinate XL (TOPROL-XL) 24 hr ER tablet 50 mg  50 mg oral q AM Qi Alexander DO   50 mg at 09/17/22 0822    metroNIDAZOLE in NaCl (iso-os) (FLAGYL) IVPB 500 mg  500 mg intravenous q8h INT Kwame Peres  mL/hr at 09/17/22 2158 500 mg at 09/17/22 2158    morphine injection 2 mg  2 mg intravenous q3h PRN Qi Alexander DO   2 mg at 09/17/22 1455    multivitamin tablet 1 tablet  1 tablet oral Daily Qi Alexander DO   1 tablet at 09/17/22 0822    nitroglycerin (NITROSTAT) SL tablet 0.4 mg  0.4 mg sublingual q5 min PRN Qi Alexander DO        pantoprazole (PROTONIX) injection 40 mg  40 mg intravenous q12h KRISTEN Qi Alexander DO   40 mg at 09/17/22 2157    potassium chloride (KLOR-CON M) ER tablet (particles/crystals) 20 mEq  20 mEq oral PRN Qi Alexander DO        potassium chloride (KLOR-CON M) ER tablet (particles/crystals) 40 mEq  40 mEq oral PRN Qi Alexander DO   40 mEq at 09/17/22 1129    senna (SENOKOT) tablet 1 tablet  1 tablet oral 2x daily PRN Qi Alexander DO        trimethobenzamide (TIGAN) injection 200 mg  200 mg intramuscular q8h PRN Qi Alexander DO              Objective   Labs  No new labs.    Telemetry  I have personally reviewed the telemetry tracings.  Sinus rhythm.    Physical Exam  Temp:  [36.7 °C (98 °F)-37.2 °C (99 °F)] 37.2 °C (99 °F)  Heart Rate:  [59-76] 63  Resp:  [18-19] 18  BP: (123-148)/(58-70) 148/65    Intake/Output Summary (Last 24 hours) at  9/18/2022 0024  Last data filed at 9/17/2022 2158  Gross per 24 hour   Intake 500 ml   Output --   Net 500 ml       General: Drowsy, NAD.  Lungs: CTA bilaterally. No rales or wheezing.  Heart: RRR. No R/M/G.  Abdomen: Soft. NT/ND. BS positive.  Extremities: Warm, no C/C/E.  Neuro: Moves all 4 extremities.  Sensation intact.  Skin: No rash or discoloration.    ASSESSMENT / PLAN:    1.  Acute gallstone pancreatitis: MRCP planned for Monday, pending EP clearance for device with the MRI.    2.  CAD status post recent PCI circumflex and RCA: Will need to remain on low-dose clopidogrel.  Aspirin allergy listed due to NSAID/Celebrex allergy.  Eliquis on hold.    3.  Hypertensive heart disease without CHF: Blood pressure stable at this time.  Continue present antihypertensive therapy.

## 2022-09-18 NOTE — PLAN OF CARE
Problem: Adult Inpatient Plan of Care  Goal: Plan of Care Review  Outcome: Progressing  Flowsheets (Taken 9/18/2022 1340)  Progress: no change  Plan of Care Reviewed With: patient  Outcome Summary: PT dorian completed. Ax1 to chair w/RW

## 2022-09-18 NOTE — PROGRESS NOTES
Patient: Emily Ramirez  Location:  [facility] 3 Main 0306  MRN:  [id number]  Today's date:  9/18/2022  General Observations  Start: Pt received supine in bed; she was agreeable to therapy and no issues or concerns identified by nurse prior to session   End: Pt seated in chair at end of session; call bell in reach, chair alarm activated, all needs met, personal items in reach and nurse notified of patient performance, patient's position and patient's response to activity      Emily is a 77 y.o. female admitted on 9/16/2022 with Abdominal pain, unspecified abdominal location [R10.9]  Atrial fibrillation, unspecified type (CMS/HCC) [I48.91]  Acute pancreatitis, unspecified complication status, unspecified pancreatitis type [K85.90]. Principal problem is Acute pancreatitis.    Past Medical History  Emily has a past medical history of Aortic regurgitation, Chronic kidney disease, Coronary artery disease, History of loop recorder, Hypertension, Lipid disorder, Mitral regurgitation, and SVT (supraventricular tachycardia) (CMS/[facility]).    History of Present Illness   Emily Ramirez is a 77 y.o. female with a past medical history of coronary disease (PCI with NIKITA to prox RCA and prox LCx [p/op developed ST elevations inferiorly- thrombotic occlusion RCA s/p repeat angioplasty), atrial fibrillation with SSS/pauses s/p PPM 7/21/22 (on Eliquis), mild AR, MR, hypertension, hyperlipidemia, and chronic kidney disease stage III  who presents to [facility] ER on 9/16/2022 with 2-day history of chest and midepigastric abdominal pain.  According to patient she was in her usual state of health until yesterday evening when shortly after dinner she developed sudden onset of substernal chest and midepigastric pain.  Symptoms resolved temporarily without any major intervention however, later in the evening symptoms reoccurred waking patient from sleep with pain now radiating throughout her abdomen and into her  back.  This is also accompanied by nausea and several episodes of nonbilious, non bloody emesis.  Symptoms to continue to progress throughout the early morning and as a result patient ultimately presented to the ER for further evaluation.  Currently, patient continues to complain of ongoing midepigastric pain although somewhat improved since administration of morphine.  She denies any fevers does admit to chills earlier today.  Denies any shortness of breath, cough, wheezing, heart palpitations.  Denies any recent diarrhea but does admit to intermittent constipation over the past week.  Denies any urinary symptoms.  Denies any prior history of gallstones or gallbladder disease      PT Vitals    Date/Time Pulse SpO2 Pt Activity O2 Therapy O2 Del Method O2 Flow Rate BP BP Location BP Method Pt Position Observations [facility]   09/18/22 1120 64 97 % At rest Supplemental oxygen Nasal cannula 2 L/min 153/70 Left forearm Automatic Lying --    09/18/22 1145 68 98 % At rest None (Room air) -- -- 163/69 Left upper arm Automatic Sitting in chair       PT Pain    Date/Time Pain Type Location Rating: Rest Rating: Activity Interventions [facility]   09/18/22 1120 Pain Assessment abdomen 1 4 position adjusted    09/18/22 1145 Pain Assessment;Post Activity abdomen 1 1 -- LG          Prior Living Environment    Flowsheet Row Most Recent Value   Living Environment Comment lives w/spouse, 2SH, 1 CARTER, 1/2 bath 1st floor, FF w/stairglide to tub shower w/seat. pt reports 1st floor setup recently w/adjustable bed.        Prior Level of Function    Flowsheet Row Most Recent Value   Dominant Hand right   Ambulation assistive equipment   Transferring assistive equipment   Toileting independent   Bathing assistive equipment   Dressing independent   Eating independent   Communication understands/communicates without difficulty   Prior Level of Function Comment Ind w/Rollator   Assistive Device Currently Used at Home walker, front-wheeled, stair  glide, shower chair  [adjustable bed]           PT Evaluation and Treatment - 09/18/22 1118        PT Time Calculation    Start Time 1118     Stop Time 1148     Time Calculation (min) 30 min        Session Details    Document Type initial evaluation     Mode of Treatment physical therapy        General Information    Patient Profile Reviewed yes     Onset of Illness/Injury or Date of Surgery 09/16/22     Referring Physician [facility]     Patient/Family/Caregiver Comments/Observations cleared by RN for session     General Observations of Patient resting in bed, willing to participate, verbalizing concern about incontinence     Existing Precautions/Restrictions fall        Cognition/Psychosocial    Affect/Mental Status (Cognition) WFL     Follows Commands (Cognition) WFL        Sensory    Hearing Status WFL        Vision Assessment/Intervention    Visual Impairment/Limitations WFL        Sensory Assessment (Somatosensory)    Sensory Assessment (Somatosensory) LE sensation intact        Range of Motion (ROM)    Range of Motion ROM is WFL;bilateral lower extremities        Strength (Manual Muscle Testing)    Strength (Manual Muscle Testing) strength is WFL;bilateral lower extremities   3+/5       Bed Mobility    [place], Roll Left supervision     [place], Roll Right supervision     [place], Supine to Sit supervision     Assistive Device head of bed elevated;bed rails     Comment (Bed Mobility) attends w/hospital panties placed prior to mobilizing from bed due to pt concerns w/incontinence. Exit to L.        Transfers    Comment pt declined use of mobility belt        Sit to Stand Transfer    [place], Sit to Stand Transfer 1 person assist;close supervision;verbal cues     Verbal Cues hand placement     Assistive Device walker, front-wheeled     Comment from EOB, 2 trials        Stand to Sit Transfer    [place], Stand to Sit Transfer close supervision     Verbal Cues hand placement     Assistive Device  walker, front-wheeled     Comment to bed/chair        Gait Training    [place], Gait minimum assist (75% or more patient effort);1 person assist     Assistive Device walker, front-wheeled     Distance in Feet 3 feet     Pattern (Gait) step-through     Deviations/Abnormal Patterns (Gait) gait speed decreased;step length decreased     Comment (Gait/Stairs) distance limited by pt reports of fatigue.        Safety Issues, Functional Mobility    Safety Issues Affecting Function (Mobility) awareness of need for assistance;insight into deficits/self-awareness     Impairments Affecting Function (Mobility) endurance/activity tolerance;balance        Balance    Balance Assessment sitting dynamic balance;sitting static balance;sit to stand dynamic balance;standing static balance;standing dynamic balance     Static Sitting Balance WFL;unsupported;sitting, edge of bed;sitting in chair     Dynamic Sitting Balance mild impairment;unsupported;sitting, edge of bed;WFL;supported;sitting in chair     Sit to Stand Dynamic Balance mild impairment;supported     Static Standing Balance mild impairment;supported     Dynamic Standing Balance mild impairment;supported     Comment, Balance + use of RW standing        Therapeutic Exercise    Therapeutic Exercise --   pt instructed in AP/LAQ/seated marching. demonstrated correctly and verbalized plan to perform t/o the day       AM-PAC (TM) - Mobility (Current Function)    Turning from your back to your side while in a flat bed without using bedrails? 3 - A Little     Moving from lying on your back to sitting on the side of a flat bed without using bedrails? 3 - A Little     Moving to and from a bed to a chair? 3 - A Little     Standing up from a chair using your arms? 3 - A Little     To walk in a hospital room? 3 - A Little     Climbing 3-5 steps with a railing? 2 - A Lot     AM-PAC (TM) Mobility Score 17        Assessment/Plan (PT)    Daily Outcome Statement PT eval completed. Sup bed  mobility, ClS transffers, Min A w/RW short distances. Limited by pain w/movement/coughing. current mobility is a decline from PLOF. will benefit from cont skilled therapy to maximize indepndence prior to anticipated return home w/homecare/family assist. [facility] 17     Rehab Potential good, to achieve stated therapy goals     Therapy Frequency 3-5 times/wk     Planned Therapy Interventions balance training;bed mobility training;gait training;transfer training;stair training;patient/family education               PT Assessment/Plan    Flowsheet Row Most Recent Value   PT Recommended Discharge Disposition home with assistance, home with home health at 09/18/2022 1118   Anticipated Equipment Needs at Discharge (PT) none at 09/18/2022 1118   Patient/Family Therapy Goals Statement get better, go home at 09/18/2022 1118                    Education Documentation  Signs/Symptoms, taught by Minnie Abdi, PT at 9/18/2022  1:41 PM.  Learner: Patient  Readiness: Acceptance  Method: Explanation  Response: Verbalizes Understanding  Comment: role of PT, hand placement w/transfers, use of  call bell, benefits of mobility/OOB vs adverse effects of immobilitiy, LE TE, use of hugme type pillow/blanket roll to inc abdominal comfort w/coughing    Risk Factors, taught by Minnie Abdi, PT at 9/18/2022  1:41 PM.  Learner: Patient  Readiness: Acceptance  Method: Explanation  Response: Verbalizes Understanding  Comment: role of PT, hand placement w/transfers, use of  call bell, benefits of mobility/OOB vs adverse effects of immobilitiy, LE TE, use of hugme type pillow/blanket roll to inc abdominal comfort w/coughing          PT Goals    Flowsheet Row Most Recent Value   Bed Mobility Goal 1    Activity/Assistive Device rolling to left, rolling to right, sit to supine, supine to sit at 09/18/2022 1118   [place] modified independence at 09/18/2022 1118   Time Frame by discharge at 09/18/2022 1118   Progress/Outcome goal ongoing at  09/18/2022 1118   Transfer Goal 1    Activity/Assistive Device bed-to-chair/chair-to-bed, stand pivot, sit-to-stand/stand-to-sit, walker, front-wheeled at 09/18/2022 1118   [place] modified independence at 09/18/2022 1118   Time Frame by discharge at 09/18/2022 1118   Progress/Outcome goal ongoing at 09/18/2022 1118   Gait Training Goal 1    Activity/Assistive Device gait (walking locomotion), walker, front-wheeled at 09/18/2022 1118   [place] modified independence at 09/18/2022 1118   Distance 100 at 09/18/2022 1118   Time Frame by discharge at 09/18/2022 1118   Progress/Outcome goal ongoing at 09/18/2022 1118   Stairs Goal 1    Activity/Assistive Device descending stairs, ascending stairs, step-to-step, walker, front-wheeled at 09/18/2022 1118   [place] modified independence at 09/18/2022 1118   Number of Stairs 1 at 09/18/2022 1118   Time Frame by discharge at 09/18/2022 1118   Progress/Outcome goal ongoing at 09/18/2022 1118

## 2022-09-18 NOTE — PLAN OF CARE
Problem: Adult Inpatient Plan of Care  Goal: Readiness for Transition of Care  Intervention: Mutually Develop Transition Plan  Flowsheets (Taken 9/18/2022 0956)  Anticipated Discharge Disposition:   home with assistance   home without assistance or services   home with home health  Assistive Device/Animal Currently Used at Home:   shower chair   walker, front-wheeled   stair glide  Readmission Within the Last 30 Days: no previous admission in last 30 days     Met with pt to discuss discharge plan.  Pt lives at home with her .  Pt used a RW to ambulate PTA.  Pt has had [facility] in the past.  Pt fills her prescriptions at the [facility] on [address] in [place]. Pt's PCP is Dr. Jonathan Meza.  Pt for [facility] Mon.  Plan: home vs home with home care pending progress.  Claire Gao RN

## 2022-09-18 NOTE — PLAN OF CARE
Plan of Care Review  Plan of Care Reviewed With: patient  Progress: improving  Outcome Summary: Tolerating clear liquid diet. MRCP planned for tomorrow.

## 2022-09-19 ENCOUNTER — APPOINTMENT (INPATIENT)
Dept: RADIOLOGY | Facility: HOSPITAL | Age: 77
DRG: 418 | End: 2022-09-19
Attending: INTERNAL MEDICINE
Payer: MEDICARE

## 2022-09-19 ENCOUNTER — ANESTHESIA EVENT (INPATIENT)
Dept: INPATIENT UNIT | Facility: HOSPITAL | Age: 77
DRG: 418 | End: 2022-09-19
Payer: MEDICARE

## 2022-09-19 PROBLEM — K80.20 CALCULUS OF GALLBLADDER WITHOUT CHOLECYSTITIS WITHOUT OBSTRUCTION: Status: ACTIVE | Noted: 2022-09-16

## 2022-09-19 LAB
ALBUMIN SERPL-MCNC: 2.8 G/DL (ref 3.4–5)
ALP SERPL-CCNC: 100 IU/L (ref 35–126)
ALT SERPL-CCNC: 121 IU/L (ref 11–54)
ANION GAP SERPL CALC-SCNC: 6 MEQ/L (ref 3–15)
APTT PPP: 49 SEC (ref 23–35)
APTT PPP: 57 SEC (ref 23–35)
APTT PPP: 60 SEC (ref 23–35)
AST SERPL-CCNC: 54 IU/L (ref 15–41)
BASOPHILS # BLD: 0.03 K/UL (ref 0.01–0.1)
BASOPHILS NFR BLD: 0.4 %
BILIRUB SERPL-MCNC: 0.9 MG/DL (ref 0.3–1.2)
BUN SERPL-MCNC: 12 MG/DL (ref 8–20)
CALCIUM SERPL-MCNC: 8.5 MG/DL (ref 8.9–10.3)
CHLORIDE SERPL-SCNC: 109 MEQ/L (ref 98–109)
CO2 SERPL-SCNC: 24 MEQ/L (ref 22–32)
CREAT SERPL-MCNC: 0.9 MG/DL (ref 0.6–1.1)
DIFFERENTIAL METHOD BLD: ABNORMAL
EOSINOPHIL # BLD: 0.17 K/UL (ref 0.04–0.36)
EOSINOPHIL NFR BLD: 2.2 %
ERYTHROCYTE [DISTWIDTH] IN BLOOD BY AUTOMATED COUNT: 14.8 % (ref 11.7–14.4)
GFR SERPL CREATININE-BSD FRML MDRD: >60 ML/MIN/1.73M*2
GLUCOSE SERPL-MCNC: 103 MG/DL (ref 70–99)
HCT VFR BLDCO AUTO: 35.9 % (ref 35–45)
HGB BLD-MCNC: 11.6 G/DL (ref 11.8–15.7)
IMM GRANULOCYTES # BLD AUTO: 0.04 K/UL (ref 0–0.08)
IMM GRANULOCYTES NFR BLD AUTO: 0.5 %
INR PPP: 1.2
LIPASE SERPL-CCNC: 359 U/L (ref 20–51)
LYMPHOCYTES # BLD: 1.31 K/UL (ref 1.2–3.5)
LYMPHOCYTES NFR BLD: 16.8 %
MCH RBC QN AUTO: 27.6 PG (ref 28–33.2)
MCHC RBC AUTO-ENTMCNC: 32.3 G/DL (ref 32.2–35.5)
MCV RBC AUTO: 85.5 FL (ref 83–98)
MONOCYTES # BLD: 0.47 K/UL (ref 0.28–0.8)
MONOCYTES NFR BLD: 6 %
NEUTROPHILS # BLD: 5.79 K/UL (ref 1.7–7)
NEUTS SEG NFR BLD: 74.1 %
NRBC BLD-RTO: 0 %
PDW BLD AUTO: 10.9 FL (ref 9.4–12.3)
PLATELET # BLD AUTO: 264 K/UL (ref 150–369)
POTASSIUM SERPL-SCNC: 3.5 MEQ/L (ref 3.6–5.1)
PROT SERPL-MCNC: 5.4 G/DL (ref 6–8.2)
PROTHROMBIN TIME: 14.7 SEC (ref 12.2–14.5)
RBC # BLD AUTO: 4.2 M/UL (ref 3.93–5.22)
SARS-COV-2 RNA RESP QL NAA+PROBE: NEGATIVE
SODIUM SERPL-SCNC: 139 MEQ/L (ref 136–144)
WBC # BLD AUTO: 7.81 K/UL (ref 3.8–10.5)

## 2022-09-19 PROCEDURE — 85730 THROMBOPLASTIN TIME PARTIAL: CPT

## 2022-09-19 PROCEDURE — 25000000 HC PHARMACY GENERAL: Performed by: INTERNAL MEDICINE

## 2022-09-19 PROCEDURE — 63600000 HC DRUGS/DETAIL CODE: Performed by: INTERNAL MEDICINE

## 2022-09-19 PROCEDURE — G1004 CDSM NDSC: HCPCS

## 2022-09-19 PROCEDURE — 85730 THROMBOPLASTIN TIME PARTIAL: CPT | Performed by: HOSPITALIST

## 2022-09-19 PROCEDURE — 63700000 HC SELF-ADMINISTRABLE DRUG: Performed by: INTERNAL MEDICINE

## 2022-09-19 PROCEDURE — 99233 SBSQ HOSP IP/OBS HIGH 50: CPT | Performed by: HOSPITALIST

## 2022-09-19 PROCEDURE — 71046 X-RAY EXAM CHEST 2 VIEWS: CPT

## 2022-09-19 PROCEDURE — 36415 COLL VENOUS BLD VENIPUNCTURE: CPT | Performed by: INTERNAL MEDICINE

## 2022-09-19 PROCEDURE — 21400000 HC ROOM AND CARE CCU/INTERMEDIATE

## 2022-09-19 PROCEDURE — 25800000 HC PHARMACY IV SOLUTIONS: Performed by: INTERNAL MEDICINE

## 2022-09-19 PROCEDURE — 83690 ASSAY OF LIPASE: CPT | Performed by: SURGERY

## 2022-09-19 PROCEDURE — 85730 THROMBOPLASTIN TIME PARTIAL: CPT | Performed by: INTERNAL MEDICINE

## 2022-09-19 PROCEDURE — 80053 COMPREHEN METABOLIC PANEL: CPT | Performed by: INTERNAL MEDICINE

## 2022-09-19 PROCEDURE — 85025 COMPLETE CBC W/AUTO DIFF WBC: CPT | Performed by: INTERNAL MEDICINE

## 2022-09-19 PROCEDURE — 97530 THERAPEUTIC ACTIVITIES: CPT | Mod: GP,CQ

## 2022-09-19 PROCEDURE — 63600000 HC DRUGS/DETAIL CODE: Performed by: HOSPITALIST

## 2022-09-19 PROCEDURE — 85610 PROTHROMBIN TIME: CPT | Performed by: HOSPITALIST

## 2022-09-19 RX ORDER — HEPARIN SODIUM 10000 [USP'U]/100ML
100-4000 INJECTION, SOLUTION INTRAVENOUS
Status: DISCONTINUED | OUTPATIENT
Start: 2022-09-19 | End: 2022-09-23

## 2022-09-19 RX ADMIN — CLOPIDOGREL BISULFATE 75 MG: 75 TABLET, FILM COATED ORAL at 13:17

## 2022-09-19 RX ADMIN — Medication 3 MG: at 21:51

## 2022-09-19 RX ADMIN — METRONIDAZOLE 500 MG: 500 INJECTION, SOLUTION INTRAVENOUS at 20:46

## 2022-09-19 RX ADMIN — METOPROLOL SUCCINATE 50 MG: 50 TABLET, EXTENDED RELEASE ORAL at 10:40

## 2022-09-19 RX ADMIN — PANTOPRAZOLE SODIUM 40 MG: 40 INJECTION, POWDER, FOR SOLUTION INTRAVENOUS at 20:38

## 2022-09-19 RX ADMIN — HEPARIN SODIUM 950 UNITS/HR: 10000 INJECTION, SOLUTION INTRAVENOUS at 06:40

## 2022-09-19 RX ADMIN — METRONIDAZOLE 500 MG: 500 INJECTION, SOLUTION INTRAVENOUS at 13:17

## 2022-09-19 RX ADMIN — PANTOPRAZOLE SODIUM 40 MG: 40 INJECTION, POWDER, FOR SOLUTION INTRAVENOUS at 10:45

## 2022-09-19 RX ADMIN — AMLODIPINE BESYLATE 5 MG: 5 TABLET ORAL at 10:41

## 2022-09-19 RX ADMIN — METRONIDAZOLE 500 MG: 500 INJECTION, SOLUTION INTRAVENOUS at 04:57

## 2022-09-19 RX ADMIN — SODIUM CHLORIDE, POTASSIUM CHLORIDE, SODIUM LACTATE AND CALCIUM CHLORIDE: 600; 310; 30; 20 INJECTION, SOLUTION INTRAVENOUS at 08:39

## 2022-09-19 RX ADMIN — HEPARIN SODIUM 1000 UNITS/HR: 10000 INJECTION, SOLUTION INTRAVENOUS at 15:35

## 2022-09-19 RX ADMIN — LORAZEPAM 0.5 MG: 0.5 TABLET ORAL at 10:41

## 2022-09-19 RX ADMIN — SODIUM CHLORIDE 1 G: 900 INJECTION INTRAVENOUS at 13:15

## 2022-09-19 ASSESSMENT — COGNITIVE AND FUNCTIONAL STATUS - GENERAL
MOVING TO AND FROM BED TO CHAIR: 3 - A LITTLE
WALKING IN HOSPITAL ROOM: 3 - A LITTLE
STANDING UP FROM CHAIR USING ARMS: 3 - A LITTLE
CLIMB 3 TO 5 STEPS WITH RAILING: 2 - A LOT

## 2022-09-19 NOTE — PATIENT CARE CONFERENCE
Care Progression Rounds Note  Date: 9/19/2022  Time: 8:46 AM     Patient Name: Emily Ramirez     Medical Record Number: [id number]   YOB: 1945  Sex: Female      Room/Bed: 0306W    Admitting Diagnosis: Abdominal pain, unspecified abdominal location [R10.9]  Atrial fibrillation, unspecified type (CMS/HCC) [I48.91]  Acute pancreatitis, unspecified complication status, unspecified pancreatitis type [K85.90]   Admit Date/Time: 9/16/2022  5:27 AM    Primary Diagnosis: Acute pancreatitis  Principal Problem: Acute pancreatitis    GMLOS: 3.1  Anticipated Discharge Date: 9/19/2022    AM-PAC:  Mobility Score: 17    Discharge Planning:  Anticipated Discharge Disposition: home with home health, home with assistance, home with outpatient services  Can patient participate in a follow-up phone call/video visit?: Yes, with assistance    Barriers to Discharge:  Medical issues not resolved, Test pending    Comments:       Participants:  social work/services, nursing,

## 2022-09-19 NOTE — PLAN OF CARE
Problem: Adult Inpatient Plan of Care  Goal: Plan of Care Review  Outcome: Progressing  Flowsheets (Taken 9/19/2022 8197)  Progress: improving  Plan of Care Reviewed With:   patient   daughter  Outcome Summary: pt still on heparin drip, had mrcp this afternoon with tentative plans for gallbladder removal tomorrow. pt pleasanet in no pain. wc  Goal: Patient-Specific Goal (Individualized)  Outcome: Progressing  Goal: Absence of Hospital-Acquired Illness or Injury  Outcome: Progressing  Goal: Optimal Comfort and Wellbeing  Outcome: Progressing  Goal: Readiness for Transition of Care  Outcome: Progressing     Problem: Fall Injury Risk  Goal: Absence of Fall and Fall-Related Injury  Outcome: Progressing     Problem: Skin Injury Risk Increased  Goal: Skin Health and Integrity  Outcome: Progressing     Problem: Fluid Volume Deficit  Goal: Fluid Balance  Outcome: Progressing     Problem: Mobility Impairment  Goal: Optimal Mobility  Outcome: Progressing   Plan of Care Review  Plan of Care Reviewed With: patient, daughter  Progress: improving  Outcome Summary: pt still on heparin drip, had mrcp this afternoon with tentative plans for gallbladder removal tomorrow. pt pleasanet in no pain. wcm

## 2022-09-19 NOTE — PROGRESS NOTES
Hospital Medicine Service -  Daily Progress Note       SUBJECTIVE   Interval History: No acute events overnight. Patient seen and examined. Cont to note improvement in abd pain      OBJECTIVE      Vital signs in last 24 hours:  Temp:  [36.8 °C (98.2 °F)-37.3 °C (99.1 °F)] 36.8 °C (98.2 °F)  Heart Rate:  [61-72] 62  Resp:  [18-20] 20  BP: (113-163)/(62-78) 122/62    Intake/Output Summary (Last 24 hours) at 9/18/2022 2214  Last data filed at 9/18/2022 2000  Gross per 24 hour   Intake 1297 ml   Output 2100 ml   Net -803 ml       PHYSICAL EXAMINATION      GEN: well-developed and well-nourished; not in acute distress  HEENT: normocephalic; atraumatic  NECK: no JVD; no bruits  CARDIO: regular rate and rhythm; no murmurs, rubs or gallops  RESP: faint crackles at bases b/l; no rales, rhonchi, or wheezes  ABD: soft, non-distended, (+) mild diffuse TTP throughout abdomen, normal bowel sounds  EXT: no cyanosis, clubbing, diffuse edema noted in UE and LEs b/l   SKIN: clean, dry, warm, and intact  MUSCULOSKELETAL: no injury or deformity  NEURO: alert and oriented x 3; nonfocal  BEHAVIOR/EMOTIONAL: appropriate; cooperative     LABS / IMAGING / TELE      Labs  Lab Results   Component Value Date    GLUCOSE 91 09/18/2022    CALCIUM 8.2 (L) 09/18/2022     (L) 09/18/2022    K 3.8 09/18/2022    CO2 21 (L) 09/18/2022     09/18/2022    BUN 16 09/18/2022    CREATININE 0.9 09/18/2022     Lab Results   Component Value Date    WBC 12.23 (H) 09/18/2022    HGB 10.9 (L) 09/18/2022    HCT 34.5 (L) 09/18/2022    MCV 88.7 09/18/2022     09/18/2022     Lab Results   Component Value Date    ALBUMIN 2.8 (L) 09/18/2022    BILITOT 0.7 09/18/2022    ALKPHOS 112 09/18/2022     (H) 09/18/2022     (H) 09/18/2022    PROTEIN 5.1 (L) 09/18/2022       Imaging  CT ANGIOGRAPHY CHEST/ABDOMEN/PELVIS WITH AND WITHOUT IV CONTRAST    Addendum Date: 9/16/2022    Please note that there is severe stenosis at the junction of the left  subclavian and left brachiocephalic vein as it courses between the left anterior first rib and left clavicle, with subsequent marked left upper extremity as well as partially imaged left lower neck and left chest wall venous collaterals, with the stenosis likely sequela of chronic left chest wall pacer leads compounded by left arm abduction.    Result Date: 9/16/2022  IMPRESSION: 1. No aortic aneurysm or dissection as clinically questioned. 2. Acute pancreatitis without evidence of pancreatic necrosis or collection. COMMENT: CHEST LUNGS AND AIRWAYS: Mild bibasilar subsegmental atelectasis without consolidation, pneumothorax, or mass. The visualized airways are patent. PLEURA: no effusion. VESSELS: Mild enlargement of the main pulmonary artery measuring up to 3.4 cm in diameter, which may be seen with pulmonary hypertension in the appropriate clinical setting. No thoracic aortic aneurysm or dissection, noting diffuse atherosclerotic calcifications/changes throughout the aorta and coronary arteries. HEART: Cardiomegaly with extensive mitral annular calcification again noted. No pericardial effusion. MEDIASTINUM AND BRENNEN: within normal limits, no enlarged lymph nodes. CHEST WALL AND LOWER NECK: Dual-lead left chest wall pacer with leads in the right atrium and right ventricle. ABDOMEN: LIVER: normal morphology without suspicious mass. BILE DUCTS: normal caliber. GALLBLADDER: Noncalcified cholelithiasis. No evidence of acute cholecystitis. PANCREAS: Diffuse peripancreatic stranding with slightly heterogeneous parenchymal perfusion/enhancement, compatible with acute interstitial pancreatitis. No evidence of pancreatic necrosis. SPLEEN: within normal limits. ADRENALS: within normal limits. KIDNEYS: A few right-sided cysts measuring up to 2.8 cm. PELVIS: Evaluation limited by right hip arthroplasty streak artifact. REPRODUCTIVE ORGANS: no pelvic masses. URETERS: within normal limits. BLADDER: within normal limits.  BOWEL/PERITONEUM: Bowel demonstrates normal caliber without evidence of obstruction. Sigmoid and descending colonic diverticulosis without evidence of acute diverticulitis. Normal appendix. Large hiatal hernia again noted resulting in partial intrathoracic stomach, unchanged. No enlarged mesenteric lymph nodes. No ascites or free air, no fluid collection. VESSELS: atherosclerotic changes, without abdominal aortic aneurysm or dissection. There is moderate focal proximal left common iliac artery stenosis due to prominent noncalcified atheromatous plaque. RETROPERITONEUM: no enlarged retroperitoneal or pelvic nodes. ABDOMINAL WALL: intact. BONES: degenerative changes, no suspicious lytic or blastic lesions. Partially imaged right hip arthroplasty with associated streak artifact. Bilateral shoulder arthroplasties noted.           ASSESSMENT AND PLAN      * Acute pancreatitis  Assessment & Plan  Presents with sudden onset of abdominal pain, nausea and vomiting admitted for acute pancreatitis.   Initial LFTs elevated with mixed hepatic injury and cholestatic picture, lipase 1436  Initial chest, abdomen, pelvis CTA with no aortic aneurysm or dissection as clinically questioned.. Acute pancreatitis without evidence of pancreatic necrosis or collection. Imaging notable for cholelithiasis, however no tigist choledocholithiasis or biliary duct dilation. Denies any alcohol use  Triglycerides relatively stable  Sxs improving, LFTs downtrending    Plan:  Cont to monitor on telemetry  plan abd mri/mrcp on Monday pending PPM clearance  LR dec 100cc/hr given hypoxia noted and signs of fluid overload. Will check CXR for further evaluation  Cont w/lidoderm patch and cont w/morphine IV 2mg q3h PRN for pain management. Avoiding Toradol for now given DENNY  GI consulted for further evaluation and treatment recs - cont to f/u recs  Gen surg consulted per GI given likelihood of choledocholithiasis as etiology of pancreatitis  Cardiology  consulted as well given ?side effects of amiodarone - cont to hold amiodarone for now      Transaminitis  Assessment & Plan  Initial labs notable for transaminitis with both hepatic injury and cholestatic picture however, abnormalities primarily suggestive of hepatic injury.  While choledocholithiasis could be the cuplrit given primary evidence of hepatic injury ? whether amiodarone also may be contributing    Plan:  Cont w/evaluation of pancreatitis/Abd MRI/MRCP as stated above  Given recent complicated cardiac history will cont w/amiodarone for now unless otherwise instructed by Cardiology  Hold statin  Cont w/IVFs as stated  Monitor cmp    Acute kidney injury superimposed on CKD (CMS/[facility])  Assessment & Plan  Initial Cr 1.5, baseline ~1.2  Likely pre-renal in the setting of acute pancreatitis and n/v    Plan:  Check ua  Will initiate IVFs w/LR at 200cc/hr  Avoid nephrotoxins  Monitor bmp    Hypoxia  Assessment & Plan  Patient with inc hypoxia, likely related to volume overload    Plan:  Will reduce IVF administration  Check cxr  Cont w/O2 via NC PRN for goal sats >92%    PAD (peripheral artery disease) (CMS/[facility])  Assessment & Plan  C/A/P CTA w/severe stenosis at the junction of the left subclavian and left brachiocephalic vein as it courses between the left anterior first rib  and left clavicle, with subsequent marked left upper extremity as well as  partially imaged left lower neck and left chest wall venous collaterals, with  the stenosis likely sequela of chronic left chest wall pacer leads compounded by  left arm abduction.  Currently denies any significant LUE pain    Plan:  Cont w/plavix, statin, and eliquis when able  Resume outpt f/u with Cardiology for continued monitoring    Atrial fibrillation, unspecified type (CMS/[facility])  Assessment & Plan  H/o new onset atrial fibrillation with SSS/pauses s/p PPM 7/21/22  EKG at with ? AV junctional rhythm and ?PPM malfunction, rate controlled    Plan:  Admit to  telemetry for continued monitoring  Cont w/outpt regimen of metoprolol. Cont w/amiodarone for now as well although ?whether this may have contributed to acute pancreatitis. Will consult cards for further input  Monitor electrolytes and replete as required for K>4, Mg>2  Eliquis on hold in case of possible ERCP. Cont heparin gtt for now per cardiology  Cardiology consulted. Will f/u any additional recs    Sick sinus syndrome (CMS/HCC)  Assessment & Plan  S/p PPM placement 7/2022  Initial EKG w/? PPM failure    Plan:  Will admit to telemetry for continued monitoring  Monitor electrolytes and replete as required for potassium greater than 4, magnesium greater than 2  Cardiology consulted for pacemaker evaluation and clearance for MRI    GERD (gastroesophageal reflux disease)  Assessment & Plan  Will place on PPI BID as stated above    Hyperlipidemia  Assessment & Plan  Hold statin for now given transaminitis  Repeat lipid panel as stated above    CAD (coronary artery disease)  Assessment & Plan   history of coronary disease (PCI with NIKITA to prox RCA and prox LCx [p/op developed ST elevations inferiorly- thrombotic occlusion RCA s/p repeat angioplasty),  Presents with atypical chest pain as stated, although EKG w/no acute st/t wave changes and HS troponin wnl x 1. Suspect sxs likely related to acute pancreatitis    Plan:  Monitor on telemetry and trend HS tropnins  Cont w/plavix. Hold eliquis for now unless otherwise specified by cardiology  Hold statin given transaminitis  Cont w/metoprolol  Cardiology consulted for further medication recommendations given recent cardiac interventions         VTE Assessment: [place]    DVT PPX: heparin gtt, GI PPX: PPI  Diet: clears liquid.   Dispo: F/T   Estimated discharge date: 9/18/2022  Code Status: Full Code     Qi Alexander, DO  9/18/2022

## 2022-09-19 NOTE — PROGRESS NOTES
GASTROENTEROLOGY DAILY PROGRESS NOTE  [facility]     PATIENT NAME:  Emily Ramirez          YOB: 1945  AGE:  77 y.o.   MRN: [id number]      SUBJECTIVE   Patient seen and examined at bedside.  Abdominal pain improving.  LFTs continue to improve.     REVIEW OF SYSTEMS   Systems reviewed and otherwise negative except as documented above.    VITAL SIGNS   Temp:  [36.7 °C (98.1 °F)-37.1 °C (98.8 °F)] 36.9 °C (98.4 °F)  Heart Rate:  [61-72] 72  Resp:  [18-20] 20  BP: (113-166)/(62-78) 166/74      PHYSICAL EXAM   Physical Exam  General appearance: alert, appears stated age and cooperative  Head: normocephalic  Heart: regular rate and rhythm  Lungs:  non-labored respirations  Abdomen: soft, mild tenderness   Extremities: no LE edema  Neurologic: awake and alert  Skin: no jaundice      IMAGING AND LABS REVIEWED   Labs reviewed    Imaging reviewed  CT ANGIOGRAPHY CHEST/ABDOMEN/PELVIS WITH AND WITHOUT IV CONTRAST    Addendum Date: 9/16/2022    Please note that there is severe stenosis at the junction of the left subclavian and left brachiocephalic vein as it courses between the left anterior first rib and left clavicle, with subsequent marked left upper extremity as well as partially imaged left lower neck and left chest wall venous collaterals, with the stenosis likely sequela of chronic left chest wall pacer leads compounded by left arm abduction.    Result Date: 9/16/2022  IMPRESSION: 1. No aortic aneurysm or dissection as clinically questioned. 2. Acute pancreatitis without evidence of pancreatic necrosis or collection. COMMENT: CHEST LUNGS AND AIRWAYS: Mild bibasilar subsegmental atelectasis without consolidation, pneumothorax, or mass. The visualized airways are patent. PLEURA: no effusion. VESSELS: Mild enlargement of the main pulmonary artery measuring up to 3.4 cm in diameter, which may be seen with pulmonary hypertension in the appropriate clinical setting. No thoracic aortic aneurysm or  dissection, noting diffuse atherosclerotic calcifications/changes throughout the aorta and coronary arteries. HEART: Cardiomegaly with extensive mitral annular calcification again noted. No pericardial effusion. MEDIASTINUM AND BRENNEN: within normal limits, no enlarged lymph nodes. CHEST WALL AND LOWER NECK: Dual-lead left chest wall pacer with leads in the right atrium and right ventricle. ABDOMEN: LIVER: normal morphology without suspicious mass. BILE DUCTS: normal caliber. GALLBLADDER: Noncalcified cholelithiasis. No evidence of acute cholecystitis. PANCREAS: Diffuse peripancreatic stranding with slightly heterogeneous parenchymal perfusion/enhancement, compatible with acute interstitial pancreatitis. No evidence of pancreatic necrosis. SPLEEN: within normal limits. ADRENALS: within normal limits. KIDNEYS: A few right-sided cysts measuring up to 2.8 cm. PELVIS: Evaluation limited by right hip arthroplasty streak artifact. REPRODUCTIVE ORGANS: no pelvic masses. URETERS: within normal limits. BLADDER: within normal limits. BOWEL/PERITONEUM: Bowel demonstrates normal caliber without evidence of obstruction. Sigmoid and descending colonic diverticulosis without evidence of acute diverticulitis. Normal appendix. Large hiatal hernia again noted resulting in partial intrathoracic stomach, unchanged. No enlarged mesenteric lymph nodes. No ascites or free air, no fluid collection. VESSELS: atherosclerotic changes, without abdominal aortic aneurysm or dissection. There is moderate focal proximal left common iliac artery stenosis due to prominent noncalcified atheromatous plaque. RETROPERITONEUM: no enlarged retroperitoneal or pelvic nodes. ABDOMINAL WALL: intact. BONES: degenerative changes, no suspicious lytic or blastic lesions. Partially imaged right hip arthroplasty with associated streak artifact. Bilateral shoulder arthroplasties noted.       ASSESSMENT/PLAN     1. Acute pancreatitis   Details:  -Patient's LFTs  suggestive of gallstone etiology however her imaging does not show dilated ducts.  -Suspect possible passage of gallstone.  Plan:  -Serial abdominal exams  -Pain control  -Patient scheduled for MRI/MRCP today for further evaluation of potential choledocholithiasis.  Pacemaker interrogation prior   --gen surg following   -Further recommendations pending MRCP          AUTHOR:  Hamlet Recinos DO [facility]  9/19/2022

## 2022-09-19 NOTE — PROGRESS NOTES
AVSS  MRCP no CBD stone  Cholelithiasis, no acute cholecystitis  Pt desires lap oli this admission  Will add on for tomorrow  NPO p MN

## 2022-09-19 NOTE — ASSESSMENT & PLAN NOTE
Patient with inc hypoxia, likely related to volume overload    Plan:  Will reduce IVF administration  Check cxr  Cont w/O2 via NC PRN for goal sats >92%

## 2022-09-19 NOTE — PROGRESS NOTES
Patient: Emily Ramirez  Location:  [facility] [address]  MRN:  [id number]  Today's date:  9/19/2022 Pt. In the chair alarm on RN notified    Emily is a 77 y.o. female admitted on 9/16/2022 with Abdominal pain, unspecified abdominal location [R10.9]  Atrial fibrillation, unspecified type (CMS/HCC) [I48.91]  Acute pancreatitis, unspecified complication status, unspecified pancreatitis type [K85.90]. Principal problem is Acute pancreatitis.    Past Medical History  Emily has a past medical history of Aortic regurgitation, Chronic kidney disease, Coronary artery disease, History of loop recorder, Hypertension, Lipid disorder, Mitral regurgitation, and SVT (supraventricular tachycardia) (CMS/HCC).    History of Present Illness   Emily Ramirez is a 77 y.o. female with a past medical history of coronary disease (PCI with NIKITA to prox RCA and prox LCx [p/op developed ST elevations inferiorly- thrombotic occlusion RCA s/p repeat angioplasty), atrial fibrillation with SSS/pauses s/p PPM 7/21/22 (on Eliquis), mild AR, MR, hypertension, hyperlipidemia, and chronic kidney disease stage III  who presents to [facility] ER on 9/16/2022 with 2-day history of chest and midepigastric abdominal pain.  According to patient she was in her usual state of health until yesterday evening when shortly after dinner she developed sudden onset of substernal chest and midepigastric pain.  Symptoms resolved temporarily without any major intervention however, later in the evening symptoms reoccurred waking patient from sleep with pain now radiating throughout her abdomen and into her back.  This is also accompanied by nausea and several episodes of nonbilious, non bloody emesis.  Symptoms to continue to progress throughout the early morning and as a result patient ultimately presented to the ER for further evaluation.  Currently, patient continues to complain of ongoing midepigastric pain although somewhat improved  since administration of morphine.  She denies any fevers does admit to chills earlier today.  Denies any shortness of breath, cough, wheezing, heart palpitations.  Denies any recent diarrhea but does admit to intermittent constipation over the past week.  Denies any urinary symptoms.  Denies any prior history of gallstones or gallbladder disease      PT Vitals    Date/Time Pulse SpO2 O2 Therapy [facility]   09/19/22 1345 65 96 % None (Room air) JEREMY      PT Pain    Date/Time Pain Type Rating: Rest Rating: Activity [facility]   09/19/22 1345 Pain Assessment 0 - no pain 0 - no pain JEREMY          Prior Living Environment    Flowsheet Row Most Recent Value   Living Environment Comment lives w/spouse, 2SH, 1 CARTER, 1/2 bath 1st floor, FF w/stairglide to tub shower w/seat. pt reports 1st floor setup recently w/adjustable bed.        Prior Level of Function    Flowsheet Row Most Recent Value   Dominant Hand right   Ambulation assistive equipment   Transferring assistive equipment   Toileting independent   Bathing assistive equipment   Dressing independent   Eating independent   Communication understands/communicates without difficulty   Prior Level of Function Comment Ind w/Rollator   Assistive Device Currently Used at Home walker, front-wheeled, stair glide, shower chair  [adjustable bed]           PT Evaluation and Treatment - 09/19/22 1345        PT Time Calculation    Start Time 1345     Stop Time 1404     Time Calculation (min) 19 min        Session Details    Document Type daily treatment/progress note     Mode of Treatment physical therapy        General Information    Patient Profile Reviewed yes     Onset of Illness/Injury or Date of Surgery 09/16/22     General Observations of Patient In bed     Existing Precautions/Restrictions fall        Bed Mobility    [place], Supine to Sit supervision        Sit to Stand Transfer    [place], Sit to Stand Transfer supervision     Assistive Device walker, front-wheeled     Comment  steady to walker        Stand to Sit Transfer    [place], Stand to Sit Transfer close supervision     Assistive Device walker, front-wheeled     Comment leaving walker out to the side and steps back to bed  curing to bring walker back to bed when sitting 1LOB able to regain on own.        Gait Training    [place], Gait close supervision     Assistive Device walker, front-wheeled     Distance in Feet 90 feet     Comment (Gait/Stairs) slight [place]AL recovers quickly cueing to step up to walker.        Balance    Static Sitting Balance WFL     Dynamic Sitting Balance WFL     Sit to Stand Dynamic Balance WFL     Static Standing Balance WFL;supported     Dynamic Standing Balance mild impairment;supported     Comment, Balance + RW        Coping    Observed Emotional State calm;cooperative        AM-PAC (TM) - Mobility (Current Function)    Turning from your back to your side while in a flat bed without using bedrails? 3 - A Little     Moving from lying on your back to sitting on the side of a flat bed without using bedrails? 3 - A Little     Moving to and from a bed to a chair? 3 - A Little     Standing up from a chair using your arms? 3 - A Little     To walk in a hospital room? 3 - A Little     Climbing 3-5 steps with a railing? 2 - A Lot     AM-PAC (TM) Mobility Score 17        Assessment/Plan (PT)    Daily Outcome Statement [facility] 17 Close supervision PASCUAL recovers quickly.     Rehab Potential good, to achieve stated therapy goals     Therapy Frequency 3-5 times/wk     Planned Therapy Interventions balance training;gait training;transfer training               PT Assessment/Plan    Flowsheet Row Most Recent Value   PT Recommended Discharge Disposition home with assistance, home with home health at 09/19/2022 1345   Anticipated Equipment Needs at Discharge (PT) none at 09/19/2022 1345   Patient/Family Therapy Goals Statement get better, go home at 09/18/2022 1118                  Bring walker to bed before reaching  out to bed.  Education Documentation  No documentation found.        PT Goals    Flowsheet Row Most Recent Value   Bed Mobility Goal 1    Activity/Assistive Device rolling to left, rolling to right, sit to supine, supine to sit at 09/18/2022 1118   [place] modified independence at 09/18/2022 1118   Time Frame by discharge at 09/18/2022 1118   Progress/Outcome goal ongoing at 09/18/2022 1118   Transfer Goal 1    Activity/Assistive Device bed-to-chair/chair-to-bed, stand pivot, sit-to-stand/stand-to-sit, walker, front-wheeled at 09/18/2022 1118   [place] modified independence at 09/18/2022 1118   Time Frame by discharge at 09/18/2022 1118   Progress/Outcome goal ongoing at 09/18/2022 1118   Gait Training Goal 1    Activity/Assistive Device gait (walking locomotion), walker, front-wheeled at 09/18/2022 1118   [place] modified independence at 09/18/2022 1118   Distance 100 at 09/18/2022 1118   Time Frame by discharge at 09/18/2022 1118   Progress/Outcome goal ongoing at 09/18/2022 1118   Stairs Goal 1    Activity/Assistive Device descending stairs, ascending stairs, step-to-step, walker, front-wheeled at 09/18/2022 1118   [place] modified independence at 09/18/2022 1118   Number of Stairs 1 at 09/18/2022 1118   Time Frame by discharge at 09/18/2022 1118   Progress/Outcome goal ongoing at 09/18/2022 1118

## 2022-09-19 NOTE — PROGRESS NOTES
Hospital Medicine Service -  Daily Progress Note       SUBJECTIVE     Interval History: No acute events overnight. Feels ok . Reports abdominal pain better     OBJECTIVE        Vital signs in last 24 hours:  Temp:  [36.6 °C (97.9 °F)-36.9 °C (98.4 °F)] 36.6 °C (97.9 °F)  Heart Rate:  [61-74] 61  Resp:  [18-20] 18  BP: (122-166)/(62-74) 157/67  I/O last 3 completed shifts:  In: 8557.5 [I.V.:7957.5; IV Piggyback:600]  Out: 4200 [Urine:4200]    PHYSICAL EXAMINATION        GEN: well-developed and well-nourished; not in acute distress  HEENT: normocephalic; atraumatic  NECK: no JVD; no bruits  CARDIO: regular rate and rhythm; no murmurs or rubs  RESP: clear to auscultation bilaterally; no rales, rhonchi, or wheezes  ABD: soft, non-distended, non-tender, normal bowel sounds  EXT: no cyanosis, clubbing, or edema  SKIN: clean, dry, warm, and intact  MUSCULOSKELETAL: no injury or deformity  NEURO: alert and oriented x 3; nonfocal  BEHAVIOR/EMOTIONAL: appropriate; cooperative     LABS / IMAGING / TELE        Labs  Lab Results   Component Value Date    WBC 7.81 09/19/2022    HGB 11.6 (L) 09/19/2022    HCT 35.9 09/19/2022    MCV 85.5 09/19/2022     09/19/2022     Lab Results   Component Value Date    GLUCOSE 103 (H) 09/19/2022    CALCIUM 8.5 (L) 09/19/2022     09/19/2022    K 3.5 (L) 09/19/2022    CO2 24 09/19/2022     09/19/2022    BUN 12 09/19/2022    CREATININE 0.9 09/19/2022     Lab Results   Component Value Date    INR 1.2 09/19/2022    INR 1.5 09/16/2022    INR 1.4 07/29/2022       Imaging  X-RAY CHEST 2 VIEWS    Result Date: 9/19/2022  IMPRESSION: 1. Stable cardiomegaly. 2. Increased right but improved left basilar pleural fluid and hypoventilatory change and no airspace disease bilaterally. COMMENT:    AP and lateral erect chest radiographs are compared to a prior AP only study dated 7/27/2022. A left-sided dual-lead PCM is again noted. The cardiomediastinal contour shows stable cardiomegaly, again  noting mural calcifications in the aortic arch. The lungs show increased right but improved left basilar pleural fluid and hypoventilatory change and no airspace disease bilaterally. The osseous thorax and surrounding soft tissues again show prior bilateral partial shoulder arthroplasties and midthoracic advanced spondylosis.    MRI ABDOMEN WITHOUT CONTRAST MRCP    Result Date: 9/19/2022  IMPRESSION: Limited study. Gallstones. No evidence of choledocholithiasis. Interval development of small right pleural effusion and bibasilar atelectasis. COMMENT: Multiplanar MRI of the abdomen was performed without intravenous contrast. An MRCP was performed utilizing T2-weighted sequences. Three-dimensional images were reconstructed on the technologist workstation and the source and MIP images were reviewed. Unfortunately, the study is limited by motion. Comparison is made to a CTA of the abdomen dated 09/16/2022. There is a new small right pleural effusion and bibasilar atelectasis. Moderate size hiatal hernia is again noted. The liver is normal in size measuring 16 cm in length. There is no signal loss on the opposed phase or in phase sequences to suggest hepatic steatosis or iron deposition, respectively. No focal hepatic mass is identified. Several low T2 signal filling defects are again seen in the gallbladder lumen, presumably gallstones, measuring up to 1.3 cm. The gallbladder appears otherwise unremarkable. The common bile duct is normal in caliber measuring 5 mm. No intraluminal filling defects or strictures are seen. No intrahepatic biliary dilatation is identified. The pancreatic duct is not well seen but does not appear enlarged. The spleen, pancreas, and adrenal glands appear unremarkable. Multiple right renal cysts are again noted measuring up to 2.7 cm. The left kidney appears unremarkable. No upper abdominal lymphadenopathy or free fluid is seen. No grossly abnormal bowel loops are identified. The signal in the  osseous structures is grossly unremarkable.    CT ANGIOGRAPHY CHEST/ABDOMEN/PELVIS WITH AND WITHOUT IV CONTRAST    Addendum Date: 9/16/2022    Please note that there is severe stenosis at the junction of the left subclavian and left brachiocephalic vein as it courses between the left anterior first rib and left clavicle, with subsequent marked left upper extremity as well as partially imaged left lower neck and left chest wall venous collaterals, with the stenosis likely sequela of chronic left chest wall pacer leads compounded by left arm abduction.    Result Date: 9/16/2022  IMPRESSION: 1. No aortic aneurysm or dissection as clinically questioned. 2. Acute pancreatitis without evidence of pancreatic necrosis or collection. COMMENT: CHEST LUNGS AND AIRWAYS: Mild bibasilar subsegmental atelectasis without consolidation, pneumothorax, or mass. The visualized airways are patent. PLEURA: no effusion. VESSELS: Mild enlargement of the main pulmonary artery measuring up to 3.4 cm in diameter, which may be seen with pulmonary hypertension in the appropriate clinical setting. No thoracic aortic aneurysm or dissection, noting diffuse atherosclerotic calcifications/changes throughout the aorta and coronary arteries. HEART: Cardiomegaly with extensive mitral annular calcification again noted. No pericardial effusion. MEDIASTINUM AND BRENNEN: within normal limits, no enlarged lymph nodes. CHEST WALL AND LOWER NECK: Dual-lead left chest wall pacer with leads in the right atrium and right ventricle. ABDOMEN: LIVER: normal morphology without suspicious mass. BILE DUCTS: normal caliber. GALLBLADDER: Noncalcified cholelithiasis. No evidence of acute cholecystitis. PANCREAS: Diffuse peripancreatic stranding with slightly heterogeneous parenchymal perfusion/enhancement, compatible with acute interstitial pancreatitis. No evidence of pancreatic necrosis. SPLEEN: within normal limits. ADRENALS: within normal limits. KIDNEYS: A few  right-sided cysts measuring up to 2.8 cm. PELVIS: Evaluation limited by right hip arthroplasty streak artifact. REPRODUCTIVE ORGANS: no pelvic masses. URETERS: within normal limits. BLADDER: within normal limits. BOWEL/PERITONEUM: Bowel demonstrates normal caliber without evidence of obstruction. Sigmoid and descending colonic diverticulosis without evidence of acute diverticulitis. Normal appendix. Large hiatal hernia again noted resulting in partial intrathoracic stomach, unchanged. No enlarged mesenteric lymph nodes. No ascites or free air, no fluid collection. VESSELS: atherosclerotic changes, without abdominal aortic aneurysm or dissection. There is moderate focal proximal left common iliac artery stenosis due to prominent noncalcified atheromatous plaque. RETROPERITONEUM: no enlarged retroperitoneal or pelvic nodes. ABDOMINAL WALL: intact. BONES: degenerative changes, no suspicious lytic or blastic lesions. Partially imaged right hip arthroplasty with associated streak artifact. Bilateral shoulder arthroplasties noted.       ECG/Telemetry  I have independently reviewed the telemetry. No events for the last 24 hours.    ASSESSMENT AND PLAN      * Acute pancreatitis  Assessment & Plan  Likely gallstone pancreatitis  Presents with sudden onset of abdominal pain, nausea and vomiting admitted for acute pancreatitis.   Initial LFTs elevated with mixed hepatic injury and cholestatic picture, lipase 1436  Initial chest, abdomen, pelvis CTA with no aortic aneurysm or dissection as clinically questioned.. Acute pancreatitis without evidence of pancreatic necrosis or collection. Imaging notable for cholelithiasis, however no tigist choledocholithiasis or biliary duct dilation. Denies any alcohol use  Triglycerides relatively stable  Sxs improving, LFTs downtrending    Plan:  MRI Abdomen shows gallstones no evidence of choledocholithiasis  LR dec 100cc/hr given hypoxia noted and signs of fluid overload. Will check CXR for  further evaluation  Cont w/lidoderm patch and cont w/morphine IV 2mg q3h PRN for pain management. Avoiding Toradol for now given DENNY  GI consulted for further evaluation and treatment recs - cont to f/u recs  Gen surg following  Patient will be scheduled for possible cholecystectomy this admission, if cleared by cardio    Acute kidney injury superimposed on CKD (CMS/[facility])  Assessment & Plan  Initial Cr 1.5, baseline ~1.2  Likely pre-renal in the setting of acute pancreatitis and n/v    Plan:  Check ua  Will initiate IVFs w/LR at 200cc/hr  Avoid nephrotoxins  Monitor bmp    GERD (gastroesophageal reflux disease)  Assessment & Plan  Will place on PPI BID as stated above    Hyperlipidemia  Assessment & Plan  Hold statin for now given transaminitis  Repeat lipid panel as stated above    Hypoxia  Assessment & Plan  Patient with inc hypoxia, likely related to volume overload    Plan:  Will reduce IVF administration  Check cxr  Cont w/O2 via NC PRN for goal sats >92%    PAD (peripheral artery disease) (CMS/[facility])  Assessment & Plan  C/A/P CTA w/severe stenosis at the junction of the left subclavian and left brachiocephalic vein as it courses between the left anterior first rib  and left clavicle, with subsequent marked left upper extremity as well as  partially imaged left lower neck and left chest wall venous collaterals, with  the stenosis likely sequela of chronic left chest wall pacer leads compounded by  left arm abduction.  Currently denies any significant LUE pain    Plan:  Cont w/plavix, statin, and eliquis when able  Resume outpt f/u with Cardiology for continued monitoring    Transaminitis  Assessment & Plan  Initial labs notable for transaminitis with both hepatic injury and cholestatic picture however, abnormalities primarily suggestive of hepatic injury.  While choledocholithiasis could be the cuplrit given primary evidence of hepatic injury ? whether amiodarone also may be contributing    Plan:  Cont  w/evaluation of pancreatitis/Abd MRI/MRCP as stated above  Given recent complicated cardiac history will cont w/amiodarone for now unless otherwise instructed by Cardiology  Hold statin, Amiodarone  Cont w/IVFs as stated  Monitor cmp    Atrial fibrillation, unspecified type (CMS/HCC)  Assessment & Plan  H/o new onset atrial fibrillation with SSS/pauses s/p PPM 7/21/22  EKG at with ? AV junctional rhythm and ?PPM malfunction, rate controlled    Plan:  Admit to telemetry for continued monitoring  Cont w/outpt regimen of metoprolol. Cont w/amiodarone for now as well although ?whether this may have contributed to acute pancreatitis. Will consult cards for further input  Monitor electrolytes and replete as required for K>4, Mg>2  Eliquis on hold in case of possible ERCP. Cont heparin gtt for now per cardiology  Cardiology consulted. Will f/u any additional recs    Sick sinus syndrome (CMS/HCC)  Assessment & Plan  S/p PPM placement 7/2022  Initial EKG w/? PPM failure    Plan:  Will admit to telemetry for continued monitoring  Monitor electrolytes and replete as required for potassium greater than 4, magnesium greater than 2  Cardiology consulted for pacemaker evaluation and clearance for MRI    CAD (coronary artery disease)  Assessment & Plan   history of coronary disease (PCI with NIKITA to prox RCA and prox LCx [p/op developed ST elevations inferiorly- thrombotic occlusion RCA s/p repeat angioplasty),  Presents with atypical chest pain as stated, although EKG w/no acute st/t wave changes and HS troponin wnl x 1. Suspect sxs likely related to acute pancreatitis    Plan:  Monitor on telemetry and trend HS tropnins  Cont w/plavix. Hold eliquis for now unless otherwise specified by cardiology  Hold statin given transaminitis  Cont w/metoprolol  Cardiology  following       VTE Assessment: [place]    Code Status: Full Code  Estimated discharge date: 9/19/2022     Gayla Soares MD  9/19/2022  4:28 PM

## 2022-09-19 NOTE — PLAN OF CARE
Plan of Care Review  Plan of Care Reviewed With: patient  Progress: no change  Outcome Summary: Pt VSS. on RA. Heparin drip, IV fluids, and abx continued. Purewick in place. Fall precautions in place, call bell within reach. MRCP today.

## 2022-09-20 ENCOUNTER — ANESTHESIA (INPATIENT)
Dept: INPATIENT UNIT | Facility: HOSPITAL | Age: 77
DRG: 418 | End: 2022-09-20
Payer: MEDICARE

## 2022-09-20 LAB
ALBUMIN SERPL-MCNC: 2.8 G/DL (ref 3.4–5)
ALP SERPL-CCNC: 97 IU/L (ref 35–126)
ALT SERPL-CCNC: 90 IU/L (ref 11–54)
ANION GAP SERPL CALC-SCNC: 10 MEQ/L (ref 3–15)
APTT PPP: 36 SEC (ref 23–35)
APTT PPP: 70 SEC (ref 23–35)
AST SERPL-CCNC: 51 IU/L (ref 15–41)
BASOPHILS # BLD: 0.04 K/UL (ref 0.01–0.1)
BASOPHILS NFR BLD: 0.6 %
BILIRUB SERPL-MCNC: 0.9 MG/DL (ref 0.3–1.2)
BUN SERPL-MCNC: 9 MG/DL (ref 8–20)
CALCIUM SERPL-MCNC: 8.6 MG/DL (ref 8.9–10.3)
CHLORIDE SERPL-SCNC: 107 MEQ/L (ref 98–109)
CO2 SERPL-SCNC: 24 MEQ/L (ref 22–32)
CREAT SERPL-MCNC: 0.9 MG/DL (ref 0.6–1.1)
DIFFERENTIAL METHOD BLD: ABNORMAL
EOSINOPHIL # BLD: 0.23 K/UL (ref 0.04–0.36)
EOSINOPHIL NFR BLD: 3.4 %
ERYTHROCYTE [DISTWIDTH] IN BLOOD BY AUTOMATED COUNT: 14.8 % (ref 11.7–14.4)
GFR SERPL CREATININE-BSD FRML MDRD: >60 ML/MIN/1.73M*2
GLUCOSE SERPL-MCNC: 96 MG/DL (ref 70–99)
HCT VFR BLDCO AUTO: 37.9 % (ref 35–45)
HGB BLD-MCNC: 11.8 G/DL (ref 11.8–15.7)
IMM GRANULOCYTES # BLD AUTO: 0.05 K/UL (ref 0–0.08)
IMM GRANULOCYTES NFR BLD AUTO: 0.7 %
LYMPHOCYTES # BLD: 1.37 K/UL (ref 1.2–3.5)
LYMPHOCYTES NFR BLD: 20.4 %
MCH RBC QN AUTO: 27.2 PG (ref 28–33.2)
MCHC RBC AUTO-ENTMCNC: 31.1 G/DL (ref 32.2–35.5)
MCV RBC AUTO: 87.3 FL (ref 83–98)
MONOCYTES # BLD: 0.56 K/UL (ref 0.28–0.8)
MONOCYTES NFR BLD: 8.3 %
NEUTROPHILS # BLD: 4.46 K/UL (ref 1.7–7)
NEUTS SEG NFR BLD: 66.6 %
NRBC BLD-RTO: 0 %
PDW BLD AUTO: 10.9 FL (ref 9.4–12.3)
PLATELET # BLD AUTO: 297 K/UL (ref 150–369)
POTASSIUM SERPL-SCNC: 3.4 MEQ/L (ref 3.6–5.1)
PROT SERPL-MCNC: 5.2 G/DL (ref 6–8.2)
RBC # BLD AUTO: 4.34 M/UL (ref 3.93–5.22)
SODIUM SERPL-SCNC: 141 MEQ/L (ref 136–144)
WBC # BLD AUTO: 6.71 K/UL (ref 3.8–10.5)

## 2022-09-20 PROCEDURE — 36415 COLL VENOUS BLD VENIPUNCTURE: CPT | Performed by: HOSPITALIST

## 2022-09-20 PROCEDURE — 85025 COMPLETE CBC W/AUTO DIFF WBC: CPT | Performed by: INTERNAL MEDICINE

## 2022-09-20 PROCEDURE — 63700000 HC SELF-ADMINISTRABLE DRUG: Performed by: INTERNAL MEDICINE

## 2022-09-20 PROCEDURE — 63600000 HC DRUGS/DETAIL CODE: Performed by: HOSPITALIST

## 2022-09-20 PROCEDURE — 63600000 HC DRUGS/DETAIL CODE: Performed by: INTERNAL MEDICINE

## 2022-09-20 PROCEDURE — 85730 THROMBOPLASTIN TIME PARTIAL: CPT | Performed by: HOSPITALIST

## 2022-09-20 PROCEDURE — 25000000 HC PHARMACY GENERAL: Performed by: INTERNAL MEDICINE

## 2022-09-20 PROCEDURE — 80053 COMPREHEN METABOLIC PANEL: CPT | Performed by: INTERNAL MEDICINE

## 2022-09-20 PROCEDURE — 99233 SBSQ HOSP IP/OBS HIGH 50: CPT | Performed by: HOSPITALIST

## 2022-09-20 PROCEDURE — 93005 ELECTROCARDIOGRAM TRACING: CPT | Performed by: HOSPITALIST

## 2022-09-20 PROCEDURE — 25800000 HC PHARMACY IV SOLUTIONS: Performed by: INTERNAL MEDICINE

## 2022-09-20 PROCEDURE — 21400000 HC ROOM AND CARE CCU/INTERMEDIATE

## 2022-09-20 RX ADMIN — SODIUM CHLORIDE, POTASSIUM CHLORIDE, SODIUM LACTATE AND CALCIUM CHLORIDE: 600; 310; 30; 20 INJECTION, SOLUTION INTRAVENOUS at 12:16

## 2022-09-20 RX ADMIN — SODIUM CHLORIDE, POTASSIUM CHLORIDE, SODIUM LACTATE AND CALCIUM CHLORIDE: 600; 310; 30; 20 INJECTION, SOLUTION INTRAVENOUS at 01:47

## 2022-09-20 RX ADMIN — Medication 3 MG: at 22:57

## 2022-09-20 RX ADMIN — HEPARIN SODIUM 1150 UNITS/HR: 10000 INJECTION, SOLUTION INTRAVENOUS at 12:14

## 2022-09-20 RX ADMIN — METRONIDAZOLE 500 MG: 500 INJECTION, SOLUTION INTRAVENOUS at 12:54

## 2022-09-20 RX ADMIN — METRONIDAZOLE 500 MG: 500 INJECTION, SOLUTION INTRAVENOUS at 20:49

## 2022-09-20 RX ADMIN — METRONIDAZOLE 500 MG: 500 INJECTION, SOLUTION INTRAVENOUS at 05:21

## 2022-09-20 RX ADMIN — AMLODIPINE BESYLATE 5 MG: 5 TABLET ORAL at 08:50

## 2022-09-20 RX ADMIN — PANTOPRAZOLE SODIUM 40 MG: 40 INJECTION, POWDER, FOR SOLUTION INTRAVENOUS at 20:48

## 2022-09-20 RX ADMIN — SODIUM CHLORIDE 1 G: 900 INJECTION INTRAVENOUS at 14:43

## 2022-09-20 RX ADMIN — POTASSIUM CHLORIDE 40 MEQ: 750 TABLET, EXTENDED RELEASE ORAL at 17:05

## 2022-09-20 RX ADMIN — METOPROLOL SUCCINATE 50 MG: 50 TABLET, EXTENDED RELEASE ORAL at 08:50

## 2022-09-20 RX ADMIN — MULTIPLE VITAMINS W/ MINERALS TAB 1 TABLET: TAB at 08:50

## 2022-09-20 RX ADMIN — PANTOPRAZOLE SODIUM 40 MG: 40 INJECTION, POWDER, FOR SOLUTION INTRAVENOUS at 08:50

## 2022-09-20 NOTE — PROGRESS NOTES
Hospital Medicine Service -  Daily Progress Note       SUBJECTIVE     Interval History: No acute events overnight.  Denies chest pain, dyspnea, abdominal pain.  Waiting for cholecystectomy today     OBJECTIVE        Vital signs in last 24 hours:  Temp:  [36.6 °C (97.9 °F)-36.8 °C (98.2 °F)] 36.6 °C (97.9 °F)  Heart Rate:  [] 95  Resp:  [18] 18  BP: (135-157)/(65-92) 150/81  I/O last 3 completed shifts:  In: 100 [I.V.:100]  Out: 5100 [Urine:5100]    PHYSICAL EXAMINATION        GEN: well-developed and well-nourished; not in acute distress  HEENT: normocephalic; atraumatic  NECK: no JVD; no bruits  CARDIO: Irregularly irregular rate and rhythm; no murmurs or rubs  RESP: clear to auscultation bilaterally; no rales, rhonchi, or wheezes  ABD: soft, non-distended, non-tender, normal bowel sounds  EXT: no cyanosis, clubbing, or edema  SKIN: clean, dry, warm, and intact  MUSCULOSKELETAL: no injury or deformity  NEURO: alert and oriented x 3; nonfocal  BEHAVIOR/EMOTIONAL: appropriate; cooperative     LABS / IMAGING / TELE        Labs  Lab Results   Component Value Date    WBC 6.71 09/20/2022    HGB 11.8 09/20/2022    HCT 37.9 09/20/2022    MCV 87.3 09/20/2022     09/20/2022     Lab Results   Component Value Date    GLUCOSE 96 09/20/2022    CALCIUM 8.6 (L) 09/20/2022     09/20/2022    K 3.4 (L) 09/20/2022    CO2 24 09/20/2022     09/20/2022    BUN 9 09/20/2022    CREATININE 0.9 09/20/2022     Lab Results   Component Value Date    INR 1.2 09/19/2022    INR 1.5 09/16/2022    INR 1.4 07/29/2022       Imaging  X-RAY CHEST 2 VIEWS    Result Date: 9/19/2022  IMPRESSION: 1. Stable cardiomegaly. 2. Increased right but improved left basilar pleural fluid and hypoventilatory change and no airspace disease bilaterally. COMMENT:    AP and lateral erect chest radiographs are compared to a prior AP only study dated 7/27/2022. A left-sided dual-lead PCM is again noted. The cardiomediastinal contour shows stable  cardiomegaly, again noting mural calcifications in the aortic arch. The lungs show increased right but improved left basilar pleural fluid and hypoventilatory change and no airspace disease bilaterally. The osseous thorax and surrounding soft tissues again show prior bilateral partial shoulder arthroplasties and midthoracic advanced spondylosis.    MRI ABDOMEN WITHOUT CONTRAST MRCP    Result Date: 9/19/2022  IMPRESSION: Limited study. Gallstones. No evidence of choledocholithiasis. Interval development of small right pleural effusion and bibasilar atelectasis. COMMENT: Multiplanar MRI of the abdomen was performed without intravenous contrast. An MRCP was performed utilizing T2-weighted sequences. Three-dimensional images were reconstructed on the technologist workstation and the source and MIP images were reviewed. Unfortunately, the study is limited by motion. Comparison is made to a CTA of the abdomen dated 09/16/2022. There is a new small right pleural effusion and bibasilar atelectasis. Moderate size hiatal hernia is again noted. The liver is normal in size measuring 16 cm in length. There is no signal loss on the opposed phase or in phase sequences to suggest hepatic steatosis or iron deposition, respectively. No focal hepatic mass is identified. Several low T2 signal filling defects are again seen in the gallbladder lumen, presumably gallstones, measuring up to 1.3 cm. The gallbladder appears otherwise unremarkable. The common bile duct is normal in caliber measuring 5 mm. No intraluminal filling defects or strictures are seen. No intrahepatic biliary dilatation is identified. The pancreatic duct is not well seen but does not appear enlarged. The spleen, pancreas, and adrenal glands appear unremarkable. Multiple right renal cysts are again noted measuring up to 2.7 cm. The left kidney appears unremarkable. No upper abdominal lymphadenopathy or free fluid is seen. No grossly abnormal bowel loops are identified.  The signal in the osseous structures is grossly unremarkable.    CT ANGIOGRAPHY CHEST/ABDOMEN/PELVIS WITH AND WITHOUT IV CONTRAST    Addendum Date: 9/16/2022    Please note that there is severe stenosis at the junction of the left subclavian and left brachiocephalic vein as it courses between the left anterior first rib and left clavicle, with subsequent marked left upper extremity as well as partially imaged left lower neck and left chest wall venous collaterals, with the stenosis likely sequela of chronic left chest wall pacer leads compounded by left arm abduction.    Result Date: 9/16/2022  IMPRESSION: 1. No aortic aneurysm or dissection as clinically questioned. 2. Acute pancreatitis without evidence of pancreatic necrosis or collection. COMMENT: CHEST LUNGS AND AIRWAYS: Mild bibasilar subsegmental atelectasis without consolidation, pneumothorax, or mass. The visualized airways are patent. PLEURA: no effusion. VESSELS: Mild enlargement of the main pulmonary artery measuring up to 3.4 cm in diameter, which may be seen with pulmonary hypertension in the appropriate clinical setting. No thoracic aortic aneurysm or dissection, noting diffuse atherosclerotic calcifications/changes throughout the aorta and coronary arteries. HEART: Cardiomegaly with extensive mitral annular calcification again noted. No pericardial effusion. MEDIASTINUM AND BRENNEN: within normal limits, no enlarged lymph nodes. CHEST WALL AND LOWER NECK: Dual-lead left chest wall pacer with leads in the right atrium and right ventricle. ABDOMEN: LIVER: normal morphology without suspicious mass. BILE DUCTS: normal caliber. GALLBLADDER: Noncalcified cholelithiasis. No evidence of acute cholecystitis. PANCREAS: Diffuse peripancreatic stranding with slightly heterogeneous parenchymal perfusion/enhancement, compatible with acute interstitial pancreatitis. No evidence of pancreatic necrosis. SPLEEN: within normal limits. ADRENALS: within normal limits.  KIDNEYS: A few right-sided cysts measuring up to 2.8 cm. PELVIS: Evaluation limited by right hip arthroplasty streak artifact. REPRODUCTIVE ORGANS: no pelvic masses. URETERS: within normal limits. BLADDER: within normal limits. BOWEL/PERITONEUM: Bowel demonstrates normal caliber without evidence of obstruction. Sigmoid and descending colonic diverticulosis without evidence of acute diverticulitis. Normal appendix. Large hiatal hernia again noted resulting in partial intrathoracic stomach, unchanged. No enlarged mesenteric lymph nodes. No ascites or free air, no fluid collection. VESSELS: atherosclerotic changes, without abdominal aortic aneurysm or dissection. There is moderate focal proximal left common iliac artery stenosis due to prominent noncalcified atheromatous plaque. RETROPERITONEUM: no enlarged retroperitoneal or pelvic nodes. ABDOMINAL WALL: intact. BONES: degenerative changes, no suspicious lytic or blastic lesions. Partially imaged right hip arthroplasty with associated streak artifact. Bilateral shoulder arthroplasties noted.       ECG/Telemetry  I have independently reviewed the telemetry. No events for the last 24 hours.    ASSESSMENT AND PLAN      * Acute pancreatitis  Assessment & Plan  Likely gallstone pancreatitis  Presents with sudden onset of abdominal pain, nausea and vomiting admitted for acute pancreatitis.   Initial LFTs elevated with mixed hepatic injury and cholestatic picture, lipase 1436  Initial chest, abdomen, pelvis CTA with no aortic aneurysm or dissection as clinically questioned.. Acute pancreatitis without evidence of pancreatic necrosis or collection. Imaging notable for cholelithiasis, however no tigist choledocholithiasis or biliary duct dilation. Denies any alcohol use  Triglycerides relatively stable  Sxs improving, LFTs downtrending    Plan:  MRI Abdomen shows gallstones no evidence of choledocholithiasis  LR dec 100cc/hr given hypoxia noted and signs of fluid overload. Will  check CXR for further evaluation  Cont w/lidoderm patch and cont w/morphine IV 2mg q3h PRN for pain management. Avoiding Toradol for now given DENNY  Scheduled for cholecystectomy by Dr. Wilson today  Resume Plavix after surgery, if ok with   Patient seen by cardiology Dr. Beck and acceptable to proceed with OR today secondary to risk of having recurrent gallstone pancreatitis and complications    Acute kidney injury superimposed on CKD (CMS/[facility])  Assessment & Plan  Initial Cr 1.5, baseline ~1.2  Likely pre-renal in the setting of acute pancreatitis and n/v    Plan:  Check ua  Will initiate IVFs w/LR at 200cc/hr  Avoid nephrotoxins  Monitor bmp    GERD (gastroesophageal reflux disease)  Assessment & Plan  Will place on PPI BID as stated above    Hyperlipidemia  Assessment & Plan  Hold statin for now given transaminitis  Repeat lipid panel as stated above    Hypoxia  Assessment & Plan  Patient with inc hypoxia, likely related to volume overload    Plan:  Will reduce IVF administration  Check cxr  Cont w/O2 via NC PRN for goal sats >92%    PAD (peripheral artery disease) (CMS/[facility])  Assessment & Plan  C/A/P CTA w/severe stenosis at the junction of the left subclavian and left brachiocephalic vein as it courses between the left anterior first rib  and left clavicle, with subsequent marked left upper extremity as well as  partially imaged left lower neck and left chest wall venous collaterals, with  the stenosis likely sequela of chronic left chest wall pacer leads compounded by  left arm abduction.  Currently denies any significant LUE pain    Plan:  Resume meds after surgery  Resume outpt f/u with Cardiology for continued monitoring    Transaminitis  Assessment & Plan  Initial labs notable for transaminitis with both hepatic injury and cholestatic picture however, abnormalities primarily suggestive of hepatic injury.  While choledocholithiasis could be the cuplrit given primary evidence of hepatic injury ?  whether amiodarone also may be contributing    Plan:  Cont w/evaluation of pancreatitis/Abd MRI/MRCP as stated above  Hold statin, Amiodarone  Cont w/IVFs as stated  Monitor cmp    Atrial fibrillation, unspecified type (CMS/HCC)  Assessment & Plan  H/o new onset atrial fibrillation with SSS/pauses s/p PPM 7/21/22  EKG at with ? AV junctional rhythm and ?PPM malfunction, rate controlled  Cont w/outpt regimen of metoprolol. Amiodarone held secondary to transaminitis  Would restart in a.m. contributed to acute pancreatitis. Will consult cards for further input  Monitor electrolytes and replete as required for K>4, Mg>2  Eliquis on hold in case of possible ERCP. Cont heparin gtt for now per cardiology  Cardiology consulted. Will f/u any additional recs    Sick sinus syndrome (CMS/HCC)  Assessment & Plan  S/p PPM placement 7/2022  Monitor electrolytes and replete as required for potassium greater than 4, magnesium greater than 2  Cardiology following     CAD (coronary artery disease)  Assessment & Plan   history of coronary disease (PCI with NIKITA to prox RCA and prox LCx [p/op developed ST elevations inferiorly- thrombotic occlusion RCA s/p repeat angioplasty),  Presents with atypical chest pain as stated, although EKG w/no acute st/t wave changes and HS troponin wnl x 1. Suspect sxs likely related to acute pancreatitis    Plan:  Monitor on telemetry and trend HS tropnins  Hold Plavix and Eliquis for cholecystectomy today.  Restart Plavix tonight if cleared by surgery  Hold statin given transaminitis  Cont w/metoprolol  Cardiology  Following.  Patient with acceptable risk to proceed with cholecystectomy today per cardiology       VTE Assessment: Pad[place]    Code Status: Full Code  Estimated discharge date: 9/19/2022     Gayla Soares MD  9/20/2022  10:19 AM

## 2022-09-20 NOTE — NURSING NOTE
S/W Qi in OR, pt waiting to be cleared by Cardiology. OR will call nurse to update when they have any further information.

## 2022-09-20 NOTE — PROGRESS NOTES
Due to OR emergency utilization, [facility]aird for this pt is rescheduled for tomorrow at noon. Will restart heparin, no bolus, and hold it again at 5 am 9/21. Clears now, npo p MN. Discussed with nurse who will notify pt.

## 2022-09-20 NOTE — PLAN OF CARE
Plan of Care Review  Plan of Care Reviewed With: patient  Progress: no change    Pt admitted for acute pancreatitis. MRCP done yesterday + gallstones. No c/o pain overnight, only mild discomfort in RUQ with coughing. Heparin gtt was running at 11.5ml/hr, stopped shortly after midnight in preparation for procedure. Continues with IV Flagyl, Rocephin. NPO after midnight for cholecystectomy today. Call bell in reach, bed alarm set, WCTM.

## 2022-09-20 NOTE — PROGRESS NOTES
Per workup/evaluation over last 2 days, resolved gallstone pancreatitis, known cholelithiasis, pt desires lap oli this admission, therefore arranged for this am. Heparin held. However, not seen by cardiology yesterday. Awaiting stat cardiology reeval now for OR this am. Cardiology reconsulted. Discusseed with pt. And nurses, as well as OR. Cardiology called.

## 2022-09-20 NOTE — PLAN OF CARE
Plan of Care Review  Plan of Care Reviewed With: patient  Progress: no change  Outcome Summary: Pt VSS. Pt unable to go to OR due to scheduling issues, rescheduled for tomorrow. Heparin gtt restarted per order. Pt and family updated on plan. Ambulated in halls with supervision. No c/o pain. Tolerating PO intake. Call bell in reach, bed alarmed. WCTM.

## 2022-09-20 NOTE — PROGRESS NOTES
Interventional Cardiology Progress Note    Pt seen and examined. Long conversation w pt / family and conversation w Dr. Wilson. Stable cardiac sxs. No stone - does not require ERCP.  Elective laparoscopic cholecystectomy has been recommended.  Per surgical guidelines, should be done within 3 months of acute gallstone pancreatitis.  She is not currently unstable, but it does not seem appropriate for her to wait more than 3 months to get past 6 months from her coronary PCI, so I do recommend electively proceeding.  If this can be done safely, today, while on clopidogrel (held this morning, but can be given postoperatively), then that is appropriate and should be performed.  If it is preferable to be on aspirin to clopidogrel, then a transition could be made on an outpatient basis and the procedure can be scheduled in the next few weeks.  This was discussed with the patient and family and with  and the patient would like to proceed today.  Presently without cardiac symptoms.  She is intermittently in atrial fibrillation, including now with a heart rate around 100 bpm.    REVIEW OF SYSTEMS : No other significant changes to review of systems over the past 24 hours.    Current Facility-Administered Medications   Medication Dose Route Frequency Provider Last Rate Last Admin    alum-mag hydroxide-simeth (MAALOX) 200-200-20 mg/5 mL suspension 30 mL  30 mL oral q4h PRN Qi Alexander DO        [Provider Managed Hold] amiodarone (PACERONE) tablet 200 mg  200 mg oral Daily Qi Alexander DO        amLODIPine (NORVASC) tablet 5 mg  5 mg oral q AM Qi Alexander DO   5 mg at 09/19/22 1041    [Provider Managed Hold] apixaban (ELIQUIS) tablet 5 mg  5 mg oral BID Qi Alexander DO        atropine injection 0.5 mg  0.5 mg intravenous q5 min PRN Qi Alexander DO        cefTRIAXone (ROCEPHIN) IVPB 1 g in 100 mL NSS vial in bag  1 g intravenous q24h INT Kwame Peres MD    Stopped at 09/19/22 1345    [Provider Managed Hold] clopidogreL (PLAVIX) tablet 75 mg  75 mg oral Daily Qi Alexander DO   75 mg at 09/19/22 1317    glucose chewable tablet 16-32 g of dextrose  16-32 g of dextrose oral PRN Qi Alexander DO        Or    dextrose 40 % oral gel 15-30 g of dextrose  15-30 g of dextrose oral PRN Qi Alexander DO        Or    glucagon (GLUCAGEN) injection 1 mg  1 mg intramuscular PRN Qi Alexander DO        Or    dextrose 50 % in water (D50) injection 12.5 g  25 mL intravenous PRN Qi Alexander DO        docusate sodium (COLACE) capsule 100 mg  100 mg oral BID Qi Alexander DO        [Provider Managed Hold] heparin (porcine) bolus from bag 2,650-5,350 Units  40-80 Units/kg (Adjusted) intravenous q6h PRN Gayal Soares MD   2,650 Units at 09/19/22 2216    [Provider Managed Hold] heparin infusion in 0.45%  units/mL  100-4,000 Units/hr intravenous Titrated Gayla Soares MD   Held at 09/20/22 0114    lactated ringer's infusion   intravenous Continuous Qi Alexander  mL/hr at 09/20/22 0147 New Bag at 09/20/22 0147    lidocaine (ASPERCREME) 4 % topical patch 1 patch  1 patch Topical Daily Qi Alexander DO        magnesium sulfate IVPB 1g in 100 mL NSS/D5W/SWFI  1 g intravenous PRN Qi Alexander DO        Or    magnesium sulfate IVPB 2g in 50 mL NSS/D5W/SWFI  2 g intravenous PRN Qi Alexander DO   Stopped at 09/17/22 1210    melatonin ODT 3 mg  3 mg oral Nightly PRN Amber Moore DO   3 mg at 09/19/22 2151    metoprolol succinate XL (TOPROL-XL) 24 hr ER tablet 50 mg  50 mg oral q AM Qi Alexander DO   50 mg at 09/19/22 1040    metroNIDAZOLE in NaCl (iso-os) (FLAGYL) IVPB 500 mg  500 mg intravenous q8h INT Kwame Peres MD   Stopped at 09/20/22 0624    morphine injection 2 mg  2 mg intravenous q3h PRN Qi Alexander DO   2 mg at 09/18/22 0120     multivitamin tablet 1 tablet  1 tablet oral Daily Qi Alexander DO   1 tablet at 09/18/22 0828    nitroglycerin (NITROSTAT) SL tablet 0.4 mg  0.4 mg sublingual q5 min PRN Qi Alexander DO        pantoprazole (PROTONIX) injection 40 mg  40 mg intravenous q12h KRISTEN Qi Alexander DO   40 mg at 09/19/22 2038    potassium chloride (KLOR-CON M) ER tablet (particles/crystals) 20 mEq  20 mEq oral PRN Qi Alexander, DO   20 mEq at 09/18/22 1400    potassium chloride (KLOR-CON M) ER tablet (particles/crystals) 40 mEq  40 mEq oral PRN iQ Alexander, DO   40 mEq at 09/17/22 1129    senna (SENOKOT) tablet 1 tablet  1 tablet oral 2x daily PRN Qi Alexander, DO        trimethobenzamide (TIGAN) injection 200 mg  200 mg intramuscular q8h PRN Qi Alexander DO              Objective   Labs  Creatinine 0.9    ECG   Atrial flutter.  No ischemic ST segment changes.    Telemetry  I have personally reviewed the telemetry tracings.  Atrial fibrillation/atrial flutter    Physical Exam  Temp:  [36.6 °C (97.9 °F)-36.8 °C (98.2 °F)] 36.6 °C (97.9 °F)  Heart Rate:  [] 100  Resp:  [18] 18  BP: (135-157)/(65-92) 150/81    Intake/Output Summary (Last 24 hours) at 9/20/2022 0850  Last data filed at 9/20/2022 0500  Gross per 24 hour   Intake --   Output 2400 ml   Net -2400 ml       General: AAOx3, NAD.  Lungs: CTA bilaterally. No rales or wheezing.  Heart: Irregularly irregular.  Abdomen: Soft. NT/ND. BS positive.  Extremities: Warm, no C/C/E.  Neuro: Moves all 4 extremities. Sensation intact.  Skin: No rash or discoloration.    ASSESSMENT / PLAN:    1.  Preoperative cardiac risk ratification: Estimated risk of major adverse perioperative cardiac complications is low-intermediate.  There are no unstable cardiac symptoms.  The only question here is timing and optimization of medical therapy to manage risk.  Aspirin or Plavix should be given throughout the operative time.  If it is  negligible to distinguish from a surgical standpoint, then it is reasonable to proceed now.  Under ideal circumstances, waiting more than 6 months presents the lowest risk of cardiac complications, but the PCI procedure was more than 3 months ago so risk is acceptable given the opposing risk of waiting having a reoccurrence of gallstone pancreatitis.  This has been discussed extensively with the patient and she is opting today to proceed with surgery.    2.  Paroxysmal atrial relation: In and out of atrial fibrillation.  If she is tachycardic, beta-blocker therapy can be added to keep her heart rate under control.    3.  Anticoagulation therapy: Recommend clopidogrel this evening and then resume Eliquis as soon as is considered safe.

## 2022-09-20 NOTE — CONSULTS
Nutrition Status Classification: Mild nutritional compromise     Clinical Course: Patient is a 77 y.o. female who was admitted on 9/16/2022 with a diagnosis of Abdominal pain, unspecified abdominal location [R10.9]  Atrial fibrillation, unspecified type (CMS/[facility]) [I48.91]  Acute pancreatitis, unspecified complication status, unspecified pancreatitis type [K85.90].   Past Medical History:   Diagnosis Date    Aortic regurgitation     Chronic kidney disease     Coronary artery disease     History of loop recorder     Hypertension     Lipid disorder     Mitral regurgitation     SVT (supraventricular tachycardia) (CMS/[facility])      No past surgical history on file.    Nutrition Interventions/Recommendations:   1. Advance diet as tolerated to CL with goal of low fat        - monitor % meal intake/tolerance       - monitor diet advance (currently day 4 NPO/CL)  2. Monitor and treat labs/lytes, replete prn       - replete K+  3. Monitor meds        - daily MVI with mineral recommended  4. Check weights weekly  5. Diet education discussed with pt and daughter and attached to chart         Dietary Orders   (From admission, onward)             Start     Ordered    09/20/22 0001  NPO Diet  Diet effective midnight         09/19/22 1537                Reason for Assessment  Reason For Assessment: per organizational policy, other (see comments) (NPO/CL x 4 days)    [facility] Nutrition Screen Tool  Has patient lost weight without trying?: 0-->No  If yes,how much weight has been lost?: 0-->Patient has not lost weight  Has patient been eating poorly due to decreased appetite?: 0-->No  MST Nutrition Screen Score: 0    Nutrition/Diet History  Typical Food/Fluid Intake: from home  Diet Prior to Admission: regular  Appetite Prior to Admission: Kkucxxiol-%  Intake (%): 0% (NPO)  Food Allergies: no known food allergies  Factors Affecting Nutritional Intake: altered gastrointestinal function    Physical Findings  Overall Physical  "Appearance: obese, edematous  Last Bowel Movement: 09/19/22  Skin: intact, edema (upper and lower extremities, +1-2)    Last Bowel Movement: 09/19/22    Nutrition Order  Nutrition Order: does not meet nutritional requirements  Nutrition Order Comments: NPO    Anthropometrics  Height: 162.6 cm (5' 4\")           Current Weight  Weight Method: (S) Bed scale  Weight: (S) 84.5 kg (186 lb 4.8 oz)    Ideal Body Weight (IBW)  Ideal Body Weight (IBW) (kg): 55  % Ideal Body Weight: 143.49    Usual Body Weight (UBW)  Weight Loss: no weight change    Body Mass Index (BMI)  BMI (Calculated): 32  BMI Assessment: BMI 30-34.9: obesity grade I                        Labs/Procedures/Meds  Lab Results Reviewed: reviewed, pertinent  Lab Results Comments: K+ 3.4      Results from last 7 days   Lab Units 09/20/22 0522 09/19/22 0415 09/18/22  0140   SODIUM mEQ/L 141 139 135*   POTASSIUM mEQ/L 3.4* 3.5* 3.8   CHLORIDE mEQ/L 107 109 109   CO2 mEQ/L 24 24 21*   BUN mg/dL 9 12 16   CREATININE mg/dL 0.9 0.9 0.9   GLUCOSE mg/dL 96 103* 91   CALCIUM mg/dL 8.6* 8.5* 8.2*      Results from last 7 days   Lab Units 09/20/22 0522 09/19/22 0415 09/18/22  0140   ALK PHOS IU/L 97 100 112   BILIRUBIN TOTAL mg/dL 0.9 0.9 0.7   ALBUMIN g/dL 2.8* 2.8* 2.8*   ALT IU/L 90* 121* 186*   AST IU/L 51* 54* 137*          No results found for: HGBA1C  No results found for: [id number]  Lab Results   Component Value Date    CALCIUM 8.6 (L) 09/20/2022    PHOS 3.3 08/02/2022     Results from last 7 days   Lab Units 09/20/22 0522 09/19/22 0415 09/18/22  0140   WBC K/uL 6.71 7.81 12.23*   HEMOGLOBIN g/dL 11.8 11.6* 10.9*   HEMATOCRIT % 37.9 35.9 34.5*   PLATELETS K/uL 297 264 243           Results from last 7 days   Lab Units 09/16/22  0557   CHOLESTEROL mg/dL 125   TRIGLYCERIDES mg/dL 66   HDL mg/dL 49*   LDL CALC mg/dL 63     Results from last 7 days   Lab Units 09/17/22  0437 09/16/22  0557   MAGNESIUM mg/dL 1.7* 1.6*          Diagnostic " Tests/Procedures  Diagnostic Test/Procedure Reviewed: reviewed, pertinent    Medications  Pertinent Medications Reviewed: reviewed, pertinent  Pertinent Medications Comments: maalox, lactated ringers, MVI   [Provider Managed Hold] amiodarone  200 mg oral Daily    amLODIPine  5 mg oral q AM    [Provider Managed Hold] apixaban  5 mg oral BID    cefTRIAXone  1 g intravenous q24h INT    [Provider Managed Hold] clopidogreL  75 mg oral Daily    docusate sodium  100 mg oral BID    lidocaine  1 patch Topical Daily    metoprolol succinate XL  50 mg oral q AM    metroNIDAZOLE  500 mg intravenous q8h INT    multivitamin  1 tablet oral Daily    pantoprazole  40 mg intravenous q12h KRISTEN      [Provider Managed Hold] heparin infusion - MAR calculator by PTT  100-4,000 Units/hr Stopped (09/20/22 0114)    lactated ringer's   100 mL/hr at 09/20/22 0147                            Weights (last 7 days)     Date/Time Weight    09/16/22 1341 84.5 kg (186 lb 4.8 oz)     09/16/22 0531 78.9 kg (174 lb)            Clinical Comments:  RD screen for NPO/CL x 4 days. Admitted with abdominal pain. Pt with acute pancreatitis (gallstone related) and she is currently NPO for cholecystectomy today. Pt visited, she was on the phone with her daughter at the time who also engaged in conversation. Daughter had diet-related questions for post-op instructions. We discussed a low fat diet and foods to avoid/limit for ongoing improvement in nutrition status. Will attach education to chart as discussed. Pt denies any previous weight loss or changes in appetite/intake pta.  Labs/meds reviewed.           Date: 09/20/22  Signature: LONDON Mane

## 2022-09-20 NOTE — PROGRESS NOTES
GASTROENTEROLOGY DAILY PROGRESS NOTE  [facility]     PATIENT NAME:  Emily Ramirez          YOB: 1945  AGE:  77 y.o.   MRN: [id number]      SUBJECTIVE   Patient seen and examined at bedside.  Abdominal pain improved.  MRCP negative for choledocholithiasis    REVIEW OF SYSTEMS   Systems reviewed and otherwise negative except as documented above.    VITAL SIGNS   Temp:  [36.6 °C (97.9 °F)-36.8 °C (98.2 °F)] 36.6 °C (97.9 °F)  Heart Rate:  [] 95  Resp:  [18] 18  BP: (135-157)/(65-92) 150/81      PHYSICAL EXAM   Physical Exam  General appearance: alert, appears stated age and cooperative  Head: normocephalic  Heart: regular rate and rhythm  Lungs:  non-labored respirations  Abdomen: soft, mild tenderness   Extremities: no LE edema  Neurologic: awake and alert  Skin: no jaundice      IMAGING AND LABS REVIEWED   Labs reviewed    Imaging reviewed  X-RAY CHEST 2 VIEWS    Result Date: 9/19/2022  IMPRESSION: 1. Stable cardiomegaly. 2. Increased right but improved left basilar pleural fluid and hypoventilatory change and no airspace disease bilaterally. COMMENT:    AP and lateral erect chest radiographs are compared to a prior AP only study dated 7/27/2022. A left-sided dual-lead PCM is again noted. The cardiomediastinal contour shows stable cardiomegaly, again noting mural calcifications in the aortic arch. The lungs show increased right but improved left basilar pleural fluid and hypoventilatory change and no airspace disease bilaterally. The osseous thorax and surrounding soft tissues again show prior bilateral partial shoulder arthroplasties and midthoracic advanced spondylosis.    MRI ABDOMEN WITHOUT CONTRAST MRCP    Result Date: 9/19/2022  IMPRESSION: Limited study. Gallstones. No evidence of choledocholithiasis. Interval development of small right pleural effusion and bibasilar atelectasis. COMMENT: Multiplanar MRI of the abdomen was performed without intravenous contrast. An MRCP was performed  utilizing T2-weighted sequences. Three-dimensional images were reconstructed on the technologist workstation and the source and MIP images were reviewed. Unfortunately, the study is limited by motion. Comparison is made to a CTA of the abdomen dated 09/16/2022. There is a new small right pleural effusion and bibasilar atelectasis. Moderate size hiatal hernia is again noted. The liver is normal in size measuring 16 cm in length. There is no signal loss on the opposed phase or in phase sequences to suggest hepatic steatosis or iron deposition, respectively. No focal hepatic mass is identified. Several low T2 signal filling defects are again seen in the gallbladder lumen, presumably gallstones, measuring up to 1.3 cm. The gallbladder appears otherwise unremarkable. The common bile duct is normal in caliber measuring 5 mm. No intraluminal filling defects or strictures are seen. No intrahepatic biliary dilatation is identified. The pancreatic duct is not well seen but does not appear enlarged. The spleen, pancreas, and adrenal glands appear unremarkable. Multiple right renal cysts are again noted measuring up to 2.7 cm. The left kidney appears unremarkable. No upper abdominal lymphadenopathy or free fluid is seen. No grossly abnormal bowel loops are identified. The signal in the osseous structures is grossly unremarkable.    CT ANGIOGRAPHY CHEST/ABDOMEN/PELVIS WITH AND WITHOUT IV CONTRAST    Addendum Date: 9/16/2022    Please note that there is severe stenosis at the junction of the left subclavian and left brachiocephalic vein as it courses between the left anterior first rib and left clavicle, with subsequent marked left upper extremity as well as partially imaged left lower neck and left chest wall venous collaterals, with the stenosis likely sequela of chronic left chest wall pacer leads compounded by left arm abduction.    Result Date: 9/16/2022  IMPRESSION: 1. No aortic aneurysm or dissection as clinically  questioned. 2. Acute pancreatitis without evidence of pancreatic necrosis or collection. COMMENT: CHEST LUNGS AND AIRWAYS: Mild bibasilar subsegmental atelectasis without consolidation, pneumothorax, or mass. The visualized airways are patent. PLEURA: no effusion. VESSELS: Mild enlargement of the main pulmonary artery measuring up to 3.4 cm in diameter, which may be seen with pulmonary hypertension in the appropriate clinical setting. No thoracic aortic aneurysm or dissection, noting diffuse atherosclerotic calcifications/changes throughout the aorta and coronary arteries. HEART: Cardiomegaly with extensive mitral annular calcification again noted. No pericardial effusion. MEDIASTINUM AND BRENNEN: within normal limits, no enlarged lymph nodes. CHEST WALL AND LOWER NECK: Dual-lead left chest wall pacer with leads in the right atrium and right ventricle. ABDOMEN: LIVER: normal morphology without suspicious mass. BILE DUCTS: normal caliber. GALLBLADDER: Noncalcified cholelithiasis. No evidence of acute cholecystitis. PANCREAS: Diffuse peripancreatic stranding with slightly heterogeneous parenchymal perfusion/enhancement, compatible with acute interstitial pancreatitis. No evidence of pancreatic necrosis. SPLEEN: within normal limits. ADRENALS: within normal limits. KIDNEYS: A few right-sided cysts measuring up to 2.8 cm. PELVIS: Evaluation limited by right hip arthroplasty streak artifact. REPRODUCTIVE ORGANS: no pelvic masses. URETERS: within normal limits. BLADDER: within normal limits. BOWEL/PERITONEUM: Bowel demonstrates normal caliber without evidence of obstruction. Sigmoid and descending colonic diverticulosis without evidence of acute diverticulitis. Normal appendix. Large hiatal hernia again noted resulting in partial intrathoracic stomach, unchanged. No enlarged mesenteric lymph nodes. No ascites or free air, no fluid collection. VESSELS: atherosclerotic changes, without abdominal aortic aneurysm or dissection.  There is moderate focal proximal left common iliac artery stenosis due to prominent noncalcified atheromatous plaque. RETROPERITONEUM: no enlarged retroperitoneal or pelvic nodes. ABDOMINAL WALL: intact. BONES: degenerative changes, no suspicious lytic or blastic lesions. Partially imaged right hip arthroplasty with associated streak artifact. Bilateral shoulder arthroplasties noted.       ASSESSMENT/PLAN     1. Acute pancreatitis   Details:  -Patient's LFTs suggestive of gallstone etiology however her imaging does not show dilated ducts.  -Suspect possible passage of gallstone.  MRCP negative for choledocholithiasis  -LFTs continue to improve   Plan:  -Serial abdominal exams  -Pain control  --gen surg following for cholecystectomy  -Given MRCP negative no plans for ERCP at this point in time.  GI will sign off at this time.  Please call with questions.  [facility]           AUTHOR:  Hamlet Recinos DO [facility]  9/20/2022

## 2022-09-21 ENCOUNTER — ANESTHESIA (INPATIENT)
Dept: OPERATING ROOM | Facility: HOSPITAL | Age: 77
DRG: 418 | End: 2022-09-21
Payer: MEDICARE

## 2022-09-21 ENCOUNTER — ANESTHESIA EVENT (INPATIENT)
Dept: OPERATING ROOM | Facility: HOSPITAL | Age: 77
DRG: 418 | End: 2022-09-21
Payer: MEDICARE

## 2022-09-21 LAB
ABO + RH BLD: NORMAL
ALBUMIN SERPL-MCNC: 2.6 G/DL (ref 3.4–5)
ALP SERPL-CCNC: 85 IU/L (ref 35–126)
ALT SERPL-CCNC: 72 IU/L (ref 11–54)
ANION GAP SERPL CALC-SCNC: 8 MEQ/L (ref 3–15)
APTT PPP: 90 SEC (ref 23–35)
AST SERPL-CCNC: 58 IU/L (ref 15–41)
ATRIAL RATE: 326
BASOPHILS # BLD: 0.04 K/UL (ref 0.01–0.1)
BASOPHILS NFR BLD: 0.5 %
BILIRUB SERPL-MCNC: 0.7 MG/DL (ref 0.3–1.2)
BLD GP AB SCN SERPL QL: NEGATIVE
BUN SERPL-MCNC: 11 MG/DL (ref 8–20)
CALCIUM SERPL-MCNC: 8.4 MG/DL (ref 8.9–10.3)
CHLORIDE SERPL-SCNC: 110 MEQ/L (ref 98–109)
CO2 SERPL-SCNC: 22 MEQ/L (ref 22–32)
CREAT SERPL-MCNC: 0.9 MG/DL (ref 0.6–1.1)
D AG BLD QL: POSITIVE
DIFFERENTIAL METHOD BLD: ABNORMAL
EOSINOPHIL # BLD: 0.31 K/UL (ref 0.04–0.36)
EOSINOPHIL NFR BLD: 4.1 %
ERYTHROCYTE [DISTWIDTH] IN BLOOD BY AUTOMATED COUNT: 14.8 % (ref 11.7–14.4)
GFR SERPL CREATININE-BSD FRML MDRD: >60 ML/MIN/1.73M*2
GLUCOSE SERPL-MCNC: 99 MG/DL (ref 70–99)
HCT VFR BLDCO AUTO: 35 % (ref 35–45)
HGB BLD-MCNC: 11.4 G/DL (ref 11.8–15.7)
IMM GRANULOCYTES # BLD AUTO: 0.07 K/UL (ref 0–0.08)
IMM GRANULOCYTES NFR BLD AUTO: 0.9 %
LABORATORY COMMENT REPORT: NORMAL
LYMPHOCYTES # BLD: 1.91 K/UL (ref 1.2–3.5)
LYMPHOCYTES NFR BLD: 25.3 %
MAGNESIUM SERPL-MCNC: 1.5 MG/DL (ref 1.8–2.5)
MCH RBC QN AUTO: 27.8 PG (ref 28–33.2)
MCHC RBC AUTO-ENTMCNC: 32.6 G/DL (ref 32.2–35.5)
MCV RBC AUTO: 85.4 FL (ref 83–98)
MONOCYTES # BLD: 0.69 K/UL (ref 0.28–0.8)
MONOCYTES NFR BLD: 9.2 %
NEUTROPHILS # BLD: 4.52 K/UL (ref 1.7–7)
NEUTS SEG NFR BLD: 60 %
NRBC BLD-RTO: 0 %
PDW BLD AUTO: 11.3 FL (ref 9.4–12.3)
PLATELET # BLD AUTO: 288 K/UL (ref 150–369)
POTASSIUM SERPL-SCNC: 3.6 MEQ/L (ref 3.6–5.1)
PROT SERPL-MCNC: 4.9 G/DL (ref 6–8.2)
QRS DURATION: 86
QT INTERVAL: 390
QTC CALCULATION(BAZETT): 525
R AXIS: 1
RBC # BLD AUTO: 4.1 M/UL (ref 3.93–5.22)
SODIUM SERPL-SCNC: 140 MEQ/L (ref 136–144)
SPECIMEN EXP DATE BLD: NORMAL
T WAVE AXIS: -31
VENTRICULAR RATE: 109
WBC # BLD AUTO: 7.54 K/UL (ref 3.8–10.5)

## 2022-09-21 PROCEDURE — 85025 COMPLETE CBC W/AUTO DIFF WBC: CPT | Performed by: INTERNAL MEDICINE

## 2022-09-21 PROCEDURE — 63600000 HC DRUGS/DETAIL CODE: Performed by: ANESTHESIOLOGY

## 2022-09-21 PROCEDURE — 36415 COLL VENOUS BLD VENIPUNCTURE: CPT | Performed by: HOSPITALIST

## 2022-09-21 PROCEDURE — 25000000 HC PHARMACY GENERAL: Performed by: INTERNAL MEDICINE

## 2022-09-21 PROCEDURE — 27200000 HC STERILE SUPPLY: Performed by: SURGERY

## 2022-09-21 PROCEDURE — 86850 RBC ANTIBODY SCREEN: CPT

## 2022-09-21 PROCEDURE — A4648 IMPLANTABLE TISSUE MARKER: HCPCS | Performed by: SURGERY

## 2022-09-21 PROCEDURE — 36000014 HC OR LEVEL 4 EA ADDL MIN: Performed by: SURGERY

## 2022-09-21 PROCEDURE — 25000000 HC PHARMACY GENERAL: Performed by: SURGERY

## 2022-09-21 PROCEDURE — 63600000 HC DRUGS/DETAIL CODE: Performed by: SURGERY

## 2022-09-21 PROCEDURE — 0FT44ZZ RESECTION OF GALLBLADDER, PERCUTANEOUS ENDOSCOPIC APPROACH: ICD-10-PCS | Performed by: SURGERY

## 2022-09-21 PROCEDURE — 37000001 HC ANESTHESIA GENERAL: Performed by: SURGERY

## 2022-09-21 PROCEDURE — 63700000 HC SELF-ADMINISTRABLE DRUG: Performed by: INTERNAL MEDICINE

## 2022-09-21 PROCEDURE — 97116 GAIT TRAINING THERAPY: CPT | Mod: GP

## 2022-09-21 PROCEDURE — 83735 ASSAY OF MAGNESIUM: CPT | Performed by: HOSPITALIST

## 2022-09-21 PROCEDURE — 85730 THROMBOPLASTIN TIME PARTIAL: CPT | Performed by: HOSPITALIST

## 2022-09-21 PROCEDURE — 21400000 HC ROOM AND CARE CCU/INTERMEDIATE

## 2022-09-21 PROCEDURE — 71000001 HC PACU PHASE 1 INITIAL 30MIN: Performed by: SURGERY

## 2022-09-21 PROCEDURE — 86900 BLOOD TYPING SEROLOGIC ABO: CPT

## 2022-09-21 PROCEDURE — 25800000 HC PHARMACY IV SOLUTIONS: Performed by: SURGERY

## 2022-09-21 PROCEDURE — 86901 BLOOD TYPING SEROLOGIC RH(D): CPT

## 2022-09-21 PROCEDURE — 25000000 HC PHARMACY GENERAL: Performed by: ANESTHESIOLOGY

## 2022-09-21 PROCEDURE — 36000004 HC OR LEVEL 4 INITIAL 30MIN: Performed by: SURGERY

## 2022-09-21 PROCEDURE — 88304 TISSUE EXAM BY PATHOLOGIST: CPT | Performed by: SURGERY

## 2022-09-21 PROCEDURE — 63700000 HC SELF-ADMINISTRABLE DRUG: Performed by: SURGERY

## 2022-09-21 PROCEDURE — 80053 COMPREHEN METABOLIC PANEL: CPT | Performed by: INTERNAL MEDICINE

## 2022-09-21 PROCEDURE — 63600000 HC DRUGS/DETAIL CODE: Performed by: INTERNAL MEDICINE

## 2022-09-21 PROCEDURE — 99233 SBSQ HOSP IP/OBS HIGH 50: CPT | Performed by: HOSPITALIST

## 2022-09-21 PROCEDURE — 63600000 HC DRUGS/DETAIL CODE: Performed by: HOSPITALIST

## 2022-09-21 PROCEDURE — 71000011 HC PACU PHASE 1 EA ADDL MIN: Performed by: SURGERY

## 2022-09-21 RX ORDER — ACETAMINOPHEN 325 MG/1
650 TABLET ORAL ONCE AS NEEDED
Status: DISCONTINUED | OUTPATIENT
Start: 2022-09-21 | End: 2022-09-21 | Stop reason: HOSPADM

## 2022-09-21 RX ORDER — DEXTROSE 50 % IN WATER (D50W) INTRAVENOUS SYRINGE
25 AS NEEDED
Status: DISCONTINUED | OUTPATIENT
Start: 2022-09-21 | End: 2022-09-21 | Stop reason: HOSPADM

## 2022-09-21 RX ORDER — PROPOFOL 10 MG/ML
INJECTION, EMULSION INTRAVENOUS AS NEEDED
Status: DISCONTINUED | OUTPATIENT
Start: 2022-09-21 | End: 2022-09-21 | Stop reason: SURG

## 2022-09-21 RX ORDER — ETOMIDATE 2 MG/ML
INJECTION INTRAVENOUS AS NEEDED
Status: DISCONTINUED | OUTPATIENT
Start: 2022-09-21 | End: 2022-09-21 | Stop reason: SURG

## 2022-09-21 RX ORDER — DEXAMETHASONE SODIUM PHOSPHATE 4 MG/ML
INJECTION, SOLUTION INTRA-ARTICULAR; INTRALESIONAL; INTRAMUSCULAR; INTRAVENOUS; SOFT TISSUE AS NEEDED
Status: DISCONTINUED | OUTPATIENT
Start: 2022-09-21 | End: 2022-09-21 | Stop reason: SURG

## 2022-09-21 RX ORDER — LIDOCAINE HCL/PF 100 MG/5ML
SYRINGE (ML) INTRAVENOUS AS NEEDED
Status: DISCONTINUED | OUTPATIENT
Start: 2022-09-21 | End: 2022-09-21 | Stop reason: SURG

## 2022-09-21 RX ORDER — FENTANYL CITRATE 50 UG/ML
INJECTION, SOLUTION INTRAMUSCULAR; INTRAVENOUS AS NEEDED
Status: DISCONTINUED | OUTPATIENT
Start: 2022-09-21 | End: 2022-09-21 | Stop reason: SURG

## 2022-09-21 RX ORDER — ONDANSETRON HYDROCHLORIDE 2 MG/ML
4 INJECTION, SOLUTION INTRAVENOUS
Status: DISCONTINUED | OUTPATIENT
Start: 2022-09-21 | End: 2022-09-21

## 2022-09-21 RX ORDER — ROCURONIUM BROMIDE 10 MG/ML
INJECTION, SOLUTION INTRAVENOUS AS NEEDED
Status: DISCONTINUED | OUTPATIENT
Start: 2022-09-21 | End: 2022-09-21 | Stop reason: SURG

## 2022-09-21 RX ORDER — IBUPROFEN 200 MG
16-32 TABLET ORAL AS NEEDED
Status: DISCONTINUED | OUTPATIENT
Start: 2022-09-21 | End: 2022-09-21 | Stop reason: HOSPADM

## 2022-09-21 RX ORDER — DEXTROSE 40 %
15-30 GEL (GRAM) ORAL AS NEEDED
Status: DISCONTINUED | OUTPATIENT
Start: 2022-09-21 | End: 2022-09-21 | Stop reason: HOSPADM

## 2022-09-21 RX ORDER — OXYCODONE HYDROCHLORIDE 5 MG/1
5 TABLET ORAL ONCE AS NEEDED
Status: DISCONTINUED | OUTPATIENT
Start: 2022-09-21 | End: 2022-09-21 | Stop reason: HOSPADM

## 2022-09-21 RX ORDER — HYDROMORPHONE HYDROCHLORIDE 1 MG/ML
0.25 INJECTION, SOLUTION INTRAMUSCULAR; INTRAVENOUS; SUBCUTANEOUS EVERY 4 HOURS PRN
Status: DISCONTINUED | OUTPATIENT
Start: 2022-09-21 | End: 2022-09-25 | Stop reason: HOSPADM

## 2022-09-21 RX ORDER — OXYCODONE HYDROCHLORIDE 5 MG/1
5 TABLET ORAL EVERY 4 HOURS PRN
Status: DISCONTINUED | OUTPATIENT
Start: 2022-09-21 | End: 2022-09-25 | Stop reason: HOSPADM

## 2022-09-21 RX ORDER — FENTANYL CITRATE 50 UG/ML
50 INJECTION, SOLUTION INTRAMUSCULAR; INTRAVENOUS
Status: DISCONTINUED | OUTPATIENT
Start: 2022-09-21 | End: 2022-09-21 | Stop reason: HOSPADM

## 2022-09-21 RX ORDER — ACETAMINOPHEN 325 MG/1
650 TABLET ORAL EVERY 4 HOURS PRN
Status: DISCONTINUED | OUTPATIENT
Start: 2022-09-21 | End: 2022-09-25 | Stop reason: HOSPADM

## 2022-09-21 RX ORDER — HYDROMORPHONE HYDROCHLORIDE 1 MG/ML
0.5 INJECTION, SOLUTION INTRAMUSCULAR; INTRAVENOUS; SUBCUTANEOUS
Status: DISCONTINUED | OUTPATIENT
Start: 2022-09-21 | End: 2022-09-21 | Stop reason: HOSPADM

## 2022-09-21 RX ORDER — BUPIVACAINE HYDROCHLORIDE AND EPINEPHRINE 5; 5 MG/ML; UG/ML
INJECTION, SOLUTION EPIDURAL; INTRACAUDAL; PERINEURAL
Status: DISCONTINUED | OUTPATIENT
Start: 2022-09-21 | End: 2022-09-21 | Stop reason: HOSPADM

## 2022-09-21 RX ORDER — ONDANSETRON HYDROCHLORIDE 2 MG/ML
INJECTION, SOLUTION INTRAVENOUS AS NEEDED
Status: DISCONTINUED | OUTPATIENT
Start: 2022-09-21 | End: 2022-09-21 | Stop reason: SURG

## 2022-09-21 RX ORDER — HYDRALAZINE HYDROCHLORIDE 20 MG/ML
10 INJECTION INTRAMUSCULAR; INTRAVENOUS EVERY 6 HOURS PRN
Status: DISCONTINUED | OUTPATIENT
Start: 2022-09-21 | End: 2022-09-25 | Stop reason: HOSPADM

## 2022-09-21 RX ORDER — METRONIDAZOLE 500 MG/100ML
500 INJECTION, SOLUTION INTRAVENOUS
Status: COMPLETED | OUTPATIENT
Start: 2022-09-21 | End: 2022-09-22

## 2022-09-21 RX ADMIN — SODIUM CHLORIDE, POTASSIUM CHLORIDE, SODIUM LACTATE AND CALCIUM CHLORIDE: 600; 310; 30; 20 INJECTION, SOLUTION INTRAVENOUS at 01:00

## 2022-09-21 RX ADMIN — FENTANYL CITRATE 50 MCG: 50 INJECTION INTRAMUSCULAR; INTRAVENOUS at 12:22

## 2022-09-21 RX ADMIN — SODIUM CHLORIDE 1 G: 900 INJECTION INTRAVENOUS at 18:24

## 2022-09-21 RX ADMIN — METRONIDAZOLE 500 MG: 500 INJECTION, SOLUTION INTRAVENOUS at 22:26

## 2022-09-21 RX ADMIN — PANTOPRAZOLE SODIUM 40 MG: 40 INJECTION, POWDER, FOR SOLUTION INTRAVENOUS at 08:45

## 2022-09-21 RX ADMIN — DEXAMETHASONE SODIUM PHOSPHATE 4 MG: 4 INJECTION, SOLUTION INTRA-ARTICULAR; INTRALESIONAL; INTRAMUSCULAR; INTRAVENOUS; SOFT TISSUE at 12:44

## 2022-09-21 RX ADMIN — ONDANSETRON 4 MG: 2 INJECTION INTRAMUSCULAR; INTRAVENOUS at 12:59

## 2022-09-21 RX ADMIN — ETOMIDATE 8 MG: 2 INJECTION, SOLUTION INTRAVENOUS at 12:22

## 2022-09-21 RX ADMIN — FENTANYL CITRATE 50 MCG: 50 INJECTION, SOLUTION INTRAMUSCULAR; INTRAVENOUS at 15:00

## 2022-09-21 RX ADMIN — SUGAMMADEX 200 MG: 100 INJECTION, SOLUTION INTRAVENOUS at 13:23

## 2022-09-21 RX ADMIN — ROCURONIUM BROMIDE 50 MG: 10 INJECTION INTRAVENOUS at 12:22

## 2022-09-21 RX ADMIN — LIDOCAINE HYDROCHLORIDE 80 MG: 20 INJECTION, SOLUTION INTRAVENOUS at 12:22

## 2022-09-21 RX ADMIN — HYDRALAZINE HYDROCHLORIDE 10 MG: 20 INJECTION INTRAMUSCULAR; INTRAVENOUS at 18:21

## 2022-09-21 RX ADMIN — MULTIPLE VITAMINS W/ MINERALS TAB 1 TABLET: TAB at 08:44

## 2022-09-21 RX ADMIN — FENTANYL CITRATE 50 MCG: 50 INJECTION, SOLUTION INTRAMUSCULAR; INTRAVENOUS at 14:15

## 2022-09-21 RX ADMIN — METOPROLOL SUCCINATE 50 MG: 50 TABLET, EXTENDED RELEASE ORAL at 08:44

## 2022-09-21 RX ADMIN — AMLODIPINE BESYLATE 5 MG: 5 TABLET ORAL at 08:44

## 2022-09-21 RX ADMIN — ACETAMINOPHEN 650 MG: 325 TABLET ORAL at 18:20

## 2022-09-21 RX ADMIN — POTASSIUM CHLORIDE 20 MEQ: 750 TABLET, EXTENDED RELEASE ORAL at 08:45

## 2022-09-21 RX ADMIN — METRONIDAZOLE 500 MG: 500 INJECTION, SOLUTION INTRAVENOUS at 12:30

## 2022-09-21 RX ADMIN — OXYCODONE HYDROCHLORIDE 5 MG: 5 TABLET ORAL at 20:30

## 2022-09-21 RX ADMIN — METRONIDAZOLE 500 MG: 500 INJECTION, SOLUTION INTRAVENOUS at 05:23

## 2022-09-21 RX ADMIN — PROPOFOL 100 MG: 10 INJECTION, EMULSION INTRAVENOUS at 12:22

## 2022-09-21 RX ADMIN — PANTOPRAZOLE SODIUM 40 MG: 40 INJECTION, POWDER, FOR SOLUTION INTRAVENOUS at 22:26

## 2022-09-21 ASSESSMENT — COGNITIVE AND FUNCTIONAL STATUS - GENERAL
CLIMB 3 TO 5 STEPS WITH RAILING: 3 - A LITTLE
STANDING UP FROM CHAIR USING ARMS: 4 - NONE
WALKING IN HOSPITAL ROOM: 3 - A LITTLE
MOVING TO AND FROM BED TO CHAIR: 4 - NONE

## 2022-09-21 NOTE — OP NOTE
Procedure: CHOLECYSTECTOMY LAPAROSCOPIC      Date of Surgery: 9/21/2022    Indications:   The patient is a 77 year old woman who was admitted to the hospital several days ago with gallstone pancreatitis. MRCP was negative for residual common duct stone, which was presumed to have passed. She is currently off her anticoagulation and antiplatelet agents and is brought to the operating room at this time for planned laparoscopic cholecystectomy.     Pre-Op Diagnosis:  Pre-op Diagnosis     * Gallbladder calculus without cholecystitis and no obstruction [K80.20]    Post-Op Diagnosis:  Post-op Diagnosis     * Gallbladder calculus without cholecystitis and no obstruction [K80.20]    Surgeon: Qi Valdviia MD    Assistant: MAVIS Lemus    Anesthesia: General endotracheal anesthesia and Local anesthesia 0.25.% bupivacaine    Estimated Blood Loss:  No blood loss documented.    Urine Output: N/A           Specimens:   ID Type Source Tests Collected by Time Destination   1 : Gallbladder Tissue Gallbladder PATHOLOGY TISSUE EXAM Qi Valdivia MD 9/21/2022 1259        Findings: Liver largely extrahepatic. Gallbladder without obvious acute inflammation. No ascites. Relatively wide cystic duct. Deep, fibrotic appearing lobulations between left and right halves of liver and segments 7/8 and 5/6 of liver (of unlikely clinical significance-- anatomic variant).            Drains/Packing: none    Implants: * No implants in log *    Complications: None immediate.           Condition: stable    Disposition: PACU - guarded condition.    Procedure In Detail  At the time of the procedure, the patient was brought to the operating room and correctly identified by me, the anesthesia staff, and operating room team.  She was placed on operating table in supine position.  She was given general anesthesia and intubated without incident. Her abdomen was then prepped and draped in usual sterile fashion.  The infraumbilical fold was  infiltrated with local anesthetic.  It was grasped with 2 penetrating towel clips and elevated.  An incision was made using an 11 blade scalpel.  S retractors x 2 were used to dissect down through the subcutaneous tissue to the level of the fascia. The fascia was grasped, elevated, and sharply entered, as was the underlying peritoneum. 0 Vicryl stay sutures were placed through the edges of the fascia and used to secure a Cristobal trocar in position.  Pneumoperitoneum was then established to a pressure of 15 mmHg maintained throughout the case. A 30° 5 mm laparoscope was advanced into the peritoneal cavity under laparoscopic visualization.  Laparoscopic inspection showed no injury from initial trocar placement.  The patient was placed in reverse Trendelenburg position with right side up.  In the right upper quadrant, the liver was noted to have deep lobulations between some of the liver segments, which had a somewhat fibrotic appearance. The gallbladder was not immediately visible.  Secondary trochars were placed under laparoscopic visualization after anesthetizing the abdominal wall.  5 mm trocars were placed in the midline epigastrium, the right midclavicular line, and the right anterior axillary line. The right upper quadrant was explored.  The gallbladder was immediately identified and was noted to be largely extrahepatic, nondistended, noninflamed.  It had no omental adhesions to it.  The gallbladder fundus was grasped and elevated over the dome of the liver.  The gallbladder infundibulum was grasped and retracted towards the patient's right lower quadrant.  The peritoneum overlying the cystic duct and artery was opened and the cystic duct and artery were individually identified, circumferentially dissected, clipped proximally and distally and sharply divided. The cystic duct was noted to be relatively wide, but amenable to closure with the clip applier. Laparoscopic palpation did not reveal any stones in the  cystic duct. The remaining gallbladder attachments to the liver were divided using electrocautery.  The gallbladder was placed within a specimen retrieval bag and extracted out through the umbilical trocar site without any dilation of the fascia necessary to accommodate specimen removal.  Reinspection of the right upper quadrant showed very small oozing from a few sites, which were made hemostatic with application of electrocautery. Due to the patient's need to resume anticoagulation and antiplatelet therapy soon after surgery, Floseal was applied to the gallbladder fossa, as well.  The patient was returned to supine position.  Secondary trochars were removed under laparoscopic visualization and sites monitored for bleeding as trochars were removed.  The epigastric incision was noted to be bleeding and so trochars were replaced and insufflation reestablished.  Further inspection of the site showed continued small bleeding.  A Ankush-Seth instrument was used to pass a 0 Vicryl suture through the abdominal wall.  The electrocautery on hook was also passed through the incision and under laparoscopic visualization used to cauterize the edges of the muscle tissue that appeared to be bleeding.  There was good hemostasis with this, which was further reinforced with tying down of the transfascial suture, after desufflation. The last remaining trocar at the umbilicus was used to vent as much pneumoperitoneum as possible and was then removed.  0 Vicryl sutures were used to close the umbilical trocar site of the level of the fascia in a figure-of-eight fashion x2.  Deep dermis was approximated interrupted 3-0 Vicryl suture.  The skin edges were closed with 4-0 Monocryl subcuticular stitch.  Mastisol, Steri-Strips, and sterile dressings were applied.  The patient was awakened from anesthesia and extubated.  She was transferred to the recovery room in stable condition.  All needle, sponge, and instrument counts were correct  x2 at the end of the procedure.           Qi Valdivia MD  9/21/2022  1:31 PM

## 2022-09-21 NOTE — PLAN OF CARE
Problem: Adult Inpatient Plan of Care  Goal: Plan of Care Review  Outcome: Not progressing  Flowsheets (Taken 9/21/2022 0737)  Progress: improving  Plan of Care Reviewed With: patient   Plan of Care Review  Plan of Care Reviewed With: patient  Progress: improving    Pt has no c/o of GI pain/discomfort. NPO at midnight for scheduled lap/coly today. Heparin gtt stopped at 0500. LR running a 100. IV abx given. VSS. Fall bundle intact.

## 2022-09-21 NOTE — PROGRESS NOTES
General Surgery Daily Progress Note    Subjective     No pain. Hungry.       Objective     Vital signs in last 24 hours:  Temp:  [36.3 °C (97.3 °F)-37.1 °C (98.8 °F)] 36.7 °C (98 °F)  Heart Rate:  [60-78] 60  Resp:  [16-18] 16  BP: (135-176)/(64-77) 135/70      Intake/Output Summary (Last 24 hours) at 9/21/2022 1125  Last data filed at 9/20/2022 2152  Gross per 24 hour   Intake 1550 ml   Output 2425 ml   Net -875 ml     Intake/Output this shift:  No intake/output data recorded.    Physical Exam    General appearance: Awake, alert, in no acute distress or obvious discomfort  Neurologic: Alert, cooperative. Coherent speech.  Lungs: Respirations nonlabored, no tachypnea.   Abdomen: Soft, nontender nondistended.   Tubes/Lines/Drains: Wick catheter. PIV.   Extremities: No cyanosis, edema.       Labs  CBC Results       09/21/22 09/20/22 09/19/22     0413 0522 0415    WBC 7.54 6.71 7.81    RBC 4.10 4.34 4.20    HGB 11.4 11.8 11.6    HCT 35.0 37.9 35.9    MCV 85.4 87.3 85.5    MCH 27.8 27.2 27.6    MCHC 32.6 31.1 32.3     297 264        BMP Results       09/21/22 09/20/22 09/19/22     0413 0522 0415     141 139    K 3.6 3.4 3.5    Cl 110 107 109    CO2 22 24 24    Glucose 99 96 103    BUN 11 9 12    Creatinine 0.9 0.9 0.9    Calcium 8.4 8.6 8.5    Anion Gap 8 10 6    EGFR >60.0 >60.0 >60.0            Imaging  Reviewed imaging.       Assessment/Plan   GS panc, resolved.   -- For lap oli today. MRCP negative.   -- Hep gtt held.     Qi Valdivia MD

## 2022-09-21 NOTE — OR SURGEON
Pre-Procedure patient identification:  I am the primary operating surgeon/proceduralist and I have identified the patient on 09/21/22 at 12:05 PM Qi Valdivia MD  Phone Number: [phone]

## 2022-09-21 NOTE — ANESTHESIA POSTPROCEDURE EVALUATION
Patient: Emily Ramirez    Procedure Summary     Date: 09/21/22 Room / Location:  OR 11 / [facility] OR    Anesthesia Start: 1216 Anesthesia Stop: 1344    Procedure: CHOLECYSTECTOMY LAPAROSCOPIC (N/A ) Diagnosis:       Gallbladder calculus without cholecystitis and no obstruction      (Calculus of Gallbladder Without Cholecystitis Without Obstruction)    Surgeons: Qi Valdivia MD Responsible Provider: Milton Rodriguez MD    Anesthesia Type: general ASA Status: 4          Anesthesia Type: general  PACU Vitals  9/21/2022 1333 - 9/21/2022 1433      9/21/2022  1345 9/21/2022  1400          BP: 136/63 158/72      Temp: 36.4 °C (97.6 °F) --      Pulse: 60 62      Resp: 18 18      SpO2: 100 % 100 %              Anesthesia Post Evaluation    Pain management: adequate  Patient location during evaluation: PACU  Patient participation: complete - patient participated  Level of consciousness: awake and alert  Cardiovascular status: acceptable  Airway Patency: adequate  Respiratory status: acceptable  Hydration status: acceptable  Anesthetic complications: no

## 2022-09-21 NOTE — DISCHARGE INSTRUCTIONS
Laparoscopic Cholecystectomy, Care After    This sheet gives you information about how to care for yourself after your procedure. Your health care provider may also give you more specific instructions. If you have problems or questions, contact your health care provider.    What can I expect after the procedure?  After the procedure, it is common to have:  Pain at your incision sites. You will be given medicines to control this pain.  Mild nausea or vomiting.  Bloating and possible shoulder pain from the air-like gas that was used during the procedure.    Follow these instructions at home:  Incision care    Follow instructions from your health care provider about how to take care of your incisions. Make sure you:  Wash your hands with soap and water before you change your bandage (dressing). If soap and water are not available, use hand .  Remove gauze and clear plastic dressings (Tegaderm) 2 days after surgery.   Leave adhesive strips in place. These skin closures should remain in place for at least a week. They will fall off on their own, or may be removed after 10 days.  Do not take baths, swim, or use a hot tub until your health care provider approves. You may shower, starting 2 days after surgery, after removing gauze dressings.   Check your incision area every day for signs of infection. Check for:  More redness, swelling, or pain.  More fluid or blood.  Warmth.  Pus or a bad smell.    Activity  Do not drive or use heavy machinery while taking prescription pain medicine.  Do not lift anything that is heavier than 10 lb (4.5 kg) until your health care provider approves.  Do not play contact sports until your health care provider approves.  Do not drive for 24 hours if you were given a medicine to help you relax (sedative).  Rest as needed. Do not return to work or school until your health care provider approves.    General instructions  Take over-the-counter and prescription medicines only as told by  your health care provider.  To prevent or treat constipation while you are taking prescription pain medicine, your health care provider may recommend that you:  Drink enough fluid to keep your urine clear or pale yellow.  Take over-the-counter or prescription medicines.  Eat foods that are high in fiber, such as fresh fruits and vegetables, whole grains, and beans.  Limit foods that are high in fat and processed sugars, such as fried and sweet foods.    Contact a health care provider if:  You develop a rash.  You have more redness, swelling, or pain around your incisions.  You have more fluid or blood coming from your incisions.  Your incisions feel warm to the touch.  You have pus or a bad smell coming from your incisions.  You have a fever.  One or more of your incisions breaks open.    Get help right away if:  You have trouble breathing.  You have chest pain.  You have increasing pain in your shoulders.  You faint or feel dizzy when you stand.  You have severe pain in your abdomen.  You have nausea or vomiting that lasts for more than one day.  You have leg pain.    Follow-Up:  Call the office of your surgeon to schedule a follow-up appointment for about 2 weeks from your surgery:  Qi Valdivia MD, [facility]  General Surgery  [phone]  [address] [facility], [place] [address]  [place] [address]    This information is not intended to replace advice given to you by your health care provider. Make sure you discuss any questions you have with your health care provider.  Document Released: 12/18/2006 Document Revised: 11/30/2018 Document Reviewed: 06/05/2017  Elsevier Patient Education © 2020 [employer]    Please follow-up with your primary care physician, and hematology oncology and you cardiology in 1 week after discharge from hospital  e  Referral made to [facility] for a visiting nurse and home physical therapy.Someone from [facility] will call you to schedule a home visit.Their phone numbr is  [phone].

## 2022-09-21 NOTE — PROGRESS NOTES
Cardiology Progress Note    No cardiac change since yesterday.  Surgery canceled yesterday due to emergency in the operating room and rescheduled for later today.    REVIEW OF SYSTEMS : No other significant changes to review of systems over the past 24 hours.    Current Facility-Administered Medications   Medication Dose Route Frequency Provider Last Rate Last Admin    alum-mag hydroxide-simeth (MAALOX) 200-200-20 mg/5 mL suspension 30 mL  30 mL oral q4h PRN Qi Alexander DO        [Provider Managed Hold] amiodarone (PACERONE) tablet 200 mg  200 mg oral Daily Qi Alexander DO        amLODIPine (NORVASC) tablet 5 mg  5 mg oral q AM Qi Alexander DO   5 mg at 09/21/22 0844    [Provider Managed Hold] apixaban (ELIQUIS) tablet 5 mg  5 mg oral BID Qi Alexander DO        atropine injection 0.5 mg  0.5 mg intravenous q5 min PRN Qi Alexander DO        cefTRIAXone (ROCEPHIN) IVPB 1 g in 100 mL NSS vial in bag  1 g intravenous q24h INT Kwame Peres MD   Stopped at 09/20/22 1539    [Provider Managed Hold] clopidogreL (PLAVIX) tablet 75 mg  75 mg oral Daily Qi Alexander DO   75 mg at 09/19/22 1317    glucose chewable tablet 16-32 g of dextrose  16-32 g of dextrose oral PRN Qi Alexander DO        Or    dextrose 40 % oral gel 15-30 g of dextrose  15-30 g of dextrose oral PRN Qi Alexander DO        Or    glucagon (GLUCAGEN) injection 1 mg  1 mg intramuscular PRN Qi Alexander DO        Or    dextrose 50 % in water (D50) injection 12.5 g  25 mL intravenous PRN Qi Alexander DO        docusate sodium (COLACE) capsule 100 mg  100 mg oral BID Qi Alexander DO        [Provider Managed Hold] heparin infusion in 0.45%  units/mL  100-4,000 Units/hr intravenous Titrated Gayla Soares MD   Stopped at 09/21/22 0521    lactated ringer's infusion   intravenous Continuous Qi Alexander  mL/hr at  09/21/22 0100 New Bag at 09/21/22 0100    lidocaine (ASPERCREME) 4 % topical patch 1 patch  1 patch Topical Daily Qi Alexander DO        magnesium sulfate IVPB 1g in 100 mL NSS/D5W/SWFI  1 g intravenous PRN Qi Alexander DO        Or    magnesium sulfate IVPB 2g in 50 mL NSS/D5W/SWFI  2 g intravenous PRN Qi Alexander DO   Stopped at 09/17/22 1210    melatonin ODT 3 mg  3 mg oral Nightly PRN Amber [place] DO   3 mg at 09/20/22 2257    metoprolol succinate XL (TOPROL-XL) 24 hr ER tablet 50 mg  50 mg oral q AM Qi Alexander DO   50 mg at 09/21/22 0844    metroNIDAZOLE in NaCl (iso-os) (FLAGYL) IVPB 500 mg  500 mg intravenous q8h INT Kwame Peres MD   Stopped at 09/21/22 0645    morphine injection 2 mg  2 mg intravenous q3h PRN Qi Alexander DO   2 mg at 09/18/22 0120    multivitamin tablet 1 tablet  1 tablet oral Daily Qi Alexander DO   1 tablet at 09/21/22 0844    nitroglycerin (NITROSTAT) SL tablet 0.4 mg  0.4 mg sublingual q5 min PRN Qi Alexander DO        pantoprazole (PROTONIX) injection 40 mg  40 mg intravenous q12h KRISTEN Qi Alexander DO   40 mg at 09/21/22 0845    potassium chloride (KLOR-CON M) ER tablet (particles/crystals) 20 mEq  20 mEq oral PRN Qi Alexander DO   20 mEq at 09/21/22 0845    potassium chloride (KLOR-CON M) ER tablet (particles/crystals) 40 mEq  40 mEq oral PRN Qi Alexander DO   40 mEq at 09/20/22 1705    senna (SENOKOT) tablet 1 tablet  1 tablet oral 2x daily PRN Qi Alexander DO        trimethobenzamide (TIGAN) injection 200 mg  200 mg intramuscular q8h PRN Qi Alexander DO              Objective   Labs  Creatinine 0.9.  Magnesium 1.5.  Hemoglobin 11.4.    Telemetry  I have personally reviewed the telemetry tracings.  Sinus rhythm.    Physical Exam  Temp:  [36.3 °C (97.3 °F)-37.1 °C (98.8 °F)] 36.7 °C (98 °F)  Heart Rate:  [60-78] 60  Resp:  [16-18]  16  BP: (135-176)/(64-77) 135/70    Intake/Output Summary (Last 24 hours) at 9/21/2022 1033  Last data filed at 9/20/2022 2152  Gross per 24 hour   Intake 1550 ml   Output 2425 ml   Net -875 ml       Awake and alert, NAD  Exam otherwise unchanged    ASSESSMENT / PLAN:    Continue present cardiac therapy.  Refer to yesterday's note for perioperative recommendations.

## 2022-09-21 NOTE — PROGRESS NOTES
Patient: Emily Ramirez  Location:  [facility] [address] [address]  MRN:  [id number]  Today's date:  9/21/2022     Pt left supine in bed and resting comfortably with all lines arranged, incontinence pad underneath,  call bell and personal items within reach and bed alarm activated. Nurse aware of pt's performance and positioning.       Emily is a 77 y.o. female admitted on 9/16/2022 with Abdominal pain, unspecified abdominal location [R10.9]  Atrial fibrillation, unspecified type (CMS/HCC) [I48.91]  Acute pancreatitis, unspecified complication status, unspecified pancreatitis type [K85.90]. Principal problem is Acute pancreatitis.    Past Medical History  Emily has a past medical history of Aortic regurgitation, Chronic kidney disease, Coronary artery disease, History of loop recorder, Hypertension, Lipid disorder, Mitral regurgitation, and SVT (supraventricular tachycardia) (CMS/[facility]).    History of Present Illness   Emily Ramirez is a 77 y.o. female with a past medical history of coronary disease (PCI with NIKITA to prox RCA and prox LCx [p/op developed ST elevations inferiorly- thrombotic occlusion RCA s/p repeat angioplasty), atrial fibrillation with SSS/pauses s/p PPM 7/21/22 (on Eliquis), mild AR, MR, hypertension, hyperlipidemia, and chronic kidney disease stage III  who presents to [facility] ER on 9/16/2022 with 2-day history of chest and midepigastric abdominal pain.  According to patient she was in her usual state of health until yesterday evening when shortly after dinner she developed sudden onset of substernal chest and midepigastric pain.  Symptoms resolved temporarily without any major intervention however, later in the evening symptoms reoccurred waking patient from sleep with pain now radiating throughout her abdomen and into her back.  This is also accompanied by nausea and several episodes of nonbilious, non bloody emesis.  Symptoms to continue to progress throughout the early  morning and as a result patient ultimately presented to the ER for further evaluation.  Currently, patient continues to complain of ongoing midepigastric pain although somewhat improved since administration of morphine.  She denies any fevers does admit to chills earlier today.  Denies any shortness of breath, cough, wheezing, heart palpitations.  Denies any recent diarrhea but does admit to intermittent constipation over the past week.  Denies any urinary symptoms.  Denies any prior history of gallstones or gallbladder disease      PT Vitals    Date/Time Pulse SpO2 BP BP Location BP Method Pt Position [facility]   09/21/22 0940 70 95 % 125/70 Left upper arm Automatic Sitting DAK      PT Pain    Date/Time Pain Type Rating: Rest Rating: Activity [facility]   09/21/22 0940 Pain Assessment 0 - no pain 0 - no pain DAK          Prior Living Environment    Flowsheet Row Most Recent Value   Living Environment Comment lives w/spouse, 2SH, 1 CARTER, 1/2 bath 1st floor, FF w/stairglide to tub shower w/seat. pt reports 1st floor setup recently w/adjustable bed.        Prior Level of Function    Flowsheet Row Most Recent Value   Dominant Hand right   Ambulation assistive equipment   Transferring assistive equipment   Toileting independent   Bathing assistive equipment   Dressing independent   Eating independent   Communication understands/communicates without difficulty   Prior Level of Function Comment Ind w/Rollator   Assistive Device Currently Used at Home walker, front-wheeled, stair glide, shower chair  [adjustable bed]           PT Evaluation and Treatment - 09/21/22 0933        PT Time Calculation    Start Time 0933     Stop Time 0953     Time Calculation (min) 20 min        Session Details    Document Type daily treatment/progress note     Mode of Treatment physical therapy        General Information    Patient Profile Reviewed yes     Patient/Family/Caregiver Comments/Observations RN cleared for PT     General Observations of Patient  Pt rec'd supine in bed agreeable to PT Session     Existing Precautions/Restrictions fall ; NPO; act as mario    Limitations/Impairments safety/cognitive        Bed Mobility    [place], Supine to Sit modified independence     [place], Sit to Supine modified independence     Assistive Device head of bed elevated     Comment (Bed Mobility) adjsutable bed baseline; OOB to L        Transfers    Comment gait belt donned entire session        Sit to Stand Transfer    [place], Sit to Stand Transfer modified independence     Assistive Device walker, front-wheeled     Comment from EOB        Stand to Sit Transfer    [place], Stand to Sit Transfer modified independence     Assistive Device walker, front-wheeled     Comment to EOB        Gait Training    [place], Gait supervision     Assistive Device walker, front-wheeled     Distance in Feet 140 feet     Pattern (Gait) step-through     Deviations/Abnormal Patterns (Gait) step length decreased;gait speed decreased     Comment (Gait/Stairs) 140'x1 with >2 turns wit RW pt receptive to utilizing RW upon d/c for inc stability. superivsion provided due to unfamilar envrionment however pt demo'd good envrionmental scanning to prevent LOB over obstacles        Stairs Training    Comment PT and pt discussed 1 CARTER pt reports spouse can assist and pt feels confident performing. based on strength and functional assessment anticiapte no difficulties with 1 step negoitation        Safety Issues, Functional Mobility    Comment, Safety Issues/Impairments (Mobility) improved stability with use of AD        Balance    Static Sitting Balance WFL     Dynamic Sitting Balance WFL     Sit to Stand Dynamic Balance WFL     Static Standing Balance WFL     Dynamic Standing Balance mild impairment     Comment, Balance with RW        Motor Skills    Functional Endurance fair+        AM-PAC (TM) - Mobility (Current Function)    Turning from your back to your side while in a flat bed  without using bedrails? 4 - None     Moving from lying on your back to sitting on the side of a flat bed without using bedrails? 4 - None     Moving to and from a bed to a chair? 4 - None     Standing up from a chair using your arms? 4 - None     To walk in a hospital room? 3 - A Little     Climbing 3-5 steps with a railing? 3 - A Little     AM-PAC (TM) Mobility Score 22        Assessment/Plan (PT)    Daily Outcome Statement Pt seen for PT dorian. [facility] 22.  Pt currently  Mod independent with bed mobility and sit to stand transfers with supervision for ambulation 140'x1 >2 turns no LOB utilized RW for stability. Pt reports will utilize RW upon d/c and spouse will be home to provide 24-7 supervision for mobility. Pt appears to be close to baseline no further skilled acute PT needs identified in this setting. Recommend d/c home with assist and home PT when medically stable for d/c. D/c PT. Encourage continued mobility with RN and support staff during remainder of hospitalization to prevent deconditioning.     Rehab Potential --   d/c PT    Therapy Frequency --   d/c PT              PT Assessment/Plan    Flowsheet Row Most Recent Value   PT Recommended Discharge Disposition home with home health, home with assistance at 09/21/2022 0933   Anticipated Equipment Needs at Discharge (PT) none at 09/21/2022 0933   Patient/Family Therapy Goals Statement to get better at 09/21/2022 0933                   PT Goals    Flowsheet Row Most Recent Value   Bed Mobility Goal 1    Activity/Assistive Device rolling to left, rolling to right, sit to supine, supine to sit at 09/18/2022 1118   [place] modified independence at 09/18/2022 1118   Time Frame by discharge at 09/18/2022 1118   Progress/Outcome goal met at 09/21/2022 0933   Transfer Goal 1    Activity/Assistive Device bed-to-chair/chair-to-bed, stand pivot, sit-to-stand/stand-to-sit, walker, front-wheeled at 09/18/2022 1118   [place] modified independence at 09/18/2022  1118   Time Frame by discharge at 09/18/2022 1118   Progress/Outcome goal met at 09/21/2022 0933   Gait Training Goal 1    Activity/Assistive Device gait (walking locomotion), walker, front-wheeled at 09/18/2022 1118   [place] modified independence at 09/18/2022 1118   Distance 100 at 09/18/2022 1118   Time Frame by discharge at 09/18/2022 1118   Progress/Outcome good progress toward goal at 09/21/2022 0933   Stairs Goal 1    Activity/Assistive Device descending stairs, ascending stairs, step-to-step, walker, front-wheeled at 09/18/2022 1118   [place] modified independence at 09/18/2022 1118   Number of Stairs 1 at 09/18/2022 1118   Time Frame by discharge at 09/18/2022 1118   Progress/Outcome good progress toward goal at 09/21/2022 0933

## 2022-09-21 NOTE — ANESTHESIA PROCEDURE NOTES
Airway  Urgency: elective    Start Time: 9/21/2022 12:25 PM  Airway not difficult    General Information and Staff    Patient location during procedure: OR  Anesthesiologist: Milton Rodriguez MD  Performed: anesthesiologist     Indications and Patient Condition  Indications for airway management: anesthesia  Sedation level: general  Preoxygenated: yes  Patient position: sniffing  Mask difficulty assessment: 1 - vent by mask    Final Airway Details  Final airway type: endotracheal airway      Successful airway: ETT     Successful intubation technique: direct laryngoscopy  Facilitating devices/methods: intubating stylet  Endotracheal tube insertion site: oral  Blade: Klarissa  Blade size: #3  ETT size (mm): 7.0  Cormack-Lehane Classification: grade I - full view of glottis  Placement verified by: chest auscultation and capnometry   Measured from: lips  ETT to lips (cm): 22  Number of attempts at approach: 1  Atraumatic airway insertion

## 2022-09-21 NOTE — PROGRESS NOTES
Hospital Medicine Service -  Daily Progress Note       SUBJECTIVE     Interval History: No acute events overnight. Feels Fine. Family at bedside. Denies chest pain,dyspnea or abdominal pain      OBJECTIVE        Vital signs in last 24 hours:  Temp:  [36.3 °C (97.3 °F)-37.1 °C (98.8 °F)] 36.7 °C (98 °F)  Heart Rate:  [60-78] 60  Resp:  [16-18] 16  BP: (135-176)/(64-77) 135/70  I/O last 3 completed shifts:  In: 1550 [P.O.:450; I.V.:1100]  Out: 4125 [Urine:4125]    PHYSICAL EXAMINATION        GEN: well-developed and well-nourished; not in acute distress  HEENT: normocephalic; atraumatic  NECK: no JVD; no bruits  CARDIO: regular rate and rhythm; no murmurs or rubs  RESP: clear to auscultation bilaterally; no rales, rhonchi, or wheezes  ABD: soft, non-distended, non-tender, normal bowel sounds  EXT: no cyanosis, clubbing, or edema  SKIN: clean, dry, warm, and intact  MUSCULOSKELETAL: no injury or deformity  NEURO: alert and oriented x 3; nonfocal  BEHAVIOR/EMOTIONAL: appropriate; cooperative     LABS / IMAGING / TELE        Labs  Lab Results   Component Value Date    WBC 7.54 09/21/2022    HGB 11.4 (L) 09/21/2022    HCT 35.0 09/21/2022    MCV 85.4 09/21/2022     09/21/2022     Lab Results   Component Value Date    GLUCOSE 99 09/21/2022    CALCIUM 8.4 (L) 09/21/2022     09/21/2022    K 3.6 09/21/2022    CO2 22 09/21/2022     (H) 09/21/2022    BUN 11 09/21/2022    CREATININE 0.9 09/21/2022     Lab Results   Component Value Date    INR 1.2 09/19/2022    INR 1.5 09/16/2022    INR 1.4 07/29/2022       Imaging  X-RAY CHEST 2 VIEWS    Result Date: 9/19/2022  IMPRESSION: 1. Stable cardiomegaly. 2. Increased right but improved left basilar pleural fluid and hypoventilatory change and no airspace disease bilaterally. COMMENT:    AP and lateral erect chest radiographs are compared to a prior AP only study dated 7/27/2022. A left-sided dual-lead PCM is again noted. The cardiomediastinal contour shows stable  cardiomegaly, again noting mural calcifications in the aortic arch. The lungs show increased right but improved left basilar pleural fluid and hypoventilatory change and no airspace disease bilaterally. The osseous thorax and surrounding soft tissues again show prior bilateral partial shoulder arthroplasties and midthoracic advanced spondylosis.    MRI ABDOMEN WITHOUT CONTRAST MRCP    Result Date: 9/19/2022  IMPRESSION: Limited study. Gallstones. No evidence of choledocholithiasis. Interval development of small right pleural effusion and bibasilar atelectasis. COMMENT: Multiplanar MRI of the abdomen was performed without intravenous contrast. An MRCP was performed utilizing T2-weighted sequences. Three-dimensional images were reconstructed on the technologist workstation and the source and MIP images were reviewed. Unfortunately, the study is limited by motion. Comparison is made to a CTA of the abdomen dated 09/16/2022. There is a new small right pleural effusion and bibasilar atelectasis. Moderate size hiatal hernia is again noted. The liver is normal in size measuring 16 cm in length. There is no signal loss on the opposed phase or in phase sequences to suggest hepatic steatosis or iron deposition, respectively. No focal hepatic mass is identified. Several low T2 signal filling defects are again seen in the gallbladder lumen, presumably gallstones, measuring up to 1.3 cm. The gallbladder appears otherwise unremarkable. The common bile duct is normal in caliber measuring 5 mm. No intraluminal filling defects or strictures are seen. No intrahepatic biliary dilatation is identified. The pancreatic duct is not well seen but does not appear enlarged. The spleen, pancreas, and adrenal glands appear unremarkable. Multiple right renal cysts are again noted measuring up to 2.7 cm. The left kidney appears unremarkable. No upper abdominal lymphadenopathy or free fluid is seen. No grossly abnormal bowel loops are identified.  The signal in the osseous structures is grossly unremarkable.    CT ANGIOGRAPHY CHEST/ABDOMEN/PELVIS WITH AND WITHOUT IV CONTRAST    Addendum Date: 9/16/2022    Please note that there is severe stenosis at the junction of the left subclavian and left brachiocephalic vein as it courses between the left anterior first rib and left clavicle, with subsequent marked left upper extremity as well as partially imaged left lower neck and left chest wall venous collaterals, with the stenosis likely sequela of chronic left chest wall pacer leads compounded by left arm abduction.    Result Date: 9/16/2022  IMPRESSION: 1. No aortic aneurysm or dissection as clinically questioned. 2. Acute pancreatitis without evidence of pancreatic necrosis or collection. COMMENT: CHEST LUNGS AND AIRWAYS: Mild bibasilar subsegmental atelectasis without consolidation, pneumothorax, or mass. The visualized airways are patent. PLEURA: no effusion. VESSELS: Mild enlargement of the main pulmonary artery measuring up to 3.4 cm in diameter, which may be seen with pulmonary hypertension in the appropriate clinical setting. No thoracic aortic aneurysm or dissection, noting diffuse atherosclerotic calcifications/changes throughout the aorta and coronary arteries. HEART: Cardiomegaly with extensive mitral annular calcification again noted. No pericardial effusion. MEDIASTINUM AND BRENNEN: within normal limits, no enlarged lymph nodes. CHEST WALL AND LOWER NECK: Dual-lead left chest wall pacer with leads in the right atrium and right ventricle. ABDOMEN: LIVER: normal morphology without suspicious mass. BILE DUCTS: normal caliber. GALLBLADDER: Noncalcified cholelithiasis. No evidence of acute cholecystitis. PANCREAS: Diffuse peripancreatic stranding with slightly heterogeneous parenchymal perfusion/enhancement, compatible with acute interstitial pancreatitis. No evidence of pancreatic necrosis. SPLEEN: within normal limits. ADRENALS: within normal limits.  KIDNEYS: A few right-sided cysts measuring up to 2.8 cm. PELVIS: Evaluation limited by right hip arthroplasty streak artifact. REPRODUCTIVE ORGANS: no pelvic masses. URETERS: within normal limits. BLADDER: within normal limits. BOWEL/PERITONEUM: Bowel demonstrates normal caliber without evidence of obstruction. Sigmoid and descending colonic diverticulosis without evidence of acute diverticulitis. Normal appendix. Large hiatal hernia again noted resulting in partial intrathoracic stomach, unchanged. No enlarged mesenteric lymph nodes. No ascites or free air, no fluid collection. VESSELS: atherosclerotic changes, without abdominal aortic aneurysm or dissection. There is moderate focal proximal left common iliac artery stenosis due to prominent noncalcified atheromatous plaque. RETROPERITONEUM: no enlarged retroperitoneal or pelvic nodes. ABDOMINAL WALL: intact. BONES: degenerative changes, no suspicious lytic or blastic lesions. Partially imaged right hip arthroplasty with associated streak artifact. Bilateral shoulder arthroplasties noted.       ECG/Telemetry  I have independently reviewed the telemetry. No events for the last 24 hours.Paced Rhythm      ASSESSMENT AND PLAN      * Acute pancreatitis  Assessment & Plan  Likely secondary to  gallstone pancreatitis  Presents with sudden onset of abdominal pain, nausea and vomiting admitted for acute pancreatitis.   Initial LFTs elevated with mixed hepatic injury and cholestatic picture, lipase 1436  Initial chest, abdomen, pelvis CTA with no aortic aneurysm or dissection as clinically questioned.. Acute pancreatitis without evidence of pancreatic necrosis or collection. Imaging notable for cholelithiasis, however no tigist choledocholithiasis or biliary duct dilation. Denies any alcohol use  Triglycerides relatively stable  Sxs improving, LFTs downtrending    MRI Abdomen shows gallstones no evidence of choledocholithiasis  Cont w/lidoderm patch and cont w/morphine IV 2mg  q3h PRN for pain management. Avoiding Toradol for now given DENNY  Scheduled for cholecystectomy by Dr. Wilson today  Resume Plavix and heparin drip today after surgery, if ok with   Updated  at bedside      Acute kidney injury superimposed on CKD (CMS/[facility])  Assessment & Plan  Initial Cr 1.5, baseline ~1.2  Likely pre-renal in the setting of acute pancreatitis and n/v    Plan:  Will initiate IVFs w/LR at 200cc/hr  Avoid nephrotoxins  Monitor bmp    GERD (gastroesophageal reflux disease)  Assessment & Plan  Will place on PPI BID as stated above    Hyperlipidemia  Assessment & Plan  Hold statin for now given transaminitis  Repeat lipid panel as stated above        PAD (peripheral artery disease) (CMS/[facility])  Assessment & Plan  C/A/P CTA w/severe stenosis at the junction of the left subclavian and left brachiocephalic vein as it courses between the left anterior first rib  and left clavicle, with subsequent marked left upper extremity as well as  partially imaged left lower neck and left chest wall venous collaterals, with  the stenosis likely sequela of chronic left chest wall pacer leads compounded by  left arm abduction.  Currently denies any significant LUE pain    Plan:  Resume meds after surgery  Resume outpt f/u with Cardiology for continued monitoring    Transaminitis  Assessment & Plan  Trending down  Likely secondary to gallstone pancreatitis   Hold statin, Amiodarone  Cont w/IVFs as stated  Monitor cmp    Atrial fibrillation, unspecified type (CMS/[facility])  Assessment & Plan  H/o new onset atrial fibrillation with SSS/pauses s/p PPM 7/21/22  EKG at with ? AV junctional rhythm and ?PPM malfunction, rate controlled  Cont w/outpt regimen of metoprolol. Amiodarone held secondary to transaminitis  Eliquis on hold for cholecystectomy. Heparin drip held today for surgery   Cardiology following    Sick sinus syndrome (CMS/[facility])  Assessment & Plan  S/p PPM placement 7/2022  Monitor electrolytes and replete as  required for potassium greater than 4, magnesium greater than 2  Cardiology following     CAD (coronary artery disease)  Assessment & Plan   history of coronary disease (PCI with NIKITA to prox RCA and prox LCx [p/op developed ST elevations inferiorly- thrombotic occlusion RCA s/p repeat angioplasty),  Presents with atypical chest pain as stated, although EKG w/no acute st/t wave changes and HS troponin wnl x 1. Suspect sxs likely related to acute pancreatitis      Hold Plavix and Eliquis for cholecystectomy today.  Restart Plavix tonight if cleared by surgery  Hold statin given transaminitis  Cont w/metoprolol  Cardiology  Following.  Patient with acceptable risk to proceed with cholecystectomy today per cardiology         VTE Assessment: Padua    Code Status: Full Code  Estimated discharge date: 9/19/2022     Gayla Soares MD  9/21/2022  11:23 AM

## 2022-09-21 NOTE — ANESTHESIA PREPROCEDURE EVALUATION
Relevant Problems   CARDIOVASCULAR   (+) Atrial fibrillation, unspecified type (CMS/HCC)   (+) CAD (coronary artery disease)   (+) Pacemaker   (+) Sick sinus syndrome (CMS/HCC)   (+) Valvular heart disease      GASTROINTESTINAL   (+) GERD (gastroesophageal reflux disease)      HEMATOLOGY   (+) Anemia      URINARY SYSTEM   (+) Acute kidney injury superimposed on CKD (CMS/HCC)   (+) Hypokalemia   (+) Hypomagnesemia     Complex patient for cholecystectomy 2/2 gallstone pancreatitis  Recent MI 6/9/22 with complex PCI c/b SSS and atrial fibrillation s/p PPM placed 7/2022  Cardiology cleared given risk of untreated gallstone pancreatiti  Plavix continued, ASA    Per hospitalist:  history of coronary disease (PCI with NIKITA to prox RCA and prox LCx [p/op developed ST elevations inferiorly- thrombotic occlusion RCA s/p repeat angioplasty),  Presents with atypical chest pain as stated, although EKG w/no acute st/t wave changes and HS troponin wnl x 1. Suspect sxs likely related to acute pancreatitis  ROS/Med Hx     No past surgical history on file.    Physical Exam    Airway   Mallampati: II   TM distance: >3 FB   Neck ROM: full  Cardiovascular - normal   Rhythm: regular   Rate: normalPulmonary - normal   clear to auscultation  Dental - normal        Anesthesia Plan    Plan: general    Technique: general endotracheal   ASA 4  Anesthetic plan and risks discussed with: patient  Postop Plan:   Patient Disposition: inpatient floor planned admission

## 2022-09-21 NOTE — PLAN OF CARE
Problem: Adult Inpatient Plan of Care  Goal: Plan of Care Review  Outcome: Progressing  Flowsheets (Taken 9/21/2022 1142)  Progress: improving  Plan of Care Reviewed With: patient  Outcome Summary: Pt seen for PT dorian. [facility] 22.  Pt currently  Mod independent with bed mobility and sit to stand transfers with supervision for ambulation 140'x1 >2 turns no LOB utilized RW for stability. Pt reports will utilize RW upon d/c and spouse will be home to provide 24-7 supervision for mobility. Pt appears to be close to baseline no further skilled acute PT needs identified in this setting. Recommend d/c home with assist and home PT when medically stable for d/c. D/c PT. Encourage continued mobility with RN and support staff during remainder of hospitalization to prevent deconditioning.  Goal: Patient-Specific Goal (Individualized)  Outcome: Progressing     Problem: Mobility Impairment  Goal: Optimal Mobility  Outcome: Met

## 2022-09-21 NOTE — ANESTHESIOLOGIST PRE-PROCEDURE ATTESTATION
Pre-Procedure Patient Identification:  I am the Primary Anesthesiologist and have identified the patient on 09/21/22 at 11:46 AM.   I have confirmed the procedure(s) will be performed by the following surgeon/proceduralist Qi Valdivia MD.

## 2022-09-22 LAB
ALBUMIN SERPL-MCNC: 3 G/DL (ref 3.4–5)
ALP SERPL-CCNC: 90 IU/L (ref 35–126)
ALT SERPL-CCNC: 67 IU/L (ref 11–54)
ANION GAP SERPL CALC-SCNC: 8 MEQ/L (ref 3–15)
AST SERPL-CCNC: 60 IU/L (ref 15–41)
BASOPHILS # BLD: 0.02 K/UL (ref 0.01–0.1)
BASOPHILS NFR BLD: 0.2 %
BILIRUB SERPL-MCNC: 0.4 MG/DL (ref 0.3–1.2)
BUN SERPL-MCNC: 16 MG/DL (ref 8–20)
CALCIUM SERPL-MCNC: 8.7 MG/DL (ref 8.9–10.3)
CASE RPRT: NORMAL
CHLORIDE SERPL-SCNC: 106 MEQ/L (ref 98–109)
CLINICAL INFO: NORMAL
CO2 SERPL-SCNC: 24 MEQ/L (ref 22–32)
CREAT SERPL-MCNC: 1 MG/DL (ref 0.6–1.1)
DIFFERENTIAL METHOD BLD: ABNORMAL
EOSINOPHIL # BLD: 0 K/UL (ref 0.04–0.36)
EOSINOPHIL NFR BLD: 0 %
ERYTHROCYTE [DISTWIDTH] IN BLOOD BY AUTOMATED COUNT: 15.6 % (ref 11.7–14.4)
GFR SERPL CREATININE-BSD FRML MDRD: 53.6 ML/MIN/1.73M*2
GLUCOSE SERPL-MCNC: 133 MG/DL (ref 70–99)
HCT VFR BLDCO AUTO: 38.7 % (ref 35–45)
HGB BLD-MCNC: 12.3 G/DL (ref 11.8–15.7)
IMM GRANULOCYTES # BLD AUTO: 0.1 K/UL (ref 0–0.08)
IMM GRANULOCYTES NFR BLD AUTO: 0.8 %
LYMPHOCYTES # BLD: 1.22 K/UL (ref 1.2–3.5)
LYMPHOCYTES NFR BLD: 10 %
MCH RBC QN AUTO: 27.8 PG (ref 28–33.2)
MCHC RBC AUTO-ENTMCNC: 31.8 G/DL (ref 32.2–35.5)
MCV RBC AUTO: 87.6 FL (ref 83–98)
MONOCYTES # BLD: 0.72 K/UL (ref 0.28–0.8)
MONOCYTES NFR BLD: 5.9 %
NEUTROPHILS # BLD: 10.1 K/UL (ref 1.7–7)
NEUTS SEG NFR BLD: 83.1 %
NRBC BLD-RTO: 0 %
PATH REPORT.FINAL DX SPEC: NORMAL
PATH REPORT.GROSS SPEC: NORMAL
PDW BLD AUTO: 11.2 FL (ref 9.4–12.3)
PLATELET # BLD AUTO: 358 K/UL (ref 150–369)
POTASSIUM SERPL-SCNC: 4 MEQ/L (ref 3.6–5.1)
PROT SERPL-MCNC: 5.6 G/DL (ref 6–8.2)
RBC # BLD AUTO: 4.42 M/UL (ref 3.93–5.22)
SODIUM SERPL-SCNC: 138 MEQ/L (ref 136–144)
WBC # BLD AUTO: 12.16 K/UL (ref 3.8–10.5)

## 2022-09-22 PROCEDURE — 63700000 HC SELF-ADMINISTRABLE DRUG: Performed by: HOSPITALIST

## 2022-09-22 PROCEDURE — 63700000 HC SELF-ADMINISTRABLE DRUG: Performed by: SURGERY

## 2022-09-22 PROCEDURE — 85025 COMPLETE CBC W/AUTO DIFF WBC: CPT | Performed by: SURGERY

## 2022-09-22 PROCEDURE — 93005 ELECTROCARDIOGRAM TRACING: CPT | Performed by: HOSPITALIST

## 2022-09-22 PROCEDURE — 80053 COMPREHEN METABOLIC PANEL: CPT | Performed by: SURGERY

## 2022-09-22 PROCEDURE — 25000000 HC PHARMACY GENERAL: Performed by: SURGERY

## 2022-09-22 PROCEDURE — 36415 COLL VENOUS BLD VENIPUNCTURE: CPT | Performed by: SURGERY

## 2022-09-22 PROCEDURE — 21400000 HC ROOM AND CARE CCU/INTERMEDIATE

## 2022-09-22 PROCEDURE — 99233 SBSQ HOSP IP/OBS HIGH 50: CPT | Performed by: HOSPITALIST

## 2022-09-22 RX ORDER — CLOPIDOGREL BISULFATE 75 MG/1
75 TABLET ORAL DAILY
Status: DISCONTINUED | OUTPATIENT
Start: 2022-09-22 | End: 2022-09-25 | Stop reason: HOSPADM

## 2022-09-22 RX ADMIN — METRONIDAZOLE 500 MG: 500 INJECTION, SOLUTION INTRAVENOUS at 06:33

## 2022-09-22 RX ADMIN — OXYCODONE HYDROCHLORIDE 5 MG: 5 TABLET ORAL at 01:04

## 2022-09-22 RX ADMIN — PANTOPRAZOLE SODIUM 40 MG: 40 INJECTION, POWDER, FOR SOLUTION INTRAVENOUS at 08:26

## 2022-09-22 RX ADMIN — CLOPIDOGREL BISULFATE 75 MG: 75 TABLET, FILM COATED ORAL at 11:51

## 2022-09-22 RX ADMIN — OXYCODONE HYDROCHLORIDE 5 MG: 5 TABLET ORAL at 22:07

## 2022-09-22 RX ADMIN — AMLODIPINE BESYLATE 5 MG: 5 TABLET ORAL at 08:26

## 2022-09-22 RX ADMIN — PANTOPRAZOLE SODIUM 40 MG: 40 INJECTION, POWDER, FOR SOLUTION INTRAVENOUS at 19:58

## 2022-09-22 RX ADMIN — AMIODARONE HYDROCHLORIDE 200 MG: 200 TABLET ORAL at 09:00

## 2022-09-22 RX ADMIN — OXYCODONE HYDROCHLORIDE 5 MG: 5 TABLET ORAL at 11:51

## 2022-09-22 RX ADMIN — OXYCODONE HYDROCHLORIDE 5 MG: 5 TABLET ORAL at 16:59

## 2022-09-22 RX ADMIN — METRONIDAZOLE 500 MG: 500 INJECTION, SOLUTION INTRAVENOUS at 14:31

## 2022-09-22 NOTE — PLAN OF CARE
Plan of Care Review  Plan of Care Reviewed With: patient  Progress: improving  Outcome Summary: Day 1 postop oob in chair, mario solids, pain controlled, VSS

## 2022-09-22 NOTE — PROGRESS NOTES
Hospital Medicine Service -  Daily Progress Note       SUBJECTIVE     Interval History: No acute events overnight. Feels Fine. Denies chest pain,dyspnea or abdominal pain      OBJECTIVE        Vital signs in last 24 hours:  Temp:  [35.9 °C (96.7 °F)-36.6 °C (97.9 °F)] 36.6 °C (97.9 °F)  Heart Rate:  [60-88] 71  Resp:  [14-17] 16  BP: (129-180)/(60-75) 159/70  I/O last 3 completed shifts:  In: 200 [I.V.:200]  Out: 1725 [Urine:1725]    PHYSICAL EXAMINATION        GEN: well-developed and well-nourished; not in acute distress  HEENT: normocephalic; atraumatic  NECK: no JVD; no bruits  CARDIO: regular rate and rhythm; no murmurs or rubs  RESP: clear to auscultation bilaterally; no rales, rhonchi, or wheezes  ABD: soft, non-distended, non-tender, normal bowel sounds, Laparoscopy incisions clean   EXT: no cyanosis, clubbing, or edema  SKIN: clean, dry, warm, and intact  MUSCULOSKELETAL: no injury or deformity  NEURO: alert and oriented x 3; nonfocal  BEHAVIOR/EMOTIONAL: appropriate; cooperative     LABS / IMAGING / TELE        Labs  Lab Results   Component Value Date    WBC 12.16 (H) 09/22/2022    HGB 12.3 09/22/2022    HCT 38.7 09/22/2022    MCV 87.6 09/22/2022     09/22/2022     Lab Results   Component Value Date    GLUCOSE 133 (H) 09/22/2022    CALCIUM 8.7 (L) 09/22/2022     09/22/2022    K 4.0 09/22/2022    CO2 24 09/22/2022     09/22/2022    BUN 16 09/22/2022    CREATININE 1.0 09/22/2022     Lab Results   Component Value Date    INR 1.2 09/19/2022    INR 1.5 09/16/2022    INR 1.4 07/29/2022       Imaging  X-RAY CHEST 2 VIEWS    Result Date: 9/19/2022  IMPRESSION: 1. Stable cardiomegaly. 2. Increased right but improved left basilar pleural fluid and hypoventilatory change and no airspace disease bilaterally. COMMENT:    AP and lateral erect chest radiographs are compared to a prior AP only study dated 7/27/2022. A left-sided dual-lead PCM is again noted. The cardiomediastinal contour shows stable  cardiomegaly, again noting mural calcifications in the aortic arch. The lungs show increased right but improved left basilar pleural fluid and hypoventilatory change and no airspace disease bilaterally. The osseous thorax and surrounding soft tissues again show prior bilateral partial shoulder arthroplasties and midthoracic advanced spondylosis.    MRI ABDOMEN WITHOUT CONTRAST MRCP    Result Date: 9/19/2022  IMPRESSION: Limited study. Gallstones. No evidence of choledocholithiasis. Interval development of small right pleural effusion and bibasilar atelectasis. COMMENT: Multiplanar MRI of the abdomen was performed without intravenous contrast. An MRCP was performed utilizing T2-weighted sequences. Three-dimensional images were reconstructed on the technologist workstation and the source and MIP images were reviewed. Unfortunately, the study is limited by motion. Comparison is made to a CTA of the abdomen dated 09/16/2022. There is a new small right pleural effusion and bibasilar atelectasis. Moderate size hiatal hernia is again noted. The liver is normal in size measuring 16 cm in length. There is no signal loss on the opposed phase or in phase sequences to suggest hepatic steatosis or iron deposition, respectively. No focal hepatic mass is identified. Several low T2 signal filling defects are again seen in the gallbladder lumen, presumably gallstones, measuring up to 1.3 cm. The gallbladder appears otherwise unremarkable. The common bile duct is normal in caliber measuring 5 mm. No intraluminal filling defects or strictures are seen. No intrahepatic biliary dilatation is identified. The pancreatic duct is not well seen but does not appear enlarged. The spleen, pancreas, and adrenal glands appear unremarkable. Multiple right renal cysts are again noted measuring up to 2.7 cm. The left kidney appears unremarkable. No upper abdominal lymphadenopathy or free fluid is seen. No grossly abnormal bowel loops are identified.  The signal in the osseous structures is grossly unremarkable.    CT ANGIOGRAPHY CHEST/ABDOMEN/PELVIS WITH AND WITHOUT IV CONTRAST    Addendum Date: 9/16/2022    Please note that there is severe stenosis at the junction of the left subclavian and left brachiocephalic vein as it courses between the left anterior first rib and left clavicle, with subsequent marked left upper extremity as well as partially imaged left lower neck and left chest wall venous collaterals, with the stenosis likely sequela of chronic left chest wall pacer leads compounded by left arm abduction.    Result Date: 9/16/2022  IMPRESSION: 1. No aortic aneurysm or dissection as clinically questioned. 2. Acute pancreatitis without evidence of pancreatic necrosis or collection. COMMENT: CHEST LUNGS AND AIRWAYS: Mild bibasilar subsegmental atelectasis without consolidation, pneumothorax, or mass. The visualized airways are patent. PLEURA: no effusion. VESSELS: Mild enlargement of the main pulmonary artery measuring up to 3.4 cm in diameter, which may be seen with pulmonary hypertension in the appropriate clinical setting. No thoracic aortic aneurysm or dissection, noting diffuse atherosclerotic calcifications/changes throughout the aorta and coronary arteries. HEART: Cardiomegaly with extensive mitral annular calcification again noted. No pericardial effusion. MEDIASTINUM AND BRENNEN: within normal limits, no enlarged lymph nodes. CHEST WALL AND LOWER NECK: Dual-lead left chest wall pacer with leads in the right atrium and right ventricle. ABDOMEN: LIVER: normal morphology without suspicious mass. BILE DUCTS: normal caliber. GALLBLADDER: Noncalcified cholelithiasis. No evidence of acute cholecystitis. PANCREAS: Diffuse peripancreatic stranding with slightly heterogeneous parenchymal perfusion/enhancement, compatible with acute interstitial pancreatitis. No evidence of pancreatic necrosis. SPLEEN: within normal limits. ADRENALS: within normal limits.  KIDNEYS: A few right-sided cysts measuring up to 2.8 cm. PELVIS: Evaluation limited by right hip arthroplasty streak artifact. REPRODUCTIVE ORGANS: no pelvic masses. URETERS: within normal limits. BLADDER: within normal limits. BOWEL/PERITONEUM: Bowel demonstrates normal caliber without evidence of obstruction. Sigmoid and descending colonic diverticulosis without evidence of acute diverticulitis. Normal appendix. Large hiatal hernia again noted resulting in partial intrathoracic stomach, unchanged. No enlarged mesenteric lymph nodes. No ascites or free air, no fluid collection. VESSELS: atherosclerotic changes, without abdominal aortic aneurysm or dissection. There is moderate focal proximal left common iliac artery stenosis due to prominent noncalcified atheromatous plaque. RETROPERITONEUM: no enlarged retroperitoneal or pelvic nodes. ABDOMINAL WALL: intact. BONES: degenerative changes, no suspicious lytic or blastic lesions. Partially imaged right hip arthroplasty with associated streak artifact. Bilateral shoulder arthroplasties noted.       ECG/Telemetry  I have independently reviewed the telemetry. No events for the last 24 hours.Paced Rhythm      ASSESSMENT AND PLAN      * Acute pancreatitis  Assessment & Plan  Likely secondary to  gallstone pancreatitis  Presents with sudden onset of abdominal pain, nausea and vomiting admitted for acute pancreatitis.   Initial LFTs elevated with mixed hepatic injury and cholestatic picture, lipase 1436  Initial chest, abdomen, pelvis CTA with no aortic aneurysm or dissection as clinically questioned.. Acute pancreatitis without evidence of pancreatic necrosis or collection. Imaging notable for cholelithiasis, however no tigist choledocholithiasis or biliary duct dilation. Denies any alcohol use  Triglycerides relatively stable  Sxs improving, LFTs downtrending    MRI Abdomen shows gallstones no evidence of choledocholithiasis  Cont w/lidoderm patch and cont w/morphine IV 2mg  q3h PRN for pain management. Avoiding Toradol for now given DENNY  S/p laparoscopic cholecystectomy by Dr. Valdivia 9/21  Patient doing well postoperatively and tolerating cardiac diet  Cleared to initiate Plavix today per Dr. Valdivia  Could initiate heparin drip for tomorrow per Surgery  Updated patient's daughter     Acute kidney injury superimposed on CKD (CMS/[facility])  Assessment & Plan  Resolved  Likely Prerenal     GERD (gastroesophageal reflux disease)  Assessment & Plan  Will place on PPI BID as stated above    Hyperlipidemia  Assessment & Plan  Hold statin for now given transaminitis  Repeat lipid panel as stated above        PAD (peripheral artery disease) (CMS/[facility])  Assessment & Plan  C/A/P CTA w/severe stenosis at the junction of the left subclavian and left brachiocephalic vein as it courses between the left anterior first rib  and left clavicle, with subsequent marked left upper extremity as well as  partially imaged left lower neck and left chest wall venous collaterals, with  the stenosis likely sequela of chronic left chest wall pacer leads compounded by  left arm abduction.  Currently denies any significant LUE pain    Plan:  Resume meds after surgery  Resume outpt f/u with Cardiology for continued monitoring    Transaminitis  Assessment & Plan  Trending down  Likely secondary to gallstone pancreatitis   Hold statin  Cont w/IVFs as stated  Monitor cmp    Atrial fibrillation, unspecified type (CMS/[facility])  Assessment & Plan  H/o new onset atrial fibrillation with SSS/pauses s/p PPM 7/21/22  EKG at with ? AV junctional rhythm and ?PPM malfunction, rate controlled  Cont w/outpt regimen of metoprolol, Amiodarone   Start Eliquis once cleared by surgery  Cardiology following    Sick sinus syndrome (CMS/[facility])  Assessment & Plan  S/p PPM placement 7/2022  Monitor electrolytes and replete as required for potassium greater than 4, magnesium greater than 2  Cardiology following     CAD (coronary artery disease)  Assessment &  Plan   history of coronary disease (PCI with NIKITA to prox RCA and prox LCx [p/op developed ST elevations inferiorly- thrombotic occlusion RCA s/p repeat angioplasty),  Presents with atypical chest pain as stated, although EKG w/no acute st/t wave changes and HS troponin wnl x 1. Suspect sxs likely related to acute pancreatitis  Restarted on Plavix  Hold statin given transaminitis  Cont w/metoprolol         VTE Assessment: [place]    Code Status: Full Code  Estimated discharge date: 9/23/2022     Gayla Soares MD  9/22/2022  3:01 PM              multiple medical complaints

## 2022-09-22 NOTE — PATIENT CARE CONFERENCE
Care Progression Rounds Note  Date: 9/22/2022  Time: 9:15 AM     Patient Name: Emily Ramirez     Medical Record Number: [id number]   YOB: 1945  Sex: Female      Room/Bed: 0306W    Admitting Diagnosis: Abdominal pain, unspecified abdominal location [R10.9]  Atrial fibrillation, unspecified type (CMS/HCC) [I48.91]  Acute pancreatitis, unspecified complication status, unspecified pancreatitis type [K85.90]   Admit Date/Time: 9/16/2022  5:27 AM    Primary Diagnosis: Acute pancreatitis  Principal Problem: Acute pancreatitis    GMLOS: 3.1  Anticipated Discharge Date: 9/23/2022    AM-PAC:  Mobility Score: 22    Discharge Planning:  Anticipated Discharge Disposition: skilled nursing facility, home with home health  Can patient participate in a follow-up phone call/video visit?: No, dispo not home    Barriers to Discharge:  Medical issues not resolved, Test pending, Consult pending, Consultant recommendations pending    Comments:       Participants:  social work/services, nursing,

## 2022-09-22 NOTE — PLAN OF CARE
Plan of Care Review  Plan of Care Reviewed With: patient  Progress: no change  Outcome Summary: Pt post op with moderate pain. Prn oxy given throughout the shift. IVF and abx given as ordered. Bed alarm on and call bell within reach

## 2022-09-22 NOTE — PROGRESS NOTES
General Surgery Daily Progress Note    Subjective     Feeling well. Tolerating diet. Minimal pain.       Objective     Vital signs in last 24 hours:  Temp:  [35.9 °C (96.7 °F)-36.6 °C (97.9 °F)] 36.6 °C (97.9 °F)  Heart Rate:  [60-88] 63  Resp:  [16-18] 18  BP: (129-180)/(60-74) 138/61      Intake/Output Summary (Last 24 hours) at 9/22/2022 1637  Last data filed at 9/22/2022 0750  Gross per 24 hour   Intake 9409 ml   Output --   Net 9409 ml     Intake/Output this shift:  I/O this shift:  In: 9409 [I.V.:9409]  Out: -     Physical Exam    General appearance: Awake, alert, in no acute distress or obvious discomfort  Neurologic: Alert, cooperative. Coherent speech.  Lungs: Respirations nonlabored, no tachypnea.   Abdomen: Soft, nontender, nondistended. Incisions dressed. Dressings intact.   Tubes/Lines/Drains: pIV, Wick  Extremities: No cyanosis, edema.       Labs  CBC Results       09/22/22 09/21/22 09/20/22     0954 0413 0522    WBC 12.16 7.54 6.71    RBC 4.42 4.10 4.34    HGB 12.3 11.4 11.8    HCT 38.7 35.0 37.9    MCV 87.6 85.4 87.3    MCH 27.8 27.8 27.2    MCHC 31.8 32.6 31.1     288 297        BMP Results       09/22/22 09/21/22 09/20/22     0955 0413 0522     140 141    K 4.0 3.6 3.4    Cl 106 110 107    CO2 24 22 24    Glucose 133 99 96    BUN 16 11 9    Creatinine 1.0 0.9 0.9    Calcium 8.7 8.4 8.6    Anion Gap 8 8 10    EGFR 53.6 >60.0 >60.0            Imaging  n/a      Assessment/Plan   POD 1 s/p lap oli for GS pancreatitis.   -- Recovering well.   -- Start plavix today.   -- Resume eliquis in a.m. Recommend monitor for sign of bleeding for 12-24 hr.      Qi Valdivia MD

## 2022-09-23 PROBLEM — R09.02 HYPOXIA: Status: RESOLVED | Noted: 2022-09-18 | Resolved: 2022-09-23

## 2022-09-23 LAB
ALBUMIN SERPL-MCNC: 3 G/DL (ref 3.4–5)
ALP SERPL-CCNC: 77 IU/L (ref 35–126)
ALT SERPL-CCNC: 49 IU/L (ref 11–54)
ANION GAP SERPL CALC-SCNC: 8 MEQ/L (ref 3–15)
AST SERPL-CCNC: 40 IU/L (ref 15–41)
ATRIAL RATE: 70
BASOPHILS # BLD: 0.03 K/UL (ref 0.01–0.1)
BASOPHILS NFR BLD: 0.3 %
BILIRUB SERPL-MCNC: 0.5 MG/DL (ref 0.3–1.2)
BUN SERPL-MCNC: 26 MG/DL (ref 8–20)
CALCIUM SERPL-MCNC: 8.7 MG/DL (ref 8.9–10.3)
CHLORIDE SERPL-SCNC: 106 MEQ/L (ref 98–109)
CO2 SERPL-SCNC: 25 MEQ/L (ref 22–32)
CREAT SERPL-MCNC: 1.2 MG/DL (ref 0.6–1.1)
DIFFERENTIAL METHOD BLD: ABNORMAL
EOSINOPHIL # BLD: 0.18 K/UL (ref 0.04–0.36)
EOSINOPHIL NFR BLD: 2 %
ERYTHROCYTE [DISTWIDTH] IN BLOOD BY AUTOMATED COUNT: 15.8 % (ref 11.7–14.4)
GFR SERPL CREATININE-BSD FRML MDRD: 43.4 ML/MIN/1.73M*2
GLUCOSE SERPL-MCNC: 138 MG/DL (ref 70–99)
HCT VFR BLDCO AUTO: 37.6 % (ref 35–45)
HGB BLD-MCNC: 12.2 G/DL (ref 11.8–15.7)
IMM GRANULOCYTES # BLD AUTO: 0.04 K/UL (ref 0–0.08)
IMM GRANULOCYTES NFR BLD AUTO: 0.5 %
LYMPHOCYTES # BLD: 1.4 K/UL (ref 1.2–3.5)
LYMPHOCYTES NFR BLD: 15.9 %
MCH RBC QN AUTO: 28.2 PG (ref 28–33.2)
MCHC RBC AUTO-ENTMCNC: 32.4 G/DL (ref 32.2–35.5)
MCV RBC AUTO: 86.8 FL (ref 83–98)
MONOCYTES # BLD: 0.72 K/UL (ref 0.28–0.8)
MONOCYTES NFR BLD: 8.2 %
NEUTROPHILS # BLD: 6.43 K/UL (ref 1.7–7)
NEUTS SEG NFR BLD: 73.1 %
NRBC BLD-RTO: 0 %
P AXIS: 46
PDW BLD AUTO: 10.8 FL (ref 9.4–12.3)
PLATELET # BLD AUTO: 320 K/UL (ref 150–369)
POTASSIUM SERPL-SCNC: 4 MEQ/L (ref 3.6–5.1)
PR INTERVAL: 162
PROT SERPL-MCNC: 5.3 G/DL (ref 6–8.2)
QRS DURATION: 84
QT INTERVAL: 406
QTC CALCULATION(BAZETT): 438
R AXIS: 6
RBC # BLD AUTO: 4.33 M/UL (ref 3.93–5.22)
SODIUM SERPL-SCNC: 139 MEQ/L (ref 136–144)
T WAVE AXIS: -15
VENTRICULAR RATE: 70
WBC # BLD AUTO: 8.8 K/UL (ref 3.8–10.5)

## 2022-09-23 PROCEDURE — 63700000 HC SELF-ADMINISTRABLE DRUG: Performed by: NURSE PRACTITIONER

## 2022-09-23 PROCEDURE — 97530 THERAPEUTIC ACTIVITIES: CPT | Mod: GP

## 2022-09-23 PROCEDURE — 21400000 HC ROOM AND CARE CCU/INTERMEDIATE

## 2022-09-23 PROCEDURE — 99233 SBSQ HOSP IP/OBS HIGH 50: CPT | Performed by: HOSPITALIST

## 2022-09-23 PROCEDURE — 63700000 HC SELF-ADMINISTRABLE DRUG: Performed by: SURGERY

## 2022-09-23 PROCEDURE — 80053 COMPREHEN METABOLIC PANEL: CPT | Performed by: SURGERY

## 2022-09-23 PROCEDURE — 63700000 HC SELF-ADMINISTRABLE DRUG: Performed by: HOSPITALIST

## 2022-09-23 PROCEDURE — 85025 COMPLETE CBC W/AUTO DIFF WBC: CPT | Performed by: SURGERY

## 2022-09-23 PROCEDURE — 97166 OT EVAL MOD COMPLEX 45 MIN: CPT | Mod: GO

## 2022-09-23 PROCEDURE — 97116 GAIT TRAINING THERAPY: CPT | Mod: GP

## 2022-09-23 PROCEDURE — 36415 COLL VENOUS BLD VENIPUNCTURE: CPT | Performed by: SURGERY

## 2022-09-23 RX ORDER — PANTOPRAZOLE SODIUM 40 MG/1
40 TABLET, DELAYED RELEASE ORAL 2 TIMES DAILY
Status: DISCONTINUED | OUTPATIENT
Start: 2022-09-23 | End: 2022-09-25 | Stop reason: HOSPADM

## 2022-09-23 RX ADMIN — APIXABAN 5 MG: 5 TABLET, FILM COATED ORAL at 21:36

## 2022-09-23 RX ADMIN — AMLODIPINE BESYLATE 5 MG: 5 TABLET ORAL at 09:02

## 2022-09-23 RX ADMIN — APIXABAN 5 MG: 5 TABLET, FILM COATED ORAL at 09:03

## 2022-09-23 RX ADMIN — OXYCODONE HYDROCHLORIDE 5 MG: 5 TABLET ORAL at 17:21

## 2022-09-23 RX ADMIN — METOPROLOL SUCCINATE 50 MG: 50 TABLET, EXTENDED RELEASE ORAL at 09:03

## 2022-09-23 RX ADMIN — OXYCODONE HYDROCHLORIDE 5 MG: 5 TABLET ORAL at 09:02

## 2022-09-23 RX ADMIN — DOCUSATE SODIUM 100 MG: 100 CAPSULE, LIQUID FILLED ORAL at 09:03

## 2022-09-23 RX ADMIN — PANTOPRAZOLE SODIUM 40 MG: 40 TABLET, DELAYED RELEASE ORAL at 22:05

## 2022-09-23 RX ADMIN — CLOPIDOGREL BISULFATE 75 MG: 75 TABLET, FILM COATED ORAL at 09:03

## 2022-09-23 RX ADMIN — OXYCODONE HYDROCHLORIDE 5 MG: 5 TABLET ORAL at 21:35

## 2022-09-23 RX ADMIN — AMIODARONE HYDROCHLORIDE 200 MG: 200 TABLET ORAL at 09:02

## 2022-09-23 ASSESSMENT — COGNITIVE AND FUNCTIONAL STATUS - GENERAL
HELP NEEDED FOR BATHING: 3 - A LITTLE
STANDING UP FROM CHAIR USING ARMS: 3 - A LITTLE
EATING MEALS: 4 - NONE
AFFECT: WFL
CLIMB 3 TO 5 STEPS WITH RAILING: 3 - A LITTLE
MOVING TO AND FROM BED TO CHAIR: 3 - A LITTLE
DRESSING REGULAR LOWER BODY CLOTHING: 3 - A LITTLE
DRESSING REGULAR UPPER BODY CLOTHING: 4 - NONE
AFFECT: WFL
TOILETING: 3 - A LITTLE
WALKING IN HOSPITAL ROOM: 3 - A LITTLE
HELP NEEDED FOR PERSONAL GROOMING: 3 - A LITTLE

## 2022-09-23 NOTE — PROGRESS NOTES
Cardiology Progress Note    Postop day #1.  No cardiac complaints.  Sitting comfortably in chair.  Back on Plavix and Eliquis.    REVIEW OF SYSTEMS : No other significant changes to review of systems over the past 24 hours.    Current Facility-Administered Medications   Medication Dose Route Frequency Provider Last Rate Last Admin    acetaminophen (TYLENOL) tablet 650 mg  650 mg oral q4h PRN Qi Valdivia MD   650 mg at 09/21/22 1820    alum-mag hydroxide-simeth (MAALOX) 200-200-20 mg/5 mL suspension 30 mL  30 mL oral q4h PRN Qi Valdivia MD        amiodarone (PACERONE) tablet 200 mg  200 mg oral Daily Qi Valdivia MD   200 mg at 09/23/22 0902    amLODIPine (NORVASC) tablet 5 mg  5 mg oral q AM Qi Valdivia MD   5 mg at 09/23/22 0902    apixaban (ELIQUIS) tablet 5 mg  5 mg oral BID Qi Valdivia MD   5 mg at 09/23/22 0903    atropine injection 0.5 mg  0.5 mg intravenous q5 min PRN Qi Valdivia MD        clopidogreL (PLAVIX) tablet 75 mg  75 mg oral Daily Gayla Soares MD   75 mg at 09/23/22 0903    glucose chewable tablet 16-32 g of dextrose  16-32 g of dextrose oral PRN Qi Valdivia MD        Or    dextrose 40 % oral gel 15-30 g of dextrose  15-30 g of dextrose oral PRN Qi Valdivia MD        Or    glucagon (GLUCAGEN) injection 1 mg  1 mg intramuscular PRN Qi Valdivia MD        Or    dextrose 50 % in water (D50) injection 12.5 g  25 mL intravenous PRN Qi Valdivia MD        docusate sodium (COLACE) capsule 100 mg  100 mg oral BID Qi Valdivia MD   100 mg at 09/23/22 0903    hydrALAZINE (APRESOLINE) injection 10 mg  10 mg intravenous q6h PRN Gayla Soares MD   10 mg at 09/21/22 1821    oxyCODONE (ROXICODONE) immediate release tablet 5 mg  5 mg oral q4h PRN Qi Valdivia MD   5 mg at 09/23/22 0902    And    HYDROmorphone (DILAUDID) injection 0.25 mg  0.25 mg intravenous q4h PRN Qi Valdivia MD        lidocaine (ASPERCREME) 4 % topical patch 1 patch  1 patch Topical Daily  Qi Valdivia MD        magnesium sulfate IVPB 1g in 100 mL NSS/D5W/SWFI  1 g intravenous PRN Qi Valdivia MD        Or    magnesium sulfate IVPB 2g in 50 mL NSS/D5W/SWFI  2 g intravenous PRN Qi Valdivia MD   Stopped at 09/17/22 1210    melatonin ODT 3 mg  3 mg oral Nightly PRN Qi Valdivia MD   3 mg at 09/20/22 2257    metoprolol succinate XL (TOPROL-XL) 24 hr ER tablet 50 mg  50 mg oral q AM Qi Valdivia MD   50 mg at 09/23/22 0903    nitroglycerin (NITROSTAT) SL tablet 0.4 mg  0.4 mg sublingual q5 min PRN Qi Valdivia MD        pantoprazole (PROTONIX) injection 40 mg  40 mg intravenous q12h KRISTEN Qi Valdivia MD   40 mg at 09/22/22 1958    potassium chloride (KLOR-CON M) ER tablet (particles/crystals) 20 mEq  20 mEq oral PRN Qi Valdivia MD   20 mEq at 09/21/22 0845    potassium chloride (KLOR-CON M) ER tablet (particles/crystals) 40 mEq  40 mEq oral PRN Qi Valdivia MD   40 mEq at 09/20/22 1705    senna (SENOKOT) tablet 1 tablet  1 tablet oral 2x daily PRN Qi Valdivia MD        trimethobenzamide (TIGAN) injection 200 mg  200 mg intramuscular q8h PRN Qi Valdivia MD              Objective   Labs  No new labs.    Telemetry  I have personally reviewed the telemetry tracings.  Sinus rhythm with nonsustained atrial tachycardia x2    Physical Exam  Temp:  [36.1 °C (97 °F)-36.6 °C (97.9 °F)] 36.3 °C (97.4 °F)  Heart Rate:  [61-93] 70  Resp:  [16-18] 18  BP: (119-164)/(61-74) 119/61    Intake/Output Summary (Last 24 hours) at 9/23/2022 1205  Last data filed at 9/22/2022 1600  Gross per 24 hour   Intake --   Output 700 ml   Net -700 ml       Sitting comfortably in chair, NAD  Lungs clear  Heart regular  Moves all 4 extremities    ASSESSMENT / PLAN:    Back on cardiac medications.  Cardiac condition stable postoperatively.  We will sign off for now.  Please not hesitate to reconsult with any cardiac questions or concerns.  She should be stable for discharge tomorrow, from a cardiovascular  standpoint, after 24 hours back on her medications.

## 2022-09-23 NOTE — PLAN OF CARE
Plan of Care Review  Plan of Care Reviewed With: patient  Progress: no change  Outcome Summary: Pt OOB in the chair this shift. Pt assist x1-2 for safety. Pt has mild-moderate pain at the incision sites. Incision CDI. Incont care provided Purewick changed as well. Bed alarm on and call bell within reach.

## 2022-09-23 NOTE — PROGRESS NOTES
Patient:  Emily Ramirez  Location:  [facility] [address] [address]  MRN:  [id number]  Today's date:  9/23/2022     General Observations  Start: Pt received seated edge of bed with PT student; she was agreeable to therapy and no issues or concerns identified by nurse prior to session   End: Pt seated in chair at end of session; call bell in reach, chair alarm activated, all needs met, personal items in reach and nurse notified of patient performance and patient's position      Emily is a 77 y.o. female admitted on 9/16/2022 with Abdominal pain, unspecified abdominal location [R10.9]  Atrial fibrillation, unspecified type (CMS/HCC) [I48.91]  Acute pancreatitis, unspecified complication status, unspecified pancreatitis type [K85.90]. Principal problem is Acute pancreatitis.    Past Medical History  Emily has a past medical history of Aortic regurgitation, Chronic kidney disease, Coronary artery disease, History of loop recorder, Hypertension, Lipid disorder, Mitral regurgitation, and SVT (supraventricular tachycardia) (CMS/HCC).    History of Present Illness   Emily Ramirez is a 77 y.o. female with a past medical history of coronary disease (PCI with NIKITA to prox RCA and prox LCx [p/op developed ST elevations inferiorly- thrombotic occlusion RCA s/p repeat angioplasty), atrial fibrillation with SSS/pauses s/p PPM 7/21/22 (on Eliquis), mild AR, MR, hypertension, hyperlipidemia, and chronic kidney disease stage III  who presents to [facility] ER on 9/16/2022 with 2-day history of chest and midepigastric abdominal pain.  According to patient she was in her usual state of health until yesterday evening when shortly after dinner she developed sudden onset of substernal chest and midepigastric pain.  Symptoms resolved temporarily without any major intervention however, later in the evening symptoms reoccurred waking patient from sleep with pain now radiating throughout her abdomen and into her back.  This  is also accompanied by nausea and several episodes of nonbilious, non bloody emesis.  Symptoms to continue to progress throughout the early morning and as a result patient ultimately presented to the ER for further evaluation.  Currently, patient continues to complain of ongoing midepigastric pain although somewhat improved since administration of morphine.  She denies any fevers does admit to chills earlier today.  Denies any shortness of breath, cough, wheezing, heart palpitations.  Denies any recent diarrhea but does admit to intermittent constipation over the past week.  Denies any urinary symptoms.  Denies any prior history of gallstones or gallbladder disease    9/21/22 CHOLECYSTECTOMY LAPAROSCOPIC       Therapy Pain/Vitals     Row Name 09/23/22 0942 09/23/22 1008          Pain/Comfort/Sleep    Pain Charting Type Pain Assessment Pain Assessment     Preferred Pain Scale number (Numeric Rating Pain Scale) number (Numeric Rating Pain Scale)     (0-10) Pain Rating: Rest 0 0     (0-10) Pain Rating: Activity 6 6     Pain Management Interventions position adjusted position adjusted            Vitals    Pulse 92 93     SpO2 -- 96 %     Patient Activity -- At rest     Oxygen Therapy -- None (Room air)     /67 143/67     BP Location Right upper arm Right upper arm     BP Method Automatic Automatic     Patient Position Lying Sitting                     Prior Living Environment    Flowsheet Row Most Recent Value   Living Environment Comment lives w/spouse, 2SH, 1 CARTER, 1/2 bath 1st floor, FF w/stairglide to tub shower w/seat. pt reports 1st floor setup recently w/adjustable bed.        Prior Level of Function    Flowsheet Row Most Recent Value   Dominant Hand right   Ambulation assistive equipment   Transferring assistive equipment   Toileting independent   Bathing assistive equipment   Dressing independent   Eating independent   Communication understands/communicates without difficulty   Prior Level of Function  Comment Ind w/Rollator   Assistive Device Currently Used at Home walker, front-wheeled, stair glide, shower chair  [adjustable bed]        Occupational Profile    Flowsheet Row Most Recent Value   Reason for Services/Referral ex lap   Successful Occupations IND ADLs   Environmental Supports and Barriers has supportive spouse   Patient Goals home           OT Evaluation and Treatment - 09/23/22 0955        OT Time Calculation    Start Time 0955     Stop Time 1011     Time Calculation (min) 16 min        Session Details    Document Type initial evaluation     Mode of Treatment occupational therapy        General Information    Patient Profile Reviewed yes     Existing Precautions/Restrictions fall        Cognition/Psychosocial    Affect/Mental Status (Cognition) WFL     Orientation Status (Cognition) oriented x 4     Follows Commands (Cognition) follows one-step commands;over 90% accuracy;WFL     Cognitive Function safety deficit     Safety Deficit (Cognition) minimal deficit;insight into deficits/self-awareness     Comment, Cognition cooperative and receptive, reports spouse can assist at home        Hearing Assessment    Hearing Status WFL        Vision Assessment/Intervention    Visual Impairment/Limitations WFL        Sensory Assessment    Sensory Assessment (Somatosensory) sensation intact;bilateral UE        Range of Motion (ROM)    Range of Motion ROM is WFL;bilateral upper extremities        Strength (Manual Muscle Testing)    Strength (Manual Muscle Testing) strength is WFL;bilateral upper extremities   functional assessment- WFL ADLS       Bed Mobility    Comment (Bed Mobility) pesents for OT seated edge of bed        Transfers    Comment deferred gait belt due to inc        Sit to Stand Transfer    [place], Sit to Stand Transfer close supervision     Verbal Cues hand placement;technique;safety     Assistive Device walker, front-wheeled     Comment bed        Stand to Sit Transfer    [place], Stand  to Sit Transfer close supervision     Verbal Cues hand placement;safety     Assistive Device walker, front-wheeled     Comment chair        Toilet Transfer    Transfer Technique sit-stand;stand-sit     [place], Toilet Transfer close supervision     Assistive Device walker, front-wheeled        Balance    Static Sitting Balance WFL;supported;sitting in chair     Dynamic Sitting Balance WFL;unsupported;sitting, edge of bed     Sit to Stand Dynamic Balance mild impairment;unsupported     Static Standing Balance WFL;supported     Dynamic Standing Balance mild impairment;supported     Comment, Balance RW        Motor Skills    Functional Endurance impaired: fatigues with activity        Lower Body Dressing    Tasks don;underwear     [place] minimum assist (75% or more patient effort)     Comment assist to thread seated edge of bed, pulled up in stance        Grooming    Self-Performance washes, rinses and dries hands     [place] supervision     Position supported sitting        Toileting    [place] adjust/manage clothing;perform bladder hygiene;minimum assist (75% or more patient effort)     Comment + void and completed hygiene seated, MIN A to pull up underwear        AM-PAC (TM) - ADL (Current Function)    Putting on and taking off regular lower body clothing? 3 - A Little     Bathing? 3 - A Little     Toileting? 3 - A Little     Putting on/taking off regular upper body clothing? 4 - None     How much help for taking care of personal grooming? 3 - A Little     Eating meals? 4 - None     AM-PAC (TM) ADL Score 20        Assessment/Plan (OT)    Daily Outcome Statement [facility] 20, close SUP functional transfers with RW, SUP seated grooming, MIN A LB dressing and toileting, ADLs impacted by impairments related to endurance/balance, Rec continued OT and home with assist     Rehab Potential good, to achieve stated therapy goals     Therapy Frequency 3-5 times/wk     Planned Therapy Interventions activity  tolerance training;adaptive equipment training;functional balance retraining;occupation/activity based interventions;patient/caregiver education/training;transfer/mobility retraining               OT Assessment/Plan    Flowsheet Row Most Recent Value   OT Recommended Discharge Disposition home with assistance at 09/23/2022 0955   Anticipated Equipment Needs At Discharge (OT) none at 09/23/2022 0955   Patient/Family Therapy Goal Statement home at 09/23/2022 0955                    Education Documentation  Self-Care, taught by Tonia Savage OT at 9/23/2022 12:38 PM.  Learner: Patient  Readiness: Acceptance  Method: Explanation  Response: Verbalizes Understanding  Comment: role of OT, adaptive LB Dressing, home safety, falls pervention, energy conservation, call bell use          OT Goals    Flowsheet Row Most Recent Value   Transfer Goal 1    Activity/Assistive Device sit-to-stand/stand-to-sit, toilet, walker, front-wheeled at 09/23/2022 0955   [place] modified independence at 09/23/2022 0955   Time Frame by discharge at 09/23/2022 0955   Progress/Outcome goal ongoing at 09/23/2022 0955   Dressing Goal 1    Activity/Adaptive Equipment dressing skills, all at 09/23/2022 0955   [place] modified independence at 09/23/2022 0955   Time Frame by discharge at 09/23/2022 0955   Progress/Outcome goal ongoing at 09/23/2022 0955   Toileting Goal 1    Activity/Assistive Device toileting skills, all at 09/23/2022 0955   [place] modified independence at 09/23/2022 0955   Time Frame by discharge at 09/23/2022 0955   Progress/Outcome goal ongoing at 09/23/2022 0955   Grooming Goal 1    Activity/Assistive Device grooming skills, all at 09/23/2022 0955   [place] modified independence at 09/23/2022 0955   Time Frame by discharge at 09/23/2022 0955   Progress/Outcome goal ongoing at 09/23/2022 0955

## 2022-09-23 NOTE — PROGRESS NOTES
General Surgery Daily Progress Note    Subjective     Pt feels well. Eating well. Started Plavix yesterday and Eliquis this a.m.       Objective     Vital signs in last 24 hours:  Temp:  [36.1 °C (97 °F)-36.6 °C (97.9 °F)] 36.3 °C (97.4 °F)  Heart Rate:  [61-93] 70  Resp:  [16-18] 18  BP: (119-164)/(61-74) 119/61      Intake/Output Summary (Last 24 hours) at 9/23/2022 1237  Last data filed at 9/22/2022 1600  Gross per 24 hour   Intake --   Output 700 ml   Net -700 ml     Intake/Output this shift:  No intake/output data recorded.    Physical Exam    General appearance: Awake, alert, in no acute distress or obvious discomfort  Neurologic: Alert, cooperative. Coherent speech.  Lungs: Respirations nonlabored, no tachypnea.   Abdomen: Soft, nontender, nondistended. Incisions dressed. Dressings clean.   Tubes/Lines/Drains: pIV  Extremities: No cyanosis, edema.       Labs  CBC Results       09/22/22 09/21/22 09/20/22     0954 0413 0522    WBC 12.16 7.54 6.71    RBC 4.42 4.10 4.34    HGB 12.3 11.4 11.8    HCT 38.7 35.0 37.9    MCV 87.6 85.4 87.3    MCH 27.8 27.8 27.2    MCHC 31.8 32.6 31.1     288 297        BMP Results       09/22/22 09/21/22 09/20/22     0955 0413 0522     140 141    K 4.0 3.6 3.4    Cl 106 110 107    CO2 24 22 24    Glucose 133 99 96    BUN 16 11 9    Creatinine 1.0 0.9 0.9    Calcium 8.7 8.4 8.6    Anion Gap 8 8 10    EGFR 53.6 >60.0 >60.0            Imaging  n/a      Assessment/Plan   S/p lap oli for gs panc, now back on anti-plt and a/c, so far doing well.   -- monitor on full a/c thru a.m. If no e/o bleeding, stable for d/c home from surgical standpoint.       Qi Valdivia MD

## 2022-09-23 NOTE — PROGRESS NOTES
Hospital Medicine Service -  Daily Progress Note       SUBJECTIVE     Interval History: No acute events overnight. Feels Fine. Denies chest pain,dyspnea or abdominal pain      OBJECTIVE        Vital signs in last 24 hours:  Temp:  [36.1 °C (97 °F)-36.5 °C (97.7 °F)] 36.3 °C (97.4 °F)  Heart Rate:  [61-93] 70  Resp:  [16-18] 18  BP: (119-164)/(61-74) 119/61  I/O last 3 completed shifts:  In: 9409 [I.V.:9409]  Out: 700 [Urine:700]    PHYSICAL EXAMINATION        GEN: well-developed and well-nourished; not in acute distress  HEENT: normocephalic; atraumatic  NECK: no JVD; no bruits  CARDIO: regular rate and rhythm; no murmurs or rubs  RESP: clear to auscultation bilaterally; no rales, rhonchi, or wheezes  ABD: soft, non-distended, non-tender, normal bowel sounds, Laparoscopy incisions clean   EXT: no cyanosis, clubbing, or edema  SKIN: clean, dry, warm, and intact  MUSCULOSKELETAL: no injury or deformity  NEURO: alert and oriented x 3; nonfocal  BEHAVIOR/EMOTIONAL: appropriate; cooperative     LABS / IMAGING / TELE        Labs  Lab Results   Component Value Date    WBC 12.16 (H) 09/22/2022    HGB 12.3 09/22/2022    HCT 38.7 09/22/2022    MCV 87.6 09/22/2022     09/22/2022     Lab Results   Component Value Date    GLUCOSE 133 (H) 09/22/2022    CALCIUM 8.7 (L) 09/22/2022     09/22/2022    K 4.0 09/22/2022    CO2 24 09/22/2022     09/22/2022    BUN 16 09/22/2022    CREATININE 1.0 09/22/2022     Lab Results   Component Value Date    INR 1.2 09/19/2022    INR 1.5 09/16/2022    INR 1.4 07/29/2022       Imaging  X-RAY CHEST 2 VIEWS    Result Date: 9/19/2022  IMPRESSION: 1. Stable cardiomegaly. 2. Increased right but improved left basilar pleural fluid and hypoventilatory change and no airspace disease bilaterally. COMMENT:    AP and lateral erect chest radiographs are compared to a prior AP only study dated 7/27/2022. A left-sided dual-lead PCM is again noted. The cardiomediastinal contour shows stable  cardiomegaly, again noting mural calcifications in the aortic arch. The lungs show increased right but improved left basilar pleural fluid and hypoventilatory change and no airspace disease bilaterally. The osseous thorax and surrounding soft tissues again show prior bilateral partial shoulder arthroplasties and midthoracic advanced spondylosis.    MRI ABDOMEN WITHOUT CONTRAST MRCP    Result Date: 9/19/2022  IMPRESSION: Limited study. Gallstones. No evidence of choledocholithiasis. Interval development of small right pleural effusion and bibasilar atelectasis. COMMENT: Multiplanar MRI of the abdomen was performed without intravenous contrast. An MRCP was performed utilizing T2-weighted sequences. Three-dimensional images were reconstructed on the technologist workstation and the source and MIP images were reviewed. Unfortunately, the study is limited by motion. Comparison is made to a CTA of the abdomen dated 09/16/2022. There is a new small right pleural effusion and bibasilar atelectasis. Moderate size hiatal hernia is again noted. The liver is normal in size measuring 16 cm in length. There is no signal loss on the opposed phase or in phase sequences to suggest hepatic steatosis or iron deposition, respectively. No focal hepatic mass is identified. Several low T2 signal filling defects are again seen in the gallbladder lumen, presumably gallstones, measuring up to 1.3 cm. The gallbladder appears otherwise unremarkable. The common bile duct is normal in caliber measuring 5 mm. No intraluminal filling defects or strictures are seen. No intrahepatic biliary dilatation is identified. The pancreatic duct is not well seen but does not appear enlarged. The spleen, pancreas, and adrenal glands appear unremarkable. Multiple right renal cysts are again noted measuring up to 2.7 cm. The left kidney appears unremarkable. No upper abdominal lymphadenopathy or free fluid is seen. No grossly abnormal bowel loops are identified.  The signal in the osseous structures is grossly unremarkable.    CT ANGIOGRAPHY CHEST/ABDOMEN/PELVIS WITH AND WITHOUT IV CONTRAST    Addendum Date: 9/16/2022    Please note that there is severe stenosis at the junction of the left subclavian and left brachiocephalic vein as it courses between the left anterior first rib and left clavicle, with subsequent marked left upper extremity as well as partially imaged left lower neck and left chest wall venous collaterals, with the stenosis likely sequela of chronic left chest wall pacer leads compounded by left arm abduction.    Result Date: 9/16/2022  IMPRESSION: 1. No aortic aneurysm or dissection as clinically questioned. 2. Acute pancreatitis without evidence of pancreatic necrosis or collection. COMMENT: CHEST LUNGS AND AIRWAYS: Mild bibasilar subsegmental atelectasis without consolidation, pneumothorax, or mass. The visualized airways are patent. PLEURA: no effusion. VESSELS: Mild enlargement of the main pulmonary artery measuring up to 3.4 cm in diameter, which may be seen with pulmonary hypertension in the appropriate clinical setting. No thoracic aortic aneurysm or dissection, noting diffuse atherosclerotic calcifications/changes throughout the aorta and coronary arteries. HEART: Cardiomegaly with extensive mitral annular calcification again noted. No pericardial effusion. MEDIASTINUM AND BRENNEN: within normal limits, no enlarged lymph nodes. CHEST WALL AND LOWER NECK: Dual-lead left chest wall pacer with leads in the right atrium and right ventricle. ABDOMEN: LIVER: normal morphology without suspicious mass. BILE DUCTS: normal caliber. GALLBLADDER: Noncalcified cholelithiasis. No evidence of acute cholecystitis. PANCREAS: Diffuse peripancreatic stranding with slightly heterogeneous parenchymal perfusion/enhancement, compatible with acute interstitial pancreatitis. No evidence of pancreatic necrosis. SPLEEN: within normal limits. ADRENALS: within normal limits.  KIDNEYS: A few right-sided cysts measuring up to 2.8 cm. PELVIS: Evaluation limited by right hip arthroplasty streak artifact. REPRODUCTIVE ORGANS: no pelvic masses. URETERS: within normal limits. BLADDER: within normal limits. BOWEL/PERITONEUM: Bowel demonstrates normal caliber without evidence of obstruction. Sigmoid and descending colonic diverticulosis without evidence of acute diverticulitis. Normal appendix. Large hiatal hernia again noted resulting in partial intrathoracic stomach, unchanged. No enlarged mesenteric lymph nodes. No ascites or free air, no fluid collection. VESSELS: atherosclerotic changes, without abdominal aortic aneurysm or dissection. There is moderate focal proximal left common iliac artery stenosis due to prominent noncalcified atheromatous plaque. RETROPERITONEUM: no enlarged retroperitoneal or pelvic nodes. ABDOMINAL WALL: intact. BONES: degenerative changes, no suspicious lytic or blastic lesions. Partially imaged right hip arthroplasty with associated streak artifact. Bilateral shoulder arthroplasties noted.       ECG/Telemetry  I have independently reviewed the telemetry. No events for the last 24 hours.Paced Rhythm      ASSESSMENT AND PLAN      * Acute pancreatitis  Assessment & Plan  Likely secondary to  gallstone pancreatitis  Presents with sudden onset of abdominal pain, nausea and vomiting admitted for acute pancreatitis.   Initial LFTs elevated with mixed hepatic injury and cholestatic picture, lipase 1436  Initial CT abdomen showed  Acute pancreatitis without evidence of pancreatic necrosis or collection. Imaging notable for cholelithiasis, however no tigist choledocholithiasis or biliary duct dilation. Denies any alcohol use  Triglycerides relatively stable  Sxs improving, LFTs downtrending  MRI Abdomen shows gallstones no evidence of choledocholithiasis  Cont w/lidoderm patch and cont w/morphine IV 2mg q3h PRN for pain management. Avoiding   S/p laparoscopic cholecystectomy  by Dr. Valdivia 9/21  Patient doing well postoperatively and tolerating cardiac diet  Initiated on Plavix yesterday and Eliquis today  Follow hemoglobin  Ok to discharge home tomorrow if hemoglobin remains stable in am per   Updated patient's daughter     Acute kidney injury superimposed on CKD (CMS/[facility])  Assessment & Plan  Resolved  Likely prerenal    GERD (gastroesophageal reflux disease)  Assessment & Plan  Will place on PPI BID as stated above    Hyperlipidemia  Assessment & Plan  Hold statin for now given transaminitis  Repeat lipid panel as stated above    PAD (peripheral artery disease) (CMS/[facility])  Assessment & Plan  C/A/P CTA w/severe stenosis at the junction of the left subclavian and left brachiocephalic vein as it courses between the left anterior first rib  and left clavicle, with subsequent marked left upper extremity as well as  partially imaged left lower neck and left chest wall venous collaterals, with  the stenosis likely sequela of chronic left chest wall pacer leads compounded by  left arm abduction.  Currently denies any significant LUE pain    Plan:  Resume meds after surgery  Resume outpt f/u with Cardiology for continued monitoring    Transaminitis  Assessment & Plan  Likely secondary to gallstone pancreatitis  Follow LFTs  Hold statin    Atrial fibrillation, unspecified type (CMS/[facility])  Assessment & Plan  H/o new onset atrial fibrillation with SSS/pauses s/p PPM 7/21/22  EKG at with ? AV junctional rhythm and ?PPM malfunction, rate controlled  Cont w/outpt regimen of metoprolol, Amiodarone   Restarted on Eliquis today  Follow CBC closely    Sick sinus syndrome (CMS/[facility])  Assessment & Plan  S/p PPM placement 7/2022  Monitor electrolytes and replete as required for potassium greater than 4, magnesium greater than 2  Cardiology following     CAD (coronary artery disease)  Assessment & Plan   history of coronary disease (PCI with NIKITA to prox RCA and prox LCx [p/op developed ST elevations  inferiorly- thrombotic occlusion RCA s/p repeat angioplasty),  restarted on Plavix  Hold statin given transaminitis  Cont w/metoprolol  Cardiology  Following       VTE Assessment: [place]    Code Status: Full Code  Estimated discharge date: 9/23/2022     Gayla Soares MD  9/23/2022  3:59 PM

## 2022-09-23 NOTE — PLAN OF CARE
Problem: Adult Inpatient Plan of Care  Goal: Plan of Care Review  Outcome: Progressing  Flowsheets (Taken 9/23/2022 1233)  Progress: improving  Plan of Care Reviewed With: patient  Outcome Summary: OT: MIN A toileting in bathroom, close SUP functional transfers with RW

## 2022-09-23 NOTE — PROGRESS NOTES
Patient: Emily Ramirez  Location:  [facility] 3 Main [address]  MRN:  [id number]  Today's date:  9/23/2022    Pt left in locked bedside recliner. Pt is oriented to their call bell and their chair alarm is on. RN made aware of pt session.     Emily is a 77 y.o. female admitted on 9/16/2022 with Abdominal pain, unspecified abdominal location [R10.9]  Atrial fibrillation, unspecified type (CMS/HCC) [I48.91]  Acute pancreatitis, unspecified complication status, unspecified pancreatitis type [K85.90]. Principal problem is Acute pancreatitis.    Past Medical History  Emily has a past medical history of Aortic regurgitation, Chronic kidney disease, Coronary artery disease, History of loop recorder, Hypertension, Lipid disorder, Mitral regurgitation, and SVT (supraventricular tachycardia) (CMS/[facility]).    History of Present Illness   Emily Ramirez is a 77 y.o. female with a past medical history of coronary disease (PCI with NIKITA to prox RCA and prox LCx [p/op developed ST elevations inferiorly- thrombotic occlusion RCA s/p repeat angioplasty), atrial fibrillation with SSS/pauses s/p PPM 7/21/22 (on Eliquis), mild AR, MR, hypertension, hyperlipidemia, and chronic kidney disease stage III  who presents to [facility] ER on 9/16/2022 with 2-day history of chest and midepigastric abdominal pain.  According to patient she was in her usual state of health until yesterday evening when shortly after dinner she developed sudden onset of substernal chest and midepigastric pain.  Symptoms resolved temporarily without any major intervention however, later in the evening symptoms reoccurred waking patient from sleep with pain now radiating throughout her abdomen and into her back.  This is also accompanied by nausea and several episodes of nonbilious, non bloody emesis.  Symptoms to continue to progress throughout the early morning and as a result patient ultimately presented to the ER for further evaluation.   Currently, patient continues to complain of ongoing midepigastric pain although somewhat improved since administration of morphine.  She denies any fevers does admit to chills earlier today.  Denies any shortness of breath, cough, wheezing, heart palpitations.  Denies any recent diarrhea but does admit to intermittent constipation over the past week.  Denies any urinary symptoms.  Denies any prior history of gallstones or gallbladder disease    9/21/22 CHOLECYSTECTOMY LAPAROSCOPIC      PT Vitals    Date/Time Pulse SpO2 Pt Activity O2 Therapy BP BP Location BP Method Pt Position [facility]   09/23/22 0942 92 -- -- -- 155/67 Right upper arm Automatic Lying CK   09/23/22 1008 -- 96 % At rest None (Room air) -- -- -- -- KK   09/23/22 1008 93 -- -- -- 143/67 Right upper arm Automatic Sitting CK      PT Pain    Date/Time Pain Type Rating: Rest Rating: Activity Interventions [facility]   09/23/22 0942 Pain Assessment 0 6 position adjusted CK   09/23/22 1008 Pain Assessment 0 6 position adjusted CK          Prior Living Environment    Flowsheet Row Most Recent Value   Living Environment Comment lives w/spouse, 2SH, 1 CARTER, 1/2 bath 1st floor, FF w/stairglide to tub shower w/seat. pt reports 1st floor setup recently w/adjustable bed.        Prior Level of Function    Flowsheet Row Most Recent Value   Dominant Hand right   Ambulation assistive equipment   Transferring assistive equipment   Toileting independent   Bathing assistive equipment   Dressing independent   Eating independent   Communication understands/communicates without difficulty   Prior Level of Function Comment Ind w/Rollator   Assistive Device Currently Used at Home walker, front-wheeled, stair glide, shower chair  [adjustable bed]           PT Evaluation and Treatment - 09/23/22 0940        PT Time Calculation    Start Time 0940     Stop Time 1010     Time Calculation (min) 30 min        Session Details    Document Type re-evaluation     Mode of Treatment physical therapy         General Information    Patient Profile Reviewed yes     Patient/Family/Caregiver Comments/Observations RN cleared for PT re-evaluation     General Observations of Patient pt received supine in bed and agreeable to PT re-eval.     Existing Precautions/Restrictions fall     Limitations/Impairments safety/cognitive        Cognition/Psychosocial    Affect/Mental Status (Cognition) WFL     Orientation Status (Cognition) oriented x 4     Follows Commands (Cognition) follows one-step commands;75-90% accuracy        Sensory Assessment (Somatosensory)    Left LE Sensory Assessment light touch awareness;intact     Right LE Sensory Assessment light touch awareness;intact        Range of Motion (ROM)    Left Lower Extremity (ROM) left LE ROM is WFL     Right Lower Extremity (ROM) right LE ROM is WFL     Comment: Range of Motion grossly assessed EOB        Strength Comprehensive (MMT)    Comment grossly assessed EOB B LE 3+/5        Bed Mobility    [place], Supine to Sit supervision     Assistive Device head of bed elevated     Comment (Bed Mobility) OOB to the right. pt needed inc time to complete due to pain.        Sit to Stand Transfer    [place], Sit to Stand Transfer close supervision     Verbal Cues hand placement;technique     Assistive Device walker, front-wheeled   gait belt not ued due to incision    Comment from EOB (x2) and from low surface (x1)        Stand to Sit Transfer    [place], Stand to Sit Transfer close supervision     Verbal Cues hand placement;technique     Assistive Device walker, front-wheeled     Comment to EOB (x1), to low surface (x1) and to bedisde chair (x1)        Gait Training    [place], Gait close supervision     Assistive Device walker, front-wheeled     Distance in Feet 42 feet   12'x1 and 30'x1    Pattern (Gait) step-through     Deviations/Abnormal Patterns (Gait) base of support, narrow;vandana decreased;gait speed decreased;step length decreased     Comment  (Gait/Stairs) pt ambulated 12'x1 and 30'x1. close supervison provided due to pt reported slight SOB. pt left foot is slightly externally rotated and crosses midline during heel-strike.        Safety Issues, Functional Mobility    Comment, Safety Issues/Impairments (Mobility) pt very aware of own fatigue during ambulation        Balance    Static Sitting Balance WFL     Dynamic Sitting Balance WFL     Sit to Stand Dynamic Balance WFL     Static Standing Balance WFL     Dynamic Standing Balance mild impairment     Comment, Balance w/ RW        Motor Skills    Functional Endurance fair        AM-PAC (TM) - Mobility (Current Function)    Turning from your back to your side while in a flat bed without using bedrails? 3 - A Little     Moving from lying on your back to sitting on the side of a flat bed without using bedrails? 3 - A Little     Moving to and from a bed to a chair? 3 - A Little     Standing up from a chair using your arms? 3 - A Little     To walk in a hospital room? 3 - A Little     Climbing 3-5 steps with a railing? 3 - A Little     AM-PAC (TM) Mobility Score 18        Assessment/Plan (PT)    Daily Outcome Statement pt seen for physical therapy re-evaluation. [facility] 18. pt supervision supine>sit. pt close supervision sit<>stand. pt ambulation 12'x1 and 30'x1 w/ RW close supervision needed for pt reported slight PASCUAL. pt ambulated w/ left foot slightly externally rotated and crossing midline. pt limited by dec strength, dec endurance, and dec balance. due to pt being below their baseline PLOF, recommend continued skileld acute PT in order to maximize fxl mobility prior to d/c. recommend home with home health and inc family assistance upon d/c when medically stable.     Rehab Potential fair, will monitor progress closely     Therapy Frequency 3-5 times/wk     Planned Therapy Interventions balance training;bed mobility training;gait training               PT Assessment/Plan    Flowsheet Row Most Recent Value    PT Recommended Discharge Disposition home with home health, home with assistance at 09/23/2022 0940   Anticipated Equipment Needs at Discharge (PT) none at 09/23/2022 0940   Patient/Family Therapy Goals Statement to go home at 09/23/2022 0940                    Education Documentation  Treatment Plan, taught by Asad Rogel at 9/23/2022  2:14 PM.  Learner: Patient  Readiness: Acceptance  Method: Explanation  Response: Verbalizes Understanding  Comment: PT POC, goals, safe transfers, call bell          PT Goals    Flowsheet Row Most Recent Value   Bed Mobility Goal 1    Activity/Assistive Device rolling to left, rolling to right, sit to supine, supine to sit at 09/18/2022 1118   [place] modified independence at 09/18/2022 1118   Time Frame by discharge at 09/18/2022 1118   Progress/Outcome goal ongoing at 09/23/2022 0940   Transfer Goal 1    Activity/Assistive Device bed-to-chair/chair-to-bed, stand pivot, sit-to-stand/stand-to-sit, walker, front-wheeled at 09/18/2022 1118   [place] modified independence at 09/18/2022 1118   Time Frame by discharge at 09/18/2022 1118   Progress/Outcome goal ongoing at 09/23/2022 0940   Gait Training Goal 1    Activity/Assistive Device gait (walking locomotion), walker, front-wheeled at 09/18/2022 1118   [place] modified independence at 09/18/2022 1118   Distance 100 at 09/18/2022 1118   Time Frame by discharge at 09/18/2022 1118   Progress/Outcome goal ongoing at 09/23/2022 0940   Stairs Goal 1    Activity/Assistive Device descending stairs, ascending stairs, step-to-step, walker, front-wheeled at 09/18/2022 1118   [place] modified independence at 09/18/2022 1118   Number of Stairs 1 at 09/18/2022 1118   Time Frame by discharge at 09/18/2022 1118   Progress/Outcome goal ongoing at 09/23/2022 0940

## 2022-09-24 ENCOUNTER — APPOINTMENT (INPATIENT)
Dept: RADIOLOGY | Facility: HOSPITAL | Age: 77
DRG: 418 | End: 2022-09-24
Payer: MEDICARE

## 2022-09-24 PROBLEM — I82.A12 ACUTE DEEP VEIN THROMBOSIS (DVT) OF AXILLARY VEIN OF LEFT UPPER EXTREMITY (CMS/HCC): Status: ACTIVE | Noted: 2022-09-24

## 2022-09-24 LAB
ALBUMIN SERPL-MCNC: 2.9 G/DL (ref 3.4–5)
ALP SERPL-CCNC: 73 IU/L (ref 35–126)
ALT SERPL-CCNC: 42 IU/L (ref 11–54)
ANION GAP SERPL CALC-SCNC: 5 MEQ/L (ref 3–15)
AST SERPL-CCNC: 27 IU/L (ref 15–41)
BASOPHILS # BLD: 0.03 K/UL (ref 0.01–0.1)
BASOPHILS NFR BLD: 0.4 %
BILIRUB SERPL-MCNC: 0.5 MG/DL (ref 0.3–1.2)
BUN SERPL-MCNC: 26 MG/DL (ref 8–20)
CALCIUM SERPL-MCNC: 8.5 MG/DL (ref 8.9–10.3)
CHLORIDE SERPL-SCNC: 108 MEQ/L (ref 98–109)
CO2 SERPL-SCNC: 27 MEQ/L (ref 22–32)
CREAT SERPL-MCNC: 1 MG/DL (ref 0.6–1.1)
DIFFERENTIAL METHOD BLD: ABNORMAL
EOSINOPHIL # BLD: 0.3 K/UL (ref 0.04–0.36)
EOSINOPHIL NFR BLD: 4.4 %
ERYTHROCYTE [DISTWIDTH] IN BLOOD BY AUTOMATED COUNT: 15.8 % (ref 11.7–14.4)
GFR SERPL CREATININE-BSD FRML MDRD: 53.6 ML/MIN/1.73M*2
GLUCOSE SERPL-MCNC: 101 MG/DL (ref 70–99)
HCT VFR BLDCO AUTO: 36.9 % (ref 35–45)
HGB BLD-MCNC: 11.8 G/DL (ref 11.8–15.7)
IMM GRANULOCYTES # BLD AUTO: 0.03 K/UL (ref 0–0.08)
IMM GRANULOCYTES NFR BLD AUTO: 0.4 %
LYMPHOCYTES # BLD: 1.94 K/UL (ref 1.2–3.5)
LYMPHOCYTES NFR BLD: 28.4 %
MAGNESIUM SERPL-MCNC: 1.7 MG/DL (ref 1.8–2.5)
MCH RBC QN AUTO: 27.6 PG (ref 28–33.2)
MCHC RBC AUTO-ENTMCNC: 32 G/DL (ref 32.2–35.5)
MCV RBC AUTO: 86.2 FL (ref 83–98)
MONOCYTES # BLD: 0.63 K/UL (ref 0.28–0.8)
MONOCYTES NFR BLD: 9.2 %
NEUTROPHILS # BLD: 3.91 K/UL (ref 1.7–7)
NEUTS SEG NFR BLD: 57.2 %
NRBC BLD-RTO: 0 %
PDW BLD AUTO: 11 FL (ref 9.4–12.3)
PLATELET # BLD AUTO: 315 K/UL (ref 150–369)
POTASSIUM SERPL-SCNC: 4 MEQ/L (ref 3.6–5.1)
PROT SERPL-MCNC: 5 G/DL (ref 6–8.2)
RBC # BLD AUTO: 4.28 M/UL (ref 3.93–5.22)
SODIUM SERPL-SCNC: 140 MEQ/L (ref 136–144)
WBC # BLD AUTO: 6.84 K/UL (ref 3.8–10.5)

## 2022-09-24 PROCEDURE — 63700000 HC SELF-ADMINISTRABLE DRUG: Performed by: NURSE PRACTITIONER

## 2022-09-24 PROCEDURE — 63700000 HC SELF-ADMINISTRABLE DRUG: Performed by: HOSPITALIST

## 2022-09-24 PROCEDURE — 63700000 HC SELF-ADMINISTRABLE DRUG: Performed by: SURGERY

## 2022-09-24 PROCEDURE — 80053 COMPREHEN METABOLIC PANEL: CPT | Performed by: SURGERY

## 2022-09-24 PROCEDURE — 85025 COMPLETE CBC W/AUTO DIFF WBC: CPT | Performed by: SURGERY

## 2022-09-24 PROCEDURE — 36415 COLL VENOUS BLD VENIPUNCTURE: CPT | Performed by: SURGERY

## 2022-09-24 PROCEDURE — 93970 EXTREMITY STUDY: CPT

## 2022-09-24 PROCEDURE — 83735 ASSAY OF MAGNESIUM: CPT | Performed by: HOSPITALIST

## 2022-09-24 PROCEDURE — 21400000 HC ROOM AND CARE CCU/INTERMEDIATE

## 2022-09-24 PROCEDURE — 99233 SBSQ HOSP IP/OBS HIGH 50: CPT | Performed by: HOSPITALIST

## 2022-09-24 PROCEDURE — 99222 1ST HOSP IP/OBS MODERATE 55: CPT | Performed by: INTERNAL MEDICINE

## 2022-09-24 RX ADMIN — PANTOPRAZOLE SODIUM 40 MG: 40 TABLET, DELAYED RELEASE ORAL at 20:09

## 2022-09-24 RX ADMIN — AMLODIPINE BESYLATE 5 MG: 5 TABLET ORAL at 08:22

## 2022-09-24 RX ADMIN — APIXABAN 5 MG: 5 TABLET, FILM COATED ORAL at 08:22

## 2022-09-24 RX ADMIN — CLOPIDOGREL BISULFATE 75 MG: 75 TABLET, FILM COATED ORAL at 08:22

## 2022-09-24 RX ADMIN — APIXABAN 10 MG: 5 TABLET, FILM COATED ORAL at 17:14

## 2022-09-24 RX ADMIN — METOPROLOL SUCCINATE 50 MG: 50 TABLET, EXTENDED RELEASE ORAL at 08:22

## 2022-09-24 RX ADMIN — PANTOPRAZOLE SODIUM 40 MG: 40 TABLET, DELAYED RELEASE ORAL at 08:22

## 2022-09-24 RX ADMIN — AMIODARONE HYDROCHLORIDE 200 MG: 200 TABLET ORAL at 08:22

## 2022-09-24 NOTE — CONSULTS
Consult Note    Subjective     Emily Ramirez is a 77 y.o. female who was admitted for Abdominal pain, unspecified abdominal location [R10.9]  Atrial fibrillation, unspecified type (CMS/HCC) [I48.91]  Acute pancreatitis, unspecified complication status, unspecified pancreatitis type [K85.90]. Patient was referred by Dr. Borjas for management recommendations. Patient is a 77 year-old hospitalized with gallstone pancreatitis who underwent a laparoscopic cholecystectomy 7/21/2022.  Her recent history is significant for CAD with recent PTCA stent placement on 6/9/2022.  She developed in-stent thrombosis requiring a second catheterization and discharged on dual antiplatelet therapy.  She was rehospitalized in July for paroxysmal atrial fibrillation and underwent pacemaker placement and discharged on amiodarone and Eliquis.  She was most recently admitted from 7/27/2022 until 8/2/2022 for pericarditis.  She was readmitted on 9/16/2022 with abdominal pain, nausea and vomiting.  Work-up revealed gallstone pancreatitis.  Her Eliquis was held and she was placed on heparin.  She underwent a laparoscopic cholecystectomy in 9/21/2022.  Her anticoagulation was resumed at 5 mg twice a day yesterday.  She had an ultrasound of the bilateral upper extremities for bilateral upper extremity edema with the left being greater than the right.  Ultrasound revealed deep vein thrombosis involving the left subclavian and left axillary veins.    On questioning, she has had at least 3 peripheral catheters, including a midline catheter in the left upper extremity.  She has had a slight improvement in the tightness and swelling since catheters have been removed.  She has never had a VTE nor is there a family history of VTE.  Today, she received the 10 mg loading dose of Eliquis.  Consult has been placed to hematology for recommendations.  Her daughter and  were on the phone during the consultation      Outside records reviewed.  Pertinent radiology results reviewed. Pertinent lab results reviewed.    Medical History:   Past Medical History:   Diagnosis Date    Aortic regurgitation     Chronic kidney disease     Coronary artery disease     History of loop recorder     Hypertension     Lipid disorder     Mitral regurgitation     SVT (supraventricular tachycardia) (CMS/[facility])        Surgical History: No past surgical history on file.    Allergies: Celebrex [celecoxib], Cipro [ciprofloxacin hcl], Clindamycin, Crestor [rosuvastatin], Lipitor [atorvastatin], and Voltaren [diclofenac sodium]    [unfilled]     Social History:   Social History     Socioeconomic History    Marital status:    Tobacco Use    Smoking status: Never Smoker    Smokeless tobacco: Never Used   Substance and Sexual Activity    Alcohol use: Yes     Alcohol/week: 1.0 standard drink     Types: 1 Glasses of wine per week     Comment: 1x/month    Drug use: Not Currently    Sexual activity: Defer     Social Determinants of Health     Food Insecurity: No Food Insecurity    Worried About Running Out of Food in the Last Year: Never true    Ran Out of Food in the Last Year: Never true       Family History: No family history on file.    Review of Systems  All other systems reviewed and negative except as noted in the HPI.    Vital signs in last 24 hours:  Temp:  [36.4 °C (97.5 °F)-36.7 °C (98.1 °F)] 36.6 °C (97.9 °F)  Heart Rate:  [57-69] 67  Resp:  [18] 18  BP: (136-185)/(63-79) 146/67    Objective     Physical Exam  General appearance: alert, appears stated age and cooperative  Head: normocephalic, without obvious abnormality, atraumatic  Eyes: conjunctivae clear. PERRL, EOM's intact.  Neck: no adenopathy and supple, symmetrical, trachea midline  Lungs: clear to auscultation bilaterally  Heart: S1, S2 normal  Abdomen: soft, non-tender; bowel sounds normal; no masses, no organomegaly and incison/port sites C/D/I  Extremities: edema b/l UE L>R.  no erythema or  warmth  Lymph nodes: Cervical and supraclavicular nodes normal. and Axillary nodes normal.  Neurologic: Grossly normal      Labs  Results from last 7 days   Lab Units 09/24/22  0416   WBC K/uL 6.84   HEMOGLOBIN g/dL 11.8   HEMATOCRIT % 36.9   PLATELETS K/uL 315   DIFF TYPE  Auto   NRBC % 0.0   IMM GRANULOCYTES % 0.4   NEUTROPHILS % 57.2   LYMPHOCYTES % 28.4   MONOCYTES % 9.2   EOSINOPHILS % 4.4   BASOPHILS % 0.4   IMM GRANUCOCYTES ABS K/uL 0.03   MONO ABS AUTO K/uL 0.63   EOS ABS AUTO K/uL 0.30   BASO ABS AUTO K/uL 0.03       Results from last 7 days   Lab Units 09/24/22  0416 09/23/22  1556 09/22/22  0955   SODIUM mEQ/L 140 139 138   POTASSIUM mEQ/L 4.0 4.0 4.0   CHLORIDE mEQ/L 108 106 106   CO2 mEQ/L 27 25 24   BUN mg/dL 26* 26* 16   CREATININE mg/dL 1.0 1.2* 1.0   CALCIUM mg/dL 8.5* 8.7* 8.7*   ALBUMIN g/dL 2.9* 3.0* 3.0*   BILIRUBIN TOTAL mg/dL 0.5 0.5 0.4   ALK PHOS IU/L 73 77 90   ALT IU/L 42 49 67*   AST IU/L 27 40 60*   GLUCOSE mg/dL 101* 138* 133*     Imaging  I have independently reviewed the pertinent imaging from the last 24 hrs.  X-RAY CHEST 2 VIEWS    Result Date: 9/19/2022  Narrative: CLINICAL HISTORY: 77-year-old female with hypoxia.     Impression: IMPRESSION: 1. Stable cardiomegaly. 2. Increased right but improved left basilar pleural fluid and hypoventilatory change and no airspace disease bilaterally. COMMENT:    AP and lateral erect chest radiographs are compared to a prior AP only study dated 7/27/2022. A left-sided dual-lead PCM is again noted. The cardiomediastinal contour shows stable cardiomegaly, again noting mural calcifications in the aortic arch. The lungs show increased right but improved left basilar pleural fluid and hypoventilatory change and no airspace disease bilaterally. The osseous thorax and surrounding soft tissues again show prior bilateral partial shoulder arthroplasties and midthoracic advanced spondylosis.    MRI ABDOMEN WITHOUT CONTRAST MRCP    Result Date:  9/19/2022  Narrative: CLINICAL HISTORY: Pancreatitis     Impression: IMPRESSION: Limited study. Gallstones. No evidence of choledocholithiasis. Interval development of small right pleural effusion and bibasilar atelectasis. COMMENT: Multiplanar MRI of the abdomen was performed without intravenous contrast. An MRCP was performed utilizing T2-weighted sequences. Three-dimensional images were reconstructed on the technologist workstation and the source and MIP images were reviewed. Unfortunately, the study is limited by motion. Comparison is made to a CTA of the abdomen dated 09/16/2022. There is a new small right pleural effusion and bibasilar atelectasis. Moderate size hiatal hernia is again noted. The liver is normal in size measuring 16 cm in length. There is no signal loss on the opposed phase or in phase sequences to suggest hepatic steatosis or iron deposition, respectively. No focal hepatic mass is identified. Several low T2 signal filling defects are again seen in the gallbladder lumen, presumably gallstones, measuring up to 1.3 cm. The gallbladder appears otherwise unremarkable. The common bile duct is normal in caliber measuring 5 mm. No intraluminal filling defects or strictures are seen. No intrahepatic biliary dilatation is identified. The pancreatic duct is not well seen but does not appear enlarged. The spleen, pancreas, and adrenal glands appear unremarkable. Multiple right renal cysts are again noted measuring up to 2.7 cm. The left kidney appears unremarkable. No upper abdominal lymphadenopathy or free fluid is seen. No grossly abnormal bowel loops are identified. The signal in the osseous structures is grossly unremarkable.    Ultrasound venous arm bilateral    Result Date: 9/24/2022  Narrative: CLINICAL HISTORY: Bilateral arm swelling.     Impression: IMPRESSION: Findings consistent with deep vein thrombosis involving the left subclavian and left axillary veins. COMMENT: Ultrasound evaluation of  bilateral upper extremities was performed. Grayscale, color flow, Doppler examination was performed with analysis of spectral waveforms. The right internal jugular, subclavian, axillary, basilic, cephalic and brachial veins are normally compressible with appropriate directional flow and waveforms. There is no evidence of deep vein thrombosis. In the left proximal mid and distal subclavian vein and axillary vein echogenic material is seen with noncompressibility consistent with deep vein thrombosis. Very minimal flow is identified in the left axillary vein. The left internal jugular vein, left basilic vein, left brachial and left cephalic vein demonstrate appropriate directional flow and waveforms. This report was called to the patient's nurse.    CT ANGIOGRAPHY CHEST/ABDOMEN/PELVIS WITH AND WITHOUT IV CONTRAST    Addendum Date: 9/16/2022 Addendum:   Please note that there is severe stenosis at the junction of the left subclavian and left brachiocephalic vein as it courses between the left anterior first rib and left clavicle, with subsequent marked left upper extremity as well as partially imaged left lower neck and left chest wall venous collaterals, with the stenosis likely sequela of chronic left chest wall pacer leads compounded by left arm abduction.    Result Date: 9/16/2022  Narrative: CLINICAL HISTORY: Aortic aneurysm, known or suspected Aortic dissection suspected COMPARED WITH: Chest CT dated 07/28/22. STUDY: CTA of the chest, abdomen, and pelvis was performed with pre and postcontrast imaging. 125 cc Omnipaque 350 intravenous contrast was administered. Oral contrast was not administered per ordering physician's request. 3D MIP and/or volume rendered image reconstruction performed and reviewed. One or more dose reduction techniques (e.g., Automated exposure control, adjustment of the mA and/or kV according to the patient size, use of iterative reconstruction technique) utilized for this examination.      Impression: IMPRESSION: 1. No aortic aneurysm or dissection as clinically questioned. 2. Acute pancreatitis without evidence of pancreatic necrosis or collection. COMMENT: CHEST LUNGS AND AIRWAYS: Mild bibasilar subsegmental atelectasis without consolidation, pneumothorax, or mass. The visualized airways are patent. PLEURA: no effusion. VESSELS: Mild enlargement of the main pulmonary artery measuring up to 3.4 cm in diameter, which may be seen with pulmonary hypertension in the appropriate clinical setting. No thoracic aortic aneurysm or dissection, noting diffuse atherosclerotic calcifications/changes throughout the aorta and coronary arteries. HEART: Cardiomegaly with extensive mitral annular calcification again noted. No pericardial effusion. MEDIASTINUM AND BRENNEN: within normal limits, no enlarged lymph nodes. CHEST WALL AND LOWER NECK: Dual-lead left chest wall pacer with leads in the right atrium and right ventricle. ABDOMEN: LIVER: normal morphology without suspicious mass. BILE DUCTS: normal caliber. GALLBLADDER: Noncalcified cholelithiasis. No evidence of acute cholecystitis. PANCREAS: Diffuse peripancreatic stranding with slightly heterogeneous parenchymal perfusion/enhancement, compatible with acute interstitial pancreatitis. No evidence of pancreatic necrosis. SPLEEN: within normal limits. ADRENALS: within normal limits. KIDNEYS: A few right-sided cysts measuring up to 2.8 cm. PELVIS: Evaluation limited by right hip arthroplasty streak artifact. REPRODUCTIVE ORGANS: no pelvic masses. URETERS: within normal limits. BLADDER: within normal limits. BOWEL/PERITONEUM: Bowel demonstrates normal caliber without evidence of obstruction. Sigmoid and descending colonic diverticulosis without evidence of acute diverticulitis. Normal appendix. Large hiatal hernia again noted resulting in partial intrathoracic stomach, unchanged. No enlarged mesenteric lymph nodes. No ascites or free air, no fluid collection.  VESSELS: atherosclerotic changes, without abdominal aortic aneurysm or dissection. There is moderate focal proximal left common iliac artery stenosis due to prominent noncalcified atheromatous plaque. RETROPERITONEUM: no enlarged retroperitoneal or pelvic nodes. ABDOMINAL WALL: intact. BONES: degenerative changes, no suspicious lytic or blastic lesions. Partially imaged right hip arthroplasty with associated streak artifact. Bilateral shoulder arthroplasties noted.       Assessment   77 y.o. female being consulted for management recommendations       Plan     Acute deep vein thrombosis (DVT) of axillary vein of left upper extremity (CMS/[facility])  Assessment & Plan  She has a provoked left upper extremity DVT in the left axillary and subclavian vein  Eliquis was held perioperatively and she temporarily received heparin.  She resumed Eliquis 5 mg twice a day yesterday and received 2 doses.  During the hospitalization, she has had 3 catheters in the left upper extremity including the catheter at the left bicep.  Peripheral catheters have been removed.  We discussed that she has a catheter provoked DVT with other risk factors including recent surgery.  Although she resumed Eliquis yesterday 5 mg twice a day, she appropriately received a loading dose of 10 mg this morning and should continue on the loading dose of 10 mg twice a day for 7 days and then resume the 5 mg twice a day.  If the swelling is persistent in 4 to 6 weeks, a repeat ultrasound can be obtained to ensure there is improvement or resolution of the DVT.    Typically for a provoked DVT, 6 to 12 weeks of anticoagulation is a plan; however due to her atrial fibrillation, she will remain on anticoagulation indefinitely.  Her family asked if there is a risk of progression to PE.  We discussed that it is typically the lower extremity DVT that progressed to PEs.  Less likely for the upper extremity DVTs.  Also to assist with the swelling and discomfort, she should  keep her arm elevated is much as possible.  I have given her my contact information and she will follow-up with me as needed

## 2022-09-24 NOTE — ASSESSMENT & PLAN NOTE
She has a provoked left upper extremity DVT in the left axillary and subclavian vein  Eliquis was held perioperatively and she temporarily received heparin.  She resumed Eliquis 5 mg twice a day yesterday and received 2 doses.  During the hospitalization, she has had 3 catheters in the left upper extremity including the catheter at the left bicep.  Peripheral catheters have been removed.  We discussed that she has a catheter provoked DVT with other risk factors including recent surgery.  Although she resumed Eliquis yesterday 5 mg twice a day, she appropriately received a loading dose of 10 mg this morning and should continue on the loading dose of 10 mg twice a day for 7 days and then resume the 5 mg twice a day.  If the swelling is persistent in 4 to 6 weeks, a repeat ultrasound can be obtained to ensure there is improvement or resolution of the DVT.    Typically for a provoked DVT, 6 to 12 weeks of anticoagulation is a plan; however due to her atrial fibrillation, she will remain on anticoagulation indefinitely.  Her family asked if there is a risk of progression to PE.  We discussed that it is typically the lower extremity DVT that progressed to PEs.  Less likely for the upper extremity DVTs.  Also to assist with the swelling and discomfort, she should keep her arm elevated is much as possible.  I have given her my contact information and she will follow-up with me as needed   Additional Progress Note...

## 2022-09-24 NOTE — PLAN OF CARE
Problem: Adult Inpatient Plan of Care  Goal: Plan of Care Review  Outcome: Progressing  Flowsheets (Taken 9/24/2022 1557)  Progress: no change  Plan of Care Reviewed With: patient  Outcome Summary: Pt had significant arm swelling L>R.  Patient denied pain.  Spoke with brian LITTLE ordered.  Patient positive for L subclavian and L axillary DVTs.  MD was notified.  Heme/Onc consulted.  Patient Eliquis increased.  Pink limb restricted band placed on L arm.  L arm elevated on pillows.  Pt does not have IV. MD aware, okay to leave out at this time.  Bed alarm on for safety.  Call bell within reach.  Will continue to monitor closely.  Goal: Patient-Specific Goal (Individualized)  Outcome: Progressing  Goal: Absence of Hospital-Acquired Illness or Injury  Outcome: Progressing  Goal: Optimal Comfort and Wellbeing  Outcome: Progressing  Goal: Readiness for Transition of Care  Outcome: Progressing   Plan of Care Review  Plan of Care Reviewed With: patient  Progress: no change  Outcome Summary: Pt had significant arm swelling L>R.  Patient denied pain.  Spoke with brian LITTLE ordered.  Patient positive for L subclavian and L axillary DVTs.  MD was notified.  Heme/Onc consulted.  Patient Eliquis increased.  Pink limb restricted band placed on L arm.  L arm elevated on pillows.  Pt does not have IV. MD aware, okay to leave out at this time.  Bed alarm on for safety.  Call bell within reach.  Will continue to monitor closely.

## 2022-09-24 NOTE — ASSESSMENT & PLAN NOTE
LLE swelling   US show   IMPRESSION: Findings consistent with deep vein thrombosis involving the left subclavian and left axillary veins.    Increase Eliquis 5mg bid to 10 mg bid   Don't think it is Eliquis Failure   Hem/onc to Eval

## 2022-09-24 NOTE — PLAN OF CARE
Plan of Care Review  Plan of Care Reviewed With: patient  Progress: improving    Pt admitted for acute pancreatitis, s/p lap oli 9/21. Incision sites C/D/I x 4, no redness or swelling.  C/o moderate abd musccular pain with movement, relieved by PRN Oxy 5mg x 1. A-paced/underlying Afib on tele monitor, VSS. Hopeful for d/c today. Call bell in reach, bed alarm set, WCTM.

## 2022-09-24 NOTE — CONSULTS
"Clinical Nutrition Education Note    Clinical Comments:  Pt is being seen for MD consult \"pt would like to review diet.\" Pt admitted with acute pancreatitis and s/p cholecystectomy. RD had discussed a low fat diet and foods to avoid/limit for ongoing improvement in nutrition status on 9/20 and attach education to chart. However since the diet education was several days ago pt requested a re-education. Pt is currently on a Cardiac diet, which allows 23 g of fat each meal for a total of about 70 g of fat per day. Per RN note PO intake was 100% of breakfast today. Visited pt at bedside and the pt's daughter and her  were on speaker phone. Pt reported that she has been eating well on the regular diet and tolerating it. Pt and daughter were confused as to why the pt was allowed to order foods such as chocolate chip cookies which have butter in them when she was told to be on a low fat diet. Reviewed low fat diet education and provided handout. Pt and daughter verbalized understanding and did not have any further questions.     Discussed with: RN, Patient and daughter      Date: 09/24/22  Signature: LONDON Randle    "

## 2022-09-24 NOTE — PROGRESS NOTES
Hospital Medicine Service -  Daily Progress Note       SUBJECTIVE   Interval History:   Emily Ramirez was seen and examined  no acute overnight event reported  patient reported no chest pain, sob, N/V/D, overt bleeding.    Left arm swollen ultrasound demonstrated DVT  Change Eliquis to 10 mg twice daily  Discharge tomorrow       OBJECTIVE      Vital signs in last 24 hours:  Vitals:    09/24/22 1555   BP:    Pulse: 65   Resp:    Temp:    SpO2:        Intake/Output Summary (Last 24 hours) at 9/24/2022 1556  Last data filed at 9/24/2022 0805  Gross per 24 hour   Intake 240 ml   Output 400 ml   Net -160 ml     PHYSICAL EXAMINATION      General Appearance:  Awake, Alert, no distress     Head:    Normocephalic, without obvious abnormality, atraumatic   Neck: Supple      Lungs:   Bilateral equal expansion  No labored breathing     Heart:  S1 and S2 normal  no rub or gallop     Abdomen:   Soft  no masses  no organomegaly     Vascular: Bilateral radial pulse equally palpated      Extremities: no calf tenderness      Behavior/Emotional: Mood stable      Skin:                                     no rash     Neuro:                                 Spontaneous move bilateral upper and lower extremity                                                No focal deficits   LINES, CATHETERS, DRAINS, AIRWAYS, AND WOUNDS   Lines, Drains, Airways, Wounds:  External Urinary Catheter (Active)   Number of days: 8       Surgical Incision Abdomen (Active)   Number of days: 3       Comments:      LABS / IMAGING / TELE      Labs  CMP Results       09/24/22 09/23/22 09/22/22     0416 1556 0955     139 138    K 4.0 4.0 4.0    Cl 108 106 106    CO2 27 25 24    Glucose 101 138 133    BUN 26 26 16    Creatinine 1.0 1.2 1.0    Calcium 8.5 8.7 8.7    Anion Gap 5 8 8    AST 27 40 60    ALT 42 49 67    Albumin 2.9 3.0 3.0    EGFR 53.6 43.4 53.6        CBC Results       09/24/22 09/23/22 09/22/22     0416 1556 0954    WBC 6.84 8.80 12.16     RBC 4.28 4.33 4.42    HGB 11.8 12.2 12.3    HCT 36.9 37.6 38.7    MCV 86.2 86.8 87.6    MCH 27.6 28.2 27.8    MCHC 32.0 32.4 31.8     320 358        Troponin I Results       09/16/22 09/16/22 09/16/22     1004 0741 0557    HS Troponin I 17.5 9.7 7.7        PT/PTT Results       09/21/22 09/20/22 09/20/22     0013 1817 0522    PTT 90 70 36         Comment for PTT at 0013 on 09/21/22: The Standard Therapeutic Range for Heparin is 68 to 101 seconds.    Comment for PTT at 1817 on 09/20/22: The Standard Therapeutic Range for Heparin is 68 to 101 seconds.    Comment for PTT at 0522 on 09/20/22: The Standard Therapeutic Range for Heparin is 68 to 101 seconds.        Lab Results   Component Value Date     (H) 07/27/2022    CHOL 125 09/16/2022    TRIG 66 09/16/2022    HDL 49 (L) 09/16/2022    LDLCALC 63 09/16/2022    TSH 1.89 07/19/2022    DDIMER 2.38 (H) 09/16/2022     Imaging  X-RAY CHEST 2 VIEWS    Result Date: 9/19/2022  IMPRESSION: 1. Stable cardiomegaly. 2. Increased right but improved left basilar pleural fluid and hypoventilatory change and no airspace disease bilaterally. COMMENT:    AP and lateral erect chest radiographs are compared to a prior AP only study dated 7/27/2022. A left-sided dual-lead PCM is again noted. The cardiomediastinal contour shows stable cardiomegaly, again noting mural calcifications in the aortic arch. The lungs show increased right but improved left basilar pleural fluid and hypoventilatory change and no airspace disease bilaterally. The osseous thorax and surrounding soft tissues again show prior bilateral partial shoulder arthroplasties and midthoracic advanced spondylosis.    MRI ABDOMEN WITHOUT CONTRAST MRCP    Result Date: 9/19/2022  IMPRESSION: Limited study. Gallstones. No evidence of choledocholithiasis. Interval development of small right pleural effusion and bibasilar atelectasis. COMMENT: Multiplanar MRI of the abdomen was performed without intravenous contrast. An  MRCP was performed utilizing T2-weighted sequences. Three-dimensional images were reconstructed on the technologist workstation and the source and MIP images were reviewed. Unfortunately, the study is limited by motion. Comparison is made to a CTA of the abdomen dated 09/16/2022. There is a new small right pleural effusion and bibasilar atelectasis. Moderate size hiatal hernia is again noted. The liver is normal in size measuring 16 cm in length. There is no signal loss on the opposed phase or in phase sequences to suggest hepatic steatosis or iron deposition, respectively. No focal hepatic mass is identified. Several low T2 signal filling defects are again seen in the gallbladder lumen, presumably gallstones, measuring up to 1.3 cm. The gallbladder appears otherwise unremarkable. The common bile duct is normal in caliber measuring 5 mm. No intraluminal filling defects or strictures are seen. No intrahepatic biliary dilatation is identified. The pancreatic duct is not well seen but does not appear enlarged. The spleen, pancreas, and adrenal glands appear unremarkable. Multiple right renal cysts are again noted measuring up to 2.7 cm. The left kidney appears unremarkable. No upper abdominal lymphadenopathy or free fluid is seen. No grossly abnormal bowel loops are identified. The signal in the osseous structures is grossly unremarkable.    Ultrasound venous arm bilateral    Result Date: 9/24/2022  IMPRESSION: Findings consistent with deep vein thrombosis involving the left subclavian and left axillary veins. COMMENT: Ultrasound evaluation of bilateral upper extremities was performed. Grayscale, color flow, Doppler examination was performed with analysis of spectral waveforms. The right internal jugular, subclavian, axillary, basilic, cephalic and brachial veins are normally compressible with appropriate directional flow and waveforms. There is no evidence of deep vein thrombosis. In the left proximal mid and distal  subclavian vein and axillary vein echogenic material is seen with noncompressibility consistent with deep vein thrombosis. Very minimal flow is identified in the left axillary vein. The left internal jugular vein, left basilic vein, left brachial and left cephalic vein demonstrate appropriate directional flow and waveforms. This report was called to the patient's nurse.    CT ANGIOGRAPHY CHEST/ABDOMEN/PELVIS WITH AND WITHOUT IV CONTRAST    Addendum Date: 9/16/2022    Please note that there is severe stenosis at the junction of the left subclavian and left brachiocephalic vein as it courses between the left anterior first rib and left clavicle, with subsequent marked left upper extremity as well as partially imaged left lower neck and left chest wall venous collaterals, with the stenosis likely sequela of chronic left chest wall pacer leads compounded by left arm abduction.    Result Date: 9/16/2022  IMPRESSION: 1. No aortic aneurysm or dissection as clinically questioned. 2. Acute pancreatitis without evidence of pancreatic necrosis or collection. COMMENT: CHEST LUNGS AND AIRWAYS: Mild bibasilar subsegmental atelectasis without consolidation, pneumothorax, or mass. The visualized airways are patent. PLEURA: no effusion. VESSELS: Mild enlargement of the main pulmonary artery measuring up to 3.4 cm in diameter, which may be seen with pulmonary hypertension in the appropriate clinical setting. No thoracic aortic aneurysm or dissection, noting diffuse atherosclerotic calcifications/changes throughout the aorta and coronary arteries. HEART: Cardiomegaly with extensive mitral annular calcification again noted. No pericardial effusion. MEDIASTINUM AND BRENNEN: within normal limits, no enlarged lymph nodes. CHEST WALL AND LOWER NECK: Dual-lead left chest wall pacer with leads in the right atrium and right ventricle. ABDOMEN: LIVER: normal morphology without suspicious mass. BILE DUCTS: normal caliber. GALLBLADDER: Noncalcified  cholelithiasis. No evidence of acute cholecystitis. PANCREAS: Diffuse peripancreatic stranding with slightly heterogeneous parenchymal perfusion/enhancement, compatible with acute interstitial pancreatitis. No evidence of pancreatic necrosis. SPLEEN: within normal limits. ADRENALS: within normal limits. KIDNEYS: A few right-sided cysts measuring up to 2.8 cm. PELVIS: Evaluation limited by right hip arthroplasty streak artifact. REPRODUCTIVE ORGANS: no pelvic masses. URETERS: within normal limits. BLADDER: within normal limits. BOWEL/PERITONEUM: Bowel demonstrates normal caliber without evidence of obstruction. Sigmoid and descending colonic diverticulosis without evidence of acute diverticulitis. Normal appendix. Large hiatal hernia again noted resulting in partial intrathoracic stomach, unchanged. No enlarged mesenteric lymph nodes. No ascites or free air, no fluid collection. VESSELS: atherosclerotic changes, without abdominal aortic aneurysm or dissection. There is moderate focal proximal left common iliac artery stenosis due to prominent noncalcified atheromatous plaque. RETROPERITONEUM: no enlarged retroperitoneal or pelvic nodes. ABDOMINAL WALL: intact. BONES: degenerative changes, no suspicious lytic or blastic lesions. Partially imaged right hip arthroplasty with associated streak artifact. Bilateral shoulder arthroplasties noted.     ECG/Telemetry  ASSESSMENT AND PLAN        Atrial fibrillation, unspecified type (CMS/HCC)  Assessment & Plan  H/o new onset atrial fibrillation with SSS/pauses s/p PPM 7/21/22  EKG at with ? AV junctional rhythm and ?PPM malfunction, rate controlled  Cont w/outpt regimen of metoprolol, Amiodarone   Restarted on Eliquis today  Follow CBC closely    Sick sinus syndrome (CMS/HCC)  Assessment & Plan  S/p PPM placement 7/2022  Monitor electrolytes and replete as required for potassium greater than 4, magnesium greater than 2  Cardiology following     Acute kidney injury superimposed  on CKD (CMS/[facility])  Assessment & Plan  Resolved  Likely prerenal    GERD (gastroesophageal reflux disease)  Assessment & Plan  Will place on PPI BID as stated above    Hyperlipidemia  Assessment & Plan  Hold statin for now given transaminitis  Repeat lipid panel as stated above    Acute deep vein thrombosis (DVT) of axillary vein of left upper extremity (CMS/[facility])  Assessment & Plan  LLE swelling   US show   IMPRESSION: Findings consistent with deep vein thrombosis involving the left subclavian and left axillary veins.    Increase Eliquis 5mg bid to 10 mg bid   Don't think it is Eliquis Failure   Hem/onc to Eval     PAD (peripheral artery disease) (CMS/[facility])  Assessment & Plan  C/A/P CTA w/severe stenosis at the junction of the left subclavian and left brachiocephalic vein as it courses between the left anterior first rib  and left clavicle, with subsequent marked left upper extremity as well as  partially imaged left lower neck and left chest wall venous collaterals, with  the stenosis likely sequela of chronic left chest wall pacer leads compounded by  left arm abduction.  Currently denies any significant LUE pain    Plan:  Resume meds after surgery  Resume outpt f/u with Cardiology for continued monitoring    Transaminitis  Assessment & Plan  Likely secondary to gallstone pancreatitis  Follow LFTs  Hold statin    CAD (coronary artery disease)  Assessment & Plan   history of coronary disease (PCI with NIKITA to prox RCA and prox LCx [p/op developed ST elevations inferiorly- thrombotic occlusion RCA s/p repeat angioplasty),  restarted on Plavix  Hold statin given transaminitis  Cont w/metoprolol  Cardiology  Following    * Acute pancreatitis  Assessment & Plan  Likely secondary to  gallstone pancreatitis  Presents with sudden onset of abdominal pain, nausea and vomiting admitted for acute pancreatitis.   Initial LFTs elevated with mixed hepatic injury and cholestatic picture, lipase 1436  Initial CT abdomen showed  Acute  pancreatitis without evidence of pancreatic necrosis or collection. Imaging notable for cholelithiasis, however no tigist choledocholithiasis or biliary duct dilation. Denies any alcohol use  Triglycerides relatively stable  Sxs improving, LFTs downtrending  MRI Abdomen shows gallstones no evidence of choledocholithiasis  Cont w/lidoderm patch and cont w/morphine IV 2mg q3h PRN for pain management. Avoiding   S/p laparoscopic cholecystectomy by Dr. Valdivia 9/21  Patient doing well postoperatively and tolerating cardiac diet  Initiated on Plavix yesterday and Eliquis today  Follow hemoglobin  Ok to discharge home tomorrow if hemoglobin remains stable in am per   Updated patient's daughter         Disposition Planning: Pending progression     VTE Assessment: [place]    VTE Prophylaxis Plan: Continue current DVT Prophylaxis   Code Status: Full Code  Estimated Discharge Date: 9/23/2022    This patient note has been dictated using speech recognition software. Inadvertent speech recognition errors should be disregarded. Please do not hesitate to call [facility] office for clarifications.     Jose Elias Borjas MD  9/24/2022

## 2022-09-25 VITALS
RESPIRATION RATE: 18 BRPM | WEIGHT: 186.3 LBS | TEMPERATURE: 97.4 F | DIASTOLIC BLOOD PRESSURE: 68 MMHG | OXYGEN SATURATION: 96 % | HEIGHT: 64 IN | HEART RATE: 73 BPM | BODY MASS INDEX: 31.8 KG/M2 | SYSTOLIC BLOOD PRESSURE: 147 MMHG

## 2022-09-25 LAB
ALBUMIN SERPL-MCNC: 2.7 G/DL (ref 3.4–5)
ALP SERPL-CCNC: 68 IU/L (ref 35–126)
ALT SERPL-CCNC: 35 IU/L (ref 11–54)
ANION GAP SERPL CALC-SCNC: 7 MEQ/L (ref 3–15)
AST SERPL-CCNC: 23 IU/L (ref 15–41)
BASOPHILS # BLD: 0.05 K/UL (ref 0.01–0.1)
BASOPHILS NFR BLD: 0.7 %
BILIRUB SERPL-MCNC: 0.6 MG/DL (ref 0.3–1.2)
BUN SERPL-MCNC: 20 MG/DL (ref 8–20)
CALCIUM SERPL-MCNC: 8.7 MG/DL (ref 8.9–10.3)
CHLORIDE SERPL-SCNC: 110 MEQ/L (ref 98–109)
CO2 SERPL-SCNC: 23 MEQ/L (ref 22–32)
CREAT SERPL-MCNC: 0.9 MG/DL (ref 0.6–1.1)
DIFFERENTIAL METHOD BLD: ABNORMAL
EOSINOPHIL # BLD: 0.26 K/UL (ref 0.04–0.36)
EOSINOPHIL NFR BLD: 3.6 %
ERYTHROCYTE [DISTWIDTH] IN BLOOD BY AUTOMATED COUNT: 15.9 % (ref 11.7–14.4)
GFR SERPL CREATININE-BSD FRML MDRD: >60 ML/MIN/1.73M*2
GLUCOSE SERPL-MCNC: 98 MG/DL (ref 70–99)
HCT VFR BLDCO AUTO: 36 % (ref 35–45)
HGB BLD-MCNC: 11.6 G/DL (ref 11.8–15.7)
IMM GRANULOCYTES # BLD AUTO: 0.03 K/UL (ref 0–0.08)
IMM GRANULOCYTES NFR BLD AUTO: 0.4 %
LYMPHOCYTES # BLD: 2.09 K/UL (ref 1.2–3.5)
LYMPHOCYTES NFR BLD: 29.3 %
MCH RBC QN AUTO: 27.5 PG (ref 28–33.2)
MCHC RBC AUTO-ENTMCNC: 32.2 G/DL (ref 32.2–35.5)
MCV RBC AUTO: 85.3 FL (ref 83–98)
MONOCYTES # BLD: 0.67 K/UL (ref 0.28–0.8)
MONOCYTES NFR BLD: 9.4 %
NEUTROPHILS # BLD: 4.04 K/UL (ref 1.7–7)
NEUTS SEG NFR BLD: 56.6 %
NRBC BLD-RTO: 0 %
PDW BLD AUTO: 11.1 FL (ref 9.4–12.3)
PLATELET # BLD AUTO: 336 K/UL (ref 150–369)
POTASSIUM SERPL-SCNC: 4 MEQ/L (ref 3.6–5.1)
PROT SERPL-MCNC: 5 G/DL (ref 6–8.2)
RBC # BLD AUTO: 4.22 M/UL (ref 3.93–5.22)
SODIUM SERPL-SCNC: 140 MEQ/L (ref 136–144)
WBC # BLD AUTO: 7.14 K/UL (ref 3.8–10.5)

## 2022-09-25 PROCEDURE — 85025 COMPLETE CBC W/AUTO DIFF WBC: CPT | Performed by: SURGERY

## 2022-09-25 PROCEDURE — 80053 COMPREHEN METABOLIC PANEL: CPT | Performed by: SURGERY

## 2022-09-25 PROCEDURE — 99239 HOSP IP/OBS DSCHRG MGMT >30: CPT | Performed by: HOSPITALIST

## 2022-09-25 PROCEDURE — 63700000 HC SELF-ADMINISTRABLE DRUG: Performed by: HOSPITALIST

## 2022-09-25 PROCEDURE — 63700000 HC SELF-ADMINISTRABLE DRUG: Performed by: NURSE PRACTITIONER

## 2022-09-25 PROCEDURE — 63700000 HC SELF-ADMINISTRABLE DRUG: Performed by: SURGERY

## 2022-09-25 PROCEDURE — 36415 COLL VENOUS BLD VENIPUNCTURE: CPT | Performed by: SURGERY

## 2022-09-25 RX ORDER — ACETAMINOPHEN 325 MG/1
650 TABLET ORAL EVERY 4 HOURS PRN
Refills: 3
Start: 2022-09-25 | End: 2022-10-25

## 2022-09-25 RX ADMIN — PANTOPRAZOLE SODIUM 40 MG: 40 TABLET, DELAYED RELEASE ORAL at 09:46

## 2022-09-25 RX ADMIN — AMLODIPINE BESYLATE 5 MG: 5 TABLET ORAL at 09:46

## 2022-09-25 RX ADMIN — AMIODARONE HYDROCHLORIDE 200 MG: 200 TABLET ORAL at 09:46

## 2022-09-25 RX ADMIN — APIXABAN 10 MG: 5 TABLET, FILM COATED ORAL at 05:51

## 2022-09-25 RX ADMIN — METOPROLOL SUCCINATE 50 MG: 50 TABLET, EXTENDED RELEASE ORAL at 09:46

## 2022-09-25 RX ADMIN — CLOPIDOGREL BISULFATE 75 MG: 75 TABLET, FILM COATED ORAL at 09:46

## 2022-09-25 NOTE — PLAN OF CARE
Plan of Care Review  Plan of Care Reviewed With: patient  Progress: no change  Outcome Summary: Pt. swelling on both arms have reduced. Continued to monitor DVTs. She denies pain. Purewick in place. Fall precaution maintained.

## 2022-09-25 NOTE — PLAN OF CARE
Problem: Adult Inpatient Plan of Care  Goal: Plan of Care Review  Outcome: Progressing  Flowsheets (Taken 9/25/2022 1236)  Progress: improving  Plan of Care Reviewed With: patient  Outcome Summary: daughter and  at bedside.  in to set up home health before discharge. patient ambulated with walker to wheelchair and discharged home.  Goal: Patient-Specific Goal (Individualized)  Outcome: Progressing  Goal: Absence of Hospital-Acquired Illness or Injury  Outcome: Progressing  Goal: Optimal Comfort and Wellbeing  Outcome: Progressing  Goal: Readiness for Transition of Care  Outcome: Progressing     Problem: Fall Injury Risk  Goal: Absence of Fall and Fall-Related Injury  Outcome: Progressing     Problem: Skin Injury Risk Increased  Goal: Skin Health and Integrity  Outcome: Progressing     Problem: Fluid Volume Deficit  Goal: Fluid Balance  Outcome: Progressing     Problem: Mobility Impairment  Goal: Optimal Mobility  Outcome: Progressing     Problem: Fluid Imbalance (Pancreatitis)  Goal: Fluid Balance  Outcome: Progressing     Problem: Infection (Pancreatitis)  Goal: Infection Symptom Resolution  Outcome: Progressing     Problem: Nutrition Impaired (Pancreatitis)  Goal: Optimal Nutrition Intake  Outcome: Progressing     Problem: Pain (Pancreatitis)  Goal: Acceptable Pain Control  Outcome: Progressing     Problem: Respiratory Compromise (Pancreatitis)  Goal: Effective Oxygenation and Ventilation  Outcome: Progressing     Problem: Self-Care Deficit  Goal: Improved Ability to Complete Activities of Daily Living  Outcome: Progressing     Problem: Mobility Impairment  Goal: Optimal Mobility [place] and Safety  Outcome: Progressing   Plan of Care Review  Plan of Care Reviewed With: patient  Progress: improving  Outcome Summary: daughter and  at bedside.  in to set up home health before discharge. patient ambulated with walker to wheelchair and discharged home.

## 2022-09-25 NOTE — PROGRESS NOTES
Pt stable for dc home today.she is sivan baird.Met with pt and family.Referral made to [facility] for a vn and home pt.pt signed medicare letter.

## 2022-09-25 NOTE — DISCHARGE SUMMARY
Hospital Medicine Service -  Inpatient Discharge Summary        BRIEF OVERVIEW   Admitting Provider: Qi Alexander DO  Attending Provider: Jose Elias Borjas MD Attending phys phone: [phone]    PCP: Jonathan Meza [phone]    Admission Date: 9/16/2022  Discharge Date: 9/25/2022     DISCHARGE DIAGNOSES      Primary Discharge Diagnosis  Acute pancreatitis    Secondary Discharge Diagnoses  Active Hospital Problems    Diagnosis Date Noted    Atrial fibrillation, unspecified type (CMS/[facility]) 07/28/2022     Priority: High    Sick sinus syndrome (CMS/[facility]) 07/19/2022     Priority: High    Acute kidney injury superimposed on CKD (CMS/[facility]) 07/19/2022     Priority: Medium    Hyperlipidemia 07/19/2022     Priority: Medium    GERD (gastroesophageal reflux disease) 07/19/2022     Priority: Medium    Acute deep vein thrombosis (DVT) of axillary vein of left upper extremity (CMS/[facility]) 09/24/2022    Acute pancreatitis 09/16/2022    Transaminitis 09/16/2022    PAD (peripheral artery disease) (CMS/[facility]) 09/16/2022    Calculus of gallbladder without cholecystitis without obstruction 09/16/2022    CAD (coronary artery disease) 06/10/2022      Resolved Hospital Problems    Diagnosis Date Noted Date Resolved    Hypoxia 09/18/2022 09/23/2022       Problem List on Day of Discharge  Atrial fibrillation, unspecified type (CMS/[facility])  Assessment & Plan  H/o new onset atrial fibrillation with SSS/pauses s/p PPM 7/21/22  EKG at with ? AV junctional rhythm and ?PPM malfunction, rate controlled  Cont w/outpt regimen of metoprolol, Amiodarone   Restarted on Eliquis today  Follow CBC closely    Sick sinus syndrome (CMS/[facility])  Assessment & Plan  S/p PPM placement 7/2022  Monitor electrolytes and replete as required for potassium greater than 4, magnesium greater than 2  Cardiology following     Acute kidney injury superimposed on CKD (CMS/[facility])  Assessment & Plan  Resolved  Likely prerenal    GERD (gastroesophageal reflux  disease)  Assessment & Plan  Will place on PPI BID as stated above    Hyperlipidemia  Assessment & Plan  Hold statin for now given transaminitis  Repeat lipid panel as stated above    Acute deep vein thrombosis (DVT) of axillary vein of left upper extremity (CMS/H[facility])  Assessment & Plan  LLE swelling   US show   IMPRESSION: Findings consistent with deep vein thrombosis involving the left subclavian and left axillary veins.    Increase Eliquis 5mg bid to 10 mg bid   Don't think it is Eliquis Failure   Hem/onc to Eval     PAD (peripheral artery disease) (CMS/[facility])  Assessment & Plan  C/A/P CTA w/severe stenosis at the junction of the left subclavian and left brachiocephalic vein as it courses between the left anterior first rib  and left clavicle, with subsequent marked left upper extremity as well as  partially imaged left lower neck and left chest wall venous collaterals, with  the stenosis likely sequela of chronic left chest wall pacer leads compounded by  left arm abduction.  Currently denies any significant LUE pain    Plan:  Resume meds after surgery  Resume outpt f/u with Cardiology for continued monitoring    Transaminitis  Assessment & Plan  Likely secondary to gallstone pancreatitis  Follow LFTs  Hold statin    CAD (coronary artery disease)  Assessment & Plan   history of coronary disease (PCI with NIKITA to prox RCA and prox LCx [p/op developed ST elevations inferiorly- thrombotic occlusion RCA s/p repeat angioplasty),  restarted on Plavix  Hold statin given transaminitis  Cont w/metoprolol  Cardiology  Following    * Acute pancreatitis  Assessment & Plan  Likely secondary to  gallstone pancreatitis  Presents with sudden onset of abdominal pain, nausea and vomiting admitted for acute pancreatitis.   Initial LFTs elevated with mixed hepatic injury and cholestatic picture, lipase 1436  Initial CT abdomen showed  Acute pancreatitis without evidence of pancreatic necrosis or collection. Imaging notable for  cholelithiasis, however no tigist choledocholithiasis or biliary duct dilation. Denies any alcohol use  Triglycerides relatively stable  Sxs improving, LFTs downtrending  MRI Abdomen shows gallstones no evidence of choledocholithiasis  Cont w/lidoderm patch and cont w/morphine IV 2mg q3h PRN for pain management. Avoiding   S/p laparoscopic cholecystectomy by Dr. Valdivia 9/21  Patient doing well postoperatively and tolerating cardiac diet  Initiated on Plavix yesterday and Eliquis today  Follow hemoglobin  Ok to discharge home tomorrow if hemoglobin remains stable in am per   Updated patient's daughter       SUMMARY OF HOSPITALIZATION      HPI per H/P   Emily Ramirez is a 77 y.o. female with a past medical history of coronary disease (PCI with NIKITA to prox RCA and prox LCx [p/op developed ST elevations inferiorly- thrombotic occlusion RCA s/p repeat angioplasty), atrial fibrillation with SSS/pauses s/p PPM 7/21/22 (on Eliquis), mild AR, MR, hypertension, hyperlipidemia, and chronic kidney disease stage III  who presents to [facility] ER on 9/16/2022 with 2-day history of chest and midepigastric abdominal pain.  According to patient she was in her usual state of health until yesterday evening when shortly after dinner she developed sudden onset of substernal chest and midepigastric pain.  Symptoms resolved temporarily without any major intervention however, later in the evening symptoms reoccurred waking patient from sleep with pain now radiating throughout her abdomen and into her back.  This is also accompanied by nausea and several episodes of nonbilious, non bloody emesis.  Symptoms to continue to progress throughout the early morning and as a result patient ultimately presented to the ER for further evaluation.  Currently, patient continues to complain of ongoing midepigastric pain although somewhat improved since administration of morphine.  She denies any fevers does admit to chills earlier today.   Denies any shortness of breath, cough, wheezing, heart palpitations.  Denies any recent diarrhea but does admit to intermittent constipation over the past week.  Denies any urinary symptoms.  Denies any prior history of gallstones or gallbladder disease.     77-year-old female with a history of CAD, CKD, HTN, pacer here for chest pain.  Symptoms started a few hours ago with pain in the sternum, rated 6 out of 10.  Symptoms increased and migrated down to the upper abdomen.  Associated nausea, vomiting, diaphoresis.  No dyspnea.  No dizziness.  No focal neuro symptoms.  No diarrhea.  Subjective fever and chills.  States has a history of GERD but never had this kind of pain before.  Hospital Course  Very delightful 77 years old female who presented with abdominal pain, being treated with gallstone pancreatitis, status post lap cholecystectomy, doing well tolerated diet, denies abdominal pain noticed left arm swollen, ultrasound demonstrate left upper extremity DVT, consult hematology oncology, do not felt it is due to Eliquis failure, most likely it is a triggered secondary to off Eliquis for surgery and with major surgery, patient take Eliquis for A. fib, therefore A. fib dose was doubled for acute DVT therapy should be maintained Eliquis 10 mg twice daily for 7 days then followed by 5 mg twice daily patient was instructed follow-up with hematology oncology and primary care physician as outpatient for further monitoring management.    Interval History:   Emily Ramirez was seen and examined, no acute overnight event reported, patient reported no chest pain, sob, N/V/D, overt bleeding.  Patient ambulating to bathroom, patient awake alert oriented x3 patient comfortable calm in bed not in acute distress patient left upper extremity significantly decreased swelling.  On the day of discharge I discussed with patient daughter and patient's  by phone I provide comprehensive medical update also questions to the  best of my knowledge they understood agree current plan.  I personally reviewed patient chart, imaging study, lab study, I personally discussed case with patient's nursing staff, , case management, I personally organize patient discharge instruction and medication reconciliation.  Patient vital signs stable, labs unremarkable, patient medically ready and stable for discharge.    Vitals:    09/24/22 2300 09/25/22 0422 09/25/22 0710 09/25/22 0812   BP:  (!) 164/70 (!) 167/74    BP Location:  Right upper arm Right upper arm    Patient Position:  Lying Lying    Pulse: 64  76 73   Resp:  18 18    Temp:  36.4 °C (97.5 °F) 36.6 °C (97.9 °F)    TempSrc:  Oral Temporal    SpO2:  97% 96%    Weight:       Height:           Exam on Day of Discharge  General Appearance:  Awake, Alert, no distress     Head:  Normocephalic, without obvious abnormality, atraumatic   Neck: Supple      Lungs:   Clear to auscultation bilaterally  respirations unlabored  no rales  no wheezing     Heart:  Regular rhythm  S1 and S2 normal  no murmur, rub or gallop     Abdomen:   Soft, non-tender, no masses, no organomegaly     Vascular: Pulses 2+ and symmetric all extremities     Extremities:  Left arm swollen significantly improved     Behavior/Emotional: Appropriate, cooperative     Consults During Admission  IP CONSULT TO GASTROENTEROLOGY  IP CONSULT TO CARDIOLOGY  IP CONSULT TO GENERAL SURGERY  IP CONSULT TO NUTRITION SERVICES  IP CONSULT TO HEMATOLOGY/ONCOLOGY    DISCHARGE MEDICATIONS        Medication List      START taking these medications    acetaminophen 325 mg tablet  Commonly known as: TYLENOL  Take 2 tablets (650 mg total) by mouth every 4 (four) hours as needed for pain or fever (specify temp in comments): (temp > 100.4°F / 38°C (If treating new fever, call physician).).  Dose: 650 mg        CHANGE how you take these medications    * apixaban 5 mg tablet  Commonly known as: ELIQUIS  Take 2 tablets (10 mg total) by mouth 2 (two)  times a day for 12 doses Indications: blood clot in a deep vein of the extremities, a clot in the lung.  Dose: 10 mg  What changed: how much to take     * apixaban 5 mg tablet  Commonly known as: ELIQUIS  Start taking on: October 1, 2022  Take 1 tablet (5 mg total) by mouth 2 (two) times a day Indications: blood clot in a deep vein of the extremities.  Dose: 5 mg  What changed: These instructions start on October 1, 2022. If you are unsure what to do until then, ask your doctor or other care provider.         * This list has 2 medication(s) that are the same as other medications prescribed for you. Read the directions carefully, and ask your doctor or other care provider to review them with you.            CONTINUE taking these medications    amiodarone 200 mg tablet  Commonly known as: PACERONE  Take 200 mg by mouth every morning.  Dose: 200 mg     amLODIPine 5 mg tablet  Commonly known as: NORVASC  Take 5 mg by mouth every morning.  Dose: 5 mg     clopidogreL 75 mg tablet  Commonly known as: PLAVIX  Take 75 mg by mouth every morning.  Dose: 75 mg     metoprolol succinate XL 50 mg 24 hr tablet  Commonly known as: TOPROL-XL  Take 50 mg by mouth every morning.  Dose: 50 mg     pantoprazole 40 mg EC tablet  Commonly known as: PROTONIX  Take 40 mg by mouth daily before breakfast. 30 min prior to breakfast  Dose: 40 mg     traMADoL-acetaminophen 37.5-325 mg per tablet  Commonly known as: ULTRACET  Take 1 tablet by mouth every 12 (twelve) hours.  Dose: 1 tablet        STOP taking these medications    coenzyme Q10 100 mg capsule  Commonly known as: COQ10     famotidine 40 mg tablet  Commonly known as: PEPCID     simvastatin 20 mg tablet  Commonly known as: ZOCOR                 PROCEDURES / LABS / IMAGING      Operative Procedures  [unfilled]    Pertinent Labs  CMP Results       09/25/22 09/24/22 09/23/22     0418 0416 1556     140 139    K 4.0 4.0 4.0    Cl 110 108 106    CO2 23 27 25    Glucose 98 101 138    BUN  20 26 26    Creatinine 0.9 1.0 1.2    Calcium 8.7 8.5 8.7    Anion Gap 7 5 8    AST 23 27 40    ALT 35 42 49    Albumin 2.7 2.9 3.0    EGFR >60.0 53.6 43.4        CBC Results       09/25/22 09/24/22 09/23/22     0418 0416 1556    WBC 7.14 6.84 8.80    RBC 4.22 4.28 4.33    HGB 11.6 11.8 12.2    HCT 36.0 36.9 37.6    MCV 85.3 86.2 86.8    MCH 27.5 27.6 28.2    MCHC 32.2 32.0 32.4     315 320        Troponin I Results       09/16/22 09/16/22 09/16/22     1004 0741 0557    HS Troponin I 17.5 9.7 7.7        PT/PTT Results       09/21/22 09/20/22 09/20/22     0013 1817 0522    PTT 90 70 36         Comment for PTT at 0013 on 09/21/22: The Standard Therapeutic Range for Heparin is 68 to 101 seconds.    Comment for PTT at 1817 on 09/20/22: The Standard Therapeutic Range for Heparin is 68 to 101 seconds.    Comment for PTT at 0522 on 09/20/22: The Standard Therapeutic Range for Heparin is 68 to 101 seconds.        Lab Results   Component Value Date     (H) 07/27/2022    CHOL 125 09/16/2022    TRIG 66 09/16/2022    HDL 49 (L) 09/16/2022    LDLCALC 63 09/16/2022    TSH 1.89 07/19/2022    DDIMER 2.38 (H) 09/16/2022       Pertinent Imaging  X-RAY CHEST 2 VIEWS    Result Date: 9/19/2022  IMPRESSION: 1. Stable cardiomegaly. 2. Increased right but improved left basilar pleural fluid and hypoventilatory change and no airspace disease bilaterally. COMMENT:    AP and lateral erect chest radiographs are compared to a prior AP only study dated 7/27/2022. A left-sided dual-lead PCM is again noted. The cardiomediastinal contour shows stable cardiomegaly, again noting mural calcifications in the aortic arch. The lungs show increased right but improved left basilar pleural fluid and hypoventilatory change and no airspace disease bilaterally. The osseous thorax and surrounding soft tissues again show prior bilateral partial shoulder arthroplasties and midthoracic advanced spondylosis.    MRI ABDOMEN WITHOUT CONTRAST  MRCP    Result Date: 9/19/2022  IMPRESSION: Limited study. Gallstones. No evidence of choledocholithiasis. Interval development of small right pleural effusion and bibasilar atelectasis. COMMENT: Multiplanar MRI of the abdomen was performed without intravenous contrast. An MRCP was performed utilizing T2-weighted sequences. Three-dimensional images were reconstructed on the technologist workstation and the source and MIP images were reviewed. Unfortunately, the study is limited by motion. Comparison is made to a CTA of the abdomen dated 09/16/2022. There is a new small right pleural effusion and bibasilar atelectasis. Moderate size hiatal hernia is again noted. The liver is normal in size measuring 16 cm in length. There is no signal loss on the opposed phase or in phase sequences to suggest hepatic steatosis or iron deposition, respectively. No focal hepatic mass is identified. Several low T2 signal filling defects are again seen in the gallbladder lumen, presumably gallstones, measuring up to 1.3 cm. The gallbladder appears otherwise unremarkable. The common bile duct is normal in caliber measuring 5 mm. No intraluminal filling defects or strictures are seen. No intrahepatic biliary dilatation is identified. The pancreatic duct is not well seen but does not appear enlarged. The spleen, pancreas, and adrenal glands appear unremarkable. Multiple right renal cysts are again noted measuring up to 2.7 cm. The left kidney appears unremarkable. No upper abdominal lymphadenopathy or free fluid is seen. No grossly abnormal bowel loops are identified. The signal in the osseous structures is grossly unremarkable.    Ultrasound venous arm bilateral    Result Date: 9/24/2022  IMPRESSION: Findings consistent with deep vein thrombosis involving the left subclavian and left axillary veins. COMMENT: Ultrasound evaluation of bilateral upper extremities was performed. Grayscale, color flow, Doppler examination was performed with  analysis of spectral waveforms. The right internal jugular, subclavian, axillary, basilic, cephalic and brachial veins are normally compressible with appropriate directional flow and waveforms. There is no evidence of deep vein thrombosis. In the left proximal mid and distal subclavian vein and axillary vein echogenic material is seen with noncompressibility consistent with deep vein thrombosis. Very minimal flow is identified in the left axillary vein. The left internal jugular vein, left basilic vein, left brachial and left cephalic vein demonstrate appropriate directional flow and waveforms. This report was called to the patient's nurse.    CT ANGIOGRAPHY CHEST/ABDOMEN/PELVIS WITH AND WITHOUT IV CONTRAST    Addendum Date: 9/16/2022    Please note that there is severe stenosis at the junction of the left subclavian and left brachiocephalic vein as it courses between the left anterior first rib and left clavicle, with subsequent marked left upper extremity as well as partially imaged left lower neck and left chest wall venous collaterals, with the stenosis likely sequela of chronic left chest wall pacer leads compounded by left arm abduction.    Result Date: 9/16/2022  IMPRESSION: 1. No aortic aneurysm or dissection as clinically questioned. 2. Acute pancreatitis without evidence of pancreatic necrosis or collection. COMMENT: CHEST LUNGS AND AIRWAYS: Mild bibasilar subsegmental atelectasis without consolidation, pneumothorax, or mass. The visualized airways are patent. PLEURA: no effusion. VESSELS: Mild enlargement of the main pulmonary artery measuring up to 3.4 cm in diameter, which may be seen with pulmonary hypertension in the appropriate clinical setting. No thoracic aortic aneurysm or dissection, noting diffuse atherosclerotic calcifications/changes throughout the aorta and coronary arteries. HEART: Cardiomegaly with extensive mitral annular calcification again noted. No pericardial effusion. MEDIASTINUM AND  BRENNEN: within normal limits, no enlarged lymph nodes. CHEST WALL AND LOWER NECK: Dual-lead left chest wall pacer with leads in the right atrium and right ventricle. ABDOMEN: LIVER: normal morphology without suspicious mass. BILE DUCTS: normal caliber. GALLBLADDER: Noncalcified cholelithiasis. No evidence of acute cholecystitis. PANCREAS: Diffuse peripancreatic stranding with slightly heterogeneous parenchymal perfusion/enhancement, compatible with acute interstitial pancreatitis. No evidence of pancreatic necrosis. SPLEEN: within normal limits. ADRENALS: within normal limits. KIDNEYS: A few right-sided cysts measuring up to 2.8 cm. PELVIS: Evaluation limited by right hip arthroplasty streak artifact. REPRODUCTIVE ORGANS: no pelvic masses. URETERS: within normal limits. BLADDER: within normal limits. BOWEL/PERITONEUM: Bowel demonstrates normal caliber without evidence of obstruction. Sigmoid and descending colonic diverticulosis without evidence of acute diverticulitis. Normal appendix. Large hiatal hernia again noted resulting in partial intrathoracic stomach, unchanged. No enlarged mesenteric lymph nodes. No ascites or free air, no fluid collection. VESSELS: atherosclerotic changes, without abdominal aortic aneurysm or dissection. There is moderate focal proximal left common iliac artery stenosis due to prominent noncalcified atheromatous plaque. RETROPERITONEUM: no enlarged retroperitoneal or pelvic nodes. ABDOMINAL WALL: intact. BONES: degenerative changes, no suspicious lytic or blastic lesions. Partially imaged right hip arthroplasty with associated streak artifact. Bilateral shoulder arthroplasties noted.       OUTPATIENT  FOLLOW-UP / REFERRALS / PENDING TESTS      Important Issues to Address in Follow-Up  Please follow up with PCP in One week       DISCHARGE DISPOSITION      Disposition: Home     This patient note has been dictated using speech recognition software. Inadvertent speech recognition errors should  be disregarded. Please do not hesitate to call [facility] office for clarifications.

## 2022-09-26 ENCOUNTER — TELEPHONE (OUTPATIENT)
Dept: HEMATOLOGY/ONCOLOGY | Facility: CLINIC | Age: 77
End: 2022-09-26
Payer: MEDICARE

## 2022-09-26 NOTE — TELEPHONE ENCOUNTER
1.  It is extensive as it is into the veins  2.  Upper extremity DVTs carry a very low risk of progressing to a PE  3.  In an acute DVT, you do not want to use compression garments.  She should keep her arm elevated is much as possible, however if she cannot, it is okay as she is on a blood thinner

## 2022-09-26 NOTE — TELEPHONE ENCOUNTER
Reviewed Dr. Phan's responses to Afua's questions regarding Emily's DVT. Afua states she has done some research that is concerning as it states UE DVTs can progress to Pes. Reviewed per Dr. Phan there is a very low risk of this. Reviewed recommendation to keep pt's arm elevated and avoid compression garments. Afua states pt has arm elevated and verbalizes understanding. Encouraged Afua and pt to call back if any questions or concerns prior to her appt.

## 2022-09-29 ENCOUNTER — TRANSCRIBE ORDERS (OUTPATIENT)
Dept: SCHEDULING | Age: 77
End: 2022-09-29

## 2022-09-29 DIAGNOSIS — I82.622 ACUTE EMBOLISM AND THROMBOSIS OF DEEP VEINS OF LEFT UPPER EXTREMITY (CMS/HCC): Primary | ICD-10-CM

## 2022-10-06 ENCOUNTER — HOSPITAL ENCOUNTER (OUTPATIENT)
Dept: CARDIOLOGY | Age: 77
Discharge: HOME | End: 2022-10-06
Attending: INTERNAL MEDICINE
Payer: MEDICARE

## 2022-10-06 VITALS
SYSTOLIC BLOOD PRESSURE: 120 MMHG | BODY MASS INDEX: 31.76 KG/M2 | WEIGHT: 186 LBS | HEIGHT: 64 IN | DIASTOLIC BLOOD PRESSURE: 80 MMHG

## 2022-10-06 DIAGNOSIS — I25.10 ATHEROSCLEROTIC HEART DISEASE OF NATIVE CORONARY ARTERY WITHOUT ANGINA PECTORIS: ICD-10-CM

## 2022-10-06 DIAGNOSIS — R94.31 ABNORMAL ELECTROCARDIOGRAM (ECG) (EKG): ICD-10-CM

## 2022-10-06 DIAGNOSIS — I47.20 VENTRICULAR TACHYCARDIA (CMS/HCC): ICD-10-CM

## 2022-10-06 LAB — BSA FOR ECHO PROCEDURE: 1.95 M2

## 2022-10-06 PROCEDURE — 93308 TTE F-UP OR LMTD: CPT

## 2022-10-12 ENCOUNTER — HOSPITAL ENCOUNTER (OUTPATIENT)
Dept: RADIOLOGY | Facility: HOSPITAL | Age: 77
Discharge: HOME | End: 2022-10-12
Payer: MEDICARE

## 2022-10-12 DIAGNOSIS — I82.622 ACUTE EMBOLISM AND THROMBOSIS OF DEEP VEINS OF LEFT UPPER EXTREMITY (CMS/HCC): ICD-10-CM

## 2022-10-12 PROCEDURE — 93971 EXTREMITY STUDY: CPT | Mod: LT

## 2022-10-27 ENCOUNTER — OFFICE VISIT (OUTPATIENT)
Dept: HEMATOLOGY/ONCOLOGY | Facility: CLINIC | Age: 77
End: 2022-10-27
Attending: INTERNAL MEDICINE
Payer: MEDICARE

## 2022-10-27 VITALS
HEART RATE: 76 BPM | BODY MASS INDEX: 29.56 KG/M2 | TEMPERATURE: 97.5 F | OXYGEN SATURATION: 98 % | DIASTOLIC BLOOD PRESSURE: 69 MMHG | RESPIRATION RATE: 18 BRPM | SYSTOLIC BLOOD PRESSURE: 146 MMHG | WEIGHT: 172.2 LBS

## 2022-10-27 DIAGNOSIS — I82.A12 ACUTE DEEP VEIN THROMBOSIS (DVT) OF AXILLARY VEIN OF LEFT UPPER EXTREMITY (CMS/HCC): Primary | ICD-10-CM

## 2022-10-27 PROCEDURE — 99215 OFFICE O/P EST HI 40 MIN: CPT | Performed by: INTERNAL MEDICINE

## 2022-10-27 ASSESSMENT — ENCOUNTER SYMPTOMS
ARTHRALGIAS: 1
ENDOCRINE NEGATIVE: 1
BACK PAIN: 1
LEG SWELLING: 1
GASTROINTESTINAL NEGATIVE: 1
EXTREMITY WEAKNESS: 1
CONSTITUTIONAL NEGATIVE: 1
RESPIRATORY NEGATIVE: 1
BRUISES/BLEEDS EASILY: 1
PSYCHIATRIC NEGATIVE: 1

## 2022-10-27 ASSESSMENT — PAIN SCALES - GENERAL: PAINLEVEL: 0-NO PAIN

## 2022-10-27 NOTE — PROGRESS NOTES
Emily Ramirez is a 77 y.o. female,   :  1945  REFERRING PHYSICIAN:   Jonathan Meza,   [address] [facility]  [place] [address]  PRIMARY CARE PROVIDER:  Jonathan Meza DO    Encounter Diagnosis   Name Primary?    Acute deep vein thrombosis (DVT) of axillary vein of left upper extremity (CMS/HCC) Yes     Patient Active Problem List   Diagnosis    CAD (coronary artery disease)    Hyperlipidemia    Hypokalemia    GERD (gastroesophageal reflux disease)    Acute kidney injury superimposed on CKD (CMS/HCC)    Sick sinus syndrome (CMS/HCC)    Hypomagnesemia    Valvular heart disease    Elevated troponin    Class 1 obesity in adult    Pericardial effusion    Pleural effusion    Anemia    Pacemaker    Atrial fibrillation, unspecified type (CMS/HCC)    Acute pancreatitis    Transaminitis    PAD (peripheral artery disease) (CMS/HCC)    Calculus of gallbladder without cholecystitis without obstruction    Acute deep vein thrombosis (DVT) of axillary vein of left upper extremity (CMS/HCC)       History of Present Illness  HPI  Emily Ramirez is seen today at the request of Jonathan Meza DO for   Encounter Diagnosis   Name Primary?    Acute deep vein thrombosis (DVT) of axillary vein of left upper extremity (CMS/HCC) Yes     Mrs. Emily Ramirez  is a 77 year-old hospitalized with gallstone pancreatitis who underwent a laparoscopic cholecystectomy 2022.  Her recent history is significant for CAD with recent PTCA stent placement on 2022.  She developed in-stent thrombosis requiring a second catheterization and discharged on dual antiplatelet therapy.  She was rehospitalized in July for paroxysmal atrial fibrillation and underwent pacemaker placement and discharged on amiodarone and Eliquis.  She was most recently admitted from 2022 until 2022 for pericarditis.  She was readmitted on 2022 with abdominal pain, nausea and vomiting.  Work-up revealed  "gallstone pancreatitis.  Her Eliquis was held and she was placed on heparin gtt.  She underwent a laparoscopic cholecystectomy in 9/21/2022.  Her anticoagulation was resumed at 5 mg twice a day 2 days later.  On the day of discharge, she had an ultrasound of the bilateral upper extremities for bilateral upper extremity edema with the left being greater than the right.  Ultrasound revealed deep vein thrombosis involving the left subclavian and left axillary veins.    On questioning, she has had at least 3 peripheral catheters, including a midline catheter in the left upper extremity.  She has had a slight improvement in the tightness and swelling since catheters have been removed.  She has never had a VTE nor is there a family history of VTE.  On the day of discharge, she received the 10 mg loading dose of Eliquis which was continued for 1 week and then she resumed the maintenance dose of 5 mg twice a day.  She had a repeat ultrasound of the left upper extremity on 10/12/2022 which showed, \" Redemonstration of a combination of occlusive and nonocclusive deep venous thrombosis throughout the left subclavian vein with interval recanalization of the left axillary vein.\"   She has been keeping her left arm elevated overnight.  The swelling is markedly improved by morning she is able to put her rings on without difficulty.  She notices that later in the day, she has swelling in her hand and the rings can feel tight.  Overall, in the last month, the swelling and tightness is markedly improved.    Review of Systems - Oncology   Constitutional: Negative.    HENT:  Negative.    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   Respiratory: Negative.    Cardiovascular: Positive for leg swelling (at times).   Gastrointestinal: Negative.    Endocrine: Negative.    Genitourinary: Negative.     Musculoskeletal: Positive for arthralgias and back pain.   Skin: Negative.         Left upper arm still has " lump, no tenderness, swelling in hand at times   Neurological: Positive for extremity weakness (leg weakness related to arthritis).   Hematological: Bruises/bleeds easily.   Psychiatric/Behavioral: Negative.    Review of Systems:  Nursing assessment reviewed. Pertinent positive and negative symptoms noted in HPI, all others negative.    Temp:  [36.4 °C (97.5 °F)] 36.4 °C (97.5 °F)  Heart Rate:  [76] 76  Resp:  [18] 18  BP: (146)/(69) 146/69  Visit Vitals  BP (!) 146/69   Pulse 76   Temp 36.4 °C (97.5 °F)   Resp 18   Wt 78.1 kg (172 lb 3.2 oz)   SpO2 98%   BMI 29.56 kg/m²     Physical Exam  Constitutional:       Appearance: She is well-developed and well-nourished.   HENT:      Head: Normocephalic and atraumatic.   Eyes:      General: No scleral icterus.     Extraocular Movements: EOM normal.      Conjunctiva/sclera: Conjunctivae normal.      Pupils: Pupils are equal, round, and reactive to light.   Cardiovascular:      Rate and Rhythm: Normal rate and regular rhythm.      Pulses: Intact distal pulses.      Heart sounds: Normal heart sounds.   Pulmonary:      Effort: Pulmonary effort is normal.      Breath sounds: Normal breath sounds. No wheezing, rhonchi or rales.   Abdominal:      General: Bowel sounds are normal.      Palpations: Abdomen is soft.   Musculoskeletal:         General: Swelling (LUE-improved.  no erythema or warmth) present. Normal range of motion.      Cervical back: Normal range of motion and neck supple.   Skin:     General: Skin is warm and dry.   Neurological:      Mental Status: She is alert and oriented to person, place, and time.   Psychiatric:         Mood and Affect: Mood and affect and mood normal.         Behavior: Behavior normal.         Thought Content: Thought content normal.         Judgment: Judgment normal.         Past Medical History:   Diagnosis Date    Aortic regurgitation     Arthritis     Chronic kidney disease     Coronary artery disease     Diverticulitis of colon      History of loop recorder     Hypertension     Lipid disorder     Mitral regurgitation     Pancreatitis     SVT (supraventricular tachycardia) (CMS/HCC)        Past Surgical History:   Procedure Laterality Date    CARDIAC SURGERY      INSERT / REPLACE / REMOVE PACEMAKER      JOINT REPLACEMENT      SKIN BIOPSY         OB History    Para Term  AB Living   2 2           SAB IAB Ectopic Multiple Live Births                  # Outcome Date GA Lbr Jhonathan/2nd Weight Sex Delivery Anes PTL Lv   2 Para            1 Para                Social History     Tobacco Use    Smoking status: Never    Smokeless tobacco: Never   Substance Use Topics    Alcohol use: Not Currently     Alcohol/week: 1.0 standard drink     Types: 1 Glasses of wine per week     Comment: 1x/month    Drug use: Not Currently       No family history on file.      Allergies  Celebrex [celecoxib], Cipro [ciprofloxacin hcl], Clindamycin, Crestor [rosuvastatin], Lipitor [atorvastatin], and Voltaren [diclofenac sodium]    Medications  Current Outpatient Medications   Medication Sig Dispense Refill    amiodarone (PACERONE) 200 mg tablet Take 200 mg by mouth every morning.      amLODIPine (NORVASC) 5 mg tablet Take 5 mg by mouth every morning.      apixaban (ELIQUIS) 5 mg tablet Take 1 tablet (5 mg total) by mouth 2 (two) times a day Indications: blood clot in a deep vein of the extremities. 60 tablet 0    clopidogreL (PLAVIX) 75 mg tablet Take 75 mg by mouth every morning.      metoprolol succinate XL (TOPROL-XL) 50 mg 24 hr tablet Take 50 mg by mouth every morning.      pantoprazole (PROTONIX) 40 mg EC tablet Take 40 mg by mouth daily before breakfast. 30 min prior to breakfast      traMADoL-acetaminophen (ULTRACET) 37.5-325 mg per tablet Take 1 tablet by mouth every 12 (twelve) hours.      apixaban (ELIQUIS) 5 mg tablet Take 2 tablets (10 mg total) by mouth 2 (two) times a day for 12 doses Indications: blood clot in a deep vein of the  extremities, a clot in the lung. 24 tablet 0     No current facility-administered medications for this visit.          Laboratory    Recent Results (from the past 672 hour(s))   Transthoracic echo (TTE) limited    Collection Time: 10/06/22 11:29 AM   Result Value Ref Range    BSA 1.95 m2         Radiology  I independently reviewed the images, tracings or specimen.     Ultrasound venous arm left    Result Date: 10/12/2022  Narrative: CLINICAL HISTORY: Recently diagnosed with left upper extremity DVT, on blood thinners.     Impression: IMPRESSION: Redemonstration of a combination of occlusive and nonocclusive deep venous thrombosis throughout the left subclavian vein with interval recanalization of the left axillary vein. COMMENT: Comparison: Bilateral upper extremities venous Doppler ultrasound 9/24/2022. Technique: Grayscale ultrasound, spectral Doppler, and color Doppler was performed of the left upper extremity veins. Findings: Again seen is a combination of occlusive and nonocclusive thrombus throughout the left subclavian vein. Grayscale and color Doppler reveals no evidence of thrombus within the visualized left internal jugular, axillary, brachial, basilic, and cephalic veins. There is normal compressibility of the left internal jugular, axillary, brachial, basilic, and cephalic veins. There are normal spectral Doppler waveforms.    Transthoracic echo (TTE) limited    Result Date: 10/6/2022  Narrative: Limited echo performed. LVEF 65%. Preserved right ventricular function.  Pacer wire seen extending to apex. No significant valvular heart disease identified on limited study. No significant pericardial effusion identified.      Assessment and Plan  Acute deep vein thrombosis (DVT) of axillary vein of left upper extremity (CMS/HCC)  She had a provoked, catheter associated left upper extremity DVT.  Her Eliquis was temporarily held perioperatively  With the diagnosis of the DVT, she was started on the loading dose  of Eliquis of 10 mg twice a day for 7 days followed by 5 mg twice a day as maintenance therapy.  She had a follow-up ultrasound of the left upper extremity approximately 2 and half weeks later which showed recannulization of the left axillary vein well as evidence of occlusive and nonocclusive thrombus in both the axillary and subclavian vein.  There is no evidence of progression of her DVT.  We discussed that typically 6 weeks to 12 weeks anticoagulation is indicated for a provoked DVT; however she is on anticoagulation indefinitely for her atrial fibrillation.  Although her recent ultrasound showed an improvement in the DVT, she would feel more comfortable showing resolution or near resolution therefore given a prescription for repeat ultrasound to be done in 3 months.  We also discussed precautions to take moving forward.  She we will try a compression garment for the left upper extremity.  Also while traveling, especially driving, she will take a break every 2 hours so that she can move her upper extremities.  All of her and her daughter's questions were answered to their satisfaction.        Pallavi M. Rastogi, MD

## 2022-10-27 NOTE — ASSESSMENT & PLAN NOTE
She had a provoked, catheter associated left upper extremity DVT.  Her Eliquis was temporarily held perioperatively  With the diagnosis of the DVT, she was started on the loading dose of Eliquis of 10 mg twice a day for 7 days followed by 5 mg twice a day as maintenance therapy.  She had a follow-up ultrasound of the left upper extremity approximately 2 and half weeks later which showed recannulization of the left axillary vein well as evidence of occlusive and nonocclusive thrombus in both the axillary and subclavian vein.  There is no evidence of progression of her DVT.  We discussed that typically 6 weeks to 12 weeks anticoagulation is indicated for a provoked DVT; however she is on anticoagulation indefinitely for her atrial fibrillation.  Although her recent ultrasound showed an improvement in the DVT, she would feel more comfortable showing resolution or near resolution therefore given a prescription for repeat ultrasound to be done in 3 months.  We also discussed precautions to take moving forward.  She we will try a compression garment for the left upper extremity.  Also while traveling, especially driving, she will take a break every 2 hours so that she can move her upper extremities.  All of her and her daughter's questions were answered to their satisfaction.

## 2022-10-27 NOTE — LETTER
October 27, 2022                                                                                                                             Patient: Emily Ramirez   YOB: 1945   Date of Visit: 10/27/2022       Dear Dr. Meza:    Thank you for referring Emily Ramirez to me for evaluation at [facility] IN [place]. Attached is my assessment and plan of care.    If you have questions, please do not hesitate to call me. I look forward to following Emily Ramirez along with you.           Sincerely,        Pallavi M. Rastogi, MD    CC: No Recipients      
no

## 2022-12-13 ENCOUNTER — HOSPITAL ENCOUNTER (OUTPATIENT)
Dept: PULMONOLOGY | Facility: HOSPITAL | Age: 77
Discharge: HOME | End: 2022-12-13
Attending: INTERNAL MEDICINE
Payer: MEDICARE

## 2022-12-13 DIAGNOSIS — Z79.899 AT RISK FOR AMIODARONE TOXICITY WITH LONG TERM USE: ICD-10-CM

## 2022-12-13 DIAGNOSIS — Z79.899 ENCOUNTER FOR MONITORING AMIODARONE THERAPY: ICD-10-CM

## 2022-12-13 DIAGNOSIS — Z51.81 ENCOUNTER FOR MONITORING AMIODARONE THERAPY: ICD-10-CM

## 2022-12-13 DIAGNOSIS — Z91.89 AT RISK FOR AMIODARONE TOXICITY WITH LONG TERM USE: ICD-10-CM

## 2022-12-13 PROCEDURE — 94010 BREATHING CAPACITY TEST: CPT

## 2022-12-13 PROCEDURE — 94726 PLETHYSMOGRAPHY LUNG VOLUMES: CPT

## 2022-12-13 PROCEDURE — 94729 DIFFUSING CAPACITY: CPT

## 2023-01-24 ENCOUNTER — TELEPHONE (OUTPATIENT)
Dept: HEMATOLOGY/ONCOLOGY | Facility: HOSPITAL | Age: 78
End: 2023-01-24
Payer: MEDICARE

## 2023-01-24 ENCOUNTER — HOSPITAL ENCOUNTER (OUTPATIENT)
Dept: RADIOLOGY | Facility: HOSPITAL | Age: 78
Discharge: HOME | End: 2023-01-24
Attending: INTERNAL MEDICINE
Payer: MEDICARE

## 2023-01-24 DIAGNOSIS — I82.A12 ACUTE DEEP VEIN THROMBOSIS (DVT) OF AXILLARY VEIN OF LEFT UPPER EXTREMITY (CMS/HCC): ICD-10-CM

## 2023-01-24 PROCEDURE — 93971 EXTREMITY STUDY: CPT | Mod: LT

## 2023-01-24 NOTE — TELEPHONE ENCOUNTER
Rastogi, Pallavi M, MD  P  [facility] Triage Nurse  Please let her know that the ultrasound shows resolution of her DVT.     She is to remain on Eliquis indefinitely for her atrial fibrillation; therefore no change to our plan     Called patient and provided feedback as detailed above. She verbalized understanding of plan of care.

## 2023-06-26 ENCOUNTER — HOSPITAL ENCOUNTER (EMERGENCY)
Facility: HOSPITAL | Age: 78
Discharge: HOME | End: 2023-06-26
Attending: EMERGENCY MEDICINE | Admitting: EMERGENCY MEDICINE
Payer: MEDICARE

## 2023-06-26 ENCOUNTER — APPOINTMENT (EMERGENCY)
Dept: RADIOLOGY | Facility: HOSPITAL | Age: 78
End: 2023-06-26
Payer: MEDICARE

## 2023-06-26 VITALS
HEIGHT: 63 IN | RESPIRATION RATE: 16 BRPM | SYSTOLIC BLOOD PRESSURE: 133 MMHG | OXYGEN SATURATION: 98 % | TEMPERATURE: 97.8 F | WEIGHT: 174 LBS | HEART RATE: 77 BPM | BODY MASS INDEX: 30.83 KG/M2 | DIASTOLIC BLOOD PRESSURE: 62 MMHG

## 2023-06-26 DIAGNOSIS — M25.551 RIGHT HIP PAIN: ICD-10-CM

## 2023-06-26 DIAGNOSIS — M25.511 ACUTE PAIN OF RIGHT SHOULDER: ICD-10-CM
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
## 2023-06-26 PROCEDURE — 73000 X-RAY EXAM OF COLLAR BONE: CPT | Mod: RT

## 2023-06-26 PROCEDURE — 73502 X-RAY EXAM HIP UNI 2-3 VIEWS: CPT | Mod: RT

## 2023-06-26 PROCEDURE — 99284 EMERGENCY DEPT VISIT MOD MDM: CPT | Mod: 25

## 2023-06-26 PROCEDURE — 72125 CT NECK SPINE W/O DYE: CPT

## 2023-06-26 PROCEDURE — G1004 CDSM NDSC: HCPCS

## 2023-06-26 RX ORDER — SIMVASTATIN 40 MG/1
40 TABLET, FILM COATED ORAL EVERY EVENING
COMMUNITY
Start: 2023-05-25

## 2023-06-26 ASSESSMENT — ENCOUNTER SYMPTOMS
HEADACHES: 0
ARTHRALGIAS: 1
HIP PAIN: 1
JOINT SWELLING: 0
LIGHT-HEADEDNESS: 0
DIZZINESS: 0

## 2023-06-27 NOTE — DISCHARGE INSTRUCTIONS
As discussed, the CT scan of your head and neck were reassuring as well as the x-rays of your right shoulder/clavicle region and your right hip.  Rest, ice, and use over-the-counter medications for pain control.  Please return if you have any worsening or concerning symptoms.

## 2023-06-27 NOTE — ED ATTESTATION NOTE
Procedures  Physical Exam  Review of Systems    6/26/202311:17 PM  I have personally seen and examined the patient.  I reviewed and agree with the PA/NP/Resident's assessment and plan of care.    Vital Signs Review: Vital signs have been reviewed. The oxygen saturation is  SpO2: 99 % which is  Normal    My examination, assessment, and plan of care of Emily Ramirez is as follows:      Patient Presents with ground-level fall denies hitting her head denies any fevers or chills no lightheaded dizziness prior.  Gave her cane to her family member and then tried to walk without it and tripped and fell    Exam: well appearing, alert, lungs ctab with no distress and no obvious murmurs good distal pulses all 4 extremities      Impression/Plan: She comes in as a tier 3 trauma was seen with physician assistant.  Patient has good distal pulses all 4 extremities x-rays are negative and CT scans are negative patient will be discharged at this time      Please refer to work-up tab for further management and follow-up on laboratory and imaging evaluations           History Provided by - Patient  While here I have Reviewed and interpreted xrays and Reviewed CT imaging results      I was physically present for the key/critical portions of the procedures documented by WILFREDO    This document was created using dragon dictation software.  There might be some typographical errors due to this technology.       Javier Chan MD  06/27/23 [id number]

## 2023-06-27 NOTE — ED ATTESTATION NOTE
The patient was seen primarily by the WILFREDO.    I was available for consult on this patient, but did not see nor evaluate the patient.     Vital Signs Review: Vital signs have been reviewed.     The oxygen saturation is  SpO2: 99 % which is Normal     I was involved in a complex trauma at this time and did not get pulled out to see or evaluate the patient.        Javier Chan MD  06/26/23 3436

## 2023-06-27 NOTE — ED PROVIDER NOTES
Emergency Medicine Note  HPI   HISTORY OF PRESENT ILLNESS     Patient is a 78-year-old female presenting to the emergency department stating that earlier today she tripped and fell onto her right side while on the sidewalk cement outside and has been having right shoulder and right hip pain since.  Patient is on thinners.  Daughter reports that she believes that she hit her head.  Patient without headache, dizziness, lightheadedness, and neck pain.       History provided by:  Patient   used: No          Patient History   PAST HISTORY     Reviewed from Nursing Triage:       Past Medical History:   Diagnosis Date   • Aortic regurgitation    • Arthritis    • Chronic kidney disease    • Coronary artery disease    • Diverticulitis of colon    • History of loop recorder    • Hypertension    • Lipid disorder    • Mitral regurgitation    • Pancreatitis    • SVT (supraventricular tachycardia) (CMS/HCC)        Past Surgical History:   Procedure Laterality Date   • CARDIAC SURGERY     • INSERT / REPLACE / REMOVE PACEMAKER     • JOINT REPLACEMENT     • SKIN BIOPSY         History reviewed. No pertinent family history.    Social History     Tobacco Use   • Smoking status: Never   • Smokeless tobacco: Never   Substance Use Topics   • Alcohol use: Not Currently     Alcohol/week: 1.0 standard drink of alcohol     Types: 1 Glasses of wine per week     Comment: 1x/month   • Drug use: Not Currently         Review of Systems   REVIEW OF SYSTEMS     Review of Systems   Musculoskeletal: Positive for arthralgias. Negative for joint swelling.   Neurological: Negative for dizziness, light-headedness and headaches.   All other systems reviewed and are negative.        VITALS     ED Vitals    Date/Time Temp Pulse Resp BP SpO2 [facility]   06/26/23 2126 36.3 °C (97.3 °F) 68 18 157/80 99 % MJC                       Physical Exam   PHYSICAL EXAM     Physical Exam  Vitals and nursing note reviewed.   Constitutional:       General:  She is not in acute distress.     Appearance: Normal appearance. She is not ill-appearing, toxic-appearing or diaphoretic.   HENT:      Head: Normocephalic and atraumatic.   Cardiovascular:      Pulses: Normal pulses.      Comments: Distal pulses intact RUE and RLE  Pulmonary:      Effort: Pulmonary effort is normal. No respiratory distress.   Musculoskeletal:         General: Tenderness present. No swelling, deformity or signs of injury.      Cervical back: Normal range of motion. No tenderness.      Comments: Tenderness to lateral R hip.     Tenderness to R AC joint. No deformity.    Skin:     General: Skin is warm and dry.      Capillary Refill: Capillary refill takes less than 2 seconds.   Neurological:      General: No focal deficit present.      Mental Status: She is alert and oriented to person, place, and time.      Comments: GCS 15   Psychiatric:         Mood and Affect: Mood normal.         Behavior: Behavior normal.           PROCEDURES     Procedures     DATA     Results     None          Imaging Results          X-RAY CLAVICLE RIGHT (Preliminary result)  Result time 06/26/23 22:47:33    Preliminary Interpretation    No acute abnormalities. RW                             X-RAY HIP WITH OR WITHOUT PELVIS 2-3 VW RIGHT (Preliminary result)  Result time 06/26/23 22:47:36    Preliminary Interpretation    No acute abnormalities. RW                             CT HEAD WITHOUT IV CONTRAST (Preliminary result)  Result time 06/26/23 22:47:53    Preliminary Interpretation    Preliminary Impression:    CT head:  No acute intracranial findings.  Intracranial vasculature atherosclerotic calcification and parenchymal volume loss. Multiple patchy hypodensities, likely sequela of chronic small vessel disease.  Complete opacification of the right sphenoid sinus.    CT cervical spine:  No acute fracture or dislocation.  Degenerative changes throughout the cervical spine.      Interpreted by: Yamel Bray DO, Jun 26,  2023 10:45 PM   Preliminary Impression:    CT head:  No acute intracranial findings.  Intracranial vasculature atherosclerotic calcification and parenchymal volume loss. Multiple patchy hypodensities, likely sequela of chronic small vessel disease.  Complete opacification of the right sphenoid sinus.    CT cervical spine:  No acute fracture or dislocation.  Degenerative changes throughout the cervical spine.      Interpreted by: Yamel Bray DO, Jun 26, 2023 10:45 PM                                CT CERVICAL SPINE WITHOUT IV CONTRAST (Preliminary result)  Result time 06/26/23 22:47:56    Preliminary Interpretation    Preliminary Impression:    CT head:  No acute intracranial findings.  Intracranial vasculature atherosclerotic calcification and parenchymal volume loss. Multiple patchy hypodensities, likely sequela of chronic small vessel disease.  Complete opacification of the right sphenoid sinus.    CT cervical spine:  No acute fracture or dislocation.  Degenerative changes throughout the cervical spine.      Interpreted by: Yamel Bray DO, Jun 26, 2023 10:45 PM                                 No orders to display       Scoring tools                                  ED Course & MDM   MDM / ED COURSE / CLINICAL IMPRESSION / DISPO     Medical Decision Making  Patient is a 78-year-old female presenting to the emergency department stating that earlier today she tripped and fell onto her right side while on the sidewalk cement outside and has been having right shoulder and right hip pain since.  Patient is on thinners.  Daughter reports that she believes that she hit her head.  Patient without headache, dizziness, lightheadedness, and neck pain. CT head and cervical spine negative. My interpretation of the x-rays show no acute abnormalities. Discussed rest, ice, and use of OTC medications as needed for pain. Discussed return if she has any worsening or concerning symptoms. Patient and family both agreed  to and verbalized understanding to treatment plan.    Amount and/or Complexity of Data Reviewed  Radiology: ordered and independent interpretation performed.             Clinical Impression      Fall, initial encounter   Acute pain of right shoulder   Right hip pain     _________________     ED Disposition   Discharge                   Bird Fonseca PA C  06/26/23 7258

## 2023-08-26 NOTE — PLAN OF CARE
Problem: Adult Inpatient Plan of Care  Goal: Plan of Care Review  Outcome: Progressing  Flowsheets (Taken 9/23/2022 1415)  Progress: improving  Plan of Care Reviewed With: patient  Outcome Summary: pt seen for physical therapy re-evaluation. [facility] 18. pt supervision supine>sit. pt close supervision sit<>stand. pt ambulation 12'x1 and 30'x1 w/ RW close supervision needed for pt reported slight PASCUAL. pt ambulated w/ left foot slightly externally rotated and crossing midline. pt limited by dec strength, dec endurance, and dec balance. due to pt being below their baseline PLOF, recommend continued skileld acute PT in order to maximize fxl mobility prior to d/c. recommend home with home health and inc family assistance upon d/c when medically stable.  Goal: Patient-Specific Goal (Individualized)  Outcome: Progressing     Problem: Mobility Impairment  Goal: Optimal Mobility [place] and Safety  Outcome: Progressing      pt presents w/ wrapped arm states there is no break or fracture feels that his left thumb and pointer are swollen

## 2024-02-20 ENCOUNTER — TRANSCRIBE ORDERS (OUTPATIENT)
Dept: SCHEDULING | Age: 79
End: 2024-02-20

## 2024-02-20 DIAGNOSIS — K44.9 DIAPHRAGMATIC HERNIA WITHOUT OBSTRUCTION OR GANGRENE: Primary | ICD-10-CM

## 2024-02-29 ENCOUNTER — HOSPITAL ENCOUNTER (OUTPATIENT)
Dept: RADIOLOGY | Facility: HOSPITAL | Age: 79
Discharge: HOME | End: 2024-02-29
Attending: INTERNAL MEDICINE
Payer: MEDICARE

## 2024-02-29 DIAGNOSIS — K44.9 DIAPHRAGMATIC HERNIA WITHOUT OBSTRUCTION OR GANGRENE: ICD-10-CM

## 2024-02-29 PROCEDURE — 74246 X-RAY XM UPR GI TRC 2CNTRST: CPT

## 2024-04-17 ENCOUNTER — TRANSCRIBE ORDERS (OUTPATIENT)
Dept: SCHEDULING | Age: 79
End: 2024-04-17

## 2024-04-17 DIAGNOSIS — I48.0 PAROXYSMAL ATRIAL FIBRILLATION (CMS/HCC): ICD-10-CM

## 2024-04-17 DIAGNOSIS — Z95.0 PRESENCE OF CARDIAC PACEMAKER: Primary | ICD-10-CM

## 2024-04-17 DIAGNOSIS — I49.5 SICK SINUS SYNDROME (CMS/HCC): ICD-10-CM

## 2024-04-23 ENCOUNTER — HOSPITAL ENCOUNTER (OUTPATIENT)
Dept: PULMONOLOGY | Facility: HOSPITAL | Age: 79
Discharge: HOME | End: 2024-04-23
Attending: NURSE PRACTITIONER
Payer: MEDICARE

## 2024-04-23 PROBLEM — Z95.0 PRESENCE OF CARDIAC PACEMAKER: Status: ACTIVE | Noted: 2024-04-23

## 2024-04-23 PROCEDURE — 94010 BREATHING CAPACITY TEST: CPT

## 2024-04-23 PROCEDURE — 94729 DIFFUSING CAPACITY: CPT

## 2024-08-14 NOTE — Clinical Note
Called and LM at the [phone] number. No VM set up on [phone].   Received a fax that Jerod needs to re enroll iin the [employer] program before 11/08/2024 to avoid a lapse in support.     Will fill out a new application    The Pacemaker [place] Xt Dr Mri device was inserted. The leads were placed into the connector and visually verified to be in correct position.

## 2024-08-15 NOTE — PLAN OF CARE
Plan of Care Review  Plan of Care Reviewed With: patient  Progress: no change  Outcome Summary: Pt AAOx4. Pt. NSR on the monitor. Echo comleted today. Pt. to have pacemaker placed on 7/21; will e NPO at midnight; eliquis is on hold. Pt. has purwick in place. VSS. Pt. denies any pain, dizziness or lightheadedness. Fall precautions maintained. Call bell within reach.   Weakness

## 2025-08-11 ENCOUNTER — HOSPITAL ENCOUNTER (OUTPATIENT)
Dept: PULMONOLOGY | Facility: HOSPITAL | Age: 80
Discharge: HOME | End: 2025-08-11
Attending: NURSE PRACTITIONER
Payer: MEDICARE

## 2025-08-11 PROBLEM — R06.02 SHORTNESS OF BREATH: Status: ACTIVE | Noted: 2025-08-11

## 2025-08-11 PROBLEM — R00.2 PALPITATIONS: Status: ACTIVE | Noted: 2025-08-11

## 2025-08-11 PROCEDURE — 94010 BREATHING CAPACITY TEST: CPT

## 2025-08-11 PROCEDURE — 94729 DIFFUSING CAPACITY: CPT

## (undated) DEVICE — CORD LAPAROSCOPIC DISP STERILE

## (undated) DEVICE — TROCAR SLEEVE 5MM

## (undated) DEVICE — APPLICATOR FLOSEAL ENDOSCOPIC

## (undated) DEVICE — CATH 6FR FL3.5

## (undated) DEVICE — SEALANT SURGICAL FLOSEAL 10ML STERILE PREP

## (undated) DEVICE — ***USE 138152*** ENDOPOUCH RETRIEVE 10MM

## (undated) DEVICE — ***USE 56941*** SUTURE VICRYL 0 J603H UR-6

## (undated) DEVICE — GUIDEWIRE DIAGNOSTIC J .035. 150 (ORDER IN 5'S)

## (undated) DEVICE — SHEATHS DELIVERY C315

## (undated) DEVICE — CATH BALLOON NC QUANTUM APEX MR 2.75MM X 12MM

## (undated) DEVICE — MASTISOL LIQUID ADHESIVE

## (undated) DEVICE — KIT CATH LAB ANGIO

## (undated) DEVICE — STRYKEFLOW 2 W/ DISP TIP

## (undated) DEVICE — SUTURE MONOCRYL 4-0 Y426H PS-2 27IN

## (undated) DEVICE — GUIDEWIRE BMW UNIVERSAL 190CM "J"

## (undated) DEVICE — STERISTRIP 1/2INX4IN

## (undated) DEVICE — ELECTRODE CAUTERY E-Z CLEAN DISP

## (undated) DEVICE — TROCAR BLADED 5 X 100MM Z-THREAD

## (undated) DEVICE — GUIDEWIRE REGULAR STANDARD ANGLE TIP 035 DIAMETER 180 CM LON

## (undated) DEVICE — ***USE 121412***PACK DEVICE IMPLANT TLH

## (undated) DEVICE — MANIFOLD SINGLE PORT NEPTUNE

## (undated) DEVICE — APPLICATOR CHLORAPREP 26ML ORANGE TINT

## (undated) DEVICE — SUTURE VICRYL 3-0 J416H SH UNDYED

## (undated) DEVICE — PENEVAC1 NONSTICK SMOKE EVAC

## (undated) DEVICE — Device

## (undated) DEVICE — BLADE SCALPEL #11

## (undated) DEVICE — TIPS SCISSOR MICROLINE LAP

## (undated) DEVICE — CATH GUIDING 6FR .070 XB 3.5

## (undated) DEVICE — ***USE 141919***CATH BALLOON NC TREK 2.5X15MM

## (undated) DEVICE — PACK RFID LAP CHOLE PMH

## (undated) DEVICE — GUIDEWIRE DIAGNOSTIC 035-260 EXCHANGE

## (undated) DEVICE — SHEATH ULTIMUM 6FR ACT 12CM 10/BX

## (undated) DEVICE — ***USE 141922***CATH BALLOON NC TREK 2.75X20MM

## (undated) DEVICE — SET MICROPUNCTURE INTRODUCER

## (undated) DEVICE — SOLN IRRIG STER WATER 1000ML

## (undated) DEVICE — CATH D 6F FR4 100CM

## (undated) DEVICE — PAD GROUND ELECTROSURGICAL W/CORD

## (undated) DEVICE — ***USE 140986*** DRAPE LAPAROTOMY W/POUCHES STE

## (undated) DEVICE — CATH GUIDING 6FR .070 JR 4

## (undated) DEVICE — MANIFOLD FOUR PORT NEPTUNE

## (undated) DEVICE — TOWEL DISPOSABLE OR

## (undated) DEVICE — ***USE 138711*** SUTURE VICRYL 0 J112T TIES 18IN

## (undated) DEVICE — ***USE 57698*** SLEEVE FLOWTRON DVT CALF SINGLE USE

## (undated) DEVICE — TUBING INSUFFLATION PNEUMOSURE HIGH FLOW

## (undated) DEVICE — TRAY PERICARDIOCENTESIS

## (undated) DEVICE — CATH BALLOON TREK RX 2.25 X 12MM

## (undated) DEVICE — GRASPER GATOR MINILAP

## (undated) DEVICE — SHEATH PRELUDE SNAP 7FR 13CM

## (undated) DEVICE — SHEATH PRELUDE SNAP 9FR 13CM

## (undated) DEVICE — ***USE 69386*** APPLIER ENDO CLIP LIGAMAX 5MM

## (undated) DEVICE — TR BAND LONG

## (undated) DEVICE — CATH SWANGANZ 6FR 2LUMEN

## (undated) DEVICE — CATH BALLOON NC QUANTUM APEX MR 3.00MM X 20MM

## (undated) DEVICE — GLIDESHEATH SLENDER SS (.021) 6FR 10CM

## (undated) DEVICE — TROCAR 1ST ENTRY 5 X 100MM ADV Z-THREAD

## (undated) DEVICE — SOLN IRRIG .9%SOD 1000ML

## (undated) DEVICE — STERISTRIP 1/4INX 3IN

## (undated) DEVICE — TROCAR BLUNT TIP 12 X 100MM

## (undated) DEVICE — TIP BOVIE BLADE COATED INSULATED 2.75IN

## (undated) DEVICE — CATH 6FR FL4

## (undated) DEVICE — DISSECTOR ENDO BLUNT TIP 5MM

## (undated) DEVICE — SHEATH PRELUDE SNAP 7FR 25CM

## (undated) DEVICE — GUIDEWIRE BMW .014 190CM J TIP

## (undated) DEVICE — EVACUATOR PLUME-AWAY LAP SMOKE PKG

## (undated) DEVICE — GAUZE 8X4 12 PLY RFID DOUBLE XRAY